# Patient Record
Sex: FEMALE | Race: WHITE | NOT HISPANIC OR LATINO | Employment: OTHER | URBAN - METROPOLITAN AREA
[De-identification: names, ages, dates, MRNs, and addresses within clinical notes are randomized per-mention and may not be internally consistent; named-entity substitution may affect disease eponyms.]

---

## 2023-03-31 ENCOUNTER — OFFICE VISIT (OUTPATIENT)
Dept: PRIMARY CARE | Facility: CLINIC | Age: 43
End: 2023-03-31
Payer: COMMERCIAL

## 2023-03-31 VITALS
WEIGHT: 233 LBS | OXYGEN SATURATION: 99 % | TEMPERATURE: 96.8 F | DIASTOLIC BLOOD PRESSURE: 70 MMHG | HEART RATE: 67 BPM | SYSTOLIC BLOOD PRESSURE: 118 MMHG | BODY MASS INDEX: 37.61 KG/M2

## 2023-03-31 DIAGNOSIS — Z12.31 ENCOUNTER FOR SCREENING MAMMOGRAM FOR MALIGNANT NEOPLASM OF BREAST: ICD-10-CM

## 2023-03-31 DIAGNOSIS — T75.3XXA MOTION SICKNESS, INITIAL ENCOUNTER: ICD-10-CM

## 2023-03-31 DIAGNOSIS — E66.09 CLASS 2 OBESITY DUE TO EXCESS CALORIES WITHOUT SERIOUS COMORBIDITY WITH BODY MASS INDEX (BMI) OF 37.0 TO 37.9 IN ADULT: Primary | ICD-10-CM

## 2023-03-31 DIAGNOSIS — E61.1 IRON DEFICIENCY: ICD-10-CM

## 2023-03-31 PROBLEM — E66.812 CLASS 2 OBESITY DUE TO EXCESS CALORIES WITHOUT SERIOUS COMORBIDITY WITH BODY MASS INDEX (BMI) OF 37.0 TO 37.9 IN ADULT: Status: ACTIVE | Noted: 2023-03-31

## 2023-03-31 PROCEDURE — 99214 OFFICE O/P EST MOD 30 MIN: CPT | Performed by: FAMILY MEDICINE

## 2023-03-31 PROCEDURE — 1036F TOBACCO NON-USER: CPT | Performed by: FAMILY MEDICINE

## 2023-03-31 RX ORDER — MECLIZINE HCL 12.5 MG 12.5 MG/1
12.5 TABLET ORAL 3 TIMES DAILY PRN
Qty: 60 TABLET | Refills: 0 | Status: SHIPPED | OUTPATIENT
Start: 2023-03-31 | End: 2023-04-20

## 2023-03-31 NOTE — PROGRESS NOTES
Assessment     ASSESSMENT/PLAN:      Problem List Items Addressed This Visit          Endocrine/Metabolic    Iron deficiency    Relevant Orders    CBC and Auto Differential    Iron and TIBC    Class 2 obesity due to excess calories without serious comorbidity with body mass index (BMI) of 37.0 to 37.9 in adult - Primary    Relevant Orders    Tsh With Reflex To Free T4 If Abnormal    Hemoglobin A1c    Lipid panel    Follow Up In [facility] - PCP     Other Visit Diagnoses       Encounter for screening mammogram for malignant neoplasm of breast        Relevant Orders    BI mammo bilateral screening tomosynthesis    Motion sickness, initial encounter        Relevant Medications    meclizine (Antivert) 12.5 mg tablet            Patient Instructions:  Patient Instructions   Class 2 Obesity: increase cardio to 45 min 5 days/week, continue calorie deficit , get labs   Preventative: mammogram ordered      Signed by: Chelsie Cooney DO       FUTURE DIRECTION:   Recheck weight, review weight loss options and labs   Needs pap this year     Subjective   SUBJECTIVE:     HPI : Patient is a 42 y.o. female who presents today for the following:     LBP: improved with exercise     GERD  Controlled with diet     Obesity   States that she has been working on calorie deficit for past month, doing a weight lost program through an [facility]   Has been weightlifting and increased cardio 30 min daily     CAMDEN:   Doing better with plant based iron supplements, does not feel fatigue     Motion sickness:  States that she will be traveling to [facility] and would like to try meds to help with motion sickness         Review of Systems    History reviewed. No pertinent past medical history.     History reviewed. No pertinent surgical history.     Current Outpatient Medications   Medication Instructions    meclizine (ANTIVERT) 12.5 mg, oral, 3 times daily PRN        No Known Allergies     Social History     Socioeconomic History    Marital  status:      Spouse name: Not on file    Number of children: Not on file    Years of education: Not on file    Highest education level: Not on file   Occupational History    Not on file   Tobacco Use    Smoking status: Never    Smokeless tobacco: Never   Vaping Use    Vaping status: Not on file   Substance and Sexual Activity    Alcohol use: Not on file    Drug use: Not on file    Sexual activity: Not on file   Other Topics Concern    Not on file   Social History Narrative    Not on file     Social Determinants of Health     Financial Resource Strain: Not on file   Food Insecurity: Not on file   Transportation Needs: Not on file   Physical Activity: Not on file   Stress: Not on file   Social Connections: Not on file   Intimate Partner Violence: Not on file   Housing Stability: Not on file        Family History   Problem Relation Name Age of Onset    Colon cancer Mother      Hyperlipidemia Father      Glaucoma Brother      Breast cancer Father's Sister          Objective     OBJECTIVE:     Vitals:    03/31/23 0855   BP: 118/70   Pulse: 67   Temp: 36 °C (96.8 °F)   SpO2: 99%   Weight: 106 kg (233 lb)   Body mass index is 37.61 kg/m².    Physical Exam  HENT:      Head: Normocephalic and atraumatic.      Nose: Nose normal.      Mouth/Throat:      Mouth: Mucous membranes are moist.   Eyes:      Pupils: Pupils are equal, round, and reactive to light.   Cardiovascular:      Rate and Rhythm: Normal rate and regular rhythm.      Pulses: Normal pulses.      Heart sounds: No murmur heard.  Pulmonary:      Effort: Pulmonary effort is normal.      Breath sounds: Normal breath sounds.   Abdominal:      Tenderness: There is no abdominal tenderness.   Musculoskeletal:         General: Normal range of motion.      Cervical back: Normal range of motion.   Skin:     General: Skin is warm and dry.   Neurological:      Mental Status: She is alert.   Psychiatric:         Mood and Affect: Mood normal.

## 2023-03-31 NOTE — PATIENT INSTRUCTIONS
Class 2 Obesity: increase cardio to 45 min 5 days/week, continue calorie deficit , get labs   Preventative: mammogram ordered

## 2023-04-04 ENCOUNTER — LAB (OUTPATIENT)
Dept: LAB | Facility: LAB | Age: 43
End: 2023-04-04
Payer: COMMERCIAL

## 2023-04-04 ENCOUNTER — TELEPHONE (OUTPATIENT)
Dept: PRIMARY CARE | Facility: CLINIC | Age: 43
End: 2023-04-04

## 2023-04-04 DIAGNOSIS — E61.1 IRON DEFICIENCY: ICD-10-CM

## 2023-04-04 DIAGNOSIS — E66.09 CLASS 2 OBESITY DUE TO EXCESS CALORIES WITHOUT SERIOUS COMORBIDITY WITH BODY MASS INDEX (BMI) OF 37.0 TO 37.9 IN ADULT: ICD-10-CM

## 2023-04-04 LAB
BASOPHILS (10*3/UL) IN BLOOD BY AUTOMATED COUNT: 0.06 X10E9/L (ref 0–0.1)
BASOPHILS/100 LEUKOCYTES IN BLOOD BY AUTOMATED COUNT: 0.8 % (ref 0–2)
CHOLESTEROL (MG/DL) IN SER/PLAS: 176 MG/DL (ref 0–199)
CHOLESTEROL IN HDL (MG/DL) IN SER/PLAS: 30.1 MG/DL
CHOLESTEROL/HDL RATIO: 5.8
EOSINOPHILS (10*3/UL) IN BLOOD BY AUTOMATED COUNT: 0.32 X10E9/L (ref 0–0.7)
EOSINOPHILS/100 LEUKOCYTES IN BLOOD BY AUTOMATED COUNT: 4.4 % (ref 0–6)
ERYTHROCYTE DISTRIBUTION WIDTH (RATIO) BY AUTOMATED COUNT: 17.6 % (ref 11.5–14.5)
ERYTHROCYTE MEAN CORPUSCULAR HEMOGLOBIN CONCENTRATION (G/DL) BY AUTOMATED: 30 G/DL (ref 32–36)
ERYTHROCYTE MEAN CORPUSCULAR VOLUME (FL) BY AUTOMATED COUNT: 75 FL (ref 80–100)
ERYTHROCYTES (10*6/UL) IN BLOOD BY AUTOMATED COUNT: 4.51 X10E12/L (ref 4–5.2)
ESTIMATED AVERAGE GLUCOSE FOR HBA1C: 126 MG/DL
HEMATOCRIT (%) IN BLOOD BY AUTOMATED COUNT: 33.7 % (ref 36–46)
HEMOGLOBIN (G/DL) IN BLOOD: 10.1 G/DL (ref 12–16)
HEMOGLOBIN A1C/HEMOGLOBIN TOTAL IN BLOOD: 6 %
IMMATURE GRANULOCYTES/100 LEUKOCYTES IN BLOOD BY AUTOMATED COUNT: 0.7 % (ref 0–0.9)
IRON (UG/DL) IN SER/PLAS: 18 UG/DL (ref 35–150)
IRON BINDING CAPACITY (UG/DL) IN SER/PLAS: 384 UG/DL (ref 240–445)
IRON SATURATION (%) IN SER/PLAS: 5 % (ref 25–45)
LDL: 126 MG/DL (ref 0–99)
LEUKOCYTES (10*3/UL) IN BLOOD BY AUTOMATED COUNT: 7.4 X10E9/L (ref 4.4–11.3)
LYMPHOCYTES (10*3/UL) IN BLOOD BY AUTOMATED COUNT: 1.27 X10E9/L (ref 1.2–4.8)
LYMPHOCYTES/100 LEUKOCYTES IN BLOOD BY AUTOMATED COUNT: 17.3 % (ref 13–44)
MONOCYTES (10*3/UL) IN BLOOD BY AUTOMATED COUNT: 0.7 X10E9/L (ref 0.1–1)
MONOCYTES/100 LEUKOCYTES IN BLOOD BY AUTOMATED COUNT: 9.5 % (ref 2–10)
NEUTROPHILS (10*3/UL) IN BLOOD BY AUTOMATED COUNT: 4.95 X10E9/L (ref 1.2–7.7)
NEUTROPHILS/100 LEUKOCYTES IN BLOOD BY AUTOMATED COUNT: 67.3 % (ref 40–80)
OVALOCYTES PRESENCE IN BLOOD BY LIGHT MICROSCOPY: NORMAL
PLATELETS (10*3/UL) IN BLOOD AUTOMATED COUNT: 87 X10E9/L (ref 150–450)
POLYCHROMASIA IN BLOOD BY LIGHT MICROSCOPY: NORMAL
RBC MORPHOLOGY IN BLOOD: NORMAL
THYROTROPIN (MIU/L) IN SER/PLAS BY DETECTION LIMIT <= 0.05 MIU/L: 5.05 MIU/L (ref 0.44–3.98)
THYROXINE (T4) FREE (NG/DL) IN SER/PLAS: 0.66 NG/DL (ref 0.61–1.12)
TRIGLYCERIDE (MG/DL) IN SER/PLAS: 98 MG/DL (ref 0–149)
VLDL: 20 MG/DL (ref 0–40)

## 2023-04-04 PROCEDURE — 83550 IRON BINDING TEST: CPT

## 2023-04-04 PROCEDURE — 83540 ASSAY OF IRON: CPT

## 2023-04-04 PROCEDURE — 36415 COLL VENOUS BLD VENIPUNCTURE: CPT

## 2023-04-04 PROCEDURE — 84443 ASSAY THYROID STIM HORMONE: CPT

## 2023-04-04 PROCEDURE — 80061 LIPID PANEL: CPT

## 2023-04-04 PROCEDURE — 83036 HEMOGLOBIN GLYCOSYLATED A1C: CPT

## 2023-04-04 PROCEDURE — 85025 COMPLETE CBC W/AUTO DIFF WBC: CPT

## 2023-04-04 PROCEDURE — 84439 ASSAY OF FREE THYROXINE: CPT

## 2023-04-04 NOTE — TELEPHONE ENCOUNTER
----- Message from Felecia Yancey MA sent at 4/4/2023  1:59 PM EDT -----    ----- Message -----  From: Chelsie Cooney DO  Sent: 4/4/2023   1:57 PM EDT  To: #    Pt can schedule video or in office visit to discuss labs

## 2023-04-04 NOTE — TELEPHONE ENCOUNTER
----- Message from Chelsie Cooney DO sent at 4/4/2023 11:11 AM EDT -----  Can't find thyroxine(T4) level. Can you help call lab to see what the level was. This will help me with management

## 2023-04-04 NOTE — TELEPHONE ENCOUNTER
Called and spoke with Varghese at  Lab- he stated that the free t4 is resulted and it is 0.66. He stated that he will also fax over a copy of this result. Thanks, CG

## 2023-04-07 ENCOUNTER — TELEMEDICINE (OUTPATIENT)
Dept: PRIMARY CARE | Facility: CLINIC | Age: 43
End: 2023-04-07
Payer: COMMERCIAL

## 2023-04-07 DIAGNOSIS — E78.2 MIXED HYPERLIPIDEMIA: ICD-10-CM

## 2023-04-07 DIAGNOSIS — E03.9 HYPOTHYROIDISM, UNSPECIFIED TYPE: Primary | ICD-10-CM

## 2023-04-07 DIAGNOSIS — R73.01 IFG (IMPAIRED FASTING GLUCOSE): ICD-10-CM

## 2023-04-07 DIAGNOSIS — D50.9 IRON DEFICIENCY ANEMIA, UNSPECIFIED IRON DEFICIENCY ANEMIA TYPE: ICD-10-CM

## 2023-04-07 PROCEDURE — 99214 OFFICE O/P EST MOD 30 MIN: CPT | Performed by: FAMILY MEDICINE

## 2023-04-07 RX ORDER — LEVOTHYROXINE SODIUM 25 UG/1
12.5 TABLET ORAL DAILY
Qty: 90 TABLET | Refills: 0 | Status: SHIPPED | OUTPATIENT
Start: 2023-04-07 | End: 2023-07-18 | Stop reason: SDUPTHER

## 2023-04-07 NOTE — PATIENT INSTRUCTIONS
Hypothyroid: would like to try 1/2 tablet of levothyroxine, recheck thyroid in 6 weeks   IFG: work on diet, will continue to monitor   CAMDEN: will refer to hematology, iron low despite oral replacement

## 2023-04-07 NOTE — PROGRESS NOTES
Assessment     ASSESSMENT/PLAN:      Problem List Items Addressed This Visit    None  Visit Diagnoses       Hypothyroidism, unspecified type    -  Primary    Relevant Medications    levothyroxine (Synthroid, Levoxyl) 25 mcg tablet    Other Relevant Orders    Tsh With Reflex To Free T4 If Abnormal    Iron deficiency anemia, unspecified iron deficiency anemia type        Relevant Orders    Referral to Hematology    IFG (impaired fasting glucose)        Mixed hyperlipidemia                  Patient Instructions:  Patient Instructions   Hypothyroid: would like to try 1/2 tablet of levothyroxine, recheck thyroid in 6 weeks   IFG: work on diet, will continue to monitor   CAMDEN: will refer to hematology, iron low despite oral replacement       Patient consented to virtual encounter. If patient was felt to need in-person evaluation they were directed to the office or ED as appropriate. Total time spent interacting with patient was 10 minutes.     Patient presents today via video telemedicine encounter. This encounter is not taking place in person due to COVID.    Signed by: Chelsie Cooney DO       FUTURE DIRECTION:   Review TSH, may need to increase to 25mcg     Subjective     SUBJECTIVE:     HPI : Patient is a 42 y.o. female who presents today via video telemedicine encounter to discuss the following:      Review labs   Patient mentions concern of low thyroid, since she has a strong family history of low thyroid     LBP: improved with exercise      GERD  Controlled with diet      Obesity   States that she has been working on calorie deficit for past month, doing a weight lost program through an [facility]   Has been weightlifting and increased cardio 30 min daily      CAMDEN:   -plant based iron supplements, does not feel fatigue      Review of Systems  Negative unless stated above     Objective   OBJECTIVE:     There were no vitals filed for this visit.    Physical Exam  Constitutional:       Appearance: Normal appearance.    HENT:      Head: Normocephalic.   Pulmonary:      Effort: Pulmonary effort is normal.   Musculoskeletal:      Cervical back: Normal range of motion.   Neurological:      Mental Status: She is alert.   Psychiatric:         Mood and Affect: Mood normal.         The 10-year ASCVD risk score (Marcus ABBASI, et al., 2019) is: 1.2%    Values used to calculate the score:      Age: 42 years      Sex: Female      Is Non- : No      Diabetic: No      Tobacco smoker: No      Systolic Blood Pressure: 118 mmHg      Is BP treated: No      HDL Cholesterol: 30.1 mg/dL      Total Cholesterol: 176 mg/dL

## 2023-04-12 DIAGNOSIS — R92.8 ABNORMAL MAMMOGRAM: Primary | ICD-10-CM

## 2023-04-25 ENCOUNTER — TELEPHONE (OUTPATIENT)
Dept: PRIMARY CARE | Facility: CLINIC | Age: 43
End: 2023-04-25
Payer: COMMERCIAL

## 2023-04-25 NOTE — TELEPHONE ENCOUNTER
----- Message from Kourtney Chung MA sent at 4/25/2023  3:50 PM EDT -----    ----- Message -----  From: Chelsie Cooney DO  Sent: 4/25/2023   1:37 PM EDT  To: #    Please let pt know that mammogram is normal. Continue yearly screening.

## 2023-05-12 ENCOUNTER — TELEPHONE (OUTPATIENT)
Dept: PRIMARY CARE | Facility: CLINIC | Age: 43
End: 2023-05-12
Payer: COMMERCIAL

## 2023-05-12 NOTE — TELEPHONE ENCOUNTER
----- Message from Chelsie Cooney DO sent at 5/12/2023  4:19 PM EDT -----  Please let pt know that mammogram is normal. Continue yearly screening.

## 2023-05-30 ENCOUNTER — HOSPITAL ENCOUNTER (OUTPATIENT)
Dept: DATA CONVERSION | Facility: HOSPITAL | Age: 43
End: 2023-05-30
Attending: SURGERY | Admitting: SURGERY
Payer: COMMERCIAL

## 2023-05-30 DIAGNOSIS — K44.9 DIAPHRAGMATIC HERNIA WITHOUT OBSTRUCTION OR GANGRENE: ICD-10-CM

## 2023-05-30 DIAGNOSIS — D50.9 IRON DEFICIENCY ANEMIA, UNSPECIFIED: ICD-10-CM

## 2023-05-30 DIAGNOSIS — Z87.11 PERSONAL HISTORY OF PEPTIC ULCER DISEASE: ICD-10-CM

## 2023-05-30 DIAGNOSIS — K31.89 OTHER DISEASES OF STOMACH AND DUODENUM: ICD-10-CM

## 2023-05-30 DIAGNOSIS — R51.9 HEADACHE, UNSPECIFIED: ICD-10-CM

## 2023-05-30 DIAGNOSIS — K21.9 GASTRO-ESOPHAGEAL REFLUX DISEASE WITHOUT ESOPHAGITIS: ICD-10-CM

## 2023-05-30 DIAGNOSIS — E03.9 HYPOTHYROIDISM, UNSPECIFIED: ICD-10-CM

## 2023-05-30 DIAGNOSIS — R12 HEARTBURN: ICD-10-CM

## 2023-05-30 LAB — HCG, URINE: NEGATIVE

## 2023-06-02 LAB
COMPLETE PATHOLOGY REPORT: NORMAL
CONVERTED CLINICAL DIAGNOSIS-HISTORY: NORMAL
CONVERTED FINAL DIAGNOSIS: NORMAL
CONVERTED FINAL REPORT PDF LINK TO COPY AND PASTE: NORMAL
CONVERTED GROSS DESCRIPTION: NORMAL

## 2023-06-30 ENCOUNTER — LAB (OUTPATIENT)
Dept: LAB | Facility: LAB | Age: 43
End: 2023-06-30
Payer: COMMERCIAL

## 2023-06-30 DIAGNOSIS — E03.9 HYPOTHYROIDISM, UNSPECIFIED TYPE: ICD-10-CM

## 2023-06-30 LAB
ERYTHROCYTE DISTRIBUTION WIDTH (RATIO) BY AUTOMATED COUNT: 21.4 % (ref 11.5–14.5)
ERYTHROCYTE MEAN CORPUSCULAR HEMOGLOBIN CONCENTRATION (G/DL) BY AUTOMATED: 30.3 G/DL (ref 32–36)
ERYTHROCYTE MEAN CORPUSCULAR VOLUME (FL) BY AUTOMATED COUNT: 80 FL (ref 80–100)
ERYTHROCYTES (10*6/UL) IN BLOOD BY AUTOMATED COUNT: 4.41 X10E12/L (ref 4–5.2)
FERRITIN (UG/LL) IN SER/PLAS: 104 UG/L (ref 8–150)
HEMATOCRIT (%) IN BLOOD BY AUTOMATED COUNT: 35.3 % (ref 36–46)
HEMOGLOBIN (G/DL) IN BLOOD: 10.7 G/DL (ref 12–16)
IRON (UG/DL) IN SER/PLAS: 53 UG/DL (ref 35–150)
IRON BINDING CAPACITY (UG/DL) IN SER/PLAS: 318 UG/DL (ref 240–445)
IRON SATURATION (%) IN SER/PLAS: 17 % (ref 25–45)
LEUKOCYTES (10*3/UL) IN BLOOD BY AUTOMATED COUNT: 8.9 X10E9/L (ref 4.4–11.3)
OVALOCYTES PRESENCE IN BLOOD BY LIGHT MICROSCOPY: NORMAL
PLATELETS (10*3/UL) IN BLOOD AUTOMATED COUNT: 65 X10E9/L (ref 150–450)
PLATELETS GIANT PRESENCE IN BLOOD BY LIGHT MICROSCOPY: NORMAL
RBC MORPHOLOGY IN BLOOD: NORMAL
THYROTROPIN (MIU/L) IN SER/PLAS BY DETECTION LIMIT <= 0.05 MIU/L: 2.93 MIU/L (ref 0.44–3.98)

## 2023-06-30 PROCEDURE — 84443 ASSAY THYROID STIM HORMONE: CPT

## 2023-06-30 PROCEDURE — 36415 COLL VENOUS BLD VENIPUNCTURE: CPT

## 2023-07-14 ENCOUNTER — APPOINTMENT (OUTPATIENT)
Dept: PRIMARY CARE | Facility: CLINIC | Age: 43
End: 2023-07-14
Payer: COMMERCIAL

## 2023-07-18 ENCOUNTER — OFFICE VISIT (OUTPATIENT)
Dept: PRIMARY CARE | Facility: CLINIC | Age: 43
End: 2023-07-18
Payer: COMMERCIAL

## 2023-07-18 VITALS
BODY MASS INDEX: 38.41 KG/M2 | WEIGHT: 238 LBS | DIASTOLIC BLOOD PRESSURE: 70 MMHG | OXYGEN SATURATION: 98 % | TEMPERATURE: 97.1 F | SYSTOLIC BLOOD PRESSURE: 122 MMHG | HEART RATE: 68 BPM

## 2023-07-18 DIAGNOSIS — E66.09 CLASS 2 OBESITY DUE TO EXCESS CALORIES WITHOUT SERIOUS COMORBIDITY WITH BODY MASS INDEX (BMI) OF 37.0 TO 37.9 IN ADULT: ICD-10-CM

## 2023-07-18 DIAGNOSIS — E78.2 MIXED HYPERLIPIDEMIA: ICD-10-CM

## 2023-07-18 DIAGNOSIS — E61.1 IRON DEFICIENCY: ICD-10-CM

## 2023-07-18 DIAGNOSIS — L40.9 PSORIASIS: Primary | ICD-10-CM

## 2023-07-18 DIAGNOSIS — E03.9 HYPOTHYROIDISM, UNSPECIFIED TYPE: ICD-10-CM

## 2023-07-18 PROCEDURE — 3008F BODY MASS INDEX DOCD: CPT | Performed by: FAMILY MEDICINE

## 2023-07-18 PROCEDURE — 1036F TOBACCO NON-USER: CPT | Performed by: FAMILY MEDICINE

## 2023-07-18 PROCEDURE — 99214 OFFICE O/P EST MOD 30 MIN: CPT | Performed by: FAMILY MEDICINE

## 2023-07-18 RX ORDER — LEVOTHYROXINE SODIUM 25 UG/1
12.5 TABLET ORAL DAILY
Qty: 45 TABLET | Refills: 1 | Status: SHIPPED | OUTPATIENT
Start: 2023-07-18 | End: 2024-03-15 | Stop reason: SDUPTHER

## 2023-07-18 ASSESSMENT — ENCOUNTER SYMPTOMS: ARTHRALGIAS: 1

## 2023-07-18 NOTE — PROGRESS NOTES
Assessment     ASSESSMENT/PLAN:      Problem List Items Addressed This Visit          Cardiac and Vasculature    Mixed hyperlipidemia       Endocrine/Metabolic    Class 2 obesity due to excess calories without serious comorbidity with body mass index (BMI) of 37.0 to 37.9 in adult    Hypothyroidism    Relevant Medications    levothyroxine (Synthroid, Levoxyl) 25 mcg tablet       Hematology and Neoplasia    Iron deficiency     Other Visit Diagnoses       Psoriasis    -  Primary            Patient Instructions:  Patient Instructions   Obesity: Continue follow up with bariatric surgery.   Hypothyroid: reviewed recent labs and refilled meds   CAMDEN: continue follow up with hematology       Signed by: Chelsie Cooney DO       FUTURE DIRECTION:   None     Subjective   SUBJECTIVE:     HPI : Patient is a 42 y.o. female who presents today for the following:     LBP: improved with exercise      GERD  Controlled with diet, not interested in taking medication     Obesity   No improvement with home exercise regimen   Was seen by bariatric surgery, plans to move forward with surgery     Dermatology   - diagnosed with psoriasis, states that she has been using steroid cream      CAMDEN:   - follows hematology   - has been getting iron infusion, last one in June       Review of Systems   Musculoskeletal:  Positive for arthralgias.       History reviewed. No pertinent past medical history.     History reviewed. No pertinent surgical history.     Current Outpatient Medications   Medication Instructions    levothyroxine (SYNTHROID, LEVOXYL) 12.5 mcg, oral, Daily        No Known Allergies     Social History     Socioeconomic History    Marital status:      Spouse name: Not on file    Number of children: Not on file    Years of education: Not on file    Highest education level: Not on file   Occupational History    Not on file   Tobacco Use    Smoking status: Never    Smokeless tobacco: Never   Substance and Sexual Activity     Alcohol use: Not on file    Drug use: Not on file    Sexual activity: Not on file   Other Topics Concern    Not on file   Social History Narrative    Not on file     Social Determinants of Health     Financial Resource Strain: Not on file   Food Insecurity: Not on file   Transportation Needs: Not on file   Physical Activity: Not on file   Stress: Not on file   Social Connections: Not on file   Intimate Partner Violence: Not on file   Housing Stability: Not on file        Family History   Problem Relation Name Age of Onset    Colon cancer Mother      Hyperlipidemia Father      Glaucoma Brother      Breast cancer Father's Sister          Objective     OBJECTIVE:     Vitals:    07/18/23 0759   BP: 122/70   Pulse: 68   Temp: 36.2 °C (97.1 °F)   SpO2: 98%   Weight: 108 kg (238 lb)        Physical Exam  HENT:      Head: Normocephalic and atraumatic.      Nose: Nose normal.      Mouth/Throat:      Mouth: Mucous membranes are moist.   Eyes:      Pupils: Pupils are equal, round, and reactive to light.   Cardiovascular:      Rate and Rhythm: Normal rate and regular rhythm.      Pulses: Normal pulses.      Heart sounds: No murmur heard.  Pulmonary:      Effort: Pulmonary effort is normal.      Breath sounds: Normal breath sounds.   Abdominal:      Tenderness: There is no abdominal tenderness.   Musculoskeletal:         General: Normal range of motion.      Cervical back: Normal range of motion.      Right knee: Crepitus present. No swelling or effusion. Normal range of motion. No tenderness. No LCL laxity, MCL laxity or ACL laxity.      Instability Tests: Anterior drawer test negative. Posterior drawer test negative. Anterior Lachman test negative.      Left knee: Crepitus present. No swelling or effusion. Normal range of motion. No tenderness. No LCL laxity, MCL laxity, ACL laxity or PCL laxity.     Instability Tests: Anterior drawer test negative. Posterior drawer test negative. Anterior Lachman test negative.   Skin:      General: Skin is warm and dry.   Neurological:      Mental Status: She is alert.   Psychiatric:         Mood and Affect: Mood normal.

## 2023-07-18 NOTE — PATIENT INSTRUCTIONS
Obesity: Continue follow up with bariatric surgery.   Hypothyroid: reviewed recent labs and refilled meds   CAMDEN: continue follow up with hematology

## 2023-09-29 NOTE — PROGRESS NOTES
"Counseling:  The patient was counseled regarding diagnostic results, instructions for management, risk factor reductions, prognosis, patient and family education, impressions, risks and benefits of treatment options and importance of compliance with treatment.      Chief Complaint:   The patient presents today for 1-month followup of echocardiogram performed as part of preop clearance.      History Of Present Illness:    ANT MESA is a 42 year old female patient who presents today for 1-month followup of echocardiogram performed as part of preop clearance. Her PMH is significant for GERD and PHAN. Echocardiogram performed 09/26/2023 demonstrated an EF of 65%. Today, the patient states that she is feeling well with no new cardiac complaints.       Last Recorded Vitals:  Vitals:    10/02/23 1521   BP: 120/78   BP Location: Left arm   Pulse: 74   Weight: 105 kg (232 lb)   Height: 1.676 m (5' 6\")     Past Surgical History:  She has no past surgical history on file.      Social History:  She reports that she has quit smoking. Her smoking use included cigarettes. She has never used smokeless tobacco. She reports current alcohol use of about 1.0 standard drink of alcohol per week. She reports that she does not use drugs.    Family History:  Family History   Problem Relation Name Age of Onset    Colon cancer Mother      Hyperlipidemia Father      Glaucoma Brother      Breast cancer Father's Sister        Allergies:  Patient has no known allergies.    Outpatient Medications:  Current Outpatient Medications   Medication Instructions    Bryhali 0.01 % lotion     levothyroxine (SYNTHROID, LEVOXYL) 12.5 mcg, oral, Daily     Review of Systems   All other systems reviewed and are negative.     Physical Exam:  Constitutional:       Appearance: Healthy appearance. Not in distress.   Neck:      Vascular: No JVR. JVD normal.   Pulmonary:      Effort: Pulmonary effort is normal.      Breath sounds: Normal breath sounds. No " wheezing. No rhonchi. No rales.   Chest:      Chest wall: Not tender to palpatation.   Cardiovascular:      PMI at left midclavicular line. Normal rate. Regular rhythm. Normal S1. Normal S2.       Murmurs: There is no murmur.      No gallop.  No click. No rub.   Pulses:     Intact distal pulses.   Edema:     Peripheral edema absent.   Abdominal:      General: Bowel sounds are normal.      Palpations: Abdomen is soft.      Tenderness: There is no abdominal tenderness.   Musculoskeletal: Normal range of motion.         General: No tenderness. Skin:     General: Skin is warm and dry.   Neurological:      General: No focal deficit present.      Mental Status: Alert and oriented to person, place and time.     Last Labs:  CBC -  Lab Results   Component Value Date    WBC 8.0 09/30/2023    HGB 13.1 09/30/2023    HCT 40.3 09/30/2023    MCV 89 09/30/2023    PLT 55 (L) 09/30/2023       CMP -  Lab Results   Component Value Date    CALCIUM 8.4 (L) 09/30/2023    PROT 6.3 (L) 09/30/2023    ALBUMIN 4.0 09/30/2023    AST 11 09/30/2023    ALT 16 09/30/2023    ALKPHOS 56 09/30/2023    BILITOT 0.2 09/30/2023       LIPID PANEL -   Lab Results   Component Value Date    CHOL 183 09/30/2023    HDL 26.6 09/30/2023    CHHDL 6.9 09/30/2023    VLDL 15 09/30/2023    TRIG 74 09/30/2023    NHDL 156 (H) 09/30/2023       RENAL FUNCTION PANEL -   Lab Results   Component Value Date    K 4.2 09/30/2023       Lab Results   Component Value Date    HGBA1C 5.8 (H) 09/30/2023       Last Cardiology Tests:  09/26/2023 - TTE  Left ventricular systolic function is normal with a 65% estimated ejection fraction.     Diagnostic review: I have personally reviewed the result(s) of the Echocardiogram.    Assessment/Plan   1) Cardiovascular Risk Stratification Prior to Bariatric Surgery  H/O palpitations s/t anemia requiring 6 iron infusions  Denies CP, chest discomfort or SOB  No past h/o h/o HTN or diabetes  Sleep study 08/01/2023 with mild sleep apnea with a SpO2  karson of 86.0% - will be starting CPAP therapy   ECG with ? old anteroseptal infarct  TTE 09/26/2023 with LVEF 65%  Low risk for planned surgery  Followup as needed      Scribe Attestation  By signing my name below, I, Rene Rose   attest that this documentation has been prepared under the direction and in the presence of Gerson Barksdale MD.

## 2023-09-30 ENCOUNTER — LAB (OUTPATIENT)
Dept: LAB | Facility: LAB | Age: 43
End: 2023-09-30
Payer: COMMERCIAL

## 2023-09-30 DIAGNOSIS — Z98.84 BARIATRIC SURGERY STATUS: Primary | ICD-10-CM

## 2023-09-30 LAB
25(OH)D3 SERPL-MCNC: 26 NG/ML (ref 30–100)
ALBUMIN SERPL BCP-MCNC: 4 G/DL (ref 3.4–5)
ALP SERPL-CCNC: 56 U/L (ref 33–110)
ALT SERPL W P-5'-P-CCNC: 16 U/L (ref 7–45)
ANION GAP SERPL CALC-SCNC: 10 MMOL/L (ref 10–20)
APTT PPP: 35 SECONDS (ref 27–38)
AST SERPL W P-5'-P-CCNC: 11 U/L (ref 9–39)
BASOPHILS # BLD AUTO: 0.05 X10*3/UL (ref 0–0.1)
BASOPHILS NFR BLD AUTO: 0.6 %
BILIRUB SERPL-MCNC: 0.2 MG/DL (ref 0–1.2)
BUN SERPL-MCNC: 15 MG/DL (ref 6–23)
CALCIUM SERPL-MCNC: 8.4 MG/DL (ref 8.6–10.3)
CHLORIDE SERPL-SCNC: 107 MMOL/L (ref 98–107)
CHOLEST SERPL-MCNC: 183 MG/DL (ref 0–199)
CHOLESTEROL/HDL RATIO: 6.9
CO2 SERPL-SCNC: 25 MMOL/L (ref 21–32)
CREAT SERPL-MCNC: 0.74 MG/DL (ref 0.5–1.05)
EOSINOPHIL # BLD AUTO: 0.35 X10*3/UL (ref 0–0.7)
EOSINOPHIL NFR BLD AUTO: 4.4 %
ERYTHROCYTE [DISTWIDTH] IN BLOOD BY AUTOMATED COUNT: 15.9 % (ref 11.5–14.5)
FERRITIN SERPL-MCNC: 185 NG/ML (ref 8–150)
FOLATE SERPL-MCNC: 11.6 NG/ML
GFR SERPL CREATININE-BSD FRML MDRD: >90 ML/MIN/1.73M*2
GLUCOSE SERPL-MCNC: 102 MG/DL (ref 74–99)
HCT VFR BLD AUTO: 40.3 % (ref 36–46)
HDLC SERPL-MCNC: 26.6 MG/DL
HGB BLD-MCNC: 13.1 G/DL (ref 12–16)
IMM GRANULOCYTES # BLD AUTO: 0.1 X10*3/UL (ref 0–0.7)
IMM GRANULOCYTES NFR BLD AUTO: 1.3 % (ref 0–0.9)
INR PPP: 1 (ref 0.9–1.1)
IRON SATN MFR SERPL: 13 % (ref 25–45)
IRON SERPL-MCNC: 38 UG/DL (ref 35–150)
LDLC SERPL CALC-MCNC: 142 MG/DL (ref 140–190)
LYMPHOCYTES # BLD AUTO: 0.96 X10*3/UL (ref 1.2–4.8)
LYMPHOCYTES NFR BLD AUTO: 12 %
MCH RBC QN AUTO: 28.8 PG (ref 26–34)
MCHC RBC AUTO-ENTMCNC: 32.5 G/DL (ref 32–36)
MCV RBC AUTO: 89 FL (ref 80–100)
MONOCYTES # BLD AUTO: 0.57 X10*3/UL (ref 0.1–1)
MONOCYTES NFR BLD AUTO: 7.1 %
NEUTROPHILS # BLD AUTO: 5.97 X10*3/UL (ref 1.2–7.7)
NEUTROPHILS NFR BLD AUTO: 74.6 %
NON HDL CHOLESTEROL: 156 MG/DL (ref 0–149)
NRBC BLD-RTO: 0 /100 WBCS (ref 0–0)
PLATELET # BLD AUTO: 55 X10*3/UL (ref 150–450)
PMV BLD AUTO: ABNORMAL FL
POTASSIUM SERPL-SCNC: 4.2 MMOL/L (ref 3.5–5.3)
PROT SERPL-MCNC: 6.3 G/DL (ref 6.4–8.2)
PROTHROMBIN TIME: 11.7 SECONDS (ref 9.8–12.8)
RBC # BLD AUTO: 4.55 X10*6/UL (ref 4–5.2)
SODIUM SERPL-SCNC: 138 MMOL/L (ref 136–145)
TIBC SERPL-MCNC: 291 UG/DL (ref 240–445)
TRIGL SERPL-MCNC: 74 MG/DL (ref 0–149)
TSH SERPL-ACNC: 3.85 MIU/L (ref 0.44–3.98)
UIBC SERPL-MCNC: 253 UG/DL (ref 110–370)
VIT B12 SERPL-MCNC: 346 PG/ML (ref 211–911)
VLDL: 15 MG/DL (ref 0–40)
WBC # BLD AUTO: 8 X10*3/UL (ref 4.4–11.3)

## 2023-09-30 PROCEDURE — 36415 COLL VENOUS BLD VENIPUNCTURE: CPT

## 2023-09-30 PROCEDURE — 85610 PROTHROMBIN TIME: CPT

## 2023-09-30 PROCEDURE — 82607 VITAMIN B-12: CPT

## 2023-09-30 PROCEDURE — 80061 LIPID PANEL: CPT

## 2023-09-30 PROCEDURE — 82306 VITAMIN D 25 HYDROXY: CPT

## 2023-09-30 PROCEDURE — 83036 HEMOGLOBIN GLYCOSYLATED A1C: CPT

## 2023-09-30 PROCEDURE — 82746 ASSAY OF FOLIC ACID SERUM: CPT

## 2023-09-30 PROCEDURE — 80323 ALKALOIDS NOS: CPT

## 2023-09-30 PROCEDURE — 83550 IRON BINDING TEST: CPT

## 2023-09-30 PROCEDURE — 84425 ASSAY OF VITAMIN B-1: CPT

## 2023-09-30 PROCEDURE — 80050 GENERAL HEALTH PANEL: CPT

## 2023-09-30 PROCEDURE — 85730 THROMBOPLASTIN TIME PARTIAL: CPT

## 2023-09-30 PROCEDURE — 83970 ASSAY OF PARATHORMONE: CPT

## 2023-09-30 PROCEDURE — 84681 ASSAY OF C-PEPTIDE: CPT

## 2023-09-30 PROCEDURE — 82728 ASSAY OF FERRITIN: CPT

## 2023-09-30 PROCEDURE — 82525 ASSAY OF COPPER: CPT

## 2023-09-30 PROCEDURE — 84630 ASSAY OF ZINC: CPT

## 2023-09-30 PROCEDURE — 83540 ASSAY OF IRON: CPT

## 2023-09-30 NOTE — H&P
"    History & Physical Reviewed:   Pregnant/Lactating:  ·  Are You Pregnant no (1)   ·  Are You Currently Breastfeeding no (1)     I have reviewed the History and Physical dated:  23-May-2023   History and Physical reviewed and relevant findings noted. Patient examined to review pertinent physical  findings.: No significant changes   Home Medications Reviewed: no changes noted   Allergies Reviewed: no changes noted       ERAS (Enhanced Recovery After Surgery):  ·  ERAS Patient: no     Consent:   COVID-19 Consent:  ·  COVID-19 Risk Consent Surgeon has reviewed key risks related to the risk of homa COVID-19 and if they contract COVID-19 what the risks are.       Electronic Signatures:  Clement Evans)  (Signed 30-May-2023 10:28)   Authored: History & Physical Reviewed, ERAS, Consent,  Note Completion      Last Updated: 30-May-2023 10:28 by Clement Evans (MD)    References:  1.  Data Referenced From \"History and Physical - Surgical Update < 30 days\" 30-May-2023 09:34   "

## 2023-09-30 NOTE — H&P
History & Physical Reviewed:   Pregnant/Lactating:  ·  Are You Pregnant no   ·  Are You Currently Breastfeeding no     I have reviewed the History and Physical dated:  23-May-2023   History and Physical reviewed and relevant findings noted. Patient examined to review pertinent physical  findings.: No significant changes   Home Medications Reviewed: no changes noted   Allergies Reviewed: no changes noted       ERAS (Enhanced Recovery After Surgery):  ·  ERAS Patient: no     Consent:   COVID-19 Consent:  ·  COVID-19 Risk Consent Surgeon has reviewed key risks related to the risk of homa COVID-19 and if they contract COVID-19 what the risks are.       Electronic Signatures:  Clement Evans)  (Signed 30-May-2023 09:34)   Authored: History & Physical Reviewed, ERAS, Consent,  Note Completion      Last Updated: 30-May-2023 09:34 by Clement Evans)

## 2023-10-01 LAB
EST. AVERAGE GLUCOSE BLD GHB EST-MCNC: 120 MG/DL
HBA1C MFR BLD: 5.8 %

## 2023-10-02 ENCOUNTER — OFFICE VISIT (OUTPATIENT)
Dept: CARDIOLOGY | Facility: CLINIC | Age: 43
End: 2023-10-02
Payer: COMMERCIAL

## 2023-10-02 VITALS
DIASTOLIC BLOOD PRESSURE: 78 MMHG | WEIGHT: 232 LBS | HEIGHT: 66 IN | SYSTOLIC BLOOD PRESSURE: 120 MMHG | HEART RATE: 74 BPM | BODY MASS INDEX: 37.28 KG/M2

## 2023-10-02 DIAGNOSIS — E78.2 MIXED HYPERLIPIDEMIA: Primary | ICD-10-CM

## 2023-10-02 LAB
Lab: 37.9 PG/ML
Lab: 4.5 NG/ML

## 2023-10-02 PROCEDURE — 93000 ELECTROCARDIOGRAM COMPLETE: CPT | Performed by: INTERNAL MEDICINE

## 2023-10-02 PROCEDURE — 99212 OFFICE O/P EST SF 10 MIN: CPT | Performed by: INTERNAL MEDICINE

## 2023-10-02 PROCEDURE — 3008F BODY MASS INDEX DOCD: CPT | Performed by: INTERNAL MEDICINE

## 2023-10-02 PROCEDURE — 1036F TOBACCO NON-USER: CPT | Performed by: INTERNAL MEDICINE

## 2023-10-02 RX ORDER — HALOBETASOL PROPIONATE 0.1 MG/G
LOTION TOPICAL
COMMUNITY
Start: 2023-05-09 | End: 2024-03-11 | Stop reason: WASHOUT

## 2023-10-02 ASSESSMENT — ENCOUNTER SYMPTOMS
DEPRESSION: 0
LOSS OF SENSATION IN FEET: 0
OCCASIONAL FEELINGS OF UNSTEADINESS: 0

## 2023-10-02 NOTE — PATIENT INSTRUCTIONS
From a heart standpoint, you are cleared for surgery.  Followup with Dr. Barksdale on an as needed basis.     If you have any questions or cardiac concerns, please call our office at [phone].

## 2023-10-06 LAB — COPPER SERPL-MCNC: 121 UG/DL (ref 80–155)

## 2023-10-07 LAB — VIT B1 PYROPHOSHATE BLD-SCNC: 109 NMOL/L (ref 70–180)

## 2023-10-08 LAB
COTININE SERPL-MCNC: <5 NG/ML
NICOTINE SERPL-MCNC: <5 NG/ML
ZINC SERPL-MCNC: 92.5 UG/DL (ref 60–120)

## 2023-10-13 ENCOUNTER — NUTRITION (OUTPATIENT)
Dept: SURGERY | Facility: CLINIC | Age: 43
End: 2023-10-13
Payer: COMMERCIAL

## 2023-10-13 VITALS — HEIGHT: 67 IN | BODY MASS INDEX: 36.1 KG/M2 | WEIGHT: 230 LBS

## 2023-10-13 NOTE — PROGRESS NOTES
"PREOPERATIVE, MULTIDISCIPLINARY, MEDICALLY SUPERVISED, REDUCED CALORIE DIET, BEHAVIOR MODIFICATION AND EXERCISE PROGRAM    S:  Continues to practice post op behaviors. Has been tracking her intake and has been getting 2000kcals and 60g+ of protein. Has been drinking water and decaf coffee and has been meet her goals. Continues to take a MVI.     O:    Wt: 230kcal     Ht:    1.699 m (5' 6.9\")          BMI: 36.1    Goal: 5% body weight loss over the course of program    Dietary recommendation:   1. Continue to get 64oz of beverages.   2. Continue post op behaviors.   3. Continue to track your intake. Aim to get 1800kcals.   4. Have balanced meals that always contain a good source of protein.  5. Increase intake of non-starchy vegetables.  Have 5 servings fruits and vegetables daily.   6. Take a multivitamin daily.  7. Continue walking. Increase physical activity by 10-15 minutes to an end goal of 60 minutes 5 x per week.    Group Topic: Eating Triggers and Controlling Environment    Behavioral recommendation: Pt will be able to identify eating triggers and how to avoid them.    A/P: Pt appears to be able to recognize eating triggers and how to avoid eating when not hungry and/or not eating when it is not time for a meal or snack. Pt will continue to practice being mindful of healthy eating habits. The pt is meeting all of her goals. Is cleared from a nutrition standpoint. Will call pt to review 2 week pre op diet.     Exercise: walking 5-6x per week for 30-45 minutes     Ines Licea RDN, [facility]  "

## 2023-10-17 ENCOUNTER — APPOINTMENT (OUTPATIENT)
Dept: SURGERY | Facility: CLINIC | Age: 43
End: 2023-10-17
Payer: COMMERCIAL

## 2023-10-18 ENCOUNTER — OFFICE VISIT (OUTPATIENT)
Dept: SLEEP MEDICINE | Facility: CLINIC | Age: 43
End: 2023-10-18
Payer: COMMERCIAL

## 2023-10-18 VITALS
SYSTOLIC BLOOD PRESSURE: 114 MMHG | RESPIRATION RATE: 16 BRPM | TEMPERATURE: 97.9 F | DIASTOLIC BLOOD PRESSURE: 76 MMHG | WEIGHT: 230.2 LBS | HEART RATE: 73 BPM | BODY MASS INDEX: 37 KG/M2 | HEIGHT: 66 IN

## 2023-10-18 DIAGNOSIS — E66.09 CLASS 2 OBESITY DUE TO EXCESS CALORIES WITHOUT SERIOUS COMORBIDITY WITH BODY MASS INDEX (BMI) OF 37.0 TO 37.9 IN ADULT: ICD-10-CM

## 2023-10-18 DIAGNOSIS — G47.33 OBSTRUCTIVE SLEEP APNEA: Primary | ICD-10-CM

## 2023-10-18 PROCEDURE — 99214 OFFICE O/P EST MOD 30 MIN: CPT | Performed by: NURSE PRACTITIONER

## 2023-10-18 PROCEDURE — 3008F BODY MASS INDEX DOCD: CPT | Performed by: NURSE PRACTITIONER

## 2023-10-18 PROCEDURE — 1036F TOBACCO NON-USER: CPT | Performed by: NURSE PRACTITIONER

## 2023-10-18 NOTE — ASSESSMENT & PLAN NOTE
-currently on auto PAP 5-15 cm H2O  -very compliant, 100% usage >4 hours for past 30 days  -PHAN is well controlled; residual AHI is 0.4  -advised that pt be closely monitored post-operatively due to increased risk of complications related to sleep apnea.   -pt at increased but not prohibitive risk of postoperative respiratory complications due to PHAN.   -pt is optimized on PAP therapy for sleep apnea as long as pt is compliant with PAP use per most recent download.  -pt has expressed understanding of the above instructions and risks.

## 2023-10-18 NOTE — ASSESSMENT & PLAN NOTE
BMI Readings from Last 1 Encounters:   10/18/23 37.16 kg/m²     - encouraged healthy weight loss via diet and exercise  - patient is in the process of undergoing bariatric surgery

## 2023-10-18 NOTE — PATIENT INSTRUCTIONS
Instructions in Preparing for Surgery When You Have Sleep Apnea    Bring your PAP and all equipment with you to surgery.  Use your PAP daily before surgery and after surgery as soon as able.   Keep your head of the bed at 30 degrees or higher whichever is comfortable for patient.  We advise judicious use of pain medications and sedatives post-operatively as these medications can worsen sleep apnea.  We advise that you be closely monitored post-operatively due to increased risk of complications related to sleep apnea.   You are at increased but not prohibitive risk of postoperative respiratory complications due to PHAN.   You are optimized on PAP therapy for sleep apnea as long as you are compliant with PAP use per most recent download.  You have expressed your understanding of the above instructions and risks.           [facility]  DO [address]  [address] [facility] [address]       NAME: Ally Carpio   DATE: [unfilled]    DIAGNOSIS:   No diagnosis found.    Thank you for coming to the Sleep Medicine Clinic today! Your sleep medicine provider today was: Kaylee Bradley, JORDAN-CNP Below is a summary of your treatment plan, other important information, and our contact numbers:    TREATMENT PLAN:   - Follow-up in 6 months.  - If not already done, sign up for 'My Chart' and send prescription requests or messages through this      Obstructive sleep apnea (PHAN): PHAN is a sleep disorder where your upper airway muscles relax during sleep and the airway intermittently and repetitively narrows and collapses leading to blocked airway (apnea) which, in turn, can disrupt breathing in sleep, lower oxygen levels while you sleep and cause night time wakings. Because apnea may cause higher carbon dioxide or low oxygen levels, untreated PHAN can lead to heart arrhythmia, elevation of blood pressure, and make it harder for the body to consolidate memory and  metabolize (leading to higher blood sugars at night).   Frequent partial arousals occur during sleep resulting in sleep deprivation and daytime sleepiness. PHAN is associated with an increased risk of cardiovascular disease, stroke, hypertension, and insulin resistance. Moreover, untreated PHAN with excessive daytime sleepiness can increase the risk of motor vehicular accidents.    Some conservative strategies for PHAN are:   Positional therapy - Avoid sleeping on your back.   Healthy diet, exercise, and optimizing weight encouraged.   Avoid alcohol late in the evening as it can make sleep apnea worse.     Safety: Avoid driving and operating heavy equipment while sleepy. Drowsy driving may lead to life-threatening motor vehicle accidents.     Common treatment options for sleep apnea include weight management, positional therapy, Positive Airway Therapy (PAP) therapy, oral appliance therapy, hypoglossal nerve stimulation, and select airway surgeries.     Instructions - Common PHAN Recs: - For your sleep apnea, continue to use your PAP every night and use it whenever you are sleeping.   - Avoid alcohol or sedatives several hours prior to sleeping.   - Get additional supplies for your PAP (e.g., mask, hose, filters) every 3 months or as your insurance allows from your [employer] company. Replacement cushions for your PAP mask can be requested monthly if airseals are an issue.  - Remember to clean your mask, tubings, and water chamber regularly as instructed.  - Avoid driving or operating heavy machinery when drowsy. A person driving while sleepy is five (5) times more likely to have an accident. If you feel sleepy, pull over and take a short power nap (sleep for less than 30 minutes). Otherwise, ask somebody to drive you.    EASY WAYS TO IMPROVE YOUR SLEEP:  1. Go to bed and wake up at the same time every day.   Aim for 8 hours but some people need less, some need more.   Get out of bed if you are not sleeping.   Limit naps to 20  min or less.   2. Expose yourself to daylight and/ or bright light in the morning.   Go outside or spend time near a window each morning.   You can use a light box (found on Amazon) if you wake before the sunrise.   Limit light exposure in the evenings (including electronic usage).   Try meditation, reading, stretching, deep breathing, warm shower or bath, or yoga nidra as part of your bedtime routine. There are many great FREE, videos or audio tracks on [employer]/ [employer], etc for guidance.  3. Exercise, in some form, EVERY day, but not too close to bedtime. Consider making this part of your routine at the start of your day, followed by a cool shower.  4. Eat meals at roughly the same time every day. Make sure you are prioritizing fruits, vegetables, whole grains, lean proteins.  5. Time your caffeine intake. Make sure you are not drinking caffeine within 8 to 12 hours prior to your bedtime.   6. Avoid marijuana, alcohol, and nicotine. They will reduce sleep quality in any quantity.  7. Learn to manage anxiety. Psychology services at  can be reached at [phone] to schedule an appointment.     Referrals:      Follow-up Appointment:   No follow-ups on file.    IMPORTANT INFORMATION:     Call [phone] for medical emergencies.  Our offices are generally open from Monday-Friday, 9 am - 5 pm.  If you need to get in touch with me, you may either call me and my team(number is below) or you can use RV ID.  If a referral for a test, for CPAP, or for another specialist was made, and you have not heard about scheduling this within a week, please call scheduling at [phone]).  If you are unable to make your appointment for clinic or an overnight study, kindly call the office at least 48 hours in advance to cancel and reschedule.  If you are on CPAP, please bring your device's card to each clinic appointment unless told otherwise by your provider.  There are no supporting services by either the sleep doctors or their  staff on weekends and Holidays, or after 5 PM on weekdays.   If you have been asked to come to a sleep study, make sure you bring toiletries, a comfy pillow, and any nighttime medications that you may regularly take. Also be sure to eat dinner before you arrive. We generally do not provide meals.      PRESCRIPTIONS:  We require 7 days advanced notice for prescription refills. If we do not receive the request in this time, we cannot guarantee that your medication will be refilled in time. Please contact the sleep nurses listed below for refills or request via Youx[employer].     IMPORTANT PHONE NUMBERS:   Sleep Medicine Clinic Fax: [phone]  Appointments (for Pediatric Sleep Clinic): [phone] - option 1  Appointments (for Adult Sleep Clinic): [phone] - option 2  Appointments (For Sleep Studies): [phone] - option 3  Behavioral Sleep Medicine: [phone]  Sleep Surgery: [phone]  ENT (Otolaryngology): [phone]  Headache Clinic (Neurology): [phone]  Neurology: [phone]  Psychiatry: [phone]  Pulmonary Function Testing (PFT) Center: [phone]  Pulmonary Medicine: [phone]  [employer] (DME): [phone]  [employer] (DME): [phone]  [facility] ([facility]): [phone]    Our Adult Sleep Medicine Team (Please do not hesitate to call the office or sleep nurse with any questions between appointments):    Adult Sleep Nurses (Rashmi Gamble, RN and Laura Hartman RN):  For clinical questions and refilling prescriptions: [phone]  Email sleep diaries and other documents at: [email or web address]    Adult Sleep Medicine Secretaries:  Margoth Diaz (For Sergio/Calix/Kralex/Jace/Mica/Иван):   P: [phone]  F: [phone]  Catherine Gabriel (For [place]/Kirk): P: [phone]  Fax: [phone]  Nano Weir (For Jurcevic/Blank): P: [phone]  F: [phone]  Ysabel Echavarria (For [place]): P: [phone]   F: [phone]  Demetria Shalom (For Betzaida/Miriam/Zakhary): P: [phone]  F: [phone]  Miya Falcon (For Shields/Cruz): P: [phone]  F: [phone]     Adult Sleep Medicine Advanced Practice Providers:  Mathieu Sauceda ([place], [place])  Sumi Pineda ([facility])  Kaylee Bradley CNP ([place], [place], [place])  Vicky Beaver CNP ([place], [place], [place])  Shweta Ogden ([place], [place], [place])  Lg Cruz CNP ([place], [place])      Our Sleep Testing Center (STC) Locations:  Our team will contact you to schedule your sleep study, however, you can contact us as follow:  Main Phone Line (scheduling only): [phone]), option 3  Adult and Pediatric Locations  [facility] (6 years and older): [facility] - 4th floor ([address]) After hours line: [phone]  [facility] (Main campus: All ages): [facility], 6th floor. After hours line: [phone]   [place] (5 years and older; younger considered on case-by-case basis): [address]; [address], [address]. Scheduling  After hours line: [phone]   [place] (6 years and older): [address]; [address]; [address]   [place] (6 years and older): [address] [facility], [address]  After hours line: [phone]   [place] (13 years and older) in [place]: [address], 2nd floor  After hours line: [phone]   [place] (13 year and older): [address]  After hours line: [phone] (Home studies out of [facility])    Adult Only Locations:   [place] (18 years and older): [address] [facility], 2nd floor   [place] (18 years and older): [address]; 4th floor  After hours line: [phone]  [facility] (18 years and older) at [place]: [address] [place] [address]  After hours line: [phone]        CONTACTING YOUR SLEEP MEDICINE PROVIDER:  Send a message  "directly to your provider through \"My Chart\", which is the email service through your  Records Account: [email or web address]   Call [phone] and leave a message. One of the administrative assistants will forward the message to your sleep medicine provider through \"My Chart\" and/or email.     Your sleep medicine provider for this visit was: Kaylee Bradley, APRN-CNP    In the event that you are running more than 15 minutes late to your appointment, I will kindly ask you to reschedule.       "

## 2023-10-18 NOTE — PROGRESS NOTES
Patient: Ally Carpio    [id number]  : 1980 -- AGE 42 y.o.    Provider: Kaylee Bradley     Location [facility]   Service Date: 10/18/2023              [facility]  Followup Visit Note    HISTORY OF PRESENT ILLNESS     HISTORY OF PRESENT ILLNESS   Ally Carpio is a 42 y.o. female with h/o PHAN and Obesity who presents to a [facility] Sleep Medicine Clinic for followup.     Assessment and plan from last visit:   Ms. ALLY SIERRA is a 42 year female with the following problems:    SLEEP DISORDERED BREATHING:   This is likely sleep apnea based on the the history and physical examination. She has not yet had a sleep study.  -HSAT is reasonable as patient likely has PHAN based on history and exam and does not have any of the following comorbidities: CHF, neuromuscular weakness, hypoventilation, or significant COPD.  -We consider treatment as indicated when testing is complete.     OBESITY with a BMI of 38. Her most recent Bicarb on BMP was 26 in 23    PROBABLE RESTLESS LEG SYNDROME: This occurs frequently.  Last ferritin 104 after IV iron x3. She has follow up pending with heme.  Discussed warm bath, massage, Tacos's Restless leg OTC PRN. She is agreeable    Current History    On today's visit, the patient reports doing well on the CPAP. She has used it for over a month. She says it was easy to get used to and doesn't leak (she uses the nasal pillows). She feels better when using the CPAP - less headache and feels less tired. She has some issues with drooling and lines from the mask on her face in the morning. She also notes that she has had iron infusions to help her anemia, which has also helped her fatigue and restless leg. She denies any current restless leg symptoms.    PAP Info  DURABLE MEDICAL EQUIPMENT COMPANY: [employer]  Machine: THERAPY: RESMED AIRSENSE 10 5 - 15 cm H2O Mask: MASK TYPE: NASAL PILLOWS  "MASK  Issues with therapy: ISSUES WITH THERAPY: None  Benefits with PAP: PERCEIVED BENEFITS OF PAP: refreshing sleep reduced daytime sleepiness better sleep quality decreased morning headaches    RLS Followup:   Symptoms improved from last visit: feels much better on CPAP and with iron infusions    Daytime Symptoms    Patient reports DAYTIME SYMPTOMS: no daytime symptoms  Patient denies daytime symptoms including: Denies: excessive daytime sleepiness late to work/school due to sleepiness irritability during the day feeling sleepy when driving    Naps: Yes, for 2times per week for 45min. Naps are refreshing  Fatigue: denies feeling fatigue    ESS: 5   PARISH:  7  FOSQ: 36    REVIEW OF SYSTEMS     REVIEW OF SYSTEMS  Review of Systems  All systems negative unless otherwise stated in HPI.      ALLERGIES AND MEDICATIONS     ALLERGIES  No Known Allergies    MEDICATIONS: She has a current medication list which includes the following prescription(s): bryhali and levothyroxine - Take 0.5 tablets (12.5 mcg) by mouth once daily.    PAST MEDICAL HISTORY : She  has no past medical history on file.    PAST SURGICAL HISTORY: She  has no past surgical history on file.     FAMILY HISTORY: No changes since previous visit. Otherwise non-contributory as charted.     SOCIAL HISTORY  She  reports that she has quit smoking. Her smoking use included cigarettes. She has never used smokeless tobacco. She reports current alcohol use of about 1.0 standard drink of alcohol per week. She reports that she does not use drugs.       PHYSICAL EXAM     VITAL SIGNS: /76 (BP Location: Right arm, Patient Position: Sitting, BP Cuff Size: Large adult)   Pulse 73   Temp 36.6 °C (97.9 °F) (Temporal)   Resp 16   Ht 1.676 m (5' 6\")   Wt 104 kg (230 lb 3.2 oz)   BMI 37.16 kg/m²      PREVIOUS WEIGHTS:  Wt Readings from Last 3 Encounters:   10/18/23 104 kg (230 lb 3.2 oz)   10/13/23 104 kg (230 lb)   10/02/23 105 kg (232 lb)         RESULTS/DATA "     Bicarbonate (mmol/L)   Date Value   09/30/2023 25   08/04/2023 23   05/06/2022 26     Iron (ug/dL)   Date Value   09/30/2023 38   09/21/2023 55   08/01/2023 68   06/30/2023 53     % Saturation (%)   Date Value   09/30/2023 13 (L)     Iron Saturation (%)   Date Value   09/21/2023 18 (L)   08/01/2023 19 (L)   06/30/2023 17 (L)     TIBC (ug/dL)   Date Value   09/30/2023 291   09/21/2023 308   08/01/2023 358   06/30/2023 318     Ferritin   Date Value   09/30/2023 185 ng/mL (H)   09/21/2023 125 ug/L   08/01/2023 64 ug/L   06/30/2023 104 ug/L       PAP Adherence  A PAP adherence download was obtained and data was reviewed personally today in clinic.      CPAP Adherence at 5-15 cm H2O: Between dates 9/17/23  and 10/16/23 . Patient usage: 30  of nights, for an average of 7 hours  and  14 mins per night, with >= 4 hours usage on  100%  of nights. On nights used, she uses PAP for approximately 7 hours  and 14 minutes  per night. Her residual estimated apnea/hypopnea index is 0.4 .  The average large leak is  median 0.1, 95th 3.8.      ASSESSMENT/PLAN     Ms. Carpio is a 42 y.o. female and she returns in followup to the [facility] for PHAN.    Problem List, Orders, Assessment, Recommendations:  Problem List Items Addressed This Visit             ICD-10-CM    Class 2 obesity due to excess calories without serious comorbidity with body mass index (BMI) of 37.0 to 37.9 in adult E66.09, Z68.37     BMI Readings from Last 1 Encounters:   10/18/23 37.16 kg/m²   - encouraged healthy weight loss via diet and exercise  - patient is in the process of undergoing bariatric surgery         Obstructive sleep apnea - Primary G47.33     -currently on auto PAP 5-15 cm H2O  -very compliant, 100% usage >4 hours for past 30 days  -PHAN is well controlled; residual AHI is 0.4  -advised that pt be closely monitored post-operatively due to increased risk of complications related to sleep apnea.   -pt at increased but  not prohibitive risk of postoperative respiratory complications due to PHAN.   -pt is optimized on PAP therapy for sleep apnea as long as pt is compliant with PAP use per most recent download.  -pt has expressed understanding of the above instructions and risks.              Disposition    Return to clinic in 6 months    Seen and staffed with Kaylee Bradley.   Debbie Baker, MS4

## 2023-10-18 NOTE — LETTER
October 18, 2023     Clement BRADY MD  [address]  [address]  [facility] [address]    Patient: Ally Carpio   YOB: 1980   Date of Visit: 10/18/2023       Dear Dr. Clement BRADY MD:    Thank you for referring Ally Carpio to me for evaluation. Below are my notes for this consultation.  If you have questions, please do not hesitate to call me. I look forward to following your patient along with you.       Sincerely,     Kaylee Bradley, APRN-CNP      CC: No Recipients  ______________________________________________________________________________________

## 2023-10-19 PROBLEM — K21.9 GASTROESOPHAGEAL REFLUX DISEASE WITHOUT ESOPHAGITIS: Status: ACTIVE | Noted: 2023-10-19

## 2023-10-19 NOTE — PROGRESS NOTES
Saw patient and agree with A&P. Discussed karan-op recommendations as listed in AVS. Reviewed CPAP compliance and she plans to send compliance report from her Zova radha to mary coordinator. Recommended follow up in 6 mo to review progress and discuss retesting for PHAN. She verbalized understanding.

## 2023-10-25 ENCOUNTER — LAB (OUTPATIENT)
Dept: LAB | Facility: LAB | Age: 43
End: 2023-10-25
Payer: COMMERCIAL

## 2023-10-25 ENCOUNTER — ANCILLARY PROCEDURE (OUTPATIENT)
Dept: RADIOLOGY | Facility: CLINIC | Age: 43
End: 2023-10-25
Payer: COMMERCIAL

## 2023-10-25 ENCOUNTER — OFFICE VISIT (OUTPATIENT)
Dept: RHEUMATOLOGY | Facility: CLINIC | Age: 43
End: 2023-10-25
Payer: COMMERCIAL

## 2023-10-25 VITALS
SYSTOLIC BLOOD PRESSURE: 104 MMHG | HEIGHT: 66 IN | HEART RATE: 57 BPM | BODY MASS INDEX: 36.96 KG/M2 | DIASTOLIC BLOOD PRESSURE: 66 MMHG | WEIGHT: 230 LBS

## 2023-10-25 DIAGNOSIS — G89.29 CHRONIC LOW BACK PAIN WITHOUT SCIATICA, UNSPECIFIED BACK PAIN LATERALITY: ICD-10-CM

## 2023-10-25 DIAGNOSIS — R76.8 POSITIVE ANA (ANTINUCLEAR ANTIBODY): Primary | ICD-10-CM

## 2023-10-25 DIAGNOSIS — M54.50 CHRONIC LOW BACK PAIN WITHOUT SCIATICA, UNSPECIFIED BACK PAIN LATERALITY: ICD-10-CM

## 2023-10-25 DIAGNOSIS — R76.8 POSITIVE ANA (ANTINUCLEAR ANTIBODY): ICD-10-CM

## 2023-10-25 LAB — ERYTHROCYTE [SEDIMENTATION RATE] IN BLOOD BY WESTERGREN METHOD: 37 MM/H (ref 0–20)

## 2023-10-25 PROCEDURE — 1036F TOBACCO NON-USER: CPT | Performed by: INTERNAL MEDICINE

## 2023-10-25 PROCEDURE — 86146 BETA-2 GLYCOPROTEIN ANTIBODY: CPT

## 2023-10-25 PROCEDURE — 72202 X-RAY EXAM SI JOINTS 3/> VWS: CPT | Mod: FY

## 2023-10-25 PROCEDURE — 72100 X-RAY EXAM L-S SPINE 2/3 VWS: CPT

## 2023-10-25 PROCEDURE — 85613 RUSSELL VIPER VENOM DILUTED: CPT

## 2023-10-25 PROCEDURE — 86160 COMPLEMENT ANTIGEN: CPT

## 2023-10-25 PROCEDURE — 86147 CARDIOLIPIN ANTIBODY EA IG: CPT

## 2023-10-25 PROCEDURE — 72100 X-RAY EXAM L-S SPINE 2/3 VWS: CPT | Performed by: RADIOLOGY

## 2023-10-25 PROCEDURE — 85730 THROMBOPLASTIN TIME PARTIAL: CPT

## 2023-10-25 PROCEDURE — 86431 RHEUMATOID FACTOR QUANT: CPT

## 2023-10-25 PROCEDURE — 85652 RBC SED RATE AUTOMATED: CPT

## 2023-10-25 PROCEDURE — 81381 HLA I TYPING 1 ALLELE HR: CPT

## 2023-10-25 PROCEDURE — 3008F BODY MASS INDEX DOCD: CPT | Performed by: INTERNAL MEDICINE

## 2023-10-25 PROCEDURE — 72202 X-RAY EXAM SI JOINTS 3/> VWS: CPT | Performed by: RADIOLOGY

## 2023-10-25 PROCEDURE — 99204 OFFICE O/P NEW MOD 45 MIN: CPT | Performed by: INTERNAL MEDICINE

## 2023-10-25 PROCEDURE — 36415 COLL VENOUS BLD VENIPUNCTURE: CPT

## 2023-10-25 ASSESSMENT — COLUMBIA-SUICIDE SEVERITY RATING SCALE - C-SSRS
1. IN THE PAST MONTH, HAVE YOU WISHED YOU WERE DEAD OR WISHED YOU COULD GO TO SLEEP AND NOT WAKE UP?: NO
2. HAVE YOU ACTUALLY HAD ANY THOUGHTS OF KILLING YOURSELF?: NO

## 2023-10-25 ASSESSMENT — PATIENT HEALTH QUESTIONNAIRE - PHQ9
1. LITTLE INTEREST OR PLEASURE IN DOING THINGS: NOT AT ALL
2. FEELING DOWN, DEPRESSED OR HOPELESS: NOT AT ALL
SUM OF ALL RESPONSES TO PHQ9 QUESTIONS 1 AND 2: 0

## 2023-10-25 NOTE — PROGRESS NOTES
"Subjective   Patient ID: Ally Carpio is a 42 y.o. female who presents for New Patient Visit (New pt is here for low platelets ).    HPI  42-year-old female with history of iron deficiency anemia, thrombocytopenia, hypothyroidism, GERD, hiatal hernia, PHAN on CPAP and obesity referred through hematology for positive ALEXEY.    He reports low back pain for the past few years.  She states right SI joint hurts more than left.  Pain gets worse after prolonged sitting or standing.  She has not noticed any difference in her pain while walking.  She has not noticed any numbness, tingling or weakness in lower extremities.    She feels better after chiropractor manipulation, stretching exercises, heating pad and using TENS unit.  She stated that she has had psoriasis involving the knees and elbows that has cleared after using topical steroid cream.  She reports chronic fatigue and tiredness.  She states that she cannot sleep 8 hours.  She uses CPAP regularly.  She stated back pain and fatigue has not improved after getting down working hours.  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   She reports heavy menstrual cycle 7 days in a month.      Recent labs showed thrombocytopenia and normocytic anemia.  EBV serology positive for previous exposure.  ALEXEY positive at 1: 160 with negative KG.  Inflammatory markers within normal limits.  She gets IV iron.  Last infusion was given in August.    Ultrasound of the abdomen showed mild hepatic steatosis and mild splenomegaly.    Review of Systems.  She has no history of fever, chills, unintentional weight loss, sicca symptoms, mucocutaneous ulceration, Raynaud's phenomena, iritis, uveitis, peripheral joint pain and swelling, pleuritic chest pain, alopecia, inflammatory bowel disease and recurrent infections.    Objective .  /66   Pulse 57   Ht 1.676 m (5' 6\")   Wt 104 kg (230 lb)   BMI 37.12 kg/m²     Physical Exam.  Gen. AAO x3, NAD.  HEENT: No " pallor or icterus, PERRLA, EOMI. Oropharynx is clear. MM moist,Parotid glands  not enlarged. No cervical lymphadenopathy .  Skin: No rashes.  Heart: S1, S2/ RRR. No murmurs or gallops.  Lungs: CTA B.  Abdomen: Soft, NT/ND, BS regular.  MSK: No.swelling or tenderness of the  upper or lower extremity joints with full ROM, Neck,spine and Chuck SI with out tenderness.finger to floor distance 14 cm.  Schober's 7 cm.    Neuro: CN II-XII intact. Sensation to touch intact.Strength 5/5 throughout. DTR 2+ and symmetrical.  Psych:Appropriate mood and behavior  EXT: No edema    Assessment/Plan .  42-year-old female with history of iron deficiency anemia, thrombocytopenia, hypothyroidism, GERD, hiatal hernia, PHAN on CPAP and obesity referred through hematology for positive ALEXEY.    #1: Positive ALEXEY.  No evidence of active autoimmune connective tissue disease by history and physical examination.  Patient was reassured.  #2: Chronic low back pain.  Appears mechanical however given the history of psoriasis, we will obtain labs and x-rays for further evaluation.  -She is to continue back stretching exercises.    We will communicate with the patient.     This note was partially generated using the Dragon Voice recognition system. There may be some incorrect wording, spelling and/or spelling errors or punctuation errors that were not corrected prior to committing the note to the medical record.    Patient Active Problem List   Diagnosis    Iron deficiency    Class 2 obesity due to excess calories without serious comorbidity with body mass index (BMI) of 37.0 to 37.9 in adult    Mixed hyperlipidemia    IFG (impaired fasting glucose)    Iron deficiency anemia    Hypothyroidism    Obstructive sleep apnea    Gastroesophageal reflux disease without esophagitis      No past surgical history on file.   Social History     Tobacco Use    Smoking status: Former     Types: Cigarettes    Smokeless tobacco: Never   Substance Use Topics    Alcohol  use: Yes     Alcohol/week: 1.0 standard drink of alcohol     Types: 1 Glasses of wine per week      Family History   Problem Relation Name Age of Onset    Colon cancer Mother      Hyperlipidemia Father      Glaucoma Brother      Breast cancer Father's Sister        No Known Allergies   Current Outpatient Medications   Medication Instructions    Bryhali 0.01 % lotion     levothyroxine (SYNTHROID, LEVOXYL) 12.5 mcg, oral, Daily

## 2023-10-26 LAB
B2 GLYCOPROT1 IGA SER-ACNC: <0.6 U/ML
B2 GLYCOPROT1 IGG SER-ACNC: <1.4 U/ML
B2 GLYCOPROT1 IGM SER-ACNC: 0.4 U/ML
C3 SERPL-MCNC: 175 MG/DL (ref 87–200)
C4 SERPL-MCNC: 34 MG/DL (ref 10–50)
CARDIOLIPIN IGA SERPL-ACNC: <0.5 APL U/ML
CARDIOLIPIN IGG SER IA-ACNC: <1.6 GPL U/ML
CARDIOLIPIN IGM SER IA-ACNC: 0.2 MPL U/ML
DRVVT SCREEN TO CONFIRM RATIO: 1.29 RATIO
DRVVT/DRVVT CFM NRMLZD PPP-RTO: 1.19 RATIO
DRVVT/DRVVT CFM P DOAC NEUT NORM PPP-RTO: 1.09 RATIO
LA 2 SCREEN W REFLEX-IMP: NORMAL
NORMALIZED SCT PPP-RTO: 0.95 RATIO
RHEUMATOID FACT SER NEPH-ACNC: <10 IU/ML (ref 0–15)
SILICA CLOTTING TIME CONFIRMATION: 1.39 RATIO
SILICA CLOTTING TIME SCREEN: 1.32 RATIO

## 2023-10-28 LAB — HLAB27 TYPING: NEGATIVE

## 2023-10-31 ENCOUNTER — APPOINTMENT (OUTPATIENT)
Dept: CARDIOLOGY | Facility: CLINIC | Age: 43
End: 2023-10-31
Payer: COMMERCIAL

## 2023-11-01 ENCOUNTER — DOCUMENTATION (OUTPATIENT)
Dept: SURGERY | Facility: CLINIC | Age: 43
End: 2023-11-01
Payer: COMMERCIAL

## 2023-11-01 DIAGNOSIS — Z98.84 BARIATRIC SURGERY STATUS: ICD-10-CM

## 2023-11-01 NOTE — PROGRESS NOTES
Case submitted    Drug result will need to be sent as soon as it becomes available.    Pt contacted.

## 2023-11-07 ENCOUNTER — DOCUMENTATION (OUTPATIENT)
Dept: SURGERY | Facility: HOSPITAL | Age: 43
End: 2023-11-07

## 2023-11-07 ENCOUNTER — LAB (OUTPATIENT)
Dept: LAB | Facility: LAB | Age: 43
End: 2023-11-07
Payer: COMMERCIAL

## 2023-11-07 DIAGNOSIS — Z98.84 BARIATRIC SURGERY STATUS: ICD-10-CM

## 2023-11-07 LAB
AMPHETAMINES UR QL SCN: NORMAL
BARBITURATES UR QL SCN: NORMAL
BENZODIAZ UR QL SCN: NORMAL
BZE UR QL SCN: NORMAL
CANNABINOIDS UR QL SCN: NORMAL
FENTANYL+NORFENTANYL UR QL SCN: NORMAL
OPIATES UR QL SCN: NORMAL
OXYCODONE+OXYMORPHONE UR QL SCN: NORMAL
PCP UR QL SCN: NORMAL

## 2023-11-07 PROCEDURE — 80307 DRUG TEST PRSMV CHEM ANLYZR: CPT

## 2023-11-09 NOTE — PROGRESS NOTES
BARIATRIC SURGERY CLINIC  FOLLOW UP NOTE      Name: Ally Carpio  MRN: [id number]      Index Surgery  Date of Surgery:  Upcoming   Surgeon: Clement Evans   Surgical Procedure: Laparoscopic anette en y gastric bypass 10351  Initial weight:  235 lbs  Current weight: 229 lbs      Other Bariatric Surgeries      Today's Visit:   Wt Readings from Last 1 Encounters:   10/25/23 104 kg (230 lb)    There is no height or weight on file to calculate BMI.   Last Visit:    Wt Readings from Last 3 Encounters:   10/25/23 104 kg (230 lb)   10/18/23 104 kg (230 lb 3.2 oz)   10/13/23 104 kg (230 lb)         HPI: 42 year old female here for a follow up visit after having completed all medical clearances for weight loss surgery requirements.  Her weight is down to 229 pounds from a starting weight of 235 pounds.  Her comorbidities include prediabetes, hypercholesterolemia, GERD, iron deficiency anemia, psoriasis, hypothyroidism, hiatal hernia, recurrent stomach ulcers, and thrombocytopenia.  The thrombocytopenia has come to light since we first seen her back in May.  She was seen by hematology, and they have completed some work-up of this, but thus far no clear diagnosis, and not much improvement in her platelet count.  Most recent platelet count is 55.  She has been as low as 26, but never above 75.      DAILY SUPPLEMENTS:  Calcium: Calcium Citrate w/ vitamin D (1200 - 1500mg)  Multivitamin & Minerals: 2 per day  Iron Supplement: included in multi-vitamin  Vitamin B12: 1000 mcg  Vitamin D3: 3000 units            Current Outpatient Medications   Medication Sig Dispense Refill    Bryhali 0.01 % lotion       levothyroxine (Synthroid, Levoxyl) 25 mcg tablet Take 0.5 tablets (12.5 mcg) by mouth once daily. 45 tablet 1     No current facility-administered medications for this visit.       Comorbidities:  No problem-specific Assessment & Plan notes found for this encounter.        REVIEW OF SYSTEMS:  CONSTITUTIONAL: Patient denies fevers,  chills, sweats and weight changes.  EYES: Patient denies any visual symptoms.  EARS, NOSE, AND THROAT: No difficulties with hearing. No symptoms of rhinitis or sore throat.  CARDIOVASCULAR: Patient denies chest pains, palpitations, orthopnea and paroxysmal nocturnal dyspnea.  RESPIRATORY: No dyspnea on exertion, no wheezing or cough.  GI: No nausea, vomiting, diarrhea, constipation, abdominal pain, hematochezia or melena.  : No urinary hesitancy or dribbling. No nocturia or urinary frequency. No abnormal urethral discharge.  MUSCULOSKELETAL: No myalgias or arthralgias.  NEUROLOGIC: No chronic headaches, no seizures. Patient denies numbness, tingling or weakness.  PSYCHIATRIC: Patient denies problems with mood disturbance. No problems with anxiety.  ENDOCRINE: No excessive urination or excessive thirst.  DERMATOLOGIC: Patient denies any rashes or skin changes.    PHYSICAL EXAM:  There were no vitals taken for this visit.  GENERAL: Obese. No apparent distress. Pt is alert and oriented x3.  HEENT: Head is normocephalic and atraumatic. Extraocular muscles are intact. Pupils are equal, round, and reactive to light and accommodation. Nares appeared normal. Mouth is well hydrated and without lesions. Mucous membranes are moist. Posterior pharynx clear of any exudate or lesions.  NECK: Supple. No carotid bruits. No lymphadenopathy or thyromegaly.  LUNGS: Clear to auscultation.  HEART: Regular rate and rhythm without murmur.  ABDOMEN: Soft, nontender, and nondistended. Positive bowel sounds. No hepatosplenomegaly was noted.  EXTREMITIES: Without any cyanosis, clubbing, rash, lesions or edema.  NEUROLOGIC: Cranial nerves II through XII are grossly intact.  PSYCHIATRIC: Flat affect, but denies suicidal or homicidal ideations.  SKIN: No ulceration or induration present.      A/P: The patient is a 42-year-old woman with a history of morbid obesity with a current weight of 229 pounds and a BMI of 37.  She has completed her  medically supervised weight loss..  Her comorbidities include prediabetes, hypercholesterolemia, GERD, iron deficiency anemia, psoriasis, hypothyroidism, hiatal hernia, stomach ulcers, and thrombocytopenia.  I am concerned about her thrombocytopenia, as her platelet counts have never been above 75.  Her most recent platelet count was 55.  I explained to the patient that at this level we cannot operate on her safely without her being at high risk for bleeding postoperatively.  I advised her to follow-up with the hematologist to further evaluate and see if there is any treatment available for this.  If this is ITP, she may require steroid treatments, which would also complicate any bariatric surgery.  For now we will have to hold off on any surgical intervention until we can figure out and treat her thrombocytopenia.

## 2023-11-10 ENCOUNTER — TELEMEDICINE (OUTPATIENT)
Dept: SURGERY | Facility: CLINIC | Age: 43
End: 2023-11-10
Payer: COMMERCIAL

## 2023-11-10 DIAGNOSIS — E66.09 CLASS 2 OBESITY DUE TO EXCESS CALORIES WITHOUT SERIOUS COMORBIDITY WITH BODY MASS INDEX (BMI) OF 37.0 TO 37.9 IN ADULT: ICD-10-CM

## 2023-11-10 DIAGNOSIS — D50.9 IRON DEFICIENCY ANEMIA, UNSPECIFIED IRON DEFICIENCY ANEMIA TYPE: Primary | ICD-10-CM

## 2023-11-10 PROCEDURE — 99213 OFFICE O/P EST LOW 20 MIN: CPT | Performed by: SURGERY

## 2023-11-11 ENCOUNTER — LAB (OUTPATIENT)
Dept: LAB | Facility: LAB | Age: 43
End: 2023-11-11
Payer: COMMERCIAL

## 2023-11-11 DIAGNOSIS — D50.9 IRON DEFICIENCY ANEMIA, UNSPECIFIED IRON DEFICIENCY ANEMIA TYPE: ICD-10-CM

## 2023-11-11 LAB
BASOPHILS # BLD AUTO: 0.05 X10*3/UL (ref 0–0.1)
BASOPHILS NFR BLD AUTO: 0.7 %
EOSINOPHIL # BLD AUTO: 0.33 X10*3/UL (ref 0–0.7)
EOSINOPHIL NFR BLD AUTO: 4.6 %
ERYTHROCYTE [DISTWIDTH] IN BLOOD BY AUTOMATED COUNT: 14.1 % (ref 11.5–14.5)
FERRITIN SERPL-MCNC: 97 NG/ML (ref 8–150)
GIANT PLATELETS BLD QL SMEAR: NORMAL
HCT VFR BLD AUTO: 41.8 % (ref 36–46)
HGB BLD-MCNC: 13.3 G/DL (ref 12–16)
IMM GRANULOCYTES # BLD AUTO: 0.06 X10*3/UL (ref 0–0.7)
IMM GRANULOCYTES NFR BLD AUTO: 0.8 % (ref 0–0.9)
IRON SATN MFR SERPL: 11 % (ref 25–45)
IRON SERPL-MCNC: 36 UG/DL (ref 35–150)
LYMPHOCYTES # BLD AUTO: 1.13 X10*3/UL (ref 1.2–4.8)
LYMPHOCYTES NFR BLD AUTO: 15.9 %
MCH RBC QN AUTO: 28.8 PG (ref 26–34)
MCHC RBC AUTO-ENTMCNC: 31.8 G/DL (ref 32–36)
MCV RBC AUTO: 91 FL (ref 80–100)
MONOCYTES # BLD AUTO: 0.52 X10*3/UL (ref 0.1–1)
MONOCYTES NFR BLD AUTO: 7.3 %
NEUTROPHILS # BLD AUTO: 5.01 X10*3/UL (ref 1.2–7.7)
NEUTROPHILS NFR BLD AUTO: 70.7 %
NRBC BLD-RTO: 0 /100 WBCS (ref 0–0)
PLATELET # BLD AUTO: 70 X10*3/UL (ref 150–450)
RBC # BLD AUTO: 4.62 X10*6/UL (ref 4–5.2)
RBC MORPH BLD: NORMAL
TIBC SERPL-MCNC: 317 UG/DL (ref 240–445)
UIBC SERPL-MCNC: 281 UG/DL (ref 110–370)
WBC # BLD AUTO: 7.1 X10*3/UL (ref 4.4–11.3)

## 2023-11-11 PROCEDURE — 36415 COLL VENOUS BLD VENIPUNCTURE: CPT

## 2023-11-11 PROCEDURE — 85025 COMPLETE CBC W/AUTO DIFF WBC: CPT

## 2023-11-11 PROCEDURE — 82728 ASSAY OF FERRITIN: CPT

## 2023-11-11 PROCEDURE — 83550 IRON BINDING TEST: CPT

## 2023-11-11 PROCEDURE — 83540 ASSAY OF IRON: CPT

## 2023-11-13 ENCOUNTER — TRANSCRIBE ORDERS (OUTPATIENT)
Dept: SURGERY | Facility: CLINIC | Age: 43
End: 2023-11-13
Payer: COMMERCIAL

## 2023-11-13 ENCOUNTER — HOSPITAL ENCOUNTER (OUTPATIENT)
Facility: HOSPITAL | Age: 43
Setting detail: OUTPATIENT SURGERY
End: 2023-11-13
Attending: SURGERY | Admitting: SURGERY
Payer: COMMERCIAL

## 2023-11-13 ENCOUNTER — OFFICE VISIT (OUTPATIENT)
Dept: HEMATOLOGY/ONCOLOGY | Facility: CLINIC | Age: 43
End: 2023-11-13
Payer: COMMERCIAL

## 2023-11-13 ENCOUNTER — DOCUMENTATION (OUTPATIENT)
Dept: SURGERY | Facility: CLINIC | Age: 43
End: 2023-11-13
Payer: COMMERCIAL

## 2023-11-13 VITALS
RESPIRATION RATE: 18 BRPM | HEART RATE: 65 BPM | OXYGEN SATURATION: 96 % | DIASTOLIC BLOOD PRESSURE: 87 MMHG | SYSTOLIC BLOOD PRESSURE: 146 MMHG | BODY MASS INDEX: 37.43 KG/M2 | TEMPERATURE: 98.4 F | WEIGHT: 231.92 LBS

## 2023-11-13 DIAGNOSIS — E66.01 MORBID OBESITY (MULTI): ICD-10-CM

## 2023-11-13 DIAGNOSIS — D69.6 THROMBOCYTOPENIA (CMS-HCC): Primary | ICD-10-CM

## 2023-11-13 DIAGNOSIS — Z98.890: ICD-10-CM

## 2023-11-13 PROCEDURE — 99214 OFFICE O/P EST MOD 30 MIN: CPT | Performed by: PHYSICIAN ASSISTANT

## 2023-11-13 PROCEDURE — 1036F TOBACCO NON-USER: CPT | Performed by: PHYSICIAN ASSISTANT

## 2023-11-13 PROCEDURE — 3008F BODY MASS INDEX DOCD: CPT | Performed by: PHYSICIAN ASSISTANT

## 2023-11-13 ASSESSMENT — PATIENT HEALTH QUESTIONNAIRE - PHQ9
SUM OF ALL RESPONSES TO PHQ9 QUESTIONS 1 AND 2: 0
1. LITTLE INTEREST OR PLEASURE IN DOING THINGS: NOT AT ALL
2. FEELING DOWN, DEPRESSED OR HOPELESS: NOT AT ALL

## 2023-11-13 ASSESSMENT — COLUMBIA-SUICIDE SEVERITY RATING SCALE - C-SSRS
6. HAVE YOU EVER DONE ANYTHING, STARTED TO DO ANYTHING, OR PREPARED TO DO ANYTHING TO END YOUR LIFE?: NO
2. HAVE YOU ACTUALLY HAD ANY THOUGHTS OF KILLING YOURSELF?: NO
1. IN THE PAST MONTH, HAVE YOU WISHED YOU WERE DEAD OR WISHED YOU COULD GO TO SLEEP AND NOT WAKE UP?: NO

## 2023-11-13 ASSESSMENT — ENCOUNTER SYMPTOMS
OCCASIONAL FEELINGS OF UNSTEADINESS: 0
LOSS OF SENSATION IN FEET: 0
DEPRESSION: 0

## 2023-11-13 ASSESSMENT — PAIN SCALES - GENERAL: PAINLEVEL: 0-NO PAIN

## 2023-11-13 NOTE — PROGRESS NOTES
Received a call from Fadia at the [employer] inquiring on clinical information sent on Friday morning, I advised yes, it was sent to their email as advised at 9:30 am. She was able to recall it, but then asked for EGD results due to hernia repair request. I forwarded this result to her as well. She advised she will review and call me back.

## 2023-11-13 NOTE — PROGRESS NOTES
Patient Visit Information:   Visit Type: Benign Heme Follow-up     Cancer History:   Treatment Synopsis:    42 female referred by Dr. Cooney for CAMDEN and IV iron.    Upon review of labs noted to be anemic with iron deficiency ~1 year ago. Started on oral iron but can't tolerate due to GI upset so stopped taking it. Also of note is thrombocytopenia, also new, last cbc with normal plt count was in  and no labs done between  till now.    On assessment, reports heavy menses and has been experiencing PIERRE and heart palpitations. Notes that she was recently diagnosed with psoriasis but might also have psoriatic arthritis, work up being done now. Otherwise doing well. Denies F/C/night sweats, unintentional weight loss, anorexia, fatigue, HA, change in vision/hearing, palpitations, CP, SOB, n/v/d/c/abd pain, bleeding, melena, LAD, peripheral neuropathy, rash.     S/P 3 doses of Venofer, last given 2023. Had EGD and VCE on 2023 c/w gastritis. No c-scope performed.    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        History of Present Illness:   On assessment, went to see surgeon regarding weight loss surgery and was told she needs bone marrow.  denies any bleeding, bruising. Notes worsening of menses seeing GYN soon. Otherwise doing well.     Review of Systems:    12 pt ROS was performed, pertinent positive and negative findings as per HPI above, remainder of systems reviewed and are negative.    Allergies and Intolerances:        Allergies:   No Known Allergies: Active    Outpatient Medication Profile:   * Patient Currently Takes Medications as of 21-Sep-2023 11:31 documented in Structured Notes   dexAMETHasone 20 mg oral tablet: 2 tab(s) orally once a day , Start Date: 21-Sep-2023   levothyroxine 25 mcg (0.025 mg) oral tablet:  1/2 tab(s) orally once a day   Multiple Vitamins oral tablet: 1 tab(s) orally once a day      Family History: No Family History items are recorded in the problem list.     Social History:   Social Substance History   · Smoking Status former smoker (1)     Visit Vitals  /87 (BP Location: Left arm, Patient Position: Sitting, BP Cuff Size: Adult)   Pulse 65   Temp 36.9 °C (98.4 °F) (Temporal)   Resp 18   Wt 105 kg (231 lb 14.8 oz)   SpO2 96%   BMI 37.43 kg/m²   Smoking Status Former   BSA 2.21 m²       Physical Exam:     Constitutional: Well developed, awake/alert/oriented x3, no distress, alert and cooperative   Eyes: clear sclera   Extremities: normal extremities, no cyanosis edema, contusions or wounds, no clubbing   Neurological: alert and oriented x3, intact senses, motor, response and reflexes, normal strength   Psychological: Appropriate mood and behavior   Skin: Warm and dry, no lesions, no rashes     Lab on 11/11/2023   Component Date Value Ref Range Status    WBC 11/11/2023 7.1  4.4 - 11.3 x10*3/uL Final    nRBC 11/11/2023 0.0  0.0 - 0.0 /100 WBCs Final    RBC 11/11/2023 4.62  4.00 - 5.20 x10*6/uL Final    Hemoglobin 11/11/2023 13.3  12.0 - 16.0 g/dL Final    Hematocrit 11/11/2023 41.8  36.0 - 46.0 % Final    MCV 11/11/2023 91  80 - 100 fL Final    MCH 11/11/2023 28.8  26.0 - 34.0 pg Final    MCHC 11/11/2023 31.8 (L)  32.0 - 36.0 g/dL Final    RDW 11/11/2023 14.1  11.5 - 14.5 % Final    Platelets 11/11/2023 70 (L)  150 - 450 x10*3/uL Final    Neutrophils % 11/11/2023 70.7  40.0 - 80.0 % Final    Immature Granulocytes %, Automated 11/11/2023 0.8  0.0 - 0.9 % Final    Immature Granulocyte Count (IG) includes promyelocytes, myelocytes and metamyelocytes but does not include bands. Percent differential counts (%) should be interpreted in the context of the absolute cell counts (cells/UL).    Lymphocytes % 11/11/2023 15.9  13.0 - 44.0 % Final    Monocytes % 11/11/2023 7.3  2.0 - 10.0 % Final    Eosinophils %  11/11/2023 4.6  0.0 - 6.0 % Final    Basophils % 11/11/2023 0.7  0.0 - 2.0 % Final    Neutrophils Absolute 11/11/2023 5.01  1.20 - 7.70 x10*3/uL Final    Percent differential counts (%) should be interpreted in the context of the absolute cell counts (cells/uL).    Immature Granulocytes Absolute, Au* 11/11/2023 0.06  0.00 - 0.70 x10*3/uL Final    Lymphocytes Absolute 11/11/2023 1.13 (L)  1.20 - 4.80 x10*3/uL Final    Monocytes Absolute 11/11/2023 0.52  0.10 - 1.00 x10*3/uL Final    Eosinophils Absolute 11/11/2023 0.33  0.00 - 0.70 x10*3/uL Final    Basophils Absolute 11/11/2023 0.05  0.00 - 0.10 x10*3/uL Final    Automated WBC differential has been confirmed by manual smear.    Iron 11/11/2023 36  35 - 150 ug/dL Final    UIBC 11/11/2023 281  110 - 370 ug/dL Final    TIBC 11/11/2023 317  240 - 445 ug/dL Final    % Saturation 11/11/2023 11 (L)  25 - 45 % Final    Ferritin 11/11/2023 97  8 - 150 ng/mL Final    RBC Morphology 11/11/2023 See Below   Final    Giant Platelets 11/11/2023 Few   Final   Lab on 11/07/2023   Component Date Value Ref Range Status    Amphetamine Screen, Urine 11/07/2023 Presumptive Negative  Presumptive Negative Final    CUTOFF LEVEL: 500 NG/ML   Cross-reactivity has been reported with high concentrations   of the following drugs: buproprion, chloroquine, chlorpromazine,   ephedrine, mephentermine, fenfluramine, phentermine,   phenylpropanolamine, pseudoephedrine, and propranolol.    Barbiturate Screen, Urine 11/07/2023 Presumptive Negative  Presumptive Negative Final    CUTOFF LEVEL: 200 NG/ML    Benzodiazepines Screen, Urine 11/07/2023 Presumptive Negative  Presumptive Negative Final    CUTOFF LEVEL: 200 NG/ML    Cannabinoid Screen, Urine 11/07/2023 Presumptive Negative  Presumptive Negative Final    CUTOFF LEVEL: 50 NG/ML    Cocaine Metabolite Screen, Urine 11/07/2023 Presumptive Negative  Presumptive Negative Final    CUTOFF LEVEL: 150 NG/ML    Fentanyl Screen, Urine 11/07/2023 Presumptive  Negative  Presumptive Negative Final    CUTOFF LEVEL: 5 NG/ML    Opiate Screen, Urine 11/07/2023 Presumptive Negative  Presumptive Negative Final    CUTOFF LEVEL: 300 NG/ML  The opiate screen does not detect fentanyl, meperidine, or   tramadol. Oxycodone is not consistently detected (refer to  Oxycodone Screen, Urine result).    Oxycodone Screen, Urine 11/07/2023 Presumptive Negative  Presumptive Negative Final    CUTOFF LEVEL: 100 NG/ML  This test will accurately detect both oxycodone and oxymorphone.    PCP Screen, Urine 11/07/2023 Presumptive Negative  Presumptive Negative Final    CUTOFF LEVEL:  25 NG/ML  Cross-reactivity has been reported with dextromethorphan.   Lab on 10/25/2023   Component Date Value Ref Range Status    C3 Complement 10/25/2023 175  87 - 200 mg/dL Final    C4 Complement 10/25/2023 34  10 - 50 mg/dL Final    Beta-2 Glyco 1 IgG 10/25/2023 <1.4  <20.0 U/mL Final    Elevated levels of IgG anti-Beta 2 Glycoprotein-I on 2 occasions  at least 12 weeks apart are laboratory criteria for anti-phospholipid  syndrome according to an international consensus (J Thromb Haemost  2006 4:295).    Beta-2 Glyco 1 IgA 10/25/2023 <0.6  <20.0 U/mL Final    Elevated levels of IgA anti-Beta 2 Glycoprotein-I have not been  included in the laboratory criteria for anti-phospholipid syndrome  according to an international consensus (J Thromb Haemost 2006 4:295).  It may be helpful in identifying subgroups of patients at risk for  specific clinical manifestations of anti-phospholipid syndrome. A  significant proportion of IgA anti-Beta 2 Glycoprotein- positive  tests has no apparent association with any clinical manifestation  of anti-phospholipid syndrome.    Beta 2 Glyco 1 IgM 10/25/2023 0.4  <20.0 U/mL Final    Elevated levels of IgM anti-Beta 2 Glycoprotein-I on 2 occasions  at least 12 weeks apart are laboratory criteria for anti-phospholipid  syndrome according to an international consensus (J Thromb Haemost  2006  4:295). IgM anti-Beta 2 Glycoprotein-I tends to give false  positive results in the low positive range, especially in the  presence of rheumatoid factor or cryoglobulins.    Anticardiolipin IgA 10/25/2023 <0.5  <20.0 APL U/mL Final    Elevated levels of IgA anti-cardiolipin have not been included  in the laboratory criteria for anti-phospholipid syndrome  according to an international consensus (J Thromb Haemost 2006 4:295).  It may be helpful in identifying subgroups of patients at risk for  specific clinical manifestations of anti-phospholipid syndrome.    Anticardiolipin IgG 10/25/2023 <1.6  <20.0 GPL U/mL Final    Elevated levels of IgG anti-cardiolipin on 2 occasions at least  12 weeks apart are laboratory criteria for anti-phospholipid  syndrome according to an international consensus (J Thromb Haemost  2006 4:295).    Anticardiolipin IgM 10/25/2023 0.2  <20.0 MPL U/mL Final    Elevated levels of IgM anti-cardiolipin on 2 occasions at least  12 weeks apart are laboratory criteria for anti-phospholipid  syndrome according to an international consensus (J Thromb Haemost  2006 4:295). IgM anti-cardiolipin tends to give false positive  results in the low positive range, especially in the  presence of rheumatoid factor or cryoglobulins.    Rheumatoid Factor 10/25/2023 <10  0 - 15 IU/mL Final    Sedimentation Rate 10/25/2023 37 (H)  0 - 20 mm/h Final    DRVVT Screen 10/25/2023 1.29  RATIO Final    DRVVT Confirmation 10/25/2023 1.19  RATIO Final    DRVVT Test Ratio 10/25/2023 1.09  <=1.20 RATIO Final    SCT Screen 10/25/2023 1.32  RATIO Final    SCT Confirmation 10/25/2023 1.39  RATIO Final    SCT Test Ratio 10/25/2023 0.95  <=1.16 RATIO Final    Lupus Anticoagulant Interpretation 10/25/2023    Final                    Value:No evidence of lupus anticoagulant in these assays (DRVVT and Silica Clotting Time (SCT)). Assay interferences may occur in the presence of factor deficiency/inhibitor and/or anticoagulants. For  patients on anti-Vitamin K therapy, repeating DRVVT testing might be indicated when the patient is off anti-vitamin K therapy. The DRVVT assay contains a heparin neutralizer up to 1.0 U/mL. Higher concentrations of heparin may cause interferences. SCT results are not affected by UF heparin up to 0.5 U/mL and LMW Heparin up to 1.0 U/mL. Higher concentrations of heparin may cause interferences. Correlation with clinical findings and clinical history is necessary to assess significance of results in an individual patient.    HLAB27 Typing 10/25/2023 Negative   Final   Lab on 09/30/2023   Component Date Value Ref Range Status    C-Peptide 09/30/2023 4.5  ng/mL Final    Protime 09/30/2023 11.7  9.8 - 12.8 seconds Final    INR 09/30/2023 1.0  0.9 - 1.1 Final    aPTT 09/30/2023 35  27 - 38 seconds Final    WBC 09/30/2023 8.0  4.4 - 11.3 x10*3/uL Final    nRBC 09/30/2023 0.0  0.0 - 0.0 /100 WBCs Final    RBC 09/30/2023 4.55  4.00 - 5.20 x10*6/uL Final    Hemoglobin 09/30/2023 13.1  12.0 - 16.0 g/dL Final    Hematocrit 09/30/2023 40.3  36.0 - 46.0 % Final    MCV 09/30/2023 89  80 - 100 fL Final    MCH 09/30/2023 28.8  26.0 - 34.0 pg Final    MCHC 09/30/2023 32.5  32.0 - 36.0 g/dL Final    RDW 09/30/2023 15.9 (H)  11.5 - 14.5 % Final    Platelets 09/30/2023 55 (L)  150 - 450 x10*3/uL Final    MPV 09/30/2023    Final    Not Measured    Neutrophils % 09/30/2023 74.6  40.0 - 80.0 % Final    Immature Granulocytes %, Automated 09/30/2023 1.3 (H)  0.0 - 0.9 % Final    Immature Granulocyte Count (IG) includes promyelocytes, myelocytes and metamyelocytes but does not include bands. Percent differential counts (%) should be interpreted in the context of the absolute cell counts (cells/UL).    Lymphocytes % 09/30/2023 12.0  13.0 - 44.0 % Final    Monocytes % 09/30/2023 7.1  2.0 - 10.0 % Final    Eosinophils % 09/30/2023 4.4  0.0 - 6.0 % Final    Basophils % 09/30/2023 0.6  0.0 - 2.0 % Final    Neutrophils Absolute 09/30/2023 5.97  1.20  - 7.70 x10*3/uL Final    Percent differential counts (%) should be interpreted in the context of the absolute cell counts (cells/uL).    Immature Granulocytes Absolute, Au* 09/30/2023 0.10  0.00 - 0.70 x10*3/uL Final    Lymphocytes Absolute 09/30/2023 0.96 (L)  1.20 - 4.80 x10*3/uL Final    Monocytes Absolute 09/30/2023 0.57  0.10 - 1.00 x10*3/uL Final    Eosinophils Absolute 09/30/2023 0.35  0.00 - 0.70 x10*3/uL Final    Basophils Absolute 09/30/2023 0.05  0.00 - 0.10 x10*3/uL Final    Glucose 09/30/2023 102 (H)  74 - 99 mg/dL Final    Sodium 09/30/2023 138  136 - 145 mmol/L Final    Potassium 09/30/2023 4.2  3.5 - 5.3 mmol/L Final    Chloride 09/30/2023 107  98 - 107 mmol/L Final    Bicarbonate 09/30/2023 25  21 - 32 mmol/L Final    Anion Gap 09/30/2023 10  10 - 20 mmol/L Final    Urea Nitrogen 09/30/2023 15  6 - 23 mg/dL Final    Creatinine 09/30/2023 0.74  0.50 - 1.05 mg/dL Final    eGFR 09/30/2023 >90  >60 mL/min/1.73m*2 Final    Calculations of estimated GFR are performed using the 2021 CKD-EPI Study Refit  equation without the race variable for the IDMS-Traceable Creatinine Methods.    https://jasn.asnjournals.org/content/early/2021/09/22/ASN.1946456834    Calcium 09/30/2023 8.4 (L)  8.6 - 10.3 mg/dL Final    Albumin 09/30/2023 4.0  3.4 - 5.0 g/dL Final    Alkaline Phosphatase 09/30/2023 56  33 - 110 U/L Final    Total Protein 09/30/2023 6.3 (L)  6.4 - 8.2 g/dL Final    AST 09/30/2023 11  9 - 39 U/L Final    Bilirubin, Total 09/30/2023 0.2  0.0 - 1.2 mg/dL Final    ALT 09/30/2023 16  7 - 45 U/L Final    Patients treated with Sulfasalazine may generate falsely decreased results for ALT.    Ferritin 09/30/2023 185 (H)  8 - 150 ng/mL Final    Folate, Serum 09/30/2023 11.6  >5.0 ng/mL Final    Hemoglobin A1C 09/30/2023 5.8 (H)  see below % Final    Estimated Average Glucose 09/30/2023 120  Not Established mg/dL Final    Iron 09/30/2023 38  35 - 150 ug/dL Final    UIBC 09/30/2023 253  110 - 370 ug/dL Final    TIBC  09/30/2023 291  240 - 445 ug/dL Final    % Saturation 09/30/2023 13 (L)  25 - 45 % Final    Cholesterol 09/30/2023 183  0 - 199 mg/dL Final          Age      Desirable   Borderline High   High     0-19 Y     0 - 169       170 - 199     >/= 200    20-24 Y     0 - 189       190 - 224     >/= 225         >24 Y     0 - 199       200 - 239     >/= 240   **All ranges are based on fasting samples. Specific   therapeutic targets will vary based on patient-specific   cardiac risk.    Pediatric guidelines reference:Pediatrics 2011, 128(S5).Adult guidelines reference: NCEP ATPIII Guidelines,LATIA 2001, 258:2486-97    Venipuncture immediately after or during the administration of Metamizole may lead to falsely low results. Testing should be performed immediately prior to Metamizole dosing.    HDL-Cholesterol 09/30/2023 26.6  mg/dL Final      Age       Very Low   Low     Normal    High    0-19 Y    < 35      < 40     40-45     ----  20-24 Y    ----     < 40      >45      ----        >24 Y      ----     < 40     40-60      >60      Cholesterol/HDL Ratio 09/30/2023 6.9   Final      Ref Values  Desirable  < 3.4  High Risk  > 5.0    LDL Calculated 09/30/2023 142  140 - 190 mg/dL Final                                Near   Borderline      AGE      Desirable  Optimal    High     High     Very High     0-19 Y     0 - 109     ---    110-129   >/= 130     ----    20-24 Y     0 - 119     ---    120-159   >/= 160     ----      >24 Y     0 -  99   100-129  130-159   160-189     >/=190      VLDL 09/30/2023 15  0 - 40 mg/dL Final    Triglycerides 09/30/2023 74  0 - 149 mg/dL Final       Age         Desirable   Borderline High   High     Very High   0 D-90 D    19 - 174         ----         ----        ----  91 D- 9 Y     0 -  74        75 -  99     >/= 100      ----    10-19 Y     0 -  89        90 - 129     >/= 130      ----    20-24 Y     0 - 114       115 - 149     >/= 150      ----         >24 Y     0 - 149       150 - 199    200- 499     >/= 500    Venipuncture immediately after or during the administration of Metamizole may lead to falsely low results. Testing should be performed immediately prior to Metamizole dosing.    Non HDL Cholesterol 09/30/2023 156 (H)  0 - 149 mg/dL Final          Age       Desirable   Borderline High   High     Very High     0-19 Y     0 - 119       120 - 144     >/= 145    >/= 160    20-24 Y     0 - 149       150 - 189     >/= 190      ----         >24 Y    30 mg/dL above LDL Cholesterol goal      Parathyroid Hormone, Intact 09/30/2023 37.9  pg/mL Final    Thyroid Stimulating Hormone 09/30/2023 3.85  0.44 - 3.98 mIU/L Final    Vitamin B12 09/30/2023 346  211 - 911 pg/mL Final    Vitamin D, 25-Hydroxy, Total 09/30/2023 26 (L)  30 - 100 ng/mL Final    Nicotine Blood Quantitative 09/30/2023 <5  ng/mL Final    Comment: Consistent with abstinence from nicotine-containing  products for at least 1 week.  INTERPRETIVE INFORMATION: Nicotine and Metabolites,                             Serum or Plasma,                             Quantitative      Methodology: Quantitative Liquid Chromatography-Tandem Mass   Spectrometry    Positive cutoff: 5 ng/mL    For medical purposes only; not valid for forensic use.     This test is designed to evaluate recent use of   nicotine-containing products.  Passive and active exposure cannot   be discriminated definitively, although a cutoff of 10 ng/mL   cotinine is frequently used for surgery qualification purposes.    For smoking cessation programs or compliance testing, the absence   of expected drug(s) and/or drug metabolite(s) may indicate   non-compliance, inappropriate timing of specimen collection   relative to drug administration, poor drug absorption, or   limitations of testing. This test cannot distinguish between use   of tobacco and purified nicotine products.                            The concentration value   must be greater than or equal to the cutoff to be reported as   positive.       This test was developed and its performance characteristics   determined by [facility]. It has not been cleared or   approved by the [place] Food and Drug Administration. This test was   performed in a CLIA certified laboratory and is intended for   clinical purposes.  Performed By: [facility]  [address] [place] [id number]  : Byron Sanchez MD, PhD  CLIA Number: [id number]    Cotinine Blood Quantitative 09/30/2023 <5  ng/mL Final    Copper 09/30/2023 121.0  80.0 - 155.0 ug/dL Final    Comment: INTERPRETIVE INFORMATION: Copper, Serum or Plasma    Elevated results may be due to skin or collection-related   contamination, including the use of a noncertified metal-free   collection/transport tube. If contamination concerns exist due to   elevated levels of serum/plasma copper, confirmation with a second   specimen collected in a certified metal-free tube is recommended.    Serum copper may be elevated with infection, inflammation, stress,   and copper supplementation. In females, elevated copper may also   be caused by oral contraceptives and pregnancy (concentrations may   be elevated up to 3 times normal during the third trimester).    This test was developed and its performance characteristics   determined by [facility]. It has not been cleared or   approved by the [place] Food and Drug Administration. This test was   performed in a CLIA certified laboratory and is intended for   clinical purposes.  Performed By: [facility]  [address] [place] [address]                            [id number]  : Byron Sanchez MD, PhD  CLIA Number: [id number]    Vitamin B1, Whole Blood 09/30/2023 109  70 - 180 nmol/L Final    INTERPRETIVE INFORMATION: Vitamin B1, Whole Blood    This assay measures the concentration of thiamine diphosphate   (TDP), the primary active form of vitamin B1. Approximately 90   percent of vitamin B1 present in whole blood is  TDP. Thiamine and   thiamine monophosphate, which comprise the remaining 10 percent,   are not measured.    This test was developed and its performance characteristics   determined by [facility]. It has not been cleared or   approved by the US Food and Drug Administration. This test was   performed in a CLIA certified laboratory and is intended for   clinical purposes.  Performed By: [facility]  [address] [place], [place] [id number]  : Byron Sanchez MD, PhD  CLIA Number: [id number]    Zinc, Serum or Plasma 09/30/2023 92.5  60.0 - 120.0 ug/dL Final    Comment: INTERPRETIVE INFORMATION: Zinc, Serum or Plasma    Elevated results may be due to skin or collection-related   contamination, including the use of a noncertified metal-free   collection/transport tube. If contamination concerns exist due to   elevated levels of serum/plasma zinc, confirmation with a second   specimen collected in a certified metal-free tube is recommended.    Circulating zinc concentrations are dependent on albumin status   and are depressed with malnutrition.  Zinc may also be lowered   with infection, inflammation, stress, oral contraceptives, and   pregnancy.  Zinc may be elevated with zinc supplementation or   fasting.  Elevated zinc concentrations may interfere with copper   absorption.     This test was developed and its performance characteristics   determined by [facility]. It has not been cleared or   approved by the US Food and Drug Administration. This test was   performed in a CLIA certified laboratory and is intended for   clinical                            purposes.  Performed By: [facility]  [address] [place] [id number]  : Byron Sanchez MD, PhD  CLIA Number: [id number]   [facility] Encounter on 09/21/2023   Component Date Value Ref Range Status    Vitamin B-12 09/21/2023 451  211 - 911 pg/mL Final    Ferritin 09/21/2023 125  8 - 150 ug/L  Final    WBC 09/21/2023 8.3  4.4 - 11.3 x10E9/L Final    RBC 09/21/2023 4.35  4.00 - 5.20 x10E12/L Final    Hemoglobin 09/21/2023 12.2  12.0 - 16.0 g/dL Final    Hematocrit 09/21/2023 39.8  36.0 - 46.0 % Final    MCV 09/21/2023 91  80 - 100 fL Final    MCHC 09/21/2023 30.7 (L)  32.0 - 36.0 g/dL Final    Platelets 09/21/2023 55 (L)  150 - 450 x10E9/L Corrected    Comment: aniket on icc notified  This is a corrected result. Previous value was 26, verified at 09/21/2023   10:55  PLT CALLED RB TO SHALOM MORGANSERGE, 09/21/2023 11:13      RDW 09/21/2023 16.1 (H)  11.5 - 14.5 % Final    Neutrophils % 09/21/2023 72.1  40.0 - 80.0 % Final    Immature Granulocytes %, Automated 09/21/2023 0.4  0.0 - 0.9 % Final    Comment:  Immature Granulocyte Count (IG) includes promyelocytes,    myelocytes and metamyelocytes but does not include bands.   Percent differential counts (%) should be interpreted in the   context of the absolute cell counts (cells/L).      Lymphocytes % 09/21/2023 16.4  13.0 - 44.0 % Final    Comment:  Percent differential counts (%) should be interpreted in the   context of the absolute cell counts (cells/L).      Monocytes % 09/21/2023 7.2  2.0 - 10.0 % Final    Eosinophils % 09/21/2023 3.1  0.0 - 6.0 % Final    Basophils % 09/21/2023 0.8  0.0 - 2.0 % Final    Neutrophils Absolute 09/21/2023 5.98  1.20 - 7.70 x10E9/L Final    Lymphocytes Absolute 09/21/2023 1.36  1.20 - 4.80 x10E9/L Final    Monocytes Absolute 09/21/2023 0.60  0.10 - 1.00 x10E9/L Final    Eosinophils Absolute 09/21/2023 0.26  0.00 - 0.70 x10E9/L Final    Basophils Absolute 09/21/2023 0.07  0.00 - 0.10 x10E9/L Final    Iron 09/21/2023 55  35 - 150 ug/dL Final    TIBC 09/21/2023 308  240 - 445 ug/dL Final    Iron Saturation 09/21/2023 18 (L)  25 - 45 % Final    RBC Morphology 09/21/2023 SEE COMMENT   Final    No significant RBC morphology present    WBC 09/21/2023 8.8  4.4 - 11.3 x10E9/L Final    RBC 09/21/2023 4.60  4.00 - 5.20 x10E12/L Final     Hemoglobin 09/21/2023 12.7  12.0 - 16.0 g/dL Final    Hematocrit 09/21/2023 40.5  36.0 - 46.0 % Final    MCV 09/21/2023 88  80 - 100 fL Final    MCHC 09/21/2023 31.4 (L)  32.0 - 36.0 g/dL Final    Platelets 09/21/2023 58 (L)  150 - 450 x10E9/L Final    RDW 09/21/2023 16.1 (H)  11.5 - 14.5 % Final   [facility] Encounter on 08/22/2023   Component Date Value Ref Range Status    WBC 08/22/2023 8.8  4.4 - 11.3 x10E9/L Final    RBC 08/22/2023 4.69  4.00 - 5.20 x10E12/L Final    Hemoglobin 08/22/2023 12.5  12.0 - 16.0 g/dL Final    Hematocrit 08/22/2023 39.8  36.0 - 46.0 % Final    MCV 08/22/2023 85  80 - 100 fL Final    MCHC 08/22/2023 31.4 (L)  32.0 - 36.0 g/dL Final    Platelets 08/22/2023 55 (L)  150 - 450 x10E9/L Final    RDW 08/22/2023 20.3 (H)  11.5 - 14.5 % Final    Neutrophils % 08/22/2023 74.0  40.0 - 80.0 % Final    Immature Granulocytes %, Automated 08/22/2023 0.9  0.0 - 0.9 % Final    Comment:  Immature Granulocyte Count (IG) includes promyelocytes,    myelocytes and metamyelocytes but does not include bands.   Percent differential counts (%) should be interpreted in the   context of the absolute cell counts (cells/L).      Lymphocytes % 08/22/2023 15.3  13.0 - 44.0 % Final    Comment:  Percent differential counts (%) should be interpreted in the   context of the absolute cell counts (cells/L).      Monocytes % 08/22/2023 6.5  2.0 - 10.0 % Final    Eosinophils % 08/22/2023 2.6  0.0 - 6.0 % Final    Basophils % 08/22/2023 0.7  0.0 - 2.0 % Final    Neutrophils Absolute 08/22/2023 6.51  1.20 - 7.70 x10E9/L Final    Lymphocytes Absolute 08/22/2023 1.35  1.20 - 4.80 x10E9/L Final    Monocytes Absolute 08/22/2023 0.57  0.10 - 1.00 x10E9/L Final    Eosinophils Absolute 08/22/2023 0.23  0.00 - 0.70 x10E9/L Final    Basophils Absolute 08/22/2023 0.06  0.00 - 0.10 x10E9/L Final   Legacy Encounter on 08/04/2023   Component Date Value Ref Range Status    WBC 08/04/2023 8.6  4.4 - 11.3 x10E9/L Final    RBC 08/04/2023 4.85   4.00 - 5.20 x10E12/L Final    Hemoglobin 08/04/2023 12.3  12.0 - 16.0 g/dL Final    Hematocrit 08/04/2023 39.6  36.0 - 46.0 % Final    MCV 08/04/2023 82  80 - 100 fL Final    MCHC 08/04/2023 31.1 (L)  32.0 - 36.0 g/dL Final    Platelets 08/04/2023 67 (L)  150 - 450 x10E9/L Final    RDW 08/04/2023 21.2 (H)  11.5 - 14.5 % Final    Neutrophils % 08/04/2023 71.7  40.0 - 80.0 % Final    Immature Granulocytes %, Automated 08/04/2023 0.6  0.0 - 0.9 % Final    Comment:  Immature Granulocyte Count (IG) includes promyelocytes,    myelocytes and metamyelocytes but does not include bands.   Percent differential counts (%) should be interpreted in the   context of the absolute cell counts (cells/L).      Lymphocytes % 08/04/2023 16.0  13.0 - 44.0 % Final    Comment:  Percent differential counts (%) should be interpreted in the   context of the absolute cell counts (cells/L).      Monocytes % 08/04/2023 7.7  2.0 - 10.0 % Final    Eosinophils % 08/04/2023 3.2  0.0 - 6.0 % Final    Basophils % 08/04/2023 0.8  0.0 - 2.0 % Final    Neutrophils Absolute 08/04/2023 6.17  1.20 - 7.70 x10E9/L Final    Lymphocytes Absolute 08/04/2023 1.38  1.20 - 4.80 x10E9/L Final    Monocytes Absolute 08/04/2023 0.66  0.10 - 1.00 x10E9/L Final    Eosinophils Absolute 08/04/2023 0.28  0.00 - 0.70 x10E9/L Final    Basophils Absolute 08/04/2023 0.07  0.00 - 0.10 x10E9/L Final    aPTT 08/04/2023 36  27 - 38 sec Final    Note new reference range as of 6/20/2023 at 10:00am.    Total IgG 08/04/2023 878  700 - 1,600 mg/dL Final    Comment: MONOCLONAL PROTEINS MAY CAUSE FALSELY LOW  RESULTS IN THIS ASSAY. SERUM PROTEIN  ELECTROPHORESIS SHOULD BE DONE AS THE  FIRST TEST TO EVALUATE MONOCLONAL GAMMOPATHY.      IgA 08/04/2023 192  70 - 400 mg/dL Final    Comment: MONOCLONAL PROTEINS MAY CAUSE FALSELY LOW  RESULTS IN THIS ASSAY. SERUM PROTEIN  ELECTROPHORESIS SHOULD BE DONE AS THE  FIRST TEST TO EVALUATE MONOCLONAL GAMMOPATHY.      IgM 08/04/2023 199  40 - 230 mg/dL  Final    Comment: MONOCLONAL PROTEINS MAY CAUSE FALSELY LOW  RESULTS IN THIS ASSAY. SERUM PROTEIN  ELECTROPHORESIS SHOULD BE DONE AS THE  FIRST TEST TO EVALUATE MONOCLONAL GAMMOPATHY.      Haptoglobin 08/04/2023 190  30 - 200 mg/dL Final    Retic % 08/04/2023 2.0  0.5 - 2.0 % Final    Retic Absolute 08/04/2023 0.092 (H)  0.018 - 0.083 x10E12/L Final    Immature Retic fraction 08/04/2023 17.7 (H)  0.0 - 16.0 % Final    Reticulocyte Hemoglobin 08/04/2023 31  28 - 38 pg Final    Sedimentation Rate 08/04/2023 35 (H)  0 - 20 mm/h Final    Folate 08/04/2023 >24.0  >5.0 ng/mL Final    Comment: Low           <3.4  Borderline 3.4-5.0  Normal        >5.0  .   Biotin interference may cause falsely elevated results.    Patients taking a Biotin dose of up to 5 mg/day should    refrain from taking Biotin for 24 hours before sample    collection. Providers may contact their local laboratory   for further information.      Protime 08/04/2023 11.3  9.8 - 12.8 sec Final    Note new reference range as of 6/20/2023 at 10:00am.    INR 08/04/2023 1.0  0.9 - 1.1 Final    EBV VCA IgG Antibody 08/04/2023 POSITIVE (A)  NEGATIVE Final    FROILAN-MOORE VIRUS EARLY ANTIGEN A* 08/04/2023 NEGATIVE  NEGATIVE Final    EBV Nuclear Ag Ab 08/04/2023 POSITIVE (A)  NEGATIVE Final    EBV VCA IgM Antibody 08/04/2023 NEGATIVE  NEGATIVE Final    EBV Interpretation 08/04/2023 SEE BELOW   Final    Comment: .                       EBV INTERPRETATION CHART  .                 VCA-IGG  VCA-IGM  NA-IGG  EA-IGG  .                 --------------------------------  PRIMARY ACUTE       +/-      +/-      -      +/-  LATE ACUTE           +       +/-     +/-     +/-  RECOVERING           +        -       -       +  PREVIOUS INFECTION   +        -      +/-      -      RBC Morphology 08/04/2023 See Below   Final    Spherocytes 08/04/2023 FEW   Final    Ovalocytes 08/04/2023 MANY   Final    Reactive Lymph 08/04/2023 FEW   Final    Giant PLTs 08/04/2023 FEW   Final    Glucose  08/04/2023 127 (H)  74 - 99 mg/dL Final    Sodium 08/04/2023 135 (L)  136 - 145 mmol/L Final    Potassium 08/04/2023 3.9  3.5 - 5.3 mmol/L Final    Chloride 08/04/2023 104  98 - 107 mmol/L Final    Bicarbonate 08/04/2023 23  21 - 32 mmol/L Final    Anion Gap 08/04/2023 12  10 - 20 mmol/L Final    Urea Nitrogen 08/04/2023 18  6 - 23 mg/dL Final    Creatinine 08/04/2023 0.82  0.50 - 1.05 mg/dL Final    GFR Female 08/04/2023 >90  >90 mL/min/1.73m2 Final    Comment:  CALCULATIONS OF ESTIMATED GFR ARE PERFORMED   USING THE 2021 CKD-EPI STUDY REFIT EQUATION   WITHOUT THE RACE VARIABLE FOR THE IDMS-TRACEABLE   CREATININE METHODS.    https://jasn.asnjournals.org/content/early/2021/09/22/ASN.2238346705      Calcium 08/04/2023 9.1  8.6 - 10.3 mg/dL Final    Albumin 08/04/2023 3.9  3.4 - 5.0 g/dL Final    Alkaline Phosphatase 08/04/2023 49  33 - 110 U/L Final    Total Protein 08/04/2023 6.5  6.4 - 8.2 g/dL Final    AST 08/04/2023 13  9 - 39 U/L Final    Total Bilirubin 08/04/2023 0.2  0.0 - 1.2 mg/dL Final    ALT (SGPT) 08/04/2023 17  7 - 45 U/L Final    Comment:  Patients treated with Sulfasalazine may generate    falsely decreased results for ALT.     Legacy Encounter on 08/01/2023   Component Date Value Ref Range Status    WBC 08/01/2023 9.3  4.4 - 11.3 x10E9/L Final    RBC 08/01/2023 4.80  4.00 - 5.20 x10E12/L Final    Hemoglobin 08/01/2023 12.2  12.0 - 16.0 g/dL Final    Hematocrit 08/01/2023 39.3  36.0 - 46.0 % Final    MCV 08/01/2023 82  80 - 100 fL Final    MCHC 08/01/2023 31.0 (L)  32.0 - 36.0 g/dL Final    Platelets 08/01/2023 32 (LL)  150 - 450 x10E9/L Final    Comment: no clot or clumps seen  PLT CALLED TO RADHA TOVAR RB, 08/01/2023 12:03      RDW 08/01/2023 21.5 (H)  11.5 - 14.5 % Final    Ig Kappa Free Light Chain 08/01/2023 2.13 (H)  0.33 - 1.94 mg/dL Final    Ig Lambda Free Light Chain 08/01/2023 1.50  0.57 - 2.63 mg/dL Final    Kappa/Lambda LC Ratio 08/01/2023 1.42  0.26 - 1.65 Final    Comment:   Undetected antigen excess is a rare event but cannot be   excluded. If these free light chain results do not agree   with other clinical or laboratory findings, or if the   sample is from a patient that has previously demonstrated   antigen excess, the result must be checked by retesting   at a higher sample dilution. Results should always be   interpreted in conjunction with other laboratory tests   and clinical evidence; any anomalies should be discussed   with the testing laboratory.      Iron 08/01/2023 68  35 - 150 ug/dL Final    TIBC 08/01/2023 358  240 - 445 ug/dL Final    Iron Saturation 08/01/2023 19 (L)  25 - 45 % Final    Total Protein 08/01/2023 7.3  6.4 - 8.2 g/dL Final    Ferritin 08/01/2023 64  8 - 150 ug/L Final    ANTI-NUCLEAR ANTIBODY (ALEXEY) 08/01/2023 POSITIVE (A)  NEGATIVE Final    Comment:   The Antinuclear Antibody (ALEXEY) test was performed using    indirect immunofluorescence assay with HEp-2 cells slide.      ALEXEY Titer 08/01/2023 1:160  <1:80 Final    ALEXEY Pattern 08/01/2023 HOMOGENEOUS   Final    Anti-SM 08/01/2023 <0.2  AI Final    Comment: REF VALUES   < 1.0 = NEGATIVE   >=1.0 = POSITIVE      Anti-RNP 08/01/2023 <0.2  AI Final    Comment: REF VALUES   < 1.0 = NEGATIVE   >=1.0 = POSITIVE      Anti-SM/RNP 08/01/2023 <0.2  AI Final    Comment: REF VALUES   < 1.0 = NEGATIVE   >=1.0 = POSITIVE      Anti-SSA 08/01/2023 <0.2  AI Final    Comment: REF VALUES   < 1.0 = NEGATIVE   >=1.0 = POSITIVE      Anti-SSB 08/01/2023 <0.2  AI Final    Comment: REF VALUES   < 1.0 = NEGATIVE   >=1.0 = POSITIVE      Anti-SCL-70 08/01/2023 <0.2  AI Final    Comment: REF VALUES   < 1.0 = NEGATIVE   >=1.0 = POSITIVE      Anti-STEFANO-1 IgG 08/01/2023 <0.2  AI Final    Comment: REF VALUES   < 1.0 = NEGATIVE   >=1.0 = POSITIVE      Anti-Chromatin 08/01/2023 <0.2  AI Final    Comment: REF VALUES   < 1.0 = NEGATIVE   >=1.0 = POSITIVE      Anti-Centromere 08/01/2023 <0.2  AI Final    Comment: REF VALUES   < 1.0 = NEGATIVE   >=1.0  = POSITIVE      Anti-Ribosomal P 08/01/2023 <0.2  AI Final    Comment: REF VALUES   < 1.0 = NEGATIVE   >=1.0 = POSITIVE      Anti-DNA (DS) 08/01/2023 <1.0  IU/mL Final    Comment: REF VALUES  NEGATIVE:    <= 4 IU/ML  EQUIVOCAL:   5- 9 IU/ML  POSITIVE:    >=10 IU/ML      Hep A IgM 08/01/2023 NONREACTIVE  NONREACTIVE Final    Comment:  Biotin interference may cause falsely decreased results.   Patients taking a Biotin dose of up to 5 mg/day should   refrain from taking Biotin for 24 hours before sample   collection. Providers may contact their local laboratory   for further information.      Hep B Core IgM 08/01/2023 NONREACTIVE  NONREACTIVE Final    Comment:  Results from patients taking biotin supplements or receiving   high-dose biotin therapy should be interpreted with caution   due to possible interference with this test. Providers may   contact their local laboratory for further information.      Hepatitis B Surface Ag 08/01/2023 NONREACTIVE  NONREACTIVE Final    Comment:  Biotin interference may cause falsely decreased results.   Patients taking a Biotin dose of up to 5 mg/day should   refrain from taking Biotin for 24 hours before sample   collection. Providers may contact their local laboratory   for further information.      Hepatitis C Ab 08/01/2023 NONREACTIVE  NONREACTIVE Final    Comment:  Results from patients taking biotin supplements or receiving   high-dose biotin therapy should be interpreted with caution   due to possible interference with this test. Providers may    contact their local laboratory for further information.      HIV 1 and 2 Screen 08/01/2023 NONREACTIVE  NONREACTIVE Final    Comment:  HIV Ag/Ab screen is performed using the Siemens QUIQ   HIV Ag/Ab Combo assay which detects the presence of HIV    p24 antigen as well as antibodies to HIV-1   (Group M and O) and HIV-2.  .  No laboratory evidence of HIV infection. If acute HIV infection is   suspected, consider testing for HIV RNA  by PCR (viral load).      CRP 08/01/2023 0.83  mg/dL Final    Comment: REF VALUE  < 1.00      Sedimentation Rate 08/01/2023 37 (H)  0 - 20 mm/h Final    Folate 08/01/2023 CANCELED   Final-Edited    Comment: Low           <3.4  Borderline 3.4-5.0  Normal        >5.0  .   Biotin interference may cause falsely elevated results.    Patients taking a Biotin dose of up to 5 mg/day should    refrain from taking Biotin for 24 hours before sample    collection. Providers may contact their local laboratory   for further information.    Result canceled by the ancillary.      RBC Morphology 08/01/2023 See Below   Final    Ovalocytes 08/01/2023 Few   Final    Teardrop Cells 08/01/2023 Few   Final    Giant PLTs 08/01/2023 Few   Final    Total Protein 08/01/2023 7.3  6.4 - 8.2 g/dL Final    Albumin 08/01/2023 4.2  3.4 - 5.0 g/dL Final    Alpha 1 08/01/2023 0.4  0.2 - 0.6 g/dL Final    Alpha 2 08/01/2023 0.8  0.4 - 1.1 g/dL Final    Beta 08/01/2023 0.9  0.5 - 1.2 g/dL Final    Gamma 08/01/2023 1.0  0.5 - 1.4 g/dL Final    SPE Interpretation 08/01/2023 NORMAL   Final    Serum Immunofixation Interpretation 08/01/2023 NORMAL   Final    No monoclonal proteins detected by immunofixation.    Path Review - Serum Protein Electr* 08/01/2023 LEXUS   Final    Comment:  By her/his signature above, the Pathologist   listed as making the final interpretation   certifies that she/he has personally reviewed    this case.  ----------------------------------------------       Path Review - Serum Immunofixation 08/01/2023 LEXUS   Final    Comment:  By her/his signature above, the Pathologist   listed as making the final interpretation   certifies that she/he has personally reviewed    this case.  ----------------------------------------------      Orders Only on 06/30/2023   Component Date Value Ref Range Status    WBC 06/30/2023 8.9  4.4 - 11.3 x10E9/L Final    RBC 06/30/2023 4.41  4.00 - 5.20 x10E12/L Final    Hemoglobin 06/30/2023 10.7 (L)   12.0 - 16.0 g/dL Final    Hematocrit 06/30/2023 35.3 (L)  36.0 - 46.0 % Final    MCV 06/30/2023 80  80 - 100 fL Final    MCHC 06/30/2023 30.3 (L)  32.0 - 36.0 g/dL Final    Platelets 06/30/2023 65 (L)  150 - 450 x10E9/L Final    RDW 06/30/2023 21.4 (H)  11.5 - 14.5 % Final    Ferritin 06/30/2023 104  8 - 150 ug/L Final    Iron 06/30/2023 53  35 - 150 ug/dL Final    TIBC 06/30/2023 318  240 - 445 ug/dL Final    Iron Saturation 06/30/2023 17 (L)  25 - 45 % Final    RBC Morphology 06/30/2023 See Below   Final    Ovalocytes 06/30/2023 Few   Final    Giant PLTs 06/30/2023 Few   Final   Lab on 06/30/2023   Component Date Value Ref Range Status    TSH 06/30/2023 2.93  0.44 - 3.98 mIU/L Final     TSH testing is performed using different testing    methodology at [facility] than at other    [facility]. Direct result comparisons should    only be made within the same method.   There may be more visits with results that are not included.        Assessment:    42 female referred for CAM[place] and IV iron due to heavy menses. Can't tolerate oral iron.     S/P 3 doses of Venofer, last given 6/19/2023. Had EGD and VCE on 6/30/2023 c/w gastritis. No c-scope performed.    Thrombocytopenia     Plan:    Reviewed and discussed lab, imaging, and pathology results with patient in detail as well as diagnosis, prognosis, and treatment options.    Continue Venofer booster PRN    Thrombocytopenia w/u including abd US (r/o liver etiologies) unremarkble.     Discussed likely diagnosis of thrombocytopenia is ITP. Discussed surgery can happen with either platelet transfusion during surgery or N-plate if given enough time to set up. As per patient, surgeon wont do surgery w/o BMBx. Explained will plan for BMBx but recommendations wont change.      F/U w/PCP    RTC in 1 month    Patient verbalized understanding, and all her questions were answered to her satisfaction.     30 min spent with patient greater than 50 % of which was spent  in consultation, counselling and coordination of care.

## 2023-11-13 NOTE — PROGRESS NOTES
Received surgery approval. Auth # [id number]. Surgical date is 12/21/2023 on auth and must be updated once date has been selected.  Auth is good for up to 5 days. Nurse and patient notified.

## 2023-11-14 ENCOUNTER — OFFICE VISIT (OUTPATIENT)
Dept: OBSTETRICS AND GYNECOLOGY | Facility: CLINIC | Age: 43
End: 2023-11-14
Payer: COMMERCIAL

## 2023-11-14 VITALS
BODY MASS INDEX: 36.64 KG/M2 | SYSTOLIC BLOOD PRESSURE: 119 MMHG | WEIGHT: 228 LBS | HEIGHT: 66 IN | DIASTOLIC BLOOD PRESSURE: 78 MMHG

## 2023-11-14 DIAGNOSIS — D69.6 THROMBOCYTOPENIA (CMS-HCC): Primary | ICD-10-CM

## 2023-11-14 DIAGNOSIS — N92.0 MENORRHAGIA WITH REGULAR CYCLE: Primary | ICD-10-CM

## 2023-11-14 PROCEDURE — 99213 OFFICE O/P EST LOW 20 MIN: CPT | Performed by: NURSE PRACTITIONER

## 2023-11-14 PROCEDURE — 1036F TOBACCO NON-USER: CPT | Performed by: NURSE PRACTITIONER

## 2023-11-14 PROCEDURE — 99203 OFFICE O/P NEW LOW 30 MIN: CPT | Performed by: NURSE PRACTITIONER

## 2023-11-14 PROCEDURE — 3008F BODY MASS INDEX DOCD: CPT | Performed by: NURSE PRACTITIONER

## 2023-11-14 RX ORDER — CHLORHEXIDINE GLUCONATE ORAL RINSE 1.2 MG/ML
15 SOLUTION DENTAL AS NEEDED
COMMUNITY
Start: 2023-11-10

## 2023-11-14 ASSESSMENT — ENCOUNTER SYMPTOMS
PSYCHIATRIC NEGATIVE: 1
BACK PAIN: 1
RESPIRATORY NEGATIVE: 1
HEMATOLOGIC/LYMPHATIC NEGATIVE: 1
CARDIOVASCULAR NEGATIVE: 1
ALLERGIC/IMMUNOLOGIC NEGATIVE: 1
ENDOCRINE NEGATIVE: 1
NEUROLOGICAL NEGATIVE: 1
GASTROINTESTINAL NEGATIVE: 1
EYES NEGATIVE: 1
CONSTITUTIONAL NEGATIVE: 1

## 2023-11-14 NOTE — PROGRESS NOTES
Subjective   Patient ID: Ally Carpio is a 42 y.o. female who presents for Menstrual Problem (Patient here for heavy, painful menstruals/Patient states that she has to get iron infusions because of bleeding/Last pap 06/29/2017 WNL).  HPI  42-year-old G2 2 new to the practice here today to discuss her heavy menstrual cycles.    Patient is overdue for a Pap smear but reports that she is currently bleeding.    Patient is under the care of hematology due to low iron counts and the need for iron infusions.  She will be having a bone marrow biopsy in the near future.  There is a possibility that she has ITP due to also having low platelets.    She reports that in the last year or 2 her menstrual cycles have become very heavy.  She is not on any method of birth control as her  has a vasectomy.  In the past she has used oral contraceptives but does not like the way they made her feel.    She was referred to discuss help with the heavy bleeding.    She has a family history in mother and sister of uterine ablations due to heavy bleeding.  Review of Systems   Constitutional: Negative.    HENT: Negative.          Dry eyes   Eyes: Negative.    Respiratory: Negative.     Cardiovascular: Negative.    Gastrointestinal: Negative.    Endocrine: Negative.    Genitourinary:  Positive for menstrual problem.   Musculoskeletal:  Positive for back pain.   Skin: Negative.    Allergic/Immunologic: Negative.    Neurological: Negative.    Hematological: Negative.    Psychiatric/Behavioral: Negative.     All other systems reviewed and are negative.      Objective   Physical Exam deferred    Assessment/Plan   Problem List Items Addressed This Visit    None  Visit Diagnoses         Codes    Menorrhagia with regular cycle    -  Primary N92.0    Relevant Orders    US pelvis    Follow Up In Gynecology        Ultrasound ordered to assess for uterine fibroids or abnormalities to the uterus.  Patient will return with MD partner for annual  exam and to discuss options for treatment of the heavy uterine bleeding.  Patient was given brochure on Mirena IUD as a medical option for control of menorrhagia.

## 2023-11-18 ENCOUNTER — HOSPITAL ENCOUNTER (OUTPATIENT)
Dept: RADIOLOGY | Facility: HOSPITAL | Age: 43
Discharge: HOME | End: 2023-11-18
Payer: COMMERCIAL

## 2023-11-18 DIAGNOSIS — N92.0 MENORRHAGIA WITH REGULAR CYCLE: ICD-10-CM

## 2023-11-18 PROCEDURE — 76856 US EXAM PELVIC COMPLETE: CPT | Performed by: RADIOLOGY

## 2023-11-18 PROCEDURE — 76856 US EXAM PELVIC COMPLETE: CPT

## 2023-11-18 PROCEDURE — 76830 TRANSVAGINAL US NON-OB: CPT | Performed by: RADIOLOGY

## 2023-11-21 ENCOUNTER — TELEPHONE (OUTPATIENT)
Dept: OBSTETRICS AND GYNECOLOGY | Facility: HOSPITAL | Age: 43
End: 2023-11-21
Payer: COMMERCIAL

## 2023-11-21 NOTE — TELEPHONE ENCOUNTER
----- Message from Valencia Black MD sent at 2023  5:11 PM EST -----  Ultrasound normal. Follow-up with MD to discuss options (has appointment)    Called patient to discuss US results  Patient identified by name and   Informed patient that her US results were normal and the recommendation is to follow up with an MD to discuss options  Patient has appointment scheduled  with Dr. Schafer  Patient verbalized understanding  No further questions or concerns at this time  Maxine Quintanilla RN

## 2023-11-22 DIAGNOSIS — D69.6 THROMBOCYTOPENIA, UNSPECIFIED (CMS-HCC): ICD-10-CM

## 2023-11-22 DIAGNOSIS — Z98.890 OTHER SPECIFIED POSTPROCEDURAL STATES: ICD-10-CM

## 2023-11-22 DIAGNOSIS — E66.01 MORBID (SEVERE) OBESITY DUE TO EXCESS CALORIES (MULTI): ICD-10-CM

## 2023-11-29 ENCOUNTER — HOSPITAL ENCOUNTER (OUTPATIENT)
Dept: RADIOLOGY | Facility: HOSPITAL | Age: 43
Discharge: HOME | End: 2023-11-29
Payer: COMMERCIAL

## 2023-11-29 VITALS
RESPIRATION RATE: 14 BRPM | HEART RATE: 69 BPM | BODY MASS INDEX: 36.96 KG/M2 | OXYGEN SATURATION: 95 % | SYSTOLIC BLOOD PRESSURE: 115 MMHG | HEIGHT: 66 IN | DIASTOLIC BLOOD PRESSURE: 74 MMHG | WEIGHT: 230 LBS | TEMPERATURE: 97.7 F

## 2023-11-29 DIAGNOSIS — D69.6 THROMBOCYTOPENIA (CMS-HCC): ICD-10-CM

## 2023-11-29 LAB
BASOPHILS # BLD AUTO: 0.06 X10*3/UL (ref 0–0.1)
BASOPHILS NFR BLD AUTO: 0.8 %
EOSINOPHIL # BLD AUTO: 0.28 X10*3/UL (ref 0–0.7)
EOSINOPHIL NFR BLD AUTO: 3.7 %
ERYTHROCYTE [DISTWIDTH] IN BLOOD BY AUTOMATED COUNT: 13.5 % (ref 11.5–14.5)
GIANT PLATELETS BLD QL SMEAR: NORMAL
HCT VFR BLD AUTO: 38.5 % (ref 36–46)
HGB BLD-MCNC: 12.8 G/DL (ref 12–16)
IMM GRANULOCYTES # BLD AUTO: 0.06 X10*3/UL (ref 0–0.7)
IMM GRANULOCYTES NFR BLD AUTO: 0.8 % (ref 0–0.9)
LYMPHOCYTES # BLD AUTO: 1.09 X10*3/UL (ref 1.2–4.8)
LYMPHOCYTES NFR BLD AUTO: 14.5 %
MCH RBC QN AUTO: 29.8 PG (ref 26–34)
MCHC RBC AUTO-ENTMCNC: 33.2 G/DL (ref 32–36)
MCV RBC AUTO: 90 FL (ref 80–100)
MONOCYTES # BLD AUTO: 0.5 X10*3/UL (ref 0.1–1)
MONOCYTES NFR BLD AUTO: 6.7 %
NEUTROPHILS # BLD AUTO: 5.52 X10*3/UL (ref 1.2–7.7)
NEUTROPHILS NFR BLD AUTO: 73.5 %
NRBC BLD-RTO: 0 /100 WBCS (ref 0–0)
PLATELET # BLD AUTO: 64 X10*3/UL (ref 150–450)
POCT INTERNATIONAL NORMALIZATION RATIO: 1
POCT PROTHROMBIN TIME: NORMAL
RBC # BLD AUTO: 4.29 X10*6/UL (ref 4–5.2)
RBC MORPH BLD: NORMAL
WBC # BLD AUTO: 7.5 X10*3/UL (ref 4.4–11.3)

## 2023-11-29 PROCEDURE — 36415 COLL VENOUS BLD VENIPUNCTURE: CPT | Performed by: PHYSICIAN ASSISTANT

## 2023-11-29 PROCEDURE — 88305 TISSUE EXAM BY PATHOLOGIST: CPT | Performed by: PATHOLOGY

## 2023-11-29 PROCEDURE — 88185 FLOWCYTOMETRY/TC ADD-ON: CPT | Mod: TC,PORLAB | Performed by: PHYSICIAN ASSISTANT

## 2023-11-29 PROCEDURE — 77012 CT SCAN FOR NEEDLE BIOPSY: CPT

## 2023-11-29 PROCEDURE — 2720000007 HC OR 272 NO HCPCS

## 2023-11-29 PROCEDURE — 99221 1ST HOSP IP/OBS SF/LOW 40: CPT | Performed by: NURSE PRACTITIONER

## 2023-11-29 PROCEDURE — 88188 FLOWCYTOMETRY/READ 9-15: CPT | Performed by: PATHOLOGY

## 2023-11-29 PROCEDURE — 81450 HL NEO GSAP 5-50DNA/DNA&RNA: CPT | Mod: 59 | Performed by: PHYSICIAN ASSISTANT

## 2023-11-29 PROCEDURE — G0452 MOLECULAR PATHOLOGY INTERPR: HCPCS | Performed by: PHYSICIAN ASSISTANT

## 2023-11-29 PROCEDURE — 2500000004 HC RX 250 GENERAL PHARMACY W/ HCPCS (ALT 636 FOR OP/ED): Performed by: RADIOLOGY

## 2023-11-29 PROCEDURE — 77012 CT SCAN FOR NEEDLE BIOPSY: CPT | Performed by: RADIOLOGY

## 2023-11-29 PROCEDURE — 85097 BONE MARROW INTERPRETATION: CPT | Mod: TC,PORLAB | Performed by: PHYSICIAN ASSISTANT

## 2023-11-29 PROCEDURE — 99152 MOD SED SAME PHYS/QHP 5/>YRS: CPT | Performed by: RADIOLOGY

## 2023-11-29 PROCEDURE — 88275 CYTOGENETICS 100-300: CPT | Mod: 59 | Performed by: PATHOLOGY

## 2023-11-29 PROCEDURE — 99152 MOD SED SAME PHYS/QHP 5/>YRS: CPT

## 2023-11-29 PROCEDURE — 88313 SPECIAL STAINS GROUP 2: CPT | Performed by: PATHOLOGY

## 2023-11-29 PROCEDURE — 88342 IMHCHEM/IMCYTCHM 1ST ANTB: CPT | Performed by: PATHOLOGY

## 2023-11-29 PROCEDURE — 88311 DECALCIFY TISSUE: CPT | Performed by: PATHOLOGY

## 2023-11-29 PROCEDURE — 88291 CYTO/MOLECULAR REPORT: CPT | Performed by: PATHOLOGY

## 2023-11-29 PROCEDURE — 88237 TISSUE CULTURE BONE MARROW: CPT | Performed by: PATHOLOGY

## 2023-11-29 PROCEDURE — 88280 CHROMOSOME KARYOTYPE STUDY: CPT | Performed by: PHYSICIAN ASSISTANT

## 2023-11-29 PROCEDURE — 38222 DX BONE MARROW BX & ASPIR: CPT | Performed by: RADIOLOGY

## 2023-11-29 PROCEDURE — 88189 FLOWCYTOMETRY/READ 16 & >: CPT | Performed by: PATHOLOGY

## 2023-11-29 PROCEDURE — 88305 TISSUE EXAM BY PATHOLOGIST: CPT | Mod: TC,SUR,PORLAB | Performed by: PHYSICIAN ASSISTANT

## 2023-11-29 PROCEDURE — 85025 COMPLETE CBC W/AUTO DIFF WBC: CPT | Performed by: PHYSICIAN ASSISTANT

## 2023-11-29 PROCEDURE — 81229 CYTOG ALYS CHRML ABNR SNPCGH: CPT | Performed by: PATHOLOGY

## 2023-11-29 RX ORDER — FENTANYL CITRATE 50 UG/ML
100 INJECTION, SOLUTION INTRAMUSCULAR; INTRAVENOUS ONCE
Status: COMPLETED | OUTPATIENT
Start: 2023-11-29 | End: 2023-11-29

## 2023-11-29 RX ORDER — MIDAZOLAM HYDROCHLORIDE 1 MG/ML
1 INJECTION, SOLUTION INTRAMUSCULAR; INTRAVENOUS ONCE
Status: COMPLETED | OUTPATIENT
Start: 2023-11-29 | End: 2023-11-29

## 2023-11-29 RX ADMIN — MIDAZOLAM 1 MG: 1 INJECTION INTRAMUSCULAR; INTRAVENOUS at 09:14

## 2023-11-29 RX ADMIN — FENTANYL CITRATE 100 MCG: 50 INJECTION, SOLUTION INTRAMUSCULAR; INTRAVENOUS at 09:14

## 2023-11-29 ASSESSMENT — PAIN - FUNCTIONAL ASSESSMENT
PAIN_FUNCTIONAL_ASSESSMENT: 0-10

## 2023-11-29 ASSESSMENT — PAIN SCALES - GENERAL
PAINLEVEL_OUTOF10: 0 - NO PAIN

## 2023-11-29 NOTE — H&P
"History Of Present Illness  H&P reviewed from Hem-Onc note on 23. Information is current. No changes.      Past Medical History  Past Medical History:   Diagnosis Date    Anemia     Hypothyroidism        Surgical History  Past Surgical History:   Procedure Laterality Date     SECTION, LOW TRANSVERSE  ,        Social History  She reports that she has quit smoking. Her smoking use included cigarettes. She has never used smokeless tobacco. She reports that she does not currently use alcohol. She reports that she does not use drugs.    Family History  Family History   Problem Relation Name Age of Onset    Colon cancer Mother Shari     COPD Mother Shari     Hyperlipidemia Father Archuleta     Glaucoma Brother      Breast cancer Father's Sister Na         Allergies  Patient has no known allergies.    Review of Systems  GEN: no fever, chills  CV: no chest pain, syncope  RESP: no wheezing or rhonchi   PV: no edema  HEM: + bruising easily and heave menses, - epistaxis       Physical Exam  GEN: NAD  ENT: tonsil grade 2  CV: RRR  RESP: CTAB  PV: no BLE edema     Last Recorded Vitals  Blood pressure 118/68, pulse 83, temperature 36.5 °C (97.7 °F), temperature source Temporal, resp. rate 14, height 1.676 m (5' 6\"), weight 104 kg (230 lb), last menstrual period 2023, SpO2 97 %.    Relevant Results  Current Outpatient Medications   Medication Instructions    Bryhali 0.01 % lotion     chlorhexidine (Peridex) 0.12 % solution 15 mL, Mouth/Throat, As needed    levothyroxine (SYNTHROID, LEVOXYL) 12.5 mcg, oral, Daily        Assessment/Plan   Pt. Appropriate for BMBX today.    Alvina Berrios NP Student     I was present with the APRN student who participated in the documentation of this note. I have personally seen and re-examined the patient and performed the medical decision-making components (assessment and plan of care). I have reviewed the APRN student documentation and verified the findings in the note as " written with additions or exceptions as stated in the body of this note.    Pauline Rowley, APRN-CNP

## 2023-11-29 NOTE — NURSING NOTE
Intraprocedure navigator not available for this patient's chart---documentation below for procedure details    Staff: Paloma Martin RN, Reed Paez RN, Pato Franks MD, Dulce Maria [facility] CT tech    0900-O2 @ 2L/min via NC per protocol for prophylaxis by Yoshi TORRE  0908-Huddle performed with all staff as listed per protocol  0910-Preprocedure time out with all staff as listed per protocol  0912- LPIC site prepped with chloraprep and draped per policy  0914-sedation meds given as charted in MAR  0916-Local anesthetic Lidocaine 2% injected at site by Mehnaz KWONG  0918-Biopsy obtained, lab staff present  0923-Debrief performed per protocol with all staff present  0924-Procedure end, physician left the room  0928-Patient out of room

## 2023-11-29 NOTE — DISCHARGE INSTRUCTIONS
If you have any questions please call the NP Esperanza Amrik at [phone]     What happens after a bone marrow biopsy?  Once you are home, it is important to keep the biopsy area clean and dry. Okay to remove bandage on 11/30/23 and wash with soap and water, rinse and dry and then leave uncovered. No tub baths for 7 days.     Take a pain reliever as if needed. Typically recommend over the counter medicine such as tylenol or ibuprofen. Ice will also help as needed     Call your provider if you have any of the following:    Fever    Redness, swelling, bleeding, or other drainage from the biopsy site    More pain around the biopsy site    You may go back to your usual diet and activities unless your healthcare provider advises you differently.

## 2023-11-29 NOTE — POST-PROCEDURE NOTE
Interventional Radiology Brief Postprocedure Note    Attending: Pato Franks MD      Assistant: none    Diagnosis: thrombocytopenia    Description of procedure: Bone marrow biopsy     Anesthesia:  Local, moderate    Complications: None    Estimated Blood Loss: none    Medications (Filter: Administrations occurring from 0926 to 0926 on 11/29/23) As of 11/29/23 0926      None          No specimens collected      See detailed result report with images in PACS.    The patient tolerated the procedure well without incident or complication and is in stable condition.

## 2023-11-30 LAB
CELL COUNT (BLOOD): 5.91 X10*3/UL
CELL POPULATIONS: NORMAL
DIAGNOSIS: NORMAL
FLOW DIFFERENTIAL: NORMAL
FLOW TEST ORDERED: NORMAL
LAB TEST METHOD: NORMAL
NUMBER OF CELLS COLLECTED: NORMAL
PATH REPORT.TOTAL CANCER: NORMAL
SIGNATURE COMMENT: NORMAL
SPECIMEN VIABILITY: NORMAL

## 2023-12-05 ENCOUNTER — APPOINTMENT (OUTPATIENT)
Dept: HEMATOLOGY/ONCOLOGY | Facility: CLINIC | Age: 43
End: 2023-12-05
Payer: COMMERCIAL

## 2023-12-06 LAB
ELECTRONICALLY SIGNED BY: NORMAL
MYELOID NGS RESULTS: NORMAL

## 2023-12-08 LAB
PATH REPORT.COMMENTS IMP SPEC: NORMAL
PATH REPORT.FINAL DX SPEC: NORMAL
PATH REPORT.GROSS SPEC: NORMAL
PATH REPORT.MICROSCOPIC SPEC OTHER STN: NORMAL
PATH REPORT.RELEVANT HX SPEC: NORMAL
PATH REPORT.TOTAL CANCER: NORMAL

## 2023-12-12 ENCOUNTER — APPOINTMENT (OUTPATIENT)
Dept: PRIMARY CARE | Facility: CLINIC | Age: 43
End: 2023-12-12
Payer: COMMERCIAL

## 2023-12-12 ENCOUNTER — APPOINTMENT (OUTPATIENT)
Dept: RADIOLOGY | Facility: HOSPITAL | Age: 43
End: 2023-12-12
Payer: COMMERCIAL

## 2023-12-12 ENCOUNTER — OFFICE VISIT (OUTPATIENT)
Dept: PRIMARY CARE | Facility: CLINIC | Age: 43
End: 2023-12-12
Payer: COMMERCIAL

## 2023-12-12 ENCOUNTER — HOSPITAL ENCOUNTER (OUTPATIENT)
Dept: RADIOLOGY | Facility: HOSPITAL | Age: 43
Discharge: HOME | End: 2023-12-12
Payer: COMMERCIAL

## 2023-12-12 VITALS
TEMPERATURE: 97.8 F | SYSTOLIC BLOOD PRESSURE: 122 MMHG | HEART RATE: 62 BPM | OXYGEN SATURATION: 98 % | DIASTOLIC BLOOD PRESSURE: 80 MMHG

## 2023-12-12 DIAGNOSIS — R10.32 LEFT LOWER QUADRANT ABDOMINAL PAIN: Primary | ICD-10-CM

## 2023-12-12 DIAGNOSIS — K21.9 GASTROESOPHAGEAL REFLUX DISEASE, UNSPECIFIED WHETHER ESOPHAGITIS PRESENT: ICD-10-CM

## 2023-12-12 DIAGNOSIS — R10.32 LEFT LOWER QUADRANT ABDOMINAL PAIN: ICD-10-CM

## 2023-12-12 DIAGNOSIS — K62.5 BRIGHT RED RECTAL BLEEDING: ICD-10-CM

## 2023-12-12 PROBLEM — L40.9 PSORIASIS (A TYPE OF SKIN INFLAMMATION): Status: ACTIVE | Noted: 2023-12-12

## 2023-12-12 PROCEDURE — 99213 OFFICE O/P EST LOW 20 MIN: CPT | Performed by: FAMILY MEDICINE

## 2023-12-12 PROCEDURE — 74177 CT ABD & PELVIS W/CONTRAST: CPT

## 2023-12-12 PROCEDURE — 2550000001 HC RX 255 CONTRASTS: Performed by: FAMILY MEDICINE

## 2023-12-12 PROCEDURE — 1036F TOBACCO NON-USER: CPT | Performed by: FAMILY MEDICINE

## 2023-12-12 PROCEDURE — 3008F BODY MASS INDEX DOCD: CPT | Performed by: FAMILY MEDICINE

## 2023-12-12 PROCEDURE — 74177 CT ABD & PELVIS W/CONTRAST: CPT | Performed by: RADIOLOGY

## 2023-12-12 RX ORDER — FAMOTIDINE 20 MG/1
20 TABLET, FILM COATED ORAL 2 TIMES DAILY
Qty: 60 TABLET | Refills: 0 | Status: SHIPPED | OUTPATIENT
Start: 2023-12-12 | End: 2024-03-11 | Stop reason: WASHOUT

## 2023-12-12 RX ADMIN — IOHEXOL 75 ML: 350 INJECTION, SOLUTION INTRAVENOUS at 14:12

## 2023-12-12 ASSESSMENT — ENCOUNTER SYMPTOMS
ANAL BLEEDING: 1
DIARRHEA: 1
BLOOD IN STOOL: 1

## 2023-12-12 NOTE — PROGRESS NOTES
Assessment/Plan   ASSESSMENT/PLAN:      Ally was seen today for diarrhea, rectal bleeding and poor appetite.  Diagnoses and all orders for this visit:  Left lower quadrant abdominal pain (Primary)  -     CT abdomen pelvis wo IV contrast; Future  Gastroesophageal reflux disease, unspecified whether esophagitis present  -     famotidine (Pepcid) 20 mg tablet; Take 1 tablet (20 mg) by mouth 2 times a day.  Bright red rectal bleeding       Patient Instructions   Rectal bleeding and Abd pain: Plan to r/o diverticulitis with CTAP, advised pt to schedule appointment with GI for colonoscopy   GERD: + gastritis on EGD done on 5/2023, try pepcid     Chelsie Cooney, DO     FUTURE DIRECTION:     Subjective   SUBJECTIVE:     Patient ID: Ally Carpio is a 42 y.o. femalefor the following:    Rectal Bleeding  Ongoing for the past 1.5 weeks   Bloody stool described as bright red, first noticed when wiping but now present inside toilet   Associate lost of appetite, bloating when she eats, and nausea  Has been taking tums without improvement   Mentions that she was having diarrhea previously for 3 weeks   Denies abd pain, fever or chills     Review of Systems   Gastrointestinal:  Positive for anal bleeding, blood in stool and diarrhea.       No Known Allergies      Current Outpatient Medications:     Bryhali 0.01 % lotion, , Disp: , Rfl:     chlorhexidine (Peridex) 0.12 % solution, Use 15 mL in the mouth or throat if needed for wound care., Disp: , Rfl:     levothyroxine (Synthroid, Levoxyl) 25 mcg tablet, Take 0.5 tablets (12.5 mcg) by mouth once daily., Disp: 45 tablet, Rfl: 1    famotidine (Pepcid) 20 mg tablet, Take 1 tablet (20 mg) by mouth 2 times a day., Disp: 60 tablet, Rfl: 0     Patient Active Problem List   Diagnosis    Iron deficiency    Class 2 obesity due to excess calories without serious comorbidity with body mass index (BMI) of 37.0 to 37.9 in adult    Mixed hyperlipidemia    IFG (impaired fasting  glucose)    Iron deficiency anemia    Hypothyroidism    Obstructive sleep apnea    Gastroesophageal reflux disease without esophagitis    Morbid obesity (CMS/HCC)    Psoriasis (a type of skin inflammation)       Social History     Socioeconomic History    Marital status:      Spouse name: Not on file    Number of children: Not on file    Years of education: Not on file    Highest education level: Not on file   Occupational History    Not on file   Tobacco Use    Smoking status: Former     Types: Cigarettes    Smokeless tobacco: Never   Vaping Use    Vaping Use: Never used   Substance and Sexual Activity    Alcohol use: Not Currently    Drug use: Never    Sexual activity: Yes     Partners: Male     Birth control/protection: Male Sterilization   Other Topics Concern    Not on file   Social History Narrative    Not on file     Social Determinants of Health     Financial Resource Strain: Not on file   Food Insecurity: Not on file   Transportation Needs: Not on file   Physical Activity: Not on file   Stress: Not on file   Social Connections: Not on file   Intimate Partner Violence: Not on file   Housing Stability: Not on file       Objective   OBJECTIVE:     Vitals:    12/12/23 0724   BP: 122/80   Pulse: 62   Temp: 36.6 °C (97.8 °F)   SpO2: 98%       Exam      Physical Exam  Constitutional:       Appearance: Normal appearance.   HENT:      Head: Normocephalic.   Pulmonary:      Effort: Pulmonary effort is normal.   Abdominal:      Tenderness: There is abdominal tenderness.       Musculoskeletal:      Cervical back: Normal range of motion.   Neurological:      Mental Status: She is alert.   Psychiatric:         Mood and Affect: Mood normal.

## 2023-12-12 NOTE — PATIENT INSTRUCTIONS
Rectal bleeding and Abd pain: Plan to r/o diverticulitis with CTAP, advised pt to schedule appointment with GI for colonoscopy   GERD: + gastritis on EGD done on 5/2023, try pepcid

## 2023-12-13 ENCOUNTER — TELEPHONE (OUTPATIENT)
Dept: HEMATOLOGY/ONCOLOGY | Facility: CLINIC | Age: 43
End: 2023-12-13
Payer: COMMERCIAL

## 2023-12-13 ENCOUNTER — HOSPITAL ENCOUNTER (EMERGENCY)
Facility: HOSPITAL | Age: 43
Discharge: HOME | End: 2023-12-13
Attending: EMERGENCY MEDICINE
Payer: COMMERCIAL

## 2023-12-13 ENCOUNTER — TELEPHONE (OUTPATIENT)
Dept: PRIMARY CARE | Facility: CLINIC | Age: 43
End: 2023-12-13
Payer: COMMERCIAL

## 2023-12-13 VITALS
WEIGHT: 220 LBS | SYSTOLIC BLOOD PRESSURE: 134 MMHG | TEMPERATURE: 97.2 F | HEART RATE: 89 BPM | HEIGHT: 66 IN | BODY MASS INDEX: 35.36 KG/M2 | RESPIRATION RATE: 18 BRPM | OXYGEN SATURATION: 97 % | DIASTOLIC BLOOD PRESSURE: 87 MMHG

## 2023-12-13 DIAGNOSIS — K52.9 COLITIS: Primary | ICD-10-CM

## 2023-12-13 LAB
ALBUMIN SERPL BCP-MCNC: 4.3 G/DL (ref 3.4–5)
ALP SERPL-CCNC: 49 U/L (ref 33–110)
ALT SERPL W P-5'-P-CCNC: 11 U/L (ref 7–45)
ANION GAP SERPL CALC-SCNC: 12 MMOL/L (ref 10–20)
APPEARANCE UR: ABNORMAL
AST SERPL W P-5'-P-CCNC: 10 U/L (ref 9–39)
B-HCG SERPL-ACNC: <2 MIU/ML
BASOPHILS # BLD MANUAL: 0 X10*3/UL (ref 0–0.1)
BASOPHILS NFR BLD MANUAL: 0 %
BILIRUB SERPL-MCNC: 0.3 MG/DL (ref 0–1.2)
BILIRUB UR STRIP.AUTO-MCNC: NEGATIVE MG/DL
BUN SERPL-MCNC: 8 MG/DL (ref 6–23)
CALCIUM SERPL-MCNC: 9.2 MG/DL (ref 8.6–10.3)
CHLORIDE SERPL-SCNC: 104 MMOL/L (ref 98–107)
CO2 SERPL-SCNC: 25 MMOL/L (ref 21–32)
COLOR UR: YELLOW
CREAT SERPL-MCNC: 0.79 MG/DL (ref 0.5–1.05)
EOSINOPHIL # BLD MANUAL: 0 X10*3/UL (ref 0–0.7)
EOSINOPHIL NFR BLD MANUAL: 0 %
ERYTHROCYTE [DISTWIDTH] IN BLOOD BY AUTOMATED COUNT: 12.9 % (ref 11.5–14.5)
GFR SERPL CREATININE-BSD FRML MDRD: >90 ML/MIN/1.73M*2
GIANT PLATELETS BLD QL SMEAR: ABNORMAL
GLUCOSE SERPL-MCNC: 132 MG/DL (ref 74–99)
GLUCOSE UR STRIP.AUTO-MCNC: NEGATIVE MG/DL
HCT VFR BLD AUTO: 40.5 % (ref 36–46)
HGB BLD-MCNC: 13.2 G/DL (ref 12–16)
HOLD SPECIMEN: NORMAL
IMM GRANULOCYTES # BLD AUTO: 0.04 X10*3/UL (ref 0–0.7)
IMM GRANULOCYTES NFR BLD AUTO: 0.4 % (ref 0–0.9)
KETONES UR STRIP.AUTO-MCNC: ABNORMAL MG/DL
LEUKOCYTE ESTERASE UR QL STRIP.AUTO: NEGATIVE
LYMPHOCYTES # BLD MANUAL: 0.61 X10*3/UL (ref 1.2–4.8)
LYMPHOCYTES NFR BLD MANUAL: 6 %
MCH RBC QN AUTO: 29.4 PG (ref 26–34)
MCHC RBC AUTO-ENTMCNC: 32.6 G/DL (ref 32–36)
MCV RBC AUTO: 90 FL (ref 80–100)
MONOCYTES # BLD MANUAL: 0.2 X10*3/UL (ref 0.1–1)
MONOCYTES NFR BLD MANUAL: 2 %
NEUTS SEG # BLD MANUAL: 9.19 X10*3/UL (ref 1.2–7)
NEUTS SEG NFR BLD MANUAL: 91 %
NITRITE UR QL STRIP.AUTO: NEGATIVE
NRBC BLD-RTO: 0 /100 WBCS (ref 0–0)
PH UR STRIP.AUTO: 7 [PH]
PLATELET # BLD AUTO: 81 X10*3/UL (ref 150–450)
POTASSIUM SERPL-SCNC: 3.7 MMOL/L (ref 3.5–5.3)
PROT SERPL-MCNC: 7.1 G/DL (ref 6.4–8.2)
PROT UR STRIP.AUTO-MCNC: NEGATIVE MG/DL
RBC # BLD AUTO: 4.49 X10*6/UL (ref 4–5.2)
RBC # UR STRIP.AUTO: NEGATIVE /UL
RBC MORPH BLD: ABNORMAL
SODIUM SERPL-SCNC: 137 MMOL/L (ref 136–145)
SP GR UR STRIP.AUTO: 1.01
TOTAL CELLS COUNTED BLD: 100
UROBILINOGEN UR STRIP.AUTO-MCNC: <2 MG/DL
VARIANT LYMPHS # BLD MANUAL: 0.1 X10*3/UL (ref 0–0.5)
VARIANT LYMPHS NFR BLD: 1 %
WBC # BLD AUTO: 10.1 X10*3/UL (ref 4.4–11.3)

## 2023-12-13 PROCEDURE — 99283 EMERGENCY DEPT VISIT LOW MDM: CPT

## 2023-12-13 PROCEDURE — 99284 EMERGENCY DEPT VISIT MOD MDM: CPT | Performed by: EMERGENCY MEDICINE

## 2023-12-13 PROCEDURE — 85007 BL SMEAR W/DIFF WBC COUNT: CPT | Performed by: EMERGENCY MEDICINE

## 2023-12-13 PROCEDURE — 84702 CHORIONIC GONADOTROPIN TEST: CPT | Performed by: EMERGENCY MEDICINE

## 2023-12-13 PROCEDURE — 80053 COMPREHEN METABOLIC PANEL: CPT | Performed by: EMERGENCY MEDICINE

## 2023-12-13 PROCEDURE — 36415 COLL VENOUS BLD VENIPUNCTURE: CPT | Performed by: EMERGENCY MEDICINE

## 2023-12-13 PROCEDURE — 85027 COMPLETE CBC AUTOMATED: CPT | Performed by: EMERGENCY MEDICINE

## 2023-12-13 PROCEDURE — 81003 URINALYSIS AUTO W/O SCOPE: CPT | Performed by: EMERGENCY MEDICINE

## 2023-12-13 PROCEDURE — 2500000001 HC RX 250 WO HCPCS SELF ADMINISTERED DRUGS (ALT 637 FOR MEDICARE OP): Performed by: EMERGENCY MEDICINE

## 2023-12-13 RX ORDER — METRONIDAZOLE 500 MG/1
500 TABLET ORAL ONCE
Status: COMPLETED | OUTPATIENT
Start: 2023-12-13 | End: 2023-12-13

## 2023-12-13 RX ORDER — TRAMADOL HYDROCHLORIDE 50 MG/1
50 TABLET ORAL EVERY 6 HOURS PRN
Qty: 12 TABLET | Refills: 0 | Status: SHIPPED | OUTPATIENT
Start: 2023-12-13 | End: 2023-12-16

## 2023-12-13 RX ORDER — METRONIDAZOLE 500 MG/1
500 TABLET ORAL 3 TIMES DAILY
Qty: 21 TABLET | Refills: 0 | Status: SHIPPED | OUTPATIENT
Start: 2023-12-13 | End: 2023-12-14 | Stop reason: ALTCHOICE

## 2023-12-13 RX ORDER — PREDNISONE 10 MG/1
TABLET ORAL
Qty: 11 TABLET | Refills: 0 | Status: SHIPPED | OUTPATIENT
Start: 2023-12-13 | End: 2023-12-18

## 2023-12-13 RX ORDER — TRAMADOL HYDROCHLORIDE 50 MG/1
50 TABLET ORAL ONCE
Status: COMPLETED | OUTPATIENT
Start: 2023-12-13 | End: 2023-12-13

## 2023-12-13 RX ORDER — ALUMINUM HYDROXIDE, MAGNESIUM HYDROXIDE, AND SIMETHICONE 1200; 120; 1200 MG/30ML; MG/30ML; MG/30ML
20 SUSPENSION ORAL ONCE
Status: COMPLETED | OUTPATIENT
Start: 2023-12-13 | End: 2023-12-13

## 2023-12-13 RX ORDER — CIPROFLOXACIN 500 MG/1
500 TABLET ORAL ONCE
Status: COMPLETED | OUTPATIENT
Start: 2023-12-13 | End: 2023-12-13

## 2023-12-13 RX ORDER — CIPROFLOXACIN 500 MG/1
500 TABLET ORAL EVERY 12 HOURS
Qty: 14 TABLET | Refills: 0 | Status: SHIPPED | OUTPATIENT
Start: 2023-12-13 | End: 2023-12-14 | Stop reason: ALTCHOICE

## 2023-12-13 RX ADMIN — TRAMADOL HYDROCHLORIDE 50 MG: 50 TABLET, COATED ORAL at 16:01

## 2023-12-13 RX ADMIN — METRONIDAZOLE 500 MG: 500 TABLET ORAL at 16:01

## 2023-12-13 RX ADMIN — ALUMINUM HYDROXIDE, MAGNESIUM HYDROXIDE, AND DIMETHICONE 20 ML: 200; 20; 200 SUSPENSION ORAL at 17:38

## 2023-12-13 RX ADMIN — CIPROFLOXACIN HYDROCHLORIDE 500 MG: 500 TABLET, FILM COATED ORAL at 16:01

## 2023-12-13 ASSESSMENT — COLUMBIA-SUICIDE SEVERITY RATING SCALE - C-SSRS
1. IN THE PAST MONTH, HAVE YOU WISHED YOU WERE DEAD OR WISHED YOU COULD GO TO SLEEP AND NOT WAKE UP?: NO
6. HAVE YOU EVER DONE ANYTHING, STARTED TO DO ANYTHING, OR PREPARED TO DO ANYTHING TO END YOUR LIFE?: NO
2. HAVE YOU ACTUALLY HAD ANY THOUGHTS OF KILLING YOURSELF?: NO

## 2023-12-13 NOTE — TELEPHONE ENCOUNTER
Called and lM on VM that her platelets were better per ARNULFO Salinas PAC and she hopes she feels better and will see her next week.

## 2023-12-13 NOTE — TELEPHONE ENCOUNTER
States that pain has slightly improved since yesterday and is now complaining of bloating. Writer reviewed CT results and advised patient to schedule with GI for colonoscopy to rule out neoplasm especially since pt does have a family history of colon cancer. Pt states that she needs a referral. Referral placed.   Will try prednisone taper to help with pain. Pt advised to call the office if no improvement.

## 2023-12-13 NOTE — ED PROVIDER NOTES
HPI   Chief Complaint   Patient presents with    Abdominal Pain     Pt to ED for severe lower abdominal pain. Pt had CT done yesterday that showed colitis & ovarian cyst rupture. Pt was started on steroids by PCP today. C/o N/V/D. C/o pain with urination.        This is a 42-year-old female who presents to the emergency department complaining of colitis.  The patient reports that for the past week she has had no appetite.  She has had diarrhea with blood in the stool.  She denies fevers and chills.  She reports that she started with abdominal pain yesterday.  The pain is crampy.  It is in the lower abdomen.  She reports some nausea when she tries to eat.  She saw her primary physician yesterday and had a CT scan.  The CT scan showed colitis.  She started on steroids today.  Her symptoms, however, have persisted.  She came to the hospital for further evaluation.    Past medical history: Low platelet count, , hypothyroid                          No data recorded                Patient History   Past Medical History:   Diagnosis Date    Anemia     Hypothyroidism      Past Surgical History:   Procedure Laterality Date     SECTION, LOW TRANSVERSE  ,     Family History   Problem Relation Name Age of Onset    Colon cancer Mother Shari     COPD Mother Shari     Hyperlipidemia Father Archuleta     Glaucoma Brother      Breast cancer Father's Sister Na      Social History     Tobacco Use    Smoking status: Former     Types: Cigarettes    Smokeless tobacco: Never   Vaping Use    Vaping Use: Never used   Substance Use Topics    Alcohol use: Not Currently    Drug use: Never       Physical Exam   ED Triage Vitals   Temp Heart Rate Resp BP   23 1146 23 1148 23 1148 23 1148   36.2 °C (97.2 °F) 72 18 177/88      SpO2 Temp src Heart Rate Source Patient Position   23 1148 -- -- --   98 %         BP Location FiO2 (%)     -- --             Physical Exam  Vitals and nursing note reviewed.    HENT:      Head: Normocephalic and atraumatic.      Nose: Nose normal.   Eyes:      Conjunctiva/sclera: Conjunctivae normal.   Cardiovascular:      Rate and Rhythm: Normal rate and regular rhythm.      Pulses: Normal pulses.      Heart sounds: Normal heart sounds.   Pulmonary:      Effort: Pulmonary effort is normal.      Breath sounds: Normal breath sounds.   Abdominal:      General: Bowel sounds are normal.      Palpations: Abdomen is soft.      Tenderness: There is abdominal tenderness in the right lower quadrant, periumbilical area, suprapubic area and left lower quadrant.   Musculoskeletal:         General: Normal range of motion.      Cervical back: Normal range of motion and neck supple.   Skin:     Findings: No rash.   Neurological:      General: No focal deficit present.      Mental Status: She is alert and oriented to person, place, and time.   Psychiatric:         Mood and Affect: Mood normal.         ED Course & MDM   Diagnoses as of 12/14/23 0103   Colitis       Medical Decision Making  Differential diagnosis: I have considered the following conditions during my assessment of this patient's condition: Gastritis, gastroenteritis, GERD, food poisoning, ileus, bowel obstruction, hernia, acute appendicitis, cholecystitis, diverticulitis, colitis.    This is a 42-year-old female who presents to the emergency department complaining of abdominal pain in the setting of a CT scan that showed colitis.  CT scan result was reviewed by myself.  It showed colitis mainly around the rectum with lymphadenopathy, an endometrial polyp and small amount of free fluid in the pelvis potentially from a ruptured ovarian cyst.  The patient's labs from today showed a normal white blood count.  Her platelet count was 81 which is actually improved from previous.  Blood sugar was mildly elevated at 132.  The patient did take steroids today.  The patient is clinically well-appearing.  She will be treated with Cipro and Flagyl for  possible infectious colitis and was given tramadol for pain.  She was already referred to GI by her primary physician for follow-up.        Procedure  Procedures     Thom Montelongo MD  12/14/23 0107

## 2023-12-13 NOTE — TELEPHONE ENCOUNTER
Pt states she did not need ARNULFO [employer] PAC to call her, but wanted he rto know she had a suspicious CT scan yesterday.  She has a follow up appointment tomorrow with GI.  She believes they will be doing a scope.  Yesterday, she had a ovarian cyst rupture.  She sees ARNULFO Salinas PAC on 12/20.  She is a little concerned wit hher Platelet level.  She has not had one drawn recently.

## 2023-12-14 ENCOUNTER — OFFICE VISIT (OUTPATIENT)
Dept: GASTROENTEROLOGY | Facility: CLINIC | Age: 43
End: 2023-12-14
Payer: COMMERCIAL

## 2023-12-14 VITALS
HEART RATE: 94 BPM | HEIGHT: 66 IN | WEIGHT: 212 LBS | TEMPERATURE: 97.5 F | DIASTOLIC BLOOD PRESSURE: 83 MMHG | SYSTOLIC BLOOD PRESSURE: 128 MMHG | BODY MASS INDEX: 34.07 KG/M2

## 2023-12-14 DIAGNOSIS — R10.30 LOWER ABDOMINAL PAIN: ICD-10-CM

## 2023-12-14 DIAGNOSIS — R19.7 DIARRHEA, UNSPECIFIED TYPE: ICD-10-CM

## 2023-12-14 DIAGNOSIS — K52.9 COLITIS: Primary | ICD-10-CM

## 2023-12-14 DIAGNOSIS — K92.1 HEMATOCHEZIA: ICD-10-CM

## 2023-12-14 PROCEDURE — 3008F BODY MASS INDEX DOCD: CPT | Performed by: NURSE PRACTITIONER

## 2023-12-14 PROCEDURE — 99204 OFFICE O/P NEW MOD 45 MIN: CPT | Performed by: NURSE PRACTITIONER

## 2023-12-14 PROCEDURE — 99214 OFFICE O/P EST MOD 30 MIN: CPT | Performed by: NURSE PRACTITIONER

## 2023-12-14 PROCEDURE — 1036F TOBACCO NON-USER: CPT | Performed by: NURSE PRACTITIONER

## 2023-12-14 NOTE — PATIENT INSTRUCTIONS
Recommendations  Can continue prednisone taper.  Stop antibiotics.  Obtain stool tests.  Schedule colonoscopy. You can call [phone] to schedule.   Follow up 3 weeks after procedure. Please call the office at [phone] with any questions or concerns.

## 2023-12-14 NOTE — PROGRESS NOTES
"Ally Carpio is a 42 y.o. female with past medical history of hypothyroidism, psoriasis, remote history of peptic ulcer disease and anemia who is referred by PCP Dr. Chelsie Cooney for colitis. She had CT scan 12/12/2023 for diarrhea, rectal bleeding, and poor appetite. There was rectal wall thickening with numerous perirectal lymph nodes in the pelvis, suspicious for inflammatory bowel disease.    She previously had \"normal\" bowel movements. She now reports an 8 month history of diarrhea since traveling to [facility] 4/2023. She did a course of antibiotics and things improved. A month ago, non-bloody diarrhea returned about 3-5 times per day. Two weeks ago developed bright red and maroon colored hematochezia with her bowel movements. She lost appetite and has lost 10-15 pounds in the last 1-2 months. Occasional bloating, but no pain at that time. Now having sharp lower abdominal pains that come and go. Worse after eating or drinking anything. No fever, but has had nausea. She has history of GERD with uncontrolled heartburn lately. Taking pepcid. Denies oral aphthous ulcerations, eye inflammations, skin lesions, and joint pains, but endorses chronic low back pain. She recently was given short prednisone taper by her PCP. She is on day 2 but did not notice much difference so went to ER yesterday and given Cipro/Flagyl.    ALEXEY positive and saw Rheumatology in the past. No lupus or RA. She has no iron deficiency anemia. Platelets previously low around 50. Most recent 80. She sees a Hematologist. Had bone marrow biopsy and is waiting for results. She has had gastric ulcers in the past when taking NSAIDS. She now avoids these. She had an EGD 5/2023 with gastritis. No H. Pylori. No prior colonoscopy.     Social history: Works as a massage therapist. No tobacco. Rare alcohol. Denies illicit drugs.    Family history: Mother with colon cancer, diagnosed around age 70. Paternal uncle also with colon cancer. Denies " "family history of IBD.     Past Medical History:   Diagnosis Date    Anemia     Hypothyroidism      Past Surgical History:   Procedure Laterality Date     SECTION, LOW TRANSVERSE  ,     Current Outpatient Medications   Medication Sig Dispense Refill    Bryhali 0.01 % lotion       chlorhexidine (Peridex) 0.12 % solution Use 15 mL in the mouth or throat if needed for wound care.      famotidine (Pepcid) 20 mg tablet Take 1 tablet (20 mg) by mouth 2 times a day. 60 tablet 0    levothyroxine (Synthroid, Levoxyl) 25 mcg tablet Take 0.5 tablets (12.5 mcg) by mouth once daily. 45 tablet 1    predniSONE (Deltasone) 10 mg tablet Take 4 tablets (40 mg) by mouth once daily for 1 day, THEN 3 tablets (30 mg) once daily for 1 day, THEN 2 tablets (20 mg) once daily for 1 day, THEN 1 tablet (10 mg) once daily for 1 day, THEN 0.5 tablets (5 mg) once daily for 2 days. 11 tablet 0    traMADol (Ultram) 50 mg tablet Take 1 tablet (50 mg) by mouth every 6 hours if needed for severe pain (7 - 10) for up to 3 days. 12 tablet 0     No current facility-administered medications for this visit.     No Known Allergies  Family History   Problem Relation Name Age of Onset    Colon cancer Mother Shari     COPD Mother Shari     Hyperlipidemia Father Archuleta     Glaucoma Brother      Breast cancer Father's Sister Na        Review of Systems  Review of Systems negative except as noted in HPI.    Objective     /83   Pulse 94   Temp 36.4 °C (97.5 °F)   Ht 1.676 m (5' 6\")   Wt 96.2 kg (212 lb)   LMP 2023   BMI 34.22 kg/m²      Physical Exam  Constitutional:  No acute distress. Normal appearance. Not ill-appearing.  HENT:  Head normocephalic and atraumatic. Conjunctivae normal.  Cardiovascular:  Normal rate. Regular rhythm.  Pulmonary:  Pulmonary effort normal. No respiratory distress. Breath sounds clear.  Abdominal:  Abdomen is soft. There is no distension. Lower abdominal tenderness with palpation, worse in LLQ. No rebound " or guarding.  Skin: Dry.  Neurological:  Alert and oriented.  Psychiatric:  Mood and affect normal.    Assessment/Plan     42 y.o. female with family history of colon cancer in mother who presents today for clinic visit for several month history of bloody diarrhea and lower abdominal pain in the setting of positive ALEXEY, elevated sed rate, and abnormal CT showing possible colitis. Colonoscopy is indicated to rule out IBD.    Recommendations  Can continue prednisone taper.  Continue pepcid.  Stop antibiotics.  Obtain stool tests.  Schedule colonoscopy with biopsies.   Follow up after procedure, or sooner if needed.    Electronically signed by: Rashmi Vieira CNP on 12/14/2023 at 3:34 PM

## 2023-12-15 ENCOUNTER — LAB (OUTPATIENT)
Dept: LAB | Facility: LAB | Age: 43
End: 2023-12-15
Payer: COMMERCIAL

## 2023-12-15 DIAGNOSIS — R10.30 LOWER ABDOMINAL PAIN: ICD-10-CM

## 2023-12-15 DIAGNOSIS — K92.1 HEMATOCHEZIA: ICD-10-CM

## 2023-12-15 DIAGNOSIS — R19.7 DIARRHEA, UNSPECIFIED TYPE: ICD-10-CM

## 2023-12-15 LAB — C DIF TOX TCDA+TCDB STL QL NAA+PROBE: NOT DETECTED

## 2023-12-15 PROCEDURE — 87493 C DIFF AMPLIFIED PROBE: CPT

## 2023-12-15 PROCEDURE — 83993 ASSAY FOR CALPROTECTIN FECAL: CPT

## 2023-12-15 PROCEDURE — 87506 IADNA-DNA/RNA PROBE TQ 6-11: CPT

## 2023-12-16 LAB

## 2023-12-17 ENCOUNTER — PATIENT MESSAGE (OUTPATIENT)
Dept: GASTROENTEROLOGY | Facility: CLINIC | Age: 43
End: 2023-12-17
Payer: COMMERCIAL

## 2023-12-18 ENCOUNTER — LAB (OUTPATIENT)
Dept: LAB | Facility: LAB | Age: 43
End: 2023-12-18
Payer: COMMERCIAL

## 2023-12-18 DIAGNOSIS — E61.1 IRON DEFICIENCY: ICD-10-CM

## 2023-12-18 DIAGNOSIS — D69.6 THROMBOCYTOPENIA, UNSPECIFIED (CMS-HCC): ICD-10-CM

## 2023-12-18 DIAGNOSIS — E66.01 MORBID (SEVERE) OBESITY DUE TO EXCESS CALORIES (MULTI): ICD-10-CM

## 2023-12-18 DIAGNOSIS — Z98.890 OTHER SPECIFIED POSTPROCEDURAL STATES: ICD-10-CM

## 2023-12-18 LAB
ALBUMIN SERPL BCP-MCNC: 4.2 G/DL (ref 3.4–5)
ALP SERPL-CCNC: 43 U/L (ref 33–110)
ALT SERPL W P-5'-P-CCNC: 10 U/L (ref 7–45)
ANION GAP SERPL CALC-SCNC: 12 MMOL/L (ref 10–20)
AST SERPL W P-5'-P-CCNC: 9 U/L (ref 9–39)
BASOPHILS # BLD AUTO: 0.06 X10*3/UL (ref 0–0.1)
BASOPHILS NFR BLD AUTO: 0.7 %
BILIRUB SERPL-MCNC: 0.5 MG/DL (ref 0–1.2)
BUN SERPL-MCNC: 16 MG/DL (ref 6–23)
CALCIUM SERPL-MCNC: 9.1 MG/DL (ref 8.6–10.3)
CHLORIDE SERPL-SCNC: 104 MMOL/L (ref 98–107)
CO2 SERPL-SCNC: 26 MMOL/L (ref 21–32)
CREAT SERPL-MCNC: 0.81 MG/DL (ref 0.5–1.05)
EOSINOPHIL # BLD AUTO: 0.25 X10*3/UL (ref 0–0.7)
EOSINOPHIL NFR BLD AUTO: 2.9 %
ERYTHROCYTE [DISTWIDTH] IN BLOOD BY AUTOMATED COUNT: 13.2 % (ref 11.5–14.5)
GFR SERPL CREATININE-BSD FRML MDRD: >90 ML/MIN/1.73M*2
GIANT PLATELETS BLD QL SMEAR: NORMAL
GLUCOSE SERPL-MCNC: 82 MG/DL (ref 74–99)
HCT VFR BLD AUTO: 40.7 % (ref 36–46)
HGB BLD-MCNC: 13.4 G/DL (ref 12–16)
IMM GRANULOCYTES # BLD AUTO: 0.02 X10*3/UL (ref 0–0.7)
IMM GRANULOCYTES NFR BLD AUTO: 0.2 % (ref 0–0.9)
LYMPHOCYTES # BLD AUTO: 1.29 X10*3/UL (ref 1.2–4.8)
LYMPHOCYTES NFR BLD AUTO: 14.9 %
MCH RBC QN AUTO: 29.5 PG (ref 26–34)
MCHC RBC AUTO-ENTMCNC: 32.9 G/DL (ref 32–36)
MCV RBC AUTO: 90 FL (ref 80–100)
MONOCYTES # BLD AUTO: 0.69 X10*3/UL (ref 0.1–1)
MONOCYTES NFR BLD AUTO: 7.9 %
NEUTROPHILS # BLD AUTO: 6.37 X10*3/UL (ref 1.2–7.7)
NEUTROPHILS NFR BLD AUTO: 73.4 %
NRBC BLD-RTO: 0 /100 WBCS (ref 0–0)
PLATELET # BLD AUTO: 87 X10*3/UL (ref 150–450)
POTASSIUM SERPL-SCNC: 3.7 MMOL/L (ref 3.5–5.3)
PROT SERPL-MCNC: 6.5 G/DL (ref 6.4–8.2)
RBC # BLD AUTO: 4.54 X10*6/UL (ref 4–5.2)
RBC MORPH BLD: NORMAL
SODIUM SERPL-SCNC: 138 MMOL/L (ref 136–145)
WBC # BLD AUTO: 8.7 X10*3/UL (ref 4.4–11.3)

## 2023-12-18 PROCEDURE — 80053 COMPREHEN METABOLIC PANEL: CPT

## 2023-12-18 PROCEDURE — 82728 ASSAY OF FERRITIN: CPT

## 2023-12-18 PROCEDURE — 85025 COMPLETE CBC W/AUTO DIFF WBC: CPT

## 2023-12-18 PROCEDURE — 36415 COLL VENOUS BLD VENIPUNCTURE: CPT

## 2023-12-18 PROCEDURE — 83540 ASSAY OF IRON: CPT

## 2023-12-18 PROCEDURE — 83550 IRON BINDING TEST: CPT

## 2023-12-19 ENCOUNTER — APPOINTMENT (OUTPATIENT)
Dept: PRIMARY CARE | Facility: CLINIC | Age: 43
End: 2023-12-19
Payer: COMMERCIAL

## 2023-12-19 DIAGNOSIS — K52.9 COLITIS: Primary | ICD-10-CM

## 2023-12-19 LAB
CALPROTECTIN STL-MCNT: >3000 UG/G
CELLS ANALYZED: 13 CELLS
CHROM ANALY OVERALL INTERP-IMP: NORMAL
CHROMOS CYTO BASIC ASSOC OBS PNL BLD/T: 4 CELLS
CHROMOSOME ANALYSIS MASTER PANEL: 0 CELLS
CHROMOSOME ANALYSIS MASTER PANEL: 0 CELLS
CHROMOSOME ANALYSIS MASTER PANEL: 46 CHROMOSOMES
CHROMOSOME ANALYSIS MASTER PANEL: NORMAL
ELECTRONICALLY SIGNED BY CYTOGENETICS: NORMAL
FERRITIN SERPL-MCNC: 81 NG/ML (ref 8–150)
IRON SATN MFR SERPL: 16 % (ref 25–45)
IRON SERPL-MCNC: 47 UG/DL (ref 35–150)
ISCN BAND LEVEL QL: 400 BANDS
KARYOTYP MAR: 2 CELLS
SPECIMEN CONDITION: NORMAL
TIBC SERPL-MCNC: 297 UG/DL (ref 240–445)
TOTAL CELLS COUNTED MAR: 13 CELLS
UIBC SERPL-MCNC: 250 UG/DL (ref 110–370)

## 2023-12-19 RX ORDER — DIPHENHYDRAMINE HYDROCHLORIDE 50 MG/ML
50 INJECTION INTRAMUSCULAR; INTRAVENOUS AS NEEDED
OUTPATIENT
Start: 2023-12-20

## 2023-12-19 RX ORDER — BUDESONIDE 3 MG/1
CAPSULE, COATED PELLETS ORAL
Qty: 180 CAPSULE | Refills: 0 | Status: SHIPPED | OUTPATIENT
Start: 2023-12-19 | End: 2024-01-02 | Stop reason: ALTCHOICE

## 2023-12-19 RX ORDER — HEPARIN 100 UNIT/ML
500 SYRINGE INTRAVENOUS AS NEEDED
OUTPATIENT
Start: 2023-12-20

## 2023-12-19 RX ORDER — ALBUTEROL SULFATE 0.83 MG/ML
3 SOLUTION RESPIRATORY (INHALATION) AS NEEDED
OUTPATIENT
Start: 2023-12-20

## 2023-12-19 RX ORDER — HEPARIN SODIUM,PORCINE/PF 10 UNIT/ML
50 SYRINGE (ML) INTRAVENOUS AS NEEDED
OUTPATIENT
Start: 2023-12-20

## 2023-12-19 RX ORDER — EPINEPHRINE 0.3 MG/.3ML
0.3 INJECTION SUBCUTANEOUS EVERY 5 MIN PRN
OUTPATIENT
Start: 2023-12-20

## 2023-12-19 RX ORDER — FAMOTIDINE 10 MG/ML
20 INJECTION INTRAVENOUS ONCE AS NEEDED
OUTPATIENT
Start: 2023-12-20

## 2023-12-20 ENCOUNTER — OFFICE VISIT (OUTPATIENT)
Dept: HEMATOLOGY/ONCOLOGY | Facility: CLINIC | Age: 43
End: 2023-12-20
Payer: COMMERCIAL

## 2023-12-20 ENCOUNTER — INFUSION (OUTPATIENT)
Dept: HEMATOLOGY/ONCOLOGY | Facility: CLINIC | Age: 43
End: 2023-12-20
Payer: COMMERCIAL

## 2023-12-20 VITALS
SYSTOLIC BLOOD PRESSURE: 155 MMHG | BODY MASS INDEX: 33.23 KG/M2 | RESPIRATION RATE: 16 BRPM | OXYGEN SATURATION: 99 % | WEIGHT: 206.79 LBS | TEMPERATURE: 97.3 F | HEART RATE: 54 BPM | DIASTOLIC BLOOD PRESSURE: 84 MMHG | HEIGHT: 66 IN

## 2023-12-20 VITALS — SYSTOLIC BLOOD PRESSURE: 128 MMHG | HEART RATE: 62 BPM | TEMPERATURE: 97.7 F | DIASTOLIC BLOOD PRESSURE: 80 MMHG

## 2023-12-20 DIAGNOSIS — E61.1 IRON DEFICIENCY: Primary | ICD-10-CM

## 2023-12-20 DIAGNOSIS — E61.1 IRON DEFICIENCY: ICD-10-CM

## 2023-12-20 PROCEDURE — 2500000004 HC RX 250 GENERAL PHARMACY W/ HCPCS (ALT 636 FOR OP/ED): Performed by: PHYSICIAN ASSISTANT

## 2023-12-20 PROCEDURE — 99214 OFFICE O/P EST MOD 30 MIN: CPT | Performed by: PHYSICIAN ASSISTANT

## 2023-12-20 PROCEDURE — 96365 THER/PROPH/DIAG IV INF INIT: CPT | Mod: INF

## 2023-12-20 PROCEDURE — 3008F BODY MASS INDEX DOCD: CPT | Performed by: PHYSICIAN ASSISTANT

## 2023-12-20 PROCEDURE — 1036F TOBACCO NON-USER: CPT | Performed by: PHYSICIAN ASSISTANT

## 2023-12-20 PROCEDURE — 99214 OFFICE O/P EST MOD 30 MIN: CPT | Mod: 25 | Performed by: PHYSICIAN ASSISTANT

## 2023-12-20 RX ORDER — EPINEPHRINE 0.3 MG/.3ML
0.3 INJECTION SUBCUTANEOUS EVERY 5 MIN PRN
OUTPATIENT
Start: 2023-12-24

## 2023-12-20 RX ORDER — DIPHENHYDRAMINE HYDROCHLORIDE 50 MG/ML
50 INJECTION INTRAMUSCULAR; INTRAVENOUS AS NEEDED
OUTPATIENT
Start: 2023-12-24

## 2023-12-20 RX ORDER — ALBUTEROL SULFATE 0.83 MG/ML
3 SOLUTION RESPIRATORY (INHALATION) AS NEEDED
OUTPATIENT
Start: 2023-12-24

## 2023-12-20 RX ORDER — HEPARIN 100 UNIT/ML
500 SYRINGE INTRAVENOUS AS NEEDED
OUTPATIENT
Start: 2023-12-20

## 2023-12-20 RX ORDER — HEPARIN SODIUM,PORCINE/PF 10 UNIT/ML
50 SYRINGE (ML) INTRAVENOUS AS NEEDED
OUTPATIENT
Start: 2023-12-20

## 2023-12-20 RX ORDER — FAMOTIDINE 10 MG/ML
20 INJECTION INTRAVENOUS ONCE AS NEEDED
OUTPATIENT
Start: 2023-12-24

## 2023-12-20 RX ADMIN — IRON SUCROSE 300 MG: 20 INJECTION, SOLUTION INTRAVENOUS at 10:32

## 2023-12-20 ASSESSMENT — ENCOUNTER SYMPTOMS
OCCASIONAL FEELINGS OF UNSTEADINESS: 0
LOSS OF SENSATION IN FEET: 0
DEPRESSION: 0

## 2023-12-20 ASSESSMENT — PAIN SCALES - GENERAL: PAINLEVEL: 2

## 2023-12-21 NOTE — PROGRESS NOTES
Patient Visit Information:   Visit Type: Benign Heme Follow-up     Cancer History:   Treatment Synopsis:    42 female referred by Dr. Cooney for CAMDEN and IV iron.    Upon review of labs noted to be anemic with iron deficiency ~1 year ago. Started on oral iron but can't tolerate due to GI upset so stopped taking it. Also of note is thrombocytopenia, also new, last cbc with normal plt count was in  and no labs done between  till now.    On assessment, reports heavy menses and has been experiencing PIERRE and heart palpitations. Notes that she was recently diagnosed with psoriasis but might also have psoriatic arthritis, work up being done now. Otherwise doing well. Denies F/C/night sweats, unintentional weight loss, anorexia, fatigue, HA, change in vision/hearing, palpitations, CP, SOB, n/v/d/c/abd pain, bleeding, melena, LAD, peripheral neuropathy, rash.     S/P 3 doses of Venofer, last given 2023. Had EGD and VCE on 2023 c/w gastritis. No c-scope performed.    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        History of Present Illness:   Patient presents for follow up. Beginning of the month, patient developed bloody diarrhea and abd pain. Started Medrol dose pack by PCP. Seen by GI placed on Budesonide. On assessment, c/o of ongoing diarrhea and n/v and weight loss. denies any bleeding, bruising. Notes worsening of menses seeing GYN soon. Otherwise doing well.     Review of Systems:    12 pt ROS was performed, pertinent positive and negative findings as per HPI above, remainder of systems reviewed and are negative.    Allergies and Intolerances:        Allergies:   No Known Allergies: Active    Outpatient Medication Profile:   * Patient Currently Takes Medications as of 21-Sep-2023 11:31 documented in Structured  "Notes   dexAMETHasone 20 mg oral tablet: 2 tab(s) orally once a day , Start Date: 21-Sep-2023   levothyroxine 25 mcg (0.025 mg) oral tablet: 1/2 tab(s) orally once a day   Multiple Vitamins oral tablet: 1 tab(s) orally once a day      Family History: No Family History items are recorded in the problem list.     Social History:   Social Substance History   · Smoking Status former smoker (1)     Visit Vitals  /84 (BP Location: Right arm, Patient Position: Sitting, BP Cuff Size: Adult long)   Pulse 54   Temp 36.3 °C (97.3 °F) (Temporal)   Resp 16   Ht 1.683 m (5' 6.26\")   Wt 93.8 kg (206 lb 12.7 oz)   SpO2 99%   BMI 33.12 kg/m²   OB Status Having periods   Smoking Status Former   BSA 2.09 m²       Physical Exam:     Constitutional: Well developed, awake/alert/oriented x3, no distress, alert and cooperative   Eyes: clear sclera   Extremities: normal extremities, no cyanosis edema, contusions or wounds, no clubbing   Neurological: alert and oriented x3, intact senses, motor, response and reflexes, normal strength   Psychological: Appropriate mood and behavior   Skin: Warm and dry, no lesions, no rashes     Lab on 12/18/2023   Component Date Value Ref Range Status    WBC 12/18/2023 8.7  4.4 - 11.3 x10*3/uL Final    nRBC 12/18/2023 0.0  0.0 - 0.0 /100 WBCs Final    RBC 12/18/2023 4.54  4.00 - 5.20 x10*6/uL Final    Hemoglobin 12/18/2023 13.4  12.0 - 16.0 g/dL Final    Hematocrit 12/18/2023 40.7  36.0 - 46.0 % Final    MCV 12/18/2023 90  80 - 100 fL Final    MCH 12/18/2023 29.5  26.0 - 34.0 pg Final    MCHC 12/18/2023 32.9  32.0 - 36.0 g/dL Final    RDW 12/18/2023 13.2  11.5 - 14.5 % Final    Platelets 12/18/2023 87 (L)  150 - 450 x10*3/uL Final    Neutrophils % 12/18/2023 73.4  40.0 - 80.0 % Final    Immature Granulocytes %, Automated 12/18/2023 0.2  0.0 - 0.9 % Final    Immature Granulocyte Count (IG) includes promyelocytes, myelocytes and metamyelocytes but does not include bands. Percent differential counts (%) " should be interpreted in the context of the absolute cell counts (cells/UL).    Lymphocytes % 12/18/2023 14.9  13.0 - 44.0 % Final    Monocytes % 12/18/2023 7.9  2.0 - 10.0 % Final    Eosinophils % 12/18/2023 2.9  0.0 - 6.0 % Final    Basophils % 12/18/2023 0.7  0.0 - 2.0 % Final    Neutrophils Absolute 12/18/2023 6.37  1.20 - 7.70 x10*3/uL Final    Percent differential counts (%) should be interpreted in the context of the absolute cell counts (cells/uL).    Immature Granulocytes Absolute, Au* 12/18/2023 0.02  0.00 - 0.70 x10*3/uL Final    Lymphocytes Absolute 12/18/2023 1.29  1.20 - 4.80 x10*3/uL Final    Monocytes Absolute 12/18/2023 0.69  0.10 - 1.00 x10*3/uL Final    Eosinophils Absolute 12/18/2023 0.25  0.00 - 0.70 x10*3/uL Final    Basophils Absolute 12/18/2023 0.06  0.00 - 0.10 x10*3/uL Final    Glucose 12/18/2023 82  74 - 99 mg/dL Final    Sodium 12/18/2023 138  136 - 145 mmol/L Final    Potassium 12/18/2023 3.7  3.5 - 5.3 mmol/L Final    Chloride 12/18/2023 104  98 - 107 mmol/L Final    Bicarbonate 12/18/2023 26  21 - 32 mmol/L Final    Anion Gap 12/18/2023 12  10 - 20 mmol/L Final    Urea Nitrogen 12/18/2023 16  6 - 23 mg/dL Final    Creatinine 12/18/2023 0.81  0.50 - 1.05 mg/dL Final    eGFR 12/18/2023 >90  >60 mL/min/1.73m*2 Final    Calculations of estimated GFR are performed using the 2021 CKD-EPI Study Refit equation without the race variable for the IDMS-Traceable creatinine methods.  https://jasn.asnjournals.org/content/early/2021/09/22/ASN.8530301624    Calcium 12/18/2023 9.1  8.6 - 10.3 mg/dL Final    Albumin 12/18/2023 4.2  3.4 - 5.0 g/dL Final    Alkaline Phosphatase 12/18/2023 43  33 - 110 U/L Final    Total Protein 12/18/2023 6.5  6.4 - 8.2 g/dL Final    AST 12/18/2023 9  9 - 39 U/L Final    Bilirubin, Total 12/18/2023 0.5  0.0 - 1.2 mg/dL Final    ALT 12/18/2023 10  7 - 45 U/L Final    Patients treated with Sulfasalazine may generate falsely decreased results for ALT.    RBC Morphology  12/18/2023 See Below   Final    Giant Platelets 12/18/2023 Few   Final    Ferritin 12/18/2023 81  8 - 150 ng/mL Final    Iron 12/18/2023 47  35 - 150 ug/dL Final    UIBC 12/18/2023 250  110 - 370 ug/dL Final    TIBC 12/18/2023 297  240 - 445 ug/dL Final    % Saturation 12/18/2023 16 (L)  25 - 45 % Final   Lab on 12/15/2023   Component Date Value Ref Range Status    Calprotectin, Stool 12/15/2023 >3000 (H)  <=49 ug/g Final    REFERENCE INTERVAL: Calprotectin, Fecal by Immunoassay      Less than 50 ug/g.........Normal     ug/g...............Borderline elevated, test should be                              re-evaluated in 4-6 weeks.    121 ug/g or greater.......Elevated  Performed By: [employer]  [address] [place] [id number]  : Byron Sanchez MD, PhD  CLIA Number: [id number]    Campylobacter Group 12/15/2023 Not Detected  Not Detected Final    Salmonella species 12/15/2023 Not Detected  Not Detected Final    Shigella species 12/15/2023 Not Detected  Not Detected Final    Vibrio Group 12/15/2023 Not Detected  Not Detected Final    Yersinia Enterocolitica 12/15/2023 Not Detected  Not Detected Final    Shiga Toxin 1 12/15/2023 Not Detected  Not Detected Final    Shiga Toxin 2 12/15/2023 Not Detected  Not Detected Final    Norovirus GI/GII 12/15/2023 Not Detected  Not Detected Final    Rotavirus A 12/15/2023 Not Detected  Not Detected Final   Office Visit on 12/14/2023   Component Date Value Ref Range Status    C. difficile, PCR 12/15/2023 Not Detected  Not Detected Final   Admission on 12/13/2023, Discharged on 12/13/2023   Component Date Value Ref Range Status    WBC 12/13/2023 10.1  4.4 - 11.3 x10*3/uL Final    nRBC 12/13/2023 0.0  0.0 - 0.0 /100 WBCs Final    RBC 12/13/2023 4.49  4.00 - 5.20 x10*6/uL Final    Hemoglobin 12/13/2023 13.2  12.0 - 16.0 g/dL Final    Hematocrit 12/13/2023 40.5  36.0 - 46.0 % Final    MCV 12/13/2023 90  80 - 100 fL Final    MCH 12/13/2023 29.4   26.0 - 34.0 pg Final    MCHC 12/13/2023 32.6  32.0 - 36.0 g/dL Final    RDW 12/13/2023 12.9  11.5 - 14.5 % Final    Platelets 12/13/2023 81 (L)  150 - 450 x10*3/uL Final    Platelet count verified by smear review.    Immature Granulocytes %, Automated 12/13/2023 0.4  0.0 - 0.9 % Final    Immature Granulocyte Count (IG) includes promyelocytes, myelocytes and metamyelocytes but does not include bands. Percent differential counts (%) should be interpreted in the context of the absolute cell counts (cells/UL).    Immature Granulocytes Absolute, Au* 12/13/2023 0.04  0.00 - 0.70 x10*3/uL Final    Glucose 12/13/2023 132 (H)  74 - 99 mg/dL Final    Sodium 12/13/2023 137  136 - 145 mmol/L Final    Potassium 12/13/2023 3.7  3.5 - 5.3 mmol/L Final    Chloride 12/13/2023 104  98 - 107 mmol/L Final    Bicarbonate 12/13/2023 25  21 - 32 mmol/L Final    Anion Gap 12/13/2023 12  10 - 20 mmol/L Final    Urea Nitrogen 12/13/2023 8  6 - 23 mg/dL Final    Creatinine 12/13/2023 0.79  0.50 - 1.05 mg/dL Final    eGFR 12/13/2023 >90  >60 mL/min/1.73m*2 Final    Calculations of estimated GFR are performed using the 2021 CKD-EPI Study Refit equation without the race variable for the IDMS-Traceable creatinine methods.  https://jasn.asnjournals.org/content/early/2021/09/22/ASN.7335482192    Calcium 12/13/2023 9.2  8.6 - 10.3 mg/dL Final    Albumin 12/13/2023 4.3  3.4 - 5.0 g/dL Final    Alkaline Phosphatase 12/13/2023 49  33 - 110 U/L Final    Total Protein 12/13/2023 7.1  6.4 - 8.2 g/dL Final    AST 12/13/2023 10  9 - 39 U/L Final    Bilirubin, Total 12/13/2023 0.3  0.0 - 1.2 mg/dL Final    ALT 12/13/2023 11  7 - 45 U/L Final    Patients treated with Sulfasalazine may generate falsely decreased results for ALT.    HCG, Beta-Quantitative 12/13/2023 <2  <5 mIU/mL Final    Neutrophils %, Manual 12/13/2023 91.0  40.0 - 80.0 % Final    Percent differential counts (%) should be interpreted in the context of the absolute cell counts (cells/uL).     Lymphocytes %, Manual 12/13/2023 6.0  13.0 - 44.0 % Final    Monocytes %, Manual 12/13/2023 2.0  2.0 - 10.0 % Final    Eosinophils %, Manual 12/13/2023 0.0  0.0 - 6.0 % Final    Basophils %, Manual 12/13/2023 0.0  0.0 - 2.0 % Final    Atypical Lymphocytes %, Manual 12/13/2023 1.0  0.0 - 2.0 % Final    Seg Neutrophils Absolute, Manual 12/13/2023 9.19 (H)  1.20 - 7.00 x10*3/uL Final    Lymphocytes Absolute, Manual 12/13/2023 0.61 (L)  1.20 - 4.80 x10*3/uL Final    Monocytes Absolute, Manual 12/13/2023 0.20  0.10 - 1.00 x10*3/uL Final    Eosinophils Absolute, Manual 12/13/2023 0.00  0.00 - 0.70 x10*3/uL Final    Basophils Absolute, Manual 12/13/2023 0.00  0.00 - 0.10 x10*3/uL Final    Atypical Lymphs Absolute, Manual 12/13/2023 0.10  0.00 - 0.50 x10*3/uL Final    Total Cells Counted 12/13/2023 100   Final    RBC Morphology 12/13/2023 See Below   Final    Giant Platelets 12/13/2023 Few   Final    Color, Urine 12/13/2023 Yellow  Straw, Yellow Final    Appearance, Urine 12/13/2023 Hazy (N)  Clear Final    Specific Gravity, Urine 12/13/2023 1.008  1.005 - 1.035 Final    pH, Urine 12/13/2023 7.0  5.0, 5.5, 6.0, 6.5, 7.0, 7.5, 8.0 Final    Protein, Urine 12/13/2023 NEGATIVE  NEGATIVE mg/dL Final    Glucose, Urine 12/13/2023 NEGATIVE  NEGATIVE mg/dL Final    Blood, Urine 12/13/2023 NEGATIVE  NEGATIVE Final    Ketones, Urine 12/13/2023 20 (1+) (A)  NEGATIVE mg/dL Final    Bilirubin, Urine 12/13/2023 NEGATIVE  NEGATIVE Final    Urobilinogen, Urine 12/13/2023 <2.0  <2.0 mg/dL Final    Nitrite, Urine 12/13/2023 NEGATIVE  NEGATIVE Final    Leukocyte Esterase, Urine 12/13/2023 NEGATIVE  NEGATIVE Final    Extra Tube 12/13/2023 Hold for add-ons.   Final    Auto resulted.   Hospital Outpatient Visit on 11/29/2023   Component Date Value Ref Range Status    POCT INR 11/29/2023 1.0   In process    Case Report 11/29/2023    Final                    Value:Surgical Pathology                                Case: [id number]                                   Authorizing Provider:  Ursula Salinas PA-C          Collected:           11/29/2023 0940              Ordering Location:     [facility]  Received:            11/29/2023 0940                                     Center                                                                       Pathologist:           Radha Alanis MD                                                              Specimens:   A) - BONE MARROW CLOT, LEFT ILIAC CREST                                                             B) - BONE MARROW CORE BIOPSY, LEFT ILIAC CREST                                                      C) - BONE MARROW ASPIRATE, LEFT ILIAC CREST                                                FINAL DIAGNOSIS 11/29/2023    Final                    Value:This result contains rich text formatting which cannot be displayed here.    Comment 11/29/2023    Final                    Value:This result contains rich text formatting which cannot be displayed here.    Bone Marrow Differential 11/29/2023    Final                    Value:This result contains rich text formatting which cannot be displayed here.    Microscopic Description 11/29/2023    Final                    Value:This result contains rich text formatting which cannot be displayed here.    Gross Description 11/29/2023    Final                    Value:This result contains rich text formatting which cannot be displayed here.    Case Report 11/29/2023    Final                    Value:Flow Cytometry                                    Case: [id number]                                   Authorizing Provider:  Ursula Salinas PA-C          Collected:           11/29/2023 0940              Ordering Location:     [facility]  Received:            11/29/2023 0940                                     Center                                                                       Pathologist:           Radha Alanis MD                                                               Specimen:                                                                                               Diagnosis 11/29/2023    Final                    Value:This result contains rich text formatting which cannot be displayed here.    Flow Differential 11/29/2023    Final                    Value:This result contains rich text formatting which cannot be displayed here.    Flow Test Ordered 11/29/2023 Acute Panel  not established Final    Specimen Viability 11/29/2023 Acceptable  not established Final    Cell Count 11/29/2023 5.91  not established x10*3/uL Final    Number of Cells Collected 11/29/2023    Final    This result contains rich text formatting which cannot be displayed here.    Methodology 11/29/2023    Final                    Value:This result contains rich text formatting which cannot be displayed here.    WBC 11/29/2023 7.5  4.4 - 11.3 x10*3/uL Final    nRBC 11/29/2023 0.0  0.0 - 0.0 /100 WBCs Final    RBC 11/29/2023 4.29  4.00 - 5.20 x10*6/uL Final    Hemoglobin 11/29/2023 12.8  12.0 - 16.0 g/dL Final    Hematocrit 11/29/2023 38.5  36.0 - 46.0 % Final    MCV 11/29/2023 90  80 - 100 fL Final    MCH 11/29/2023 29.8  26.0 - 34.0 pg Final    MCHC 11/29/2023 33.2  32.0 - 36.0 g/dL Final    RDW 11/29/2023 13.5  11.5 - 14.5 % Final    Platelets 11/29/2023 64 (L)  150 - 450 x10*3/uL Final    Neutrophils % 11/29/2023 73.5  40.0 - 80.0 % Final    Immature Granulocytes %, Automated 11/29/2023 0.8  0.0 - 0.9 % Final    Immature Granulocyte Count (IG) includes promyelocytes, myelocytes and metamyelocytes but does not include bands. Percent differential counts (%) should be interpreted in the context of the absolute cell counts (cells/UL).    Lymphocytes % 11/29/2023 14.5  13.0 - 44.0 % Final    Monocytes % 11/29/2023 6.7  2.0 - 10.0 % Final    Eosinophils % 11/29/2023 3.7  0.0 - 6.0 % Final    Basophils % 11/29/2023 0.8  0.0 - 2.0 % Final    Neutrophils Absolute 11/29/2023 5.52  1.20 -  7.70 x10*3/uL Final    Percent differential counts (%) should be interpreted in the context of the absolute cell counts (cells/uL).    Immature Granulocytes Absolute, Au* 11/29/2023 0.06  0.00 - 0.70 x10*3/uL Final    Lymphocytes Absolute 11/29/2023 1.09 (L)  1.20 - 4.80 x10*3/uL Final    Monocytes Absolute 11/29/2023 0.50  0.10 - 1.00 x10*3/uL Final    Eosinophils Absolute 11/29/2023 0.28  0.00 - 0.70 x10*3/uL Final    Basophils Absolute 11/29/2023 0.06  0.00 - 0.10 x10*3/uL Final    Automated WBC differential has been confirmed by manual smear.    RBC Morphology 11/29/2023 See Below   Final    Giant Platelets 11/29/2023 Few   Final    Cytogenetics Interpretation 11/29/2023    Final                    Value:This result contains rich text formatting which cannot be displayed here.    Cells Counted 11/29/2023 13  cells Final    Cells Analyzed 11/29/2023 13  cells Final    Cells Imaged 11/29/2023 4  cells Final    Cells Karyotyped 11/29/2023 2  cells Final    Modal Chromosome # 11/29/2023 46  chromosomes Final    Hypomodal Chromosome # 11/29/2023 0  cells Final    Hypermodal Chromosome # 11/29/2023 0  cells Final    Staining Method 11/29/2023 GTG   Final    Band Resolution 11/29/2023 400  bands Final    Karyotype Specimen Adequacy 11/29/2023 Hypocellular   Final    Electronically signed and reported* 11/29/2023    Final                    Value:This result contains rich text formatting which cannot be displayed here.    Myeloid Malignancies Results 11/29/2023    Final                    Value:This result contains rich text formatting which cannot be displayed here.    Electronically signed and reported* 11/29/2023    Final                    Value:Kourtney Bach MD   Lab on 11/11/2023   Component Date Value Ref Range Status    WBC 11/11/2023 7.1  4.4 - 11.3 x10*3/uL Final    nRBC 11/11/2023 0.0  0.0 - 0.0 /100 WBCs Final    RBC 11/11/2023 4.62  4.00 - 5.20 x10*6/uL Final    Hemoglobin 11/11/2023 13.3  12.0 - 16.0  g/dL Final    Hematocrit 11/11/2023 41.8  36.0 - 46.0 % Final    MCV 11/11/2023 91  80 - 100 fL Final    MCH 11/11/2023 28.8  26.0 - 34.0 pg Final    MCHC 11/11/2023 31.8 (L)  32.0 - 36.0 g/dL Final    RDW 11/11/2023 14.1  11.5 - 14.5 % Final    Platelets 11/11/2023 70 (L)  150 - 450 x10*3/uL Final    Neutrophils % 11/11/2023 70.7  40.0 - 80.0 % Final    Immature Granulocytes %, Automated 11/11/2023 0.8  0.0 - 0.9 % Final    Immature Granulocyte Count (IG) includes promyelocytes, myelocytes and metamyelocytes but does not include bands. Percent differential counts (%) should be interpreted in the context of the absolute cell counts (cells/UL).    Lymphocytes % 11/11/2023 15.9  13.0 - 44.0 % Final    Monocytes % 11/11/2023 7.3  2.0 - 10.0 % Final    Eosinophils % 11/11/2023 4.6  0.0 - 6.0 % Final    Basophils % 11/11/2023 0.7  0.0 - 2.0 % Final    Neutrophils Absolute 11/11/2023 5.01  1.20 - 7.70 x10*3/uL Final    Percent differential counts (%) should be interpreted in the context of the absolute cell counts (cells/uL).    Immature Granulocytes Absolute, Au* 11/11/2023 0.06  0.00 - 0.70 x10*3/uL Final    Lymphocytes Absolute 11/11/2023 1.13 (L)  1.20 - 4.80 x10*3/uL Final    Monocytes Absolute 11/11/2023 0.52  0.10 - 1.00 x10*3/uL Final    Eosinophils Absolute 11/11/2023 0.33  0.00 - 0.70 x10*3/uL Final    Basophils Absolute 11/11/2023 0.05  0.00 - 0.10 x10*3/uL Final    Automated WBC differential has been confirmed by manual smear.    Iron 11/11/2023 36  35 - 150 ug/dL Final    UIBC 11/11/2023 281  110 - 370 ug/dL Final    TIBC 11/11/2023 317  240 - 445 ug/dL Final    % Saturation 11/11/2023 11 (L)  25 - 45 % Final    Ferritin 11/11/2023 97  8 - 150 ng/mL Final    RBC Morphology 11/11/2023 See Below   Final    Giant Platelets 11/11/2023 Few   Final   Lab on 11/07/2023   Component Date Value Ref Range Status    Amphetamine Screen, Urine 11/07/2023 Presumptive Negative  Presumptive Negative Final    CUTOFF LEVEL: 500  NG/ML   Cross-reactivity has been reported with high concentrations   of the following drugs: buproprion, chloroquine, chlorpromazine,   ephedrine, mephentermine, fenfluramine, phentermine,   phenylpropanolamine, pseudoephedrine, and propranolol.    Barbiturate Screen, Urine 11/07/2023 Presumptive Negative  Presumptive Negative Final    CUTOFF LEVEL: 200 NG/ML    Benzodiazepines Screen, Urine 11/07/2023 Presumptive Negative  Presumptive Negative Final    CUTOFF LEVEL: 200 NG/ML    Cannabinoid Screen, Urine 11/07/2023 Presumptive Negative  Presumptive Negative Final    CUTOFF LEVEL: 50 NG/ML    Cocaine Metabolite Screen, Urine 11/07/2023 Presumptive Negative  Presumptive Negative Final    CUTOFF LEVEL: 150 NG/ML    Fentanyl Screen, Urine 11/07/2023 Presumptive Negative  Presumptive Negative Final    CUTOFF LEVEL: 5 NG/ML    Opiate Screen, Urine 11/07/2023 Presumptive Negative  Presumptive Negative Final    CUTOFF LEVEL: 300 NG/ML  The opiate screen does not detect fentanyl, meperidine, or   tramadol. Oxycodone is not consistently detected (refer to  Oxycodone Screen, Urine result).    Oxycodone Screen, Urine 11/07/2023 Presumptive Negative  Presumptive Negative Final    CUTOFF LEVEL: 100 NG/ML  This test will accurately detect both oxycodone and oxymorphone.    PCP Screen, Urine 11/07/2023 Presumptive Negative  Presumptive Negative Final    CUTOFF LEVEL:  25 NG/ML  Cross-reactivity has been reported with dextromethorphan.   Lab on 10/25/2023   Component Date Value Ref Range Status    C3 Complement 10/25/2023 175  87 - 200 mg/dL Final    C4 Complement 10/25/2023 34  10 - 50 mg/dL Final    Beta-2 Glyco 1 IgG 10/25/2023 <1.4  <20.0 U/mL Final    Elevated levels of IgG anti-Beta 2 Glycoprotein-I on 2 occasions  at least 12 weeks apart are laboratory criteria for anti-phospholipid  syndrome according to an international consensus (J Thromb Haemost  2006 4:295).    Beta-2 Glyco 1 IgA 10/25/2023 <0.6  <20.0 U/mL Final     Elevated levels of IgA anti-Beta 2 Glycoprotein-I have not been  included in the laboratory criteria for anti-phospholipid syndrome  according to an international consensus (J Thromb Haemost 2006 4:295).  It may be helpful in identifying subgroups of patients at risk for  specific clinical manifestations of anti-phospholipid syndrome. A  significant proportion of IgA anti-Beta 2 Glycoprotein- positive  tests has no apparent association with any clinical manifestation  of anti-phospholipid syndrome.    Beta 2 Glyco 1 IgM 10/25/2023 0.4  <20.0 U/mL Final    Elevated levels of IgM anti-Beta 2 Glycoprotein-I on 2 occasions  at least 12 weeks apart are laboratory criteria for anti-phospholipid  syndrome according to an international consensus (J Thromb Haemost  2006 4:295). IgM anti-Beta 2 Glycoprotein-I tends to give false  positive results in the low positive range, especially in the  presence of rheumatoid factor or cryoglobulins.    Anticardiolipin IgA 10/25/2023 <0.5  <20.0 APL U/mL Final    Elevated levels of IgA anti-cardiolipin have not been included  in the laboratory criteria for anti-phospholipid syndrome  according to an international consensus (J Thromb Haemost 2006 4:295).  It may be helpful in identifying subgroups of patients at risk for  specific clinical manifestations of anti-phospholipid syndrome.    Anticardiolipin IgG 10/25/2023 <1.6  <20.0 GPL U/mL Final    Elevated levels of IgG anti-cardiolipin on 2 occasions at least  12 weeks apart are laboratory criteria for anti-phospholipid  syndrome according to an international consensus (J Thromb Haemost  2006 4:295).    Anticardiolipin IgM 10/25/2023 0.2  <20.0 MPL U/mL Final    Elevated levels of IgM anti-cardiolipin on 2 occasions at least  12 weeks apart are laboratory criteria for anti-phospholipid  syndrome according to an international consensus (J Thromb Haemost  2006 4:295). IgM anti-cardiolipin tends to give false positive  results in the low  positive range, especially in the  presence of rheumatoid factor or cryoglobulins.    Rheumatoid Factor 10/25/2023 <10  0 - 15 IU/mL Final    Sedimentation Rate 10/25/2023 37 (H)  0 - 20 mm/h Final    DRVVT Screen 10/25/2023 1.29  RATIO Final    DRVVT Confirmation 10/25/2023 1.19  RATIO Final    DRVVT Test Ratio 10/25/2023 1.09  <=1.20 RATIO Final    SCT Screen 10/25/2023 1.32  RATIO Final    SCT Confirmation 10/25/2023 1.39  RATIO Final    SCT Test Ratio 10/25/2023 0.95  <=1.16 RATIO Final    Lupus Anticoagulant Interpretation 10/25/2023    Final                    Value:No evidence of lupus anticoagulant in these assays (DRVVT and Silica Clotting Time (SCT)). Assay interferences may occur in the presence of factor deficiency/inhibitor and/or anticoagulants. For patients on anti-Vitamin K therapy, repeating DRVVT testing might be indicated when the patient is off anti-vitamin K therapy. The DRVVT assay contains a heparin neutralizer up to 1.0 U/mL. Higher concentrations of heparin may cause interferences. SCT results are not affected by UF heparin up to 0.5 U/mL and LMW Heparin up to 1.0 U/mL. Higher concentrations of heparin may cause interferences. Correlation with clinical findings and clinical history is necessary to assess significance of results in an individual patient.    HLAB27 Typing 10/25/2023 Negative   Final   Lab on 09/30/2023   Component Date Value Ref Range Status    C-Peptide 09/30/2023 4.5  ng/mL Final    Protime 09/30/2023 11.7  9.8 - 12.8 seconds Final    INR 09/30/2023 1.0  0.9 - 1.1 Final    aPTT 09/30/2023 35  27 - 38 seconds Final    WBC 09/30/2023 8.0  4.4 - 11.3 x10*3/uL Final    nRBC 09/30/2023 0.0  0.0 - 0.0 /100 WBCs Final    RBC 09/30/2023 4.55  4.00 - 5.20 x10*6/uL Final    Hemoglobin 09/30/2023 13.1  12.0 - 16.0 g/dL Final    Hematocrit 09/30/2023 40.3  36.0 - 46.0 % Final    MCV 09/30/2023 89  80 - 100 fL Final    MCH 09/30/2023 28.8  26.0 - 34.0 pg Final    MCHC 09/30/2023 32.5   32.0 - 36.0 g/dL Final    RDW 09/30/2023 15.9 (H)  11.5 - 14.5 % Final    Platelets 09/30/2023 55 (L)  150 - 450 x10*3/uL Final    MPV 09/30/2023    Final    Not Measured    Neutrophils % 09/30/2023 74.6  40.0 - 80.0 % Final    Immature Granulocytes %, Automated 09/30/2023 1.3 (H)  0.0 - 0.9 % Final    Immature Granulocyte Count (IG) includes promyelocytes, myelocytes and metamyelocytes but does not include bands. Percent differential counts (%) should be interpreted in the context of the absolute cell counts (cells/UL).    Lymphocytes % 09/30/2023 12.0  13.0 - 44.0 % Final    Monocytes % 09/30/2023 7.1  2.0 - 10.0 % Final    Eosinophils % 09/30/2023 4.4  0.0 - 6.0 % Final    Basophils % 09/30/2023 0.6  0.0 - 2.0 % Final    Neutrophils Absolute 09/30/2023 5.97  1.20 - 7.70 x10*3/uL Final    Percent differential counts (%) should be interpreted in the context of the absolute cell counts (cells/uL).    Immature Granulocytes Absolute, Au* 09/30/2023 0.10  0.00 - 0.70 x10*3/uL Final    Lymphocytes Absolute 09/30/2023 0.96 (L)  1.20 - 4.80 x10*3/uL Final    Monocytes Absolute 09/30/2023 0.57  0.10 - 1.00 x10*3/uL Final    Eosinophils Absolute 09/30/2023 0.35  0.00 - 0.70 x10*3/uL Final    Basophils Absolute 09/30/2023 0.05  0.00 - 0.10 x10*3/uL Final    Glucose 09/30/2023 102 (H)  74 - 99 mg/dL Final    Sodium 09/30/2023 138  136 - 145 mmol/L Final    Potassium 09/30/2023 4.2  3.5 - 5.3 mmol/L Final    Chloride 09/30/2023 107  98 - 107 mmol/L Final    Bicarbonate 09/30/2023 25  21 - 32 mmol/L Final    Anion Gap 09/30/2023 10  10 - 20 mmol/L Final    Urea Nitrogen 09/30/2023 15  6 - 23 mg/dL Final    Creatinine 09/30/2023 0.74  0.50 - 1.05 mg/dL Final    eGFR 09/30/2023 >90  >60 mL/min/1.73m*2 Final    Calculations of estimated GFR are performed using the 2021 CKD-EPI Study Refit  equation without the race variable for the IDMS-Traceable Creatinine  Methods.    https://jasn.asnjournals.org/content/early/2021/09/22/ASN.6384583971    Calcium 09/30/2023 8.4 (L)  8.6 - 10.3 mg/dL Final    Albumin 09/30/2023 4.0  3.4 - 5.0 g/dL Final    Alkaline Phosphatase 09/30/2023 56  33 - 110 U/L Final    Total Protein 09/30/2023 6.3 (L)  6.4 - 8.2 g/dL Final    AST 09/30/2023 11  9 - 39 U/L Final    Bilirubin, Total 09/30/2023 0.2  0.0 - 1.2 mg/dL Final    ALT 09/30/2023 16  7 - 45 U/L Final    Patients treated with Sulfasalazine may generate falsely decreased results for ALT.    Ferritin 09/30/2023 185 (H)  8 - 150 ng/mL Final    Folate, Serum 09/30/2023 11.6  >5.0 ng/mL Final    Hemoglobin A1C 09/30/2023 5.8 (H)  see below % Final    Estimated Average Glucose 09/30/2023 120  Not Established mg/dL Final    Iron 09/30/2023 38  35 - 150 ug/dL Final    UIBC 09/30/2023 253  110 - 370 ug/dL Final    TIBC 09/30/2023 291  240 - 445 ug/dL Final    % Saturation 09/30/2023 13 (L)  25 - 45 % Final    Cholesterol 09/30/2023 183  0 - 199 mg/dL Final          Age      Desirable   Borderline High   High     0-19 Y     0 - 169       170 - 199     >/= 200    20-24 Y     0 - 189       190 - 224     >/= 225         >24 Y     0 - 199       200 - 239     >/= 240   **All ranges are based on fasting samples. Specific   therapeutic targets will vary based on patient-specific   cardiac risk.    Pediatric guidelines reference:Pediatrics 2011, 128(S5).Adult guidelines reference: NCEP ATPIII Guidelines,LATIA 2001, 258:2486-97    Venipuncture immediately after or during the administration of Metamizole may lead to falsely low results. Testing should be performed immediately prior to Metamizole dosing.    HDL-Cholesterol 09/30/2023 26.6  mg/dL Final      Age       Very Low   Low     Normal    High    0-19 Y    < 35      < 40     40-45     ----  20-24 Y    ----     < 40      >45      ----        >24 Y      ----     < 40     40-60      >60      Cholesterol/HDL Ratio 09/30/2023 6.9   Final      Ref  Values  Desirable  < 3.4  High Risk  > 5.0    LDL Calculated 09/30/2023 142  140 - 190 mg/dL Final                                Near   Borderline      AGE      Desirable  Optimal    High     High     Very High     0-19 Y     0 - 109     ---    110-129   >/= 130     ----    20-24 Y     0 - 119     ---    120-159   >/= 160     ----      >24 Y     0 -  99   100-129  130-159   160-189     >/=190      VLDL 09/30/2023 15  0 - 40 mg/dL Final    Triglycerides 09/30/2023 74  0 - 149 mg/dL Final       Age         Desirable   Borderline High   High     Very High   0 D-90 D    19 - 174         ----         ----        ----  91 D- 9 Y     0 -  74        75 -  99     >/= 100      ----    10-19 Y     0 -  89        90 - 129     >/= 130      ----    20-24 Y     0 - 114       115 - 149     >/= 150      ----         >24 Y     0 - 149       150 - 199    200- 499    >/= 500    Venipuncture immediately after or during the administration of Metamizole may lead to falsely low results. Testing should be performed immediately prior to Metamizole dosing.    Non HDL Cholesterol 09/30/2023 156 (H)  0 - 149 mg/dL Final          Age       Desirable   Borderline High   High     Very High     0-19 Y     0 - 119       120 - 144     >/= 145    >/= 160    20-24 Y     0 - 149       150 - 189     >/= 190      ----         >24 Y    30 mg/dL above LDL Cholesterol goal      Parathyroid Hormone, Intact 09/30/2023 37.9  pg/mL Final    Thyroid Stimulating Hormone 09/30/2023 3.85  0.44 - 3.98 mIU/L Final    Vitamin B12 09/30/2023 346  211 - 911 pg/mL Final    Vitamin D, 25-Hydroxy, Total 09/30/2023 26 (L)  30 - 100 ng/mL Final    Nicotine Blood Quantitative 09/30/2023 <5  ng/mL Final    Comment: Consistent with abstinence from nicotine-containing  products for at least 1 week.  INTERPRETIVE INFORMATION: Nicotine and Metabolites,                             Serum or Plasma,                             Quantitative      Methodology: Quantitative Liquid  Chromatography-Tandem Mass   Spectrometry    Positive cutoff: 5 ng/mL    For medical purposes only; not valid for forensic use.     This test is designed to evaluate recent use of   nicotine-containing products.  Passive and active exposure cannot   be discriminated definitively, although a cutoff of 10 ng/mL   cotinine is frequently used for surgery qualification purposes.    For smoking cessation programs or compliance testing, the absence   of expected drug(s) and/or drug metabolite(s) may indicate   non-compliance, inappropriate timing of specimen collection   relative to drug administration, poor drug absorption, or   limitations of testing. This test cannot distinguish between use   of tobacco and purified nicotine products.                            The concentration value   must be greater than or equal to the cutoff to be reported as   positive.      This test was developed and its performance characteristics   determined by [facility]. It has not been cleared or   approved by the US Food and Drug Administration. This test was   performed in a CLIA certified laboratory and is intended for   clinical purposes.  Performed By: [facility]  [address] [place] [id number]  : Byron Sanchez MD, PhD  CLIA Number: [id number]    Cotinine Blood Quantitative 09/30/2023 <5  ng/mL Final    Copper 09/30/2023 121.0  80.0 - 155.0 ug/dL Final    Comment: INTERPRETIVE INFORMATION: Copper, Serum or Plasma    Elevated results may be due to skin or collection-related   contamination, including the use of a noncertified metal-free   collection/transport tube. If contamination concerns exist due to   elevated levels of serum/plasma copper, confirmation with a second   specimen collected in a certified metal-free tube is recommended.    Serum copper may be elevated with infection, inflammation, stress,   and copper supplementation. In females, elevated copper may also   be caused by  oral contraceptives and pregnancy (concentrations may   be elevated up to 3 times normal during the third trimester).    This test was developed and its performance characteristics   determined by [facility]. It has not been cleared or   approved by the US Food and Drug Administration. This test was   performed in a CLIA certified laboratory and is intended for   clinical purposes.  Performed By: [facility]  [address] [place], [place] [id number]  : Byron Sanhcez MD, PhD  CLIA Number: [id number]    Vitamin B1, Whole Blood 09/30/2023 109  70 - 180 nmol/L Final    INTERPRETIVE INFORMATION: Vitamin B1, Whole Blood    This assay measures the concentration of thiamine diphosphate   (TDP), the primary active form of vitamin B1. Approximately 90   percent of vitamin B1 present in whole blood is TDP. Thiamine and   thiamine monophosphate, which comprise the remaining 10 percent,   are not measured.    This test was developed and its performance characteristics   determined by [facility]. It has not been cleared or   approved by the US Food and Drug Administration. This test was   performed in a CLIA certified laboratory and is intended for   clinical purposes.  Performed By: [facility]  [address] [place] [id number]  : Byron Sanchez MD, PhD  CLIA Number: [id number]    Zinc, Serum or Plasma 09/30/2023 92.5  60.0 - 120.0 ug/dL Final    Comment: INTERPRETIVE INFORMATION: Zinc, Serum or Plasma    Elevated results may be due to skin or collection-related   contamination, including the use of a noncertified metal-free   collection/transport tube. If contamination concerns exist due to   elevated levels of serum/plasma zinc, confirmation with a second   specimen collected in a certified metal-free tube is recommended.    Circulating zinc concentrations are dependent on albumin status   and are depressed with  malnutrition.  Zinc may also be lowered   with infection, inflammation, stress, oral contraceptives, and   pregnancy.  Zinc may be elevated with zinc supplementation or   fasting.  Elevated zinc concentrations may interfere with copper   absorption.     This test was developed and its performance characteristics   determined by [facility]. It has not been cleared or   approved by the US Food and Drug Administration. This test was   performed in a CLIA certified laboratory and is intended for   clinical                            purposes.  Performed By: [facility]  [address] [place] [id number]  : Byron Sanchez MD, PhD  CLIA Number: [id number]   Legacy Encounter on 09/21/2023   Component Date Value Ref Range Status    Vitamin B-12 09/21/2023 451  211 - 911 pg/mL Final    Ferritin 09/21/2023 125  8 - 150 ug/L Final    WBC 09/21/2023 8.3  4.4 - 11.3 x10E9/L Final    RBC 09/21/2023 4.35  4.00 - 5.20 x10E12/L Final    Hemoglobin 09/21/2023 12.2  12.0 - 16.0 g/dL Final    Hematocrit 09/21/2023 39.8  36.0 - 46.0 % Final    MCV 09/21/2023 91  80 - 100 fL Final    MCHC 09/21/2023 30.7 (L)  32.0 - 36.0 g/dL Final    Platelets 09/21/2023 55 (L)  150 - 450 x10E9/L Corrected    Comment: aniket on icc notified  This is a corrected result. Previous value was 26, verified at 09/21/2023   10:55  PLT CALLED RB TO SHALOM LUNSFORD, 09/21/2023 11:13      RDW 09/21/2023 16.1 (H)  11.5 - 14.5 % Final    Neutrophils % 09/21/2023 72.1  40.0 - 80.0 % Final    Immature Granulocytes %, Automated 09/21/2023 0.4  0.0 - 0.9 % Final    Comment:  Immature Granulocyte Count (IG) includes promyelocytes,    myelocytes and metamyelocytes but does not include bands.   Percent differential counts (%) should be interpreted in the   context of the absolute cell counts (cells/L).      Lymphocytes % 09/21/2023 16.4  13.0 - 44.0 % Final    Comment:  Percent differential counts (%) should be interpreted in the    context of the absolute cell counts (cells/L).      Monocytes % 09/21/2023 7.2  2.0 - 10.0 % Final    Eosinophils % 09/21/2023 3.1  0.0 - 6.0 % Final    Basophils % 09/21/2023 0.8  0.0 - 2.0 % Final    Neutrophils Absolute 09/21/2023 5.98  1.20 - 7.70 x10E9/L Final    Lymphocytes Absolute 09/21/2023 1.36  1.20 - 4.80 x10E9/L Final    Monocytes Absolute 09/21/2023 0.60  0.10 - 1.00 x10E9/L Final    Eosinophils Absolute 09/21/2023 0.26  0.00 - 0.70 x10E9/L Final    Basophils Absolute 09/21/2023 0.07  0.00 - 0.10 x10E9/L Final    Iron 09/21/2023 55  35 - 150 ug/dL Final    TIBC 09/21/2023 308  240 - 445 ug/dL Final    Iron Saturation 09/21/2023 18 (L)  25 - 45 % Final    RBC Morphology 09/21/2023 SEE COMMENT   Final    No significant RBC morphology present    WBC 09/21/2023 8.8  4.4 - 11.3 x10E9/L Final    RBC 09/21/2023 4.60  4.00 - 5.20 x10E12/L Final    Hemoglobin 09/21/2023 12.7  12.0 - 16.0 g/dL Final    Hematocrit 09/21/2023 40.5  36.0 - 46.0 % Final    MCV 09/21/2023 88  80 - 100 fL Final    MCHC 09/21/2023 31.4 (L)  32.0 - 36.0 g/dL Final    Platelets 09/21/2023 58 (L)  150 - 450 x10E9/L Final    RDW 09/21/2023 16.1 (H)  11.5 - 14.5 % Final   There may be more visits with results that are not included.        Assessment:    42 female referred for [place] and IV iron due to heavy menses. Can't tolerate oral iron.     S/P 3 doses of Venofer, last given 6/19/2023. Had EGD and VCE on 6/30/2023 c/w gastritis. No c-scope performed.    Thrombocytopenia w/u including abd US (r/o liver etiologies) unremarkble. Likely ITP    Plan:    Reviewed and discussed lab, imaging, and pathology results with patient in detail as well as diagnosis, prognosis, and treatment options.    Continue Venofer booster today and continue to monitor every 6 months.    Platelet improve while on steroids.     BMBx w low level of  but otherwise no evidence of malignancy.    Continue Budesonide and f/U with GI and perform c-scope  F/U w/PCP    RTC  in 6 month    Patient verbalized understanding, and all her questions were answered to her satisfaction.     30 min spent with patient greater than 50 % of which was spent in consultation, counselling and coordination of care.

## 2023-12-22 ENCOUNTER — OFFICE VISIT (OUTPATIENT)
Dept: OBSTETRICS AND GYNECOLOGY | Facility: CLINIC | Age: 43
End: 2023-12-22
Payer: COMMERCIAL

## 2023-12-22 VITALS
WEIGHT: 204 LBS | HEIGHT: 66 IN | BODY MASS INDEX: 32.78 KG/M2 | DIASTOLIC BLOOD PRESSURE: 82 MMHG | SYSTOLIC BLOOD PRESSURE: 122 MMHG

## 2023-12-22 DIAGNOSIS — D69.3 CHRONIC ITP (IDIOPATHIC THROMBOCYTOPENIC PURPURA) (MULTI): ICD-10-CM

## 2023-12-22 DIAGNOSIS — Z01.419 PAP TEST, AS PART OF ROUTINE GYNECOLOGICAL EXAMINATION: Primary | ICD-10-CM

## 2023-12-22 DIAGNOSIS — N92.0 MENORRHAGIA WITH REGULAR CYCLE: ICD-10-CM

## 2023-12-22 DIAGNOSIS — Z01.419 ENCOUNTER FOR WELL WOMAN EXAM WITH ROUTINE GYNECOLOGICAL EXAM: ICD-10-CM

## 2023-12-22 PROCEDURE — 88175 CYTOPATH C/V AUTO FLUID REDO: CPT | Mod: TC | Performed by: OBSTETRICS & GYNECOLOGY

## 2023-12-22 PROCEDURE — 99396 PREV VISIT EST AGE 40-64: CPT | Performed by: OBSTETRICS & GYNECOLOGY

## 2023-12-22 PROCEDURE — 3008F BODY MASS INDEX DOCD: CPT | Performed by: OBSTETRICS & GYNECOLOGY

## 2023-12-22 PROCEDURE — 87624 HPV HI-RISK TYP POOLED RSLT: CPT | Performed by: OBSTETRICS & GYNECOLOGY

## 2023-12-22 PROCEDURE — 99214 OFFICE O/P EST MOD 30 MIN: CPT | Performed by: OBSTETRICS & GYNECOLOGY

## 2023-12-22 PROCEDURE — 1036F TOBACCO NON-USER: CPT | Performed by: OBSTETRICS & GYNECOLOGY

## 2023-12-22 RX ORDER — NORETHINDRONE 0.35 MG/1
1 TABLET ORAL DAILY
Qty: 28 TABLET | Refills: 11 | Status: SHIPPED | OUTPATIENT
Start: 2023-12-22 | End: 2024-03-11 | Stop reason: WASHOUT

## 2023-12-22 ASSESSMENT — ENCOUNTER SYMPTOMS
NEUROLOGICAL NEGATIVE: 0
RESPIRATORY NEGATIVE: 0
CONSTITUTIONAL NEGATIVE: 0
ALLERGIC/IMMUNOLOGIC NEGATIVE: 0
GASTROINTESTINAL NEGATIVE: 0
CARDIOVASCULAR NEGATIVE: 0
MUSCULOSKELETAL NEGATIVE: 0
PSYCHIATRIC NEGATIVE: 0
EYES NEGATIVE: 0
HEMATOLOGIC/LYMPHATIC NEGATIVE: 0
ENDOCRINE NEGATIVE: 0

## 2023-12-22 ASSESSMENT — PATIENT HEALTH QUESTIONNAIRE - PHQ9
2. FEELING DOWN, DEPRESSED OR HOPELESS: NOT AT ALL
SUM OF ALL RESPONSES TO PHQ9 QUESTIONS 1 AND 2: 0
1. LITTLE INTEREST OR PLEASURE IN DOING THINGS: NOT AT ALL

## 2023-12-22 ASSESSMENT — PAIN SCALES - GENERAL: PAINLEVEL: 0-NO PAIN

## 2023-12-22 NOTE — PROGRESS NOTES
"Ally Carpio is a 42 y.o. female who is here for a routine exam. PCP = Chelsie Cooney DO  Patient here for annual exam.  Sexually active with steady partner.  Partner has had a vasectomy    Patient complaining of 1 to 2 years of increasingly heavy menses.  Cycles are very regular 20 days apart lasting 5 to 8 days.  First 3 days patient also bleeding is so heavy she has to wear diapers also complains severe dysmenorrhea.Patient has been treated with hormonal therapy in the past and notes she has not done well.  Uncertain as to which when she was on.  Patient also noted to have ITP and is currently under care of hematology.  Platelets generally run around 80,000 PE.  Patient has had multiple iron transfusions    Review of Systems   Genitourinary:  Positive for vaginal bleeding.   All other systems reviewed and are negative.      OBGyn Exam    Objective   /82   Ht 1.676 m (5' 6\")   Wt 92.5 kg (204 lb)   LMP 2023   BMI 32.93 kg/m²   OB History          2    Para   2    Term                AB        Living   2         SAB        IAB        Ectopic        Multiple        Live Births   2                  GynHx:  Menarche/Menopause: 14  Periods are regular every 28-30 days, lasting 5 days.  Dysmenorrhea: severe, occurring first 1-2 days of flow.   Cyclic symptoms include none.    Social History     Substance and Sexual Activity   Sexual Activity Yes    Partners: Male    Birth control/protection: Male Sterilization     Current contraception: Vasectomy  STIs: none    Substance:   Tobacco Use: Medium Risk (2023)    Patient History     Smoking Tobacco Use: Former     Smokeless Tobacco Use: Never     Passive Exposure: Not on file      Social History     Substance and Sexual Activity   Drug Use Never      Social History     Substance and Sexual Activity   Alcohol Use Not Currently     Abuse: No  Depression Screen:   Denies any symptoms    Past med hx and past surg hx reviewed and " notable for: ITP    Assessment/Plan    Clinically unremarkable GYN exam.  Patient is sexually active steady partner declining STD testing.  Patient has had a recent mammogram.  Discussed diet and exercise.  Urinalysis was negative today.  Pap smear obtained.  Should return to office for annual exam in 1 year or as needed    Patient with 1 to 2-year history of increasing menorrhagia with regular cycles.  Patient notes her cycles every 28 days lasting anywhere from 5 to 8 days.  First 3 days are extremely heavy with large clots.  Patient has to wear diaper.  Chart reviewed.  Ultrasound and CT scans reviewed.  Essentially normal pelvic organs.  Uterus negative.  No polyps seen on ultrasound or thickened lining.  Remainder of chart reviewed as well.  Patient has ITP is currently under treatment with steroids.  Running approximately 80,000.  Reviewed options with patient and .  Currently could continue observation although this is resulted in multiple iron transfusions and is probably not the best course of action.  Discussed hormonal therapy would probably be her best option at this point.  Discussed various methods both hormonal and nonhormonal.  Patient notes she has had some issues with some emotional mood swings on oral contraceptives but does not remember which when she was on.  Is willing to try again.  Will start norethindrone and reassess in 4 weeks.  Patient will keep menstrual calendar.  Also discussed possible surgery.  With history of 2 prior  sections ablation would be a higher risk but still possible.  Also discussed hysterectomy.  Risks and benefits of these procedures reviewed.  Patient will consider her options and will see how the oral contraceptives work.  Will check back in 4 to 6 weeks

## 2023-12-26 ENCOUNTER — TELEPHONE (OUTPATIENT)
Dept: SURGERY | Facility: CLINIC | Age: 43
End: 2023-12-26
Payer: COMMERCIAL

## 2023-12-26 LAB
CHROM ANALY OVERALL INTERP-IMP: NORMAL
ELECTRONICALLY COSIGNED BY CYTOGENETICS: NORMAL
ELECTRONICALLY SIGNED BY CYTOGENETICS: NORMAL
STRUCT VAR ISCN NAME: NORMAL

## 2023-12-26 NOTE — TELEPHONE ENCOUNTER
Called this pt to review the 2 week pre op diet starting on 1/1. LVM and encouraged the pt to call this RD back if she had any questions.    Ines Licea MS, RD, LD  Phone: [phone]    Pt called back noting that she will not be undergoing BS at this time.

## 2023-12-27 ENCOUNTER — APPOINTMENT (OUTPATIENT)
Dept: SLEEP MEDICINE | Facility: CLINIC | Age: 43
End: 2023-12-27
Payer: COMMERCIAL

## 2023-12-29 ENCOUNTER — OFFICE VISIT (OUTPATIENT)
Dept: PRIMARY CARE | Facility: CLINIC | Age: 43
End: 2023-12-29
Payer: COMMERCIAL

## 2023-12-29 VITALS
SYSTOLIC BLOOD PRESSURE: 122 MMHG | WEIGHT: 202 LBS | DIASTOLIC BLOOD PRESSURE: 74 MMHG | HEART RATE: 78 BPM | BODY MASS INDEX: 32.6 KG/M2 | OXYGEN SATURATION: 98 % | TEMPERATURE: 97.3 F

## 2023-12-29 DIAGNOSIS — F41.9 ANXIETY: Primary | ICD-10-CM

## 2023-12-29 DIAGNOSIS — K21.9 GASTROESOPHAGEAL REFLUX DISEASE, UNSPECIFIED WHETHER ESOPHAGITIS PRESENT: ICD-10-CM

## 2023-12-29 DIAGNOSIS — K52.9 COLITIS: ICD-10-CM

## 2023-12-29 LAB
ELECTRONICALLY SIGNED BY CYTOGENETICS: NORMAL
MICROARRAY PLATFORM: NORMAL

## 2023-12-29 PROCEDURE — 3008F BODY MASS INDEX DOCD: CPT | Performed by: FAMILY MEDICINE

## 2023-12-29 PROCEDURE — 1036F TOBACCO NON-USER: CPT | Performed by: FAMILY MEDICINE

## 2023-12-29 PROCEDURE — 99214 OFFICE O/P EST MOD 30 MIN: CPT | Performed by: FAMILY MEDICINE

## 2023-12-29 RX ORDER — ONDANSETRON 4 MG/1
4 TABLET, ORALLY DISINTEGRATING ORAL EVERY 8 HOURS PRN
Qty: 20 TABLET | Refills: 0 | Status: SHIPPED | OUTPATIENT
Start: 2023-12-29 | End: 2024-01-05

## 2023-12-29 RX ORDER — PANTOPRAZOLE SODIUM 40 MG/1
40 TABLET, DELAYED RELEASE ORAL DAILY
Qty: 30 TABLET | Refills: 1 | Status: SHIPPED | OUTPATIENT
Start: 2023-12-29 | End: 2024-02-16 | Stop reason: SDUPTHER

## 2023-12-29 RX ORDER — ALPRAZOLAM 0.25 MG/1
0.25 TABLET ORAL 3 TIMES DAILY PRN
Qty: 21 TABLET | Refills: 0 | Status: SHIPPED | OUTPATIENT
Start: 2023-12-29 | End: 2024-03-11 | Stop reason: WASHOUT

## 2023-12-29 NOTE — PROGRESS NOTES
Assessment/Plan   ASSESSMENT/PLAN:      Patient Instructions   1. Anxiety  Agreed to short term use of xanax due to increased anxiety likely 2/2 to prednisone side effect   If you are experiencing thoughts of hurting yourself or hurting someone else please use the following resources:   Crisis  Hotline [phone]  [facility] - ADM Board [facility] Crisis [facility] Recovery [facility] [phone]  Nationwide Suicide & Crisis Lifeline - 9-8-8 or 1-390.175.5223 (TALK)  [facility] First Call for Help -   2-1-1, National [place]  on Mental Illness - (939) 159-7497 info@rober.org  24/7 Peer Support Warmline: 940.697.7468  Crisis Text Line: Text keyword 4hope to 740981       - ALPRAZolam (Xanax) 0.25 mg tablet; Take 1 tablet (0.25 mg) by mouth 3 times a day as needed for anxiety for up to 7 days.  Dispense: 21 tablet; Refill: 0  - ondansetron ODT (Zofran-ODT) 4 mg disintegrating tablet; Take 1 tablet (4 mg) by mouth every 8 hours if needed for nausea or vomiting for up to 7 days.  Dispense: 20 tablet; Refill: 0    2. Gastroesophageal reflux disease, unspecified whether esophagitis present  Start pantoprazole to help with reflux symptoms   - pantoprazole (ProtoNix) 40 mg EC tablet; Take 1 tablet (40 mg) by mouth once daily. Do not crush, chew, or split.  Dispense: 30 tablet; Refill: 1    3. Colitis  Provided information for General Surgery to get colonoscopy sooner          Chelsie Cooney DO     FUTURE DIRECTION:     Subjective   SUBJECTIVE:     Patient ID: Ally Carpio is a 43 y.o. femalefor the following:    Increased stress   Feeling a bit oerwhelmed   Has been having difficulty sleeping   Has increased stress with worrying about  recent cancer diagnosis     Nausea and Abd pain   Following GI, fecal calprotectin is elevated   Needs a colonoscopy   Has poor appetite and reflux symptoms     Review of Systems    No Known Allergies      Current Outpatient Medications:     Bryhali 0.01  % lotion, , Disp: , Rfl:     budesonide EC (Entocort EC) 3 mg 24 hr capsule, Take 3 capsules (9 mg) by mouth once daily in the morning for 30 days, THEN 2 capsules (6 mg) once daily in the morning for 30 days, THEN 1 capsule (3 mg) once daily in the morning., Disp: 180 capsule, Rfl: 0    chlorhexidine (Peridex) 0.12 % solution, Use 15 mL in the mouth or throat if needed for wound care., Disp: , Rfl:     famotidine (Pepcid) 20 mg tablet, Take 1 tablet (20 mg) by mouth 2 times a day., Disp: 60 tablet, Rfl: 0    levothyroxine (Synthroid, Levoxyl) 25 mcg tablet, Take 0.5 tablets (12.5 mcg) by mouth once daily., Disp: 45 tablet, Rfl: 1    norethindrone (Micronor) 0.35 mg tablet, Take 1 tablet (0.35 mg) over 28 days by mouth once daily., Disp: 28 tablet, Rfl: 11    ALPRAZolam (Xanax) 0.25 mg tablet, Take 1 tablet (0.25 mg) by mouth 3 times a day as needed for anxiety for up to 7 days., Disp: 21 tablet, Rfl: 0    ondansetron ODT (Zofran-ODT) 4 mg disintegrating tablet, Take 1 tablet (4 mg) by mouth every 8 hours if needed for nausea or vomiting for up to 7 days., Disp: 20 tablet, Rfl: 0    pantoprazole (ProtoNix) 40 mg EC tablet, Take 1 tablet (40 mg) by mouth once daily. Do not crush, chew, or split., Disp: 30 tablet, Rfl: 1     Patient Active Problem List   Diagnosis    Iron deficiency    Class 2 obesity due to excess calories without serious comorbidity with body mass index (BMI) of 37.0 to 37.9 in adult    Mixed hyperlipidemia    IFG (impaired fasting glucose)    Iron deficiency anemia    Hypothyroidism    Obstructive sleep apnea    Gastroesophageal reflux disease without esophagitis    Morbid obesity (CMS/HCC)    Psoriasis (a type of skin inflammation)       Social History     Socioeconomic History    Marital status:      Spouse name: Not on file    Number of children: Not on file    Years of education: Not on file    Highest education level: Not on file   Occupational History    Not on file   Tobacco Use     Smoking status: Former     Types: Cigarettes    Smokeless tobacco: Never   Vaping Use    Vaping Use: Never used   Substance and Sexual Activity    Alcohol use: Not Currently    Drug use: Never    Sexual activity: Yes     Partners: Male     Birth control/protection: Male Sterilization   Other Topics Concern    Not on file   Social History Narrative    Not on file     Social Determinants of Health     Financial Resource Strain: Not on file   Food Insecurity: Not on file   Transportation Needs: Not on file   Physical Activity: Not on file   Stress: Not on file   Social Connections: Not on file   Intimate Partner Violence: Not on file   Housing Stability: Not on file       Objective   OBJECTIVE:     Vitals:    12/29/23 1700   BP: 122/74   Pulse: 78   Temp: 36.3 °C (97.3 °F)   SpO2: 98%       Exam      Physical Exam  Constitutional:       Appearance: Normal appearance.   HENT:      Head: Normocephalic.   Pulmonary:      Effort: Pulmonary effort is normal.   Musculoskeletal:      Cervical back: Normal range of motion.   Neurological:      Mental Status: She is alert.   Psychiatric:         Mood and Affect: Mood is anxious.

## 2023-12-29 NOTE — PATIENT INSTRUCTIONS
1. Anxiety  Agreed to short term use of xanax due to increased anxiety likely 2/2 to prednisone side effect   If you are experiencing thoughts of hurting yourself or hurting someone else please use the following resources:   Crisis  Hotline [phone]  [facility] Board [facility] Crisis Intervention and Recovery Center ([phone]  Nationwide Suicide & Crisis Lifeline - 9-8-8 or 1-991.148.7722 (TALK)  [facility] First Call for Help -   2-1-1, National [place]  on Mental Illness - (323) 262-1491 info@rober.org  24/7 Peer Support Warmline: 488.749.6240  Crisis Text Line: Text keyword 4hope to 362054       - ALPRAZolam (Xanax) 0.25 mg tablet; Take 1 tablet (0.25 mg) by mouth 3 times a day as needed for anxiety for up to 7 days.  Dispense: 21 tablet; Refill: 0  - ondansetron ODT (Zofran-ODT) 4 mg disintegrating tablet; Take 1 tablet (4 mg) by mouth every 8 hours if needed for nausea or vomiting for up to 7 days.  Dispense: 20 tablet; Refill: 0    2. Gastroesophageal reflux disease, unspecified whether esophagitis present  Start pantoprazole to help with reflux symptoms. Continue pepcid   - pantoprazole (ProtoNix) 40 mg EC tablet; Take 1 tablet (40 mg) by mouth once daily. Do not crush, chew, or split.  Dispense: 30 tablet; Refill: 1    3. Colitis  Provided information for General Surgery to get colonoscopy sooner

## 2024-01-01 ENCOUNTER — PATIENT MESSAGE (OUTPATIENT)
Dept: GASTROENTEROLOGY | Facility: CLINIC | Age: 44
End: 2024-01-01
Payer: COMMERCIAL

## 2024-01-02 DIAGNOSIS — K52.9 COLITIS: Primary | ICD-10-CM

## 2024-01-02 RX ORDER — PREDNISONE 10 MG/1
TABLET ORAL
Qty: 123 TABLET | Refills: 0 | Status: SHIPPED | OUTPATIENT
Start: 2024-01-02 | End: 2024-01-29 | Stop reason: SDUPTHER

## 2024-01-03 ENCOUNTER — APPOINTMENT (OUTPATIENT)
Dept: SURGERY | Facility: CLINIC | Age: 44
End: 2024-01-03
Payer: COMMERCIAL

## 2024-01-04 ENCOUNTER — APPOINTMENT (OUTPATIENT)
Dept: PREADMISSION TESTING | Facility: HOSPITAL | Age: 44
End: 2024-01-04
Payer: COMMERCIAL

## 2024-01-04 DIAGNOSIS — K21.9 GASTROESOPHAGEAL REFLUX DISEASE, UNSPECIFIED WHETHER ESOPHAGITIS PRESENT: ICD-10-CM

## 2024-01-04 RX ORDER — FAMOTIDINE 20 MG/1
20 TABLET, FILM COATED ORAL 2 TIMES DAILY
Qty: 180 TABLET | Refills: 1 | OUTPATIENT
Start: 2024-01-04

## 2024-01-12 ENCOUNTER — PATIENT MESSAGE (OUTPATIENT)
Dept: GASTROENTEROLOGY | Facility: CLINIC | Age: 44
End: 2024-01-12
Payer: COMMERCIAL

## 2024-01-12 LAB
CYTOLOGY CMNT CVX/VAG CYTO-IMP: NORMAL
HPV HR 12 DNA GENITAL QL NAA+PROBE: NEGATIVE
HPV HR GENOTYPES PNL CVX NAA+PROBE: NEGATIVE
HPV16 DNA SPEC QL NAA+PROBE: NEGATIVE
HPV18 DNA SPEC QL NAA+PROBE: NEGATIVE
LAB AP HPV GENOTYPE QUESTION: YES
LAB AP HPV HR: NORMAL
LABORATORY COMMENT REPORT: NORMAL
LMP START DATE: NORMAL
PATH REPORT.TOTAL CANCER: NORMAL

## 2024-01-16 ENCOUNTER — HOSPITAL ENCOUNTER (OUTPATIENT)
Dept: GASTROENTEROLOGY | Facility: HOSPITAL | Age: 44
Discharge: HOME | End: 2024-01-16
Payer: COMMERCIAL

## 2024-01-16 ENCOUNTER — ANESTHESIA EVENT (OUTPATIENT)
Dept: GASTROENTEROLOGY | Facility: HOSPITAL | Age: 44
End: 2024-01-16
Payer: COMMERCIAL

## 2024-01-16 ENCOUNTER — ANESTHESIA (OUTPATIENT)
Dept: GASTROENTEROLOGY | Facility: HOSPITAL | Age: 44
End: 2024-01-16
Payer: COMMERCIAL

## 2024-01-16 VITALS
BODY MASS INDEX: 31.5 KG/M2 | OXYGEN SATURATION: 95 % | WEIGHT: 195.99 LBS | DIASTOLIC BLOOD PRESSURE: 74 MMHG | TEMPERATURE: 97.2 F | SYSTOLIC BLOOD PRESSURE: 133 MMHG | RESPIRATION RATE: 15 BRPM | HEIGHT: 66 IN | HEART RATE: 54 BPM

## 2024-01-16 DIAGNOSIS — R10.30 LOWER ABDOMINAL PAIN: ICD-10-CM

## 2024-01-16 DIAGNOSIS — K92.1 HEMATOCHEZIA: ICD-10-CM

## 2024-01-16 DIAGNOSIS — R19.7 DIARRHEA, UNSPECIFIED TYPE: ICD-10-CM

## 2024-01-16 DIAGNOSIS — K51.911 ULCERATIVE COLITIS WITH RECTAL BLEEDING, UNSPECIFIED LOCATION (MULTI): Primary | ICD-10-CM

## 2024-01-16 DIAGNOSIS — K62.89 PROCTITIS: Primary | ICD-10-CM

## 2024-01-16 PROCEDURE — 88305 TISSUE EXAM BY PATHOLOGIST: CPT | Performed by: PATHOLOGY

## 2024-01-16 PROCEDURE — 0753T DGTZ GLS MCRSCP SLD LEVEL IV: CPT | Mod: TC,SUR,AHULAB | Performed by: INTERNAL MEDICINE

## 2024-01-16 PROCEDURE — 2500000004 HC RX 250 GENERAL PHARMACY W/ HCPCS (ALT 636 FOR OP/ED): Performed by: INTERNAL MEDICINE

## 2024-01-16 PROCEDURE — 7100000009 HC PHASE TWO TIME - INITIAL BASE CHARGE

## 2024-01-16 PROCEDURE — 3700000002 HC GENERAL ANESTHESIA TIME - EACH INCREMENTAL 1 MINUTE

## 2024-01-16 PROCEDURE — A45385 PR COLONOSCOPY,REMV LESN,SNARE: Performed by: ANESTHESIOLOGY

## 2024-01-16 PROCEDURE — 7100000010 HC PHASE TWO TIME - EACH INCREMENTAL 1 MINUTE

## 2024-01-16 PROCEDURE — 3700000001 HC GENERAL ANESTHESIA TIME - INITIAL BASE CHARGE

## 2024-01-16 PROCEDURE — 94760 N-INVAS EAR/PLS OXIMETRY 1: CPT

## 2024-01-16 PROCEDURE — 2500000004 HC RX 250 GENERAL PHARMACY W/ HCPCS (ALT 636 FOR OP/ED): Performed by: NURSE ANESTHETIST, CERTIFIED REGISTERED

## 2024-01-16 PROCEDURE — 45380 COLONOSCOPY AND BIOPSY: CPT | Mod: XS | Performed by: INTERNAL MEDICINE

## 2024-01-16 PROCEDURE — A45385 PR COLONOSCOPY,REMV LESN,SNARE: Performed by: NURSE ANESTHETIST, CERTIFIED REGISTERED

## 2024-01-16 PROCEDURE — 2500000005 HC RX 250 GENERAL PHARMACY W/O HCPCS: Performed by: NURSE ANESTHETIST, CERTIFIED REGISTERED

## 2024-01-16 PROCEDURE — 45385 COLONOSCOPY W/LESION REMOVAL: CPT | Performed by: INTERNAL MEDICINE

## 2024-01-16 RX ORDER — LIDOCAINE HYDROCHLORIDE 20 MG/ML
INJECTION, SOLUTION EPIDURAL; INFILTRATION; INTRACAUDAL; PERINEURAL AS NEEDED
Status: DISCONTINUED | OUTPATIENT
Start: 2024-01-16 | End: 2024-01-16

## 2024-01-16 RX ORDER — MESALAMINE 1000 MG/1
1000 SUPPOSITORY RECTAL 2 TIMES DAILY
Qty: 60 SUPPOSITORY | Refills: 0 | Status: SHIPPED | OUTPATIENT
Start: 2024-01-16 | End: 2024-02-02 | Stop reason: SDUPTHER

## 2024-01-16 RX ORDER — PROPOFOL 10 MG/ML
INJECTION, EMULSION INTRAVENOUS CONTINUOUS PRN
Status: DISCONTINUED | OUTPATIENT
Start: 2024-01-16 | End: 2024-01-16

## 2024-01-16 RX ORDER — SODIUM CHLORIDE, SODIUM LACTATE, POTASSIUM CHLORIDE, CALCIUM CHLORIDE 600; 310; 30; 20 MG/100ML; MG/100ML; MG/100ML; MG/100ML
20 INJECTION, SOLUTION INTRAVENOUS CONTINUOUS
Status: DISCONTINUED | OUTPATIENT
Start: 2024-01-16 | End: 2024-01-17 | Stop reason: HOSPADM

## 2024-01-16 RX ORDER — PROPOFOL 10 MG/ML
INJECTION, EMULSION INTRAVENOUS AS NEEDED
Status: DISCONTINUED | OUTPATIENT
Start: 2024-01-16 | End: 2024-01-16

## 2024-01-16 RX ORDER — MESALAMINE 1.2 G/1
4.8 TABLET, DELAYED RELEASE ORAL DAILY
Qty: 120 TABLET | Refills: 11 | Status: SHIPPED | OUTPATIENT
Start: 2024-01-16 | End: 2024-03-11 | Stop reason: WASHOUT

## 2024-01-16 RX ADMIN — PROPOFOL 50 MG: 10 INJECTION, EMULSION INTRAVENOUS at 11:53

## 2024-01-16 RX ADMIN — PROPOFOL 50 MG: 10 INJECTION, EMULSION INTRAVENOUS at 11:38

## 2024-01-16 RX ADMIN — PROPOFOL 125 MCG/KG/MIN: 10 INJECTION, EMULSION INTRAVENOUS at 11:32

## 2024-01-16 RX ADMIN — LIDOCAINE HYDROCHLORIDE 50 MG: 20 INJECTION, SOLUTION EPIDURAL; INFILTRATION; INTRACAUDAL; PERINEURAL at 11:32

## 2024-01-16 RX ADMIN — PROPOFOL 150 MG: 10 INJECTION, EMULSION INTRAVENOUS at 11:32

## 2024-01-16 RX ADMIN — SODIUM CHLORIDE, POTASSIUM CHLORIDE, SODIUM LACTATE AND CALCIUM CHLORIDE: 600; 310; 30; 20 INJECTION, SOLUTION INTRAVENOUS at 11:24

## 2024-01-16 RX ADMIN — SODIUM CHLORIDE, POTASSIUM CHLORIDE, SODIUM LACTATE AND CALCIUM CHLORIDE 20 ML/HR: 600; 310; 30; 20 INJECTION, SOLUTION INTRAVENOUS at 10:15

## 2024-01-16 ASSESSMENT — PAIN - FUNCTIONAL ASSESSMENT
PAIN_FUNCTIONAL_ASSESSMENT: 0-10
PAIN_FUNCTIONAL_ASSESSMENT: 0-10
PAIN_FUNCTIONAL_ASSESSMENT: VAS (VISUAL ANALOG SCALE)
PAIN_FUNCTIONAL_ASSESSMENT: 0-10
PAIN_FUNCTIONAL_ASSESSMENT: VAS (VISUAL ANALOG SCALE)

## 2024-01-16 ASSESSMENT — PAIN SCALES - GENERAL
PAINLEVEL_OUTOF10: 0 - NO PAIN
PAINLEVEL_OUTOF10: 0 - NO PAIN
PAINLEVEL_OUTOF10: 2
PAINLEVEL_OUTOF10: 0 - NO PAIN

## 2024-01-16 ASSESSMENT — PAIN DESCRIPTION - DESCRIPTORS: DESCRIPTORS: CRAMPING

## 2024-01-16 ASSESSMENT — COLUMBIA-SUICIDE SEVERITY RATING SCALE - C-SSRS
1. IN THE PAST MONTH, HAVE YOU WISHED YOU WERE DEAD OR WISHED YOU COULD GO TO SLEEP AND NOT WAKE UP?: NO
6. HAVE YOU EVER DONE ANYTHING, STARTED TO DO ANYTHING, OR PREPARED TO DO ANYTHING TO END YOUR LIFE?: NO

## 2024-01-16 NOTE — DISCHARGE INSTRUCTIONS
Patient Instructions after a colonoscopy      The anesthetics, sedatives or narcotics which were given to you today will be acting in your body for the next 24 hours, so you might feel a little sleepy or groggy.  This feeling should slowly wear off. Carefully read and follow the instructions.     You received sedation today:  - Do not drive or operate any machinery or power tools of any kind.   - No alcoholic beverages today, not even beer or wine.  - Do not make any important decisions or sign any legal documents.  - No over the counter medications that contain alcohol or that may cause drowsiness.  - Do not make any important decisions or sign any legal documents.    While it is common to experience mild to moderate abdominal distention, gas, or belching after your procedure, if any of these symptoms occur following discharge from the GI Lab or within one week of having your procedure, call the [facility] to be advised whether a visit to your nearest Urgent Care or Emergency Department is indicated.  Take this paper with you if you go.     - If you develop an allergic reaction to the medications that were given during your procedure such as difficulty breathing, rash, hives, severe nausea, vomiting or lightheadedness.- If you experience chest pain, shortness of breath, severe abdominal pain, fevers and chills.    -If you develop signs and symptoms of bleeding such as blood in your spit, if your stools turn black, tarry, or bloody    - If you have not urinated within 8 hours following your procedure.- If your IV site becomes painful, red, inflamed, or looks infected.

## 2024-01-16 NOTE — ANESTHESIA PREPROCEDURE EVALUATION
Patient: Ally Carpio    Procedure Information       Date/Time: 24 1040    Scheduled providers: Leidy Ortiz MD; Edwar Goncalves MD; ANA PAULA Alicia DNP    Procedure: COLONOSCOPY    Location: [facility]            Relevant Problems   Cardiovascular   (+) Mixed hyperlipidemia      Endocrine   (+) Class 2 obesity due to excess calories without serious comorbidity with body mass index (BMI) of 37.0 to 37.9 in adult   (+) Hypothyroidism   (+) Morbid obesity (CMS/HCC)      GI   (+) Gastroesophageal reflux disease without esophagitis      Pulmonary   (+) Obstructive sleep apnea      Hematology   (+) Iron deficiency anemia       Clinical information reviewed:   Tobacco  Allergies  Meds   Med Hx  Surg Hx  OB Status  Fam Hx  Soc   Hx        NPO/Void Status  Carbohydrate Drink Given Prior to Surgery? : N  Date of Last Liquid: 24  Time of Last Liquid: 0400  Date of Last Solid: 24  Time of Last Solid: 1900  Last Intake Type: Clear fluids  Time of Last Void: 0950           Past Medical History:   Diagnosis Date    Anemia     Anxiety     GERD (gastroesophageal reflux disease)     Hx of idiopathic thrombocytopenic purpura     on steroids    Hypothyroidism     Sleep apnea       Past Surgical History:   Procedure Laterality Date     SECTION, LOW TRANSVERSE  ,    ESOPHAGOGASTRODUODENOSCOPY       Social History     Tobacco Use    Smoking status: Former     Packs/day: 1.00     Years: 12.50     Additional pack years: 0.00     Total pack years: 12.50     Types: Cigarettes     Quit date:      Years since quittin.0    Smokeless tobacco: Never   Vaping Use    Vaping Use: Never used   Substance Use Topics    Alcohol use: Not Currently     Comment: socially    Drug use: Never      Current Outpatient Medications   Medication Instructions    ALPRAZolam (XANAX) 0.25 mg, oral, 3 times daily PRN    Bryhali 0.01 % lotion     chlorhexidine (Peridex) 0.12 % solution 15 mL,  Mouth/Throat, As needed    famotidine (PEPCID) 20 mg, oral, 2 times daily    levothyroxine (SYNTHROID, LEVOXYL) 12.5 mcg, oral, Daily    norethindrone (MICRONOR) 0.35 mg, oral, Daily    pantoprazole (PROTONIX) 40 mg, oral, Daily, Do not crush, chew, or split.    predniSONE (Deltasone) 10 mg tablet Take 4 tablets (40 mg) by mouth once daily for 7 days, THEN 3.5 tablets (35 mg) once daily for 7 days, THEN 3 tablets (30 mg) once daily for 7 days, THEN 2.5 tablets (25 mg) once daily for 7 days, THEN 2 tablets (20 mg) once daily for 7 days, THEN 1.5 tablets (15 mg) once daily for 7 days, THEN 1 tablet (10 mg) once daily for 7 days. Take by mouth as directed.      No Known Allergies     Chemistry    Lab Results   Component Value Date/Time     12/18/2023 1216    K 3.7 12/18/2023 1216     12/18/2023 1216    CO2 26 12/18/2023 1216    BUN 16 12/18/2023 1216    CREATININE 0.81 12/18/2023 1216    Lab Results   Component Value Date/Time    CALCIUM 9.1 12/18/2023 1216    ALKPHOS 43 12/18/2023 1216    AST 9 12/18/2023 1216    ALT 10 12/18/2023 1216    BILITOT 0.5 12/18/2023 1216          Lab Results   Component Value Date/Time    WBC 8.7 12/18/2023 1216    HGB 13.4 12/18/2023 1216    HCT 40.7 12/18/2023 1216    PLT 87 (L) 12/18/2023 1216     Lab Results   Component Value Date/Time    PROTIME 11.7 09/30/2023 0938    INR 1.0 11/29/2023 0846    INR 1.0 09/30/2023 0938     Encounter Date: 10/02/23   ECG 12 Lead    Narrative    See ECG     No results found for this or any previous visit from the past 1095 days.   Echo 9/26/2023:  Left Ventricle: Left ventricular systolic function is normal, with an estimated ejection fraction of 65%. There are no regional wall motion abnormalities. The left ventricular cavity size is normal. Spectral Doppler shows a normal pattern of left ventricular diastolic filling.  Left Atrium: The left atrium is normal in size.  Right Ventricle: The right ventricle is upper limits of normal in size.  "There is normal right ventricular global systolic function.  Right Atrium: The right atrium is mildly dilated.  Aortic Valve: The aortic valve appears structurally normal. There is no evidence of aortic valve regurgitation. The peak instantaneous gradient of the aortic valve is 6.8 mmHg. The mean gradient of the aortic valve is 3.5 mmHg.  Mitral Valve: The mitral valve is normal in structure. There is trace mitral valve regurgitation.  Tricuspid Valve: The tricuspid valve is structurally normal. There is trace tricuspid regurgitation.  Pulmonic Valve: The pulmonic valve is not well visualized. There is no indication of pulmonic valve regurgitation.  Pericardium: There is no pericardial effusion noted.  Aorta: The aortic root is normal.        CONCLUSIONS:  1. Left ventricular systolic function is normal with a 65% estimated ejection fraction.    Visit Vitals  /67   Pulse 69   Temp 37 °C (98.6 °F) (Temporal)   Resp 16   Ht 1.676 m (5' 6\")   Wt 88.9 kg (195 lb 15.8 oz)   LMP 12/27/2023 (Exact Date)   SpO2 97%   BMI 31.63 kg/m²   OB Status Having periods   Smoking Status Former   BSA 2.03 m²        Physical Exam    Airway  Mallampati: III  TM distance: >3 FB  Neck ROM: full     Cardiovascular   Rhythm: regular  Rate: normal     Dental - normal exam     Pulmonary   Breath sounds clear to auscultation     Abdominal - normal exam              Anesthesia Plan    History of general anesthesia?: yes  History of complications of general anesthesia?: no    ASA 3     MAC     intravenous induction   Anesthetic plan and risks discussed with patient.    Plan discussed with CRNA and CAA.      "

## 2024-01-16 NOTE — ANESTHESIA POSTPROCEDURE EVALUATION
Patient: Ally Carpio    Procedure Summary       Date: 01/16/24 Room / Location: [facility]    Anesthesia Start: 1124 Anesthesia Stop: 1214    Procedure: COLONOSCOPY Diagnosis:       Hematochezia      Lower abdominal pain      Diarrhea, unspecified type    Scheduled Providers: Leidy Ortiz MD; Edwar Goncalves MD; JORDAN Alicia-OLVIN, DNP Responsible Provider: Edwar Goncalves MD    Anesthesia Type: MAC ASA Status: 3            Anesthesia Type: MAC    Vitals Value Taken Time   /74 01/16/24 1236   Temp 36.2 °C (97.2 °F) 01/16/24 1206   Pulse 54 01/16/24 1236   Resp 15 01/16/24 1236   SpO2 95 % 01/16/24 1236       Anesthesia Post Evaluation    Patient location during evaluation: PACU  Patient participation: complete - patient participated  Level of consciousness: awake and alert  Pain management: adequate  Airway patency: patent  Cardiovascular status: acceptable and hemodynamically stable  Respiratory status: acceptable, spontaneous ventilation and nonlabored ventilation  Hydration status: acceptable  Postoperative Nausea and Vomiting: none        There were no known notable events for this encounter.

## 2024-01-16 NOTE — H&P
"History Of Present Illness  Ally Carpio is a 43 y.o. female presenting with several month history of bloody diarrhea and lower abdominal pain.    Had abnormal CT showing possible colitis. Colonoscopy is indicated to rule out IBD.      Past Medical History  Past Medical History:   Diagnosis Date    Anemia     Anxiety     GERD (gastroesophageal reflux disease)     Hx of idiopathic thrombocytopenic purpura     on steroids    Hypothyroidism     Sleep apnea        Surgical History  Past Surgical History:   Procedure Laterality Date     SECTION, LOW TRANSVERSE  ,    ESOPHAGOGASTRODUODENOSCOPY          Social History  She reports that she quit smoking about 7 years ago. Her smoking use included cigarettes. She has a 12.50 pack-year smoking history. She has never used smokeless tobacco. She reports that she does not currently use alcohol. She reports that she does not use drugs.    Family History  Family History   Problem Relation Name Age of Onset    Colon cancer Mother Shari     COPD Mother Shari     Hyperlipidemia Father Archuleta     Glaucoma Brother      Colon cancer Mother's Brother      Breast cancer Father's Sister Na         Allergies  Patient has no known allergies.    Review of Systems     Physical Exam     Last Recorded Vitals  Blood pressure 119/67, pulse 69, temperature 37 °C (98.6 °F), temperature source Temporal, resp. rate 16, height 1.676 m (5' 6\"), weight 88.9 kg (195 lb 15.8 oz), last menstrual period 2023, SpO2 97 %.    Relevant Results       Assessment/Plan   Proceed with colonoscopy with biopsies.       I spent 5 minutes in the professional and overall care of this patient.      Leidy Ortiz MD    "

## 2024-01-19 ENCOUNTER — PATIENT MESSAGE (OUTPATIENT)
Dept: OBSTETRICS AND GYNECOLOGY | Facility: CLINIC | Age: 44
End: 2024-01-19
Payer: COMMERCIAL

## 2024-01-19 ENCOUNTER — TELEPHONE (OUTPATIENT)
Dept: OBSTETRICS AND GYNECOLOGY | Facility: CLINIC | Age: 44
End: 2024-01-19
Payer: COMMERCIAL

## 2024-01-19 DIAGNOSIS — N92.0 MENORRHAGIA WITH REGULAR CYCLE: Primary | ICD-10-CM

## 2024-01-22 RX ORDER — MEDROXYPROGESTERONE ACETATE 10 MG/1
10 TABLET ORAL DAILY
Qty: 14 TABLET | Refills: 3 | Status: SHIPPED | OUTPATIENT
Start: 2024-01-22 | End: 2024-03-11 | Stop reason: WASHOUT

## 2024-01-23 ENCOUNTER — APPOINTMENT (OUTPATIENT)
Dept: PRIMARY CARE | Facility: CLINIC | Age: 44
End: 2024-01-23
Payer: COMMERCIAL

## 2024-01-23 LAB
LABORATORY COMMENT REPORT: NORMAL
PATH REPORT.FINAL DX SPEC: NORMAL
PATH REPORT.GROSS SPEC: NORMAL
PATH REPORT.MICROSCOPIC SPEC OTHER STN: NORMAL
PATH REPORT.RELEVANT HX SPEC: NORMAL
PATH REPORT.TOTAL CANCER: NORMAL

## 2024-01-25 NOTE — PROGRESS NOTES
Ally Carpio is a 43 y.o. female with past medical history of hypothyroidism, psoriasis, remote history of peptic ulcer disease when on NSAIDS, and anemia who presents today for follow up of colitis. She had 8 month history of progressively worsening diarrhea with rectal bleeding since traveling to [facility] 4/2023. Symptoms worsened and she was having abdominal pain, nausea poor appetite, and weight loss. She had CT scan 12/2023. There was rectal wall thickening with numerous perirectal lymph nodes in the pelvis, suspicious for inflammatory bowel disease. Fecal calprotectin 12/2023 was elevated over 3,000. She had colonoscopy 1/16/2024. Terminal ileum normal. There was chronic active colitis in rectosigmoid colon with edema, erythema, and aphthous ulcerations consistent with ulcerative colitis.    She was given Prednisone taper which helped some, but she did not tolerate very well and had adverse effect of mood changes. Currently on 10 mg daily and goes down weekly. The last 2 weeks has been on oral Mesalamine and Canasa suppositories twice daily. Currently having 3 bowels movements per day. Stools are loose to semi-formed. No longer seeing blood. Continues to have lots of upper symptoms. Can really only eat very bland foods like white toast. Lots of RUQ pain, upper abdominal pain, and nausea. Taking Pantoprazole twice daily and Carafate as needed. which helps heartburn but not the pain/nausea. Has lost about 40-50 pounds since diagnosis.    ALEXEY positive and saw Rheumatology in the past. No lupus or RA. She has no iron deficiency anemia. Platelets previously low around 50. Most recent 80. She sees a Hematologist. Had bone marrow biopsy and was diagnosed with ITP.     Social history: Stressed lately as her  has been undergoing chemo. Works as a massage therapist. No tobacco. Rare alcohol. Denies illicit drugs.     Family history: Mother with colon cancer, diagnosed around age 70. Paternal uncle also with  colon cancer. Denies family history of IBD.     Past Medical History:   Diagnosis Date    Anemia     Anxiety     GERD (gastroesophageal reflux disease)     Hx of idiopathic thrombocytopenic purpura     on steroids    Hypothyroidism     Sleep apnea      Past Surgical History:   Procedure Laterality Date     SECTION, LOW TRANSVERSE  ,    ESOPHAGOGASTRODUODENOSCOPY       Current Outpatient Medications   Medication Sig Dispense Refill    Bryhali 0.01 % lotion       chlorhexidine (Peridex) 0.12 % solution Use 15 mL in the mouth or throat if needed for wound care.      medroxyPROGESTERone (Provera) 10 mg tablet Take 1 tablet (10 mg) by mouth once daily. Take 1 tablet by mouth daily for 7 days beginning on day 16 of the menstrual cycle. 14 tablet 3    mesalamine (Lialda) 1.2 gram EC tablet Take 4 tablets (4.8 g) by mouth once daily. Do not crush, chew, or split. 120 tablet 11    norethindrone (Micronor) 0.35 mg tablet Take 1 tablet (0.35 mg) over 28 days by mouth once daily. 28 tablet 11    pantoprazole (ProtoNix) 40 mg EC tablet Take 1 tablet (40 mg) by mouth once daily. Do not crush, chew, or split. 30 tablet 1    predniSONE (Deltasone) 10 mg tablet Take 4 tablets (40 mg) by mouth once daily for 7 days, THEN 3.5 tablets (35 mg) once daily for 7 days, THEN 3 tablets (30 mg) once daily for 7 days, THEN 2.5 tablets (25 mg) once daily for 7 days, THEN 2 tablets (20 mg) once daily for 7 days, THEN 1.5 tablets (15 mg) once daily for 7 days, THEN 1 tablet (10 mg) once daily for 7 days. Take by mouth as directed. 123 tablet 0    sucralfate (Carafate) 1 gram tablet Take 1 tablet (1 g) by mouth 4 times a day. Take 1 hour before meals and at bedtime 120 tablet 11    ALPRAZolam (Xanax) 0.25 mg tablet Take 1 tablet (0.25 mg) by mouth 3 times a day as needed for anxiety for up to 7 days. 21 tablet 0    famotidine (Pepcid) 20 mg tablet Take 1 tablet (20 mg) by mouth 2 times a day. 60 tablet 0    levothyroxine  "(Synthroid, Levoxyl) 25 mcg tablet Take 0.5 tablets (12.5 mcg) by mouth once daily. 45 tablet 1    mesalamine (Canasa) 1,000 mg suppository Insert 1 suppository (1,000 mg) into the rectum once daily at bedtime. Use as directed 30 suppository 0     No current facility-administered medications for this visit.     No Known Allergies    Family History   Problem Relation Name Age of Onset    Colon cancer Mother Shari     COPD Mother Shari     Hyperlipidemia Father Archuleta     Glaucoma Brother      Colon cancer Mother's Brother      Breast cancer Father's Sister Na        Review of Systems  Review of Systems negative except as noted in HPI.    Objective     /89 (BP Location: Right arm, Patient Position: Sitting, BP Cuff Size: Large adult)   Pulse 80   Temp 36.4 °C (97.5 °F)   Ht 1.676 m (5' 6\")   Wt 83 kg (183 lb)   LMP 12/27/2023 (Exact Date)   BMI 29.54 kg/m²      Physical Exam  Constitutional:  No acute distress. Normal appearance. Not ill-appearing.  HENT:  Head normocephalic and atraumatic. Conjunctivae normal.  Cardiovascular:  Normal rate. Regular rhythm.  Pulmonary:  Pulmonary effort normal. No respiratory distress. Breath sounds clear.  Abdominal:  Abdomen is soft. There is no distension. RUQ tenderness with palpation. No rebound or guarding.  Skin: Dry.  Neurological:  Alert and oriented.  Psychiatric:  Mood and affect normal.    Assessment/Plan     43 y.o. female with history of hypothyroidism, psoriasis, remote history of peptic ulcer disease when on NSAIDS, anemia, and recently diagnosed ITP followed by Hematology who presents today for clinic visit for recently diagnosed ulcerative colitis. Fecal calprotectin 12/2023 over 3,000 and colonoscopy 1/2024 showed left sided UC. She has had mild improvement in stools/bleeding with oral and topical mesalamine, however her upper abdominal pain, nausea, poor appetite, and weight loss have persisted despite high dose PPI therapy. Acute pancreatitis is a rare " side effect of mesalamine so will obtain labs and CT scan. Will also exclude gallbladder etiology of symptoms.    Recommendations  Hold oral Mesalamine for a few days and see if it affects abdominal pain.  Continue suppositories twice daily for another month. Then decrease to once daily.  Continue Pantoprazole twice daily on empty stomach with Carafate as needed (separate Pantoprazole from Synthroid).  Obtain repeat labs.  Obtain US and CT scan.   Follow up in 4 weeks, or sooner if needed.     Electronically signed by: Rashmi Vieira CNP on 2/2/2024 at 12:01 PM

## 2024-01-26 ENCOUNTER — TELEPHONE (OUTPATIENT)
Dept: GASTROENTEROLOGY | Facility: CLINIC | Age: 44
End: 2024-01-26
Payer: COMMERCIAL

## 2024-01-26 DIAGNOSIS — R12 BURNING REFLUX: Primary | ICD-10-CM

## 2024-01-26 DIAGNOSIS — R10.9 ABDOMINAL PAIN, UNSPECIFIED ABDOMINAL LOCATION: ICD-10-CM

## 2024-01-26 DIAGNOSIS — R11.0 NAUSEA WITHOUT VOMITING: ICD-10-CM

## 2024-01-26 RX ORDER — SUCRALFATE 1 G/1
1 TABLET ORAL 4 TIMES DAILY
Qty: 120 TABLET | Refills: 11 | Status: SHIPPED | OUTPATIENT
Start: 2024-01-26 | End: 2024-05-07 | Stop reason: ALTCHOICE

## 2024-01-29 DIAGNOSIS — K52.9 COLITIS: ICD-10-CM

## 2024-01-29 RX ORDER — PREDNISONE 10 MG/1
TABLET ORAL
Qty: 123 TABLET | Refills: 0 | Status: SHIPPED | OUTPATIENT
Start: 2024-01-29 | End: 2024-03-11 | Stop reason: WASHOUT

## 2024-01-31 ENCOUNTER — APPOINTMENT (OUTPATIENT)
Dept: SURGERY | Facility: CLINIC | Age: 44
End: 2024-01-31
Payer: COMMERCIAL

## 2024-02-02 ENCOUNTER — HOSPITAL ENCOUNTER (OUTPATIENT)
Dept: RADIOLOGY | Facility: HOSPITAL | Age: 44
Discharge: HOME | End: 2024-02-02
Payer: COMMERCIAL

## 2024-02-02 ENCOUNTER — OFFICE VISIT (OUTPATIENT)
Dept: GASTROENTEROLOGY | Facility: CLINIC | Age: 44
End: 2024-02-02
Payer: COMMERCIAL

## 2024-02-02 ENCOUNTER — LAB (OUTPATIENT)
Dept: LAB | Facility: LAB | Age: 44
End: 2024-02-02
Payer: COMMERCIAL

## 2024-02-02 VITALS
BODY MASS INDEX: 29.41 KG/M2 | SYSTOLIC BLOOD PRESSURE: 142 MMHG | TEMPERATURE: 97.5 F | DIASTOLIC BLOOD PRESSURE: 89 MMHG | HEART RATE: 80 BPM | WEIGHT: 183 LBS | HEIGHT: 66 IN

## 2024-02-02 DIAGNOSIS — R10.11 RUQ ABDOMINAL PAIN: ICD-10-CM

## 2024-02-02 DIAGNOSIS — K62.89 PROCTITIS: ICD-10-CM

## 2024-02-02 DIAGNOSIS — R11.0 NAUSEA WITHOUT VOMITING: ICD-10-CM

## 2024-02-02 DIAGNOSIS — K51.311 ULCERATIVE RECTOSIGMOIDITIS WITH RECTAL BLEEDING (MULTI): Primary | ICD-10-CM

## 2024-02-02 DIAGNOSIS — R10.10 UPPER ABDOMINAL PAIN: ICD-10-CM

## 2024-02-02 LAB
ALBUMIN SERPL BCP-MCNC: 3.8 G/DL (ref 3.4–5)
ALP SERPL-CCNC: 35 U/L (ref 33–110)
ALT SERPL W P-5'-P-CCNC: 6 U/L (ref 7–45)
ANION GAP SERPL CALC-SCNC: 11 MMOL/L (ref 10–20)
AST SERPL W P-5'-P-CCNC: 7 U/L (ref 9–39)
BILIRUB SERPL-MCNC: 0.4 MG/DL (ref 0–1.2)
BUN SERPL-MCNC: 15 MG/DL (ref 6–23)
CALCIUM SERPL-MCNC: 8.5 MG/DL (ref 8.6–10.3)
CHLORIDE SERPL-SCNC: 105 MMOL/L (ref 98–107)
CO2 SERPL-SCNC: 25 MMOL/L (ref 21–32)
CREAT SERPL-MCNC: 0.74 MG/DL (ref 0.5–1.05)
CRP SERPL-MCNC: 0.11 MG/DL
EGFRCR SERPLBLD CKD-EPI 2021: >90 ML/MIN/1.73M*2
ERYTHROCYTE [DISTWIDTH] IN BLOOD BY AUTOMATED COUNT: 14.7 % (ref 11.5–14.5)
GLUCOSE SERPL-MCNC: 103 MG/DL (ref 74–99)
HCT VFR BLD AUTO: 39.8 % (ref 36–46)
HGB BLD-MCNC: 12.8 G/DL (ref 12–16)
LIPASE SERPL-CCNC: 5 U/L (ref 9–82)
MCH RBC QN AUTO: 29.2 PG (ref 26–34)
MCHC RBC AUTO-ENTMCNC: 32.2 G/DL (ref 32–36)
MCV RBC AUTO: 91 FL (ref 80–100)
NRBC BLD-RTO: 0 /100 WBCS (ref 0–0)
PLATELET # BLD AUTO: 95 X10*3/UL (ref 150–450)
POTASSIUM SERPL-SCNC: 3.6 MMOL/L (ref 3.5–5.3)
PROT SERPL-MCNC: 6 G/DL (ref 6.4–8.2)
RBC # BLD AUTO: 4.39 X10*6/UL (ref 4–5.2)
SODIUM SERPL-SCNC: 137 MMOL/L (ref 136–145)
WBC # BLD AUTO: 6.9 X10*3/UL (ref 4.4–11.3)

## 2024-02-02 PROCEDURE — 99214 OFFICE O/P EST MOD 30 MIN: CPT | Performed by: NURSE PRACTITIONER

## 2024-02-02 PROCEDURE — 76705 ECHO EXAM OF ABDOMEN: CPT

## 2024-02-02 PROCEDURE — 80053 COMPREHEN METABOLIC PANEL: CPT

## 2024-02-02 PROCEDURE — 76705 ECHO EXAM OF ABDOMEN: CPT | Performed by: RADIOLOGY

## 2024-02-02 PROCEDURE — 86140 C-REACTIVE PROTEIN: CPT

## 2024-02-02 PROCEDURE — 74177 CT ABD & PELVIS W/CONTRAST: CPT

## 2024-02-02 PROCEDURE — 3008F BODY MASS INDEX DOCD: CPT | Performed by: NURSE PRACTITIONER

## 2024-02-02 PROCEDURE — 36415 COLL VENOUS BLD VENIPUNCTURE: CPT

## 2024-02-02 PROCEDURE — 85027 COMPLETE CBC AUTOMATED: CPT

## 2024-02-02 PROCEDURE — 83690 ASSAY OF LIPASE: CPT

## 2024-02-02 PROCEDURE — 1036F TOBACCO NON-USER: CPT | Performed by: NURSE PRACTITIONER

## 2024-02-02 PROCEDURE — 2550000001 HC RX 255 CONTRASTS: Performed by: FAMILY MEDICINE

## 2024-02-02 RX ORDER — MESALAMINE 1000 MG/1
1000 SUPPOSITORY RECTAL NIGHTLY
Qty: 30 SUPPOSITORY | Refills: 0 | Status: SHIPPED | OUTPATIENT
Start: 2024-02-02 | End: 2024-03-11 | Stop reason: WASHOUT

## 2024-02-02 RX ADMIN — IOHEXOL 75 ML: 350 INJECTION, SOLUTION INTRAVENOUS at 14:30

## 2024-02-02 ASSESSMENT — PAIN SCALES - GENERAL: PAINLEVEL: 8

## 2024-02-02 NOTE — PATIENT INSTRUCTIONS
Recommendations  Hold oral Mesalamine for a few days and see if it affects your abdominal pain.  Continue suppositories twice daily for another month. Then can decrease to once daily.  Continue Pantoprazole twice daily on empty stomach with Carafate as needed.  Obtain repeat labs.  Obtain US and CT scan. You can call Radiology/Imaging at [phone] to schedule.   Follow up in 4 weeks. You can call my nurse, Moriah at [phone] with any questions or concerns.

## 2024-02-07 ENCOUNTER — TELEPHONE (OUTPATIENT)
Dept: GASTROENTEROLOGY | Facility: HOSPITAL | Age: 44
End: 2024-02-07
Payer: COMMERCIAL

## 2024-02-07 NOTE — TELEPHONE ENCOUNTER
Called and spoke to patient. Has been holding oral mesalamine for about a week and pain is about the same. Not any better or any worse. Will restart mesalamine and she is going to try Nhi-Jennifer indigo supplement to see if this helps.

## 2024-02-09 ENCOUNTER — APPOINTMENT (OUTPATIENT)
Dept: OBSTETRICS AND GYNECOLOGY | Facility: CLINIC | Age: 44
End: 2024-02-09
Payer: COMMERCIAL

## 2024-02-16 DIAGNOSIS — K21.9 GASTROESOPHAGEAL REFLUX DISEASE, UNSPECIFIED WHETHER ESOPHAGITIS PRESENT: ICD-10-CM

## 2024-02-16 RX ORDER — PANTOPRAZOLE SODIUM 40 MG/1
40 TABLET, DELAYED RELEASE ORAL DAILY
Qty: 30 TABLET | Refills: 2 | Status: SHIPPED | OUTPATIENT
Start: 2024-02-16 | End: 2024-05-29

## 2024-02-20 ENCOUNTER — APPOINTMENT (OUTPATIENT)
Dept: OBSTETRICS AND GYNECOLOGY | Facility: CLINIC | Age: 44
End: 2024-02-20
Payer: COMMERCIAL

## 2024-02-21 ENCOUNTER — APPOINTMENT (OUTPATIENT)
Dept: SURGERY | Facility: CLINIC | Age: 44
End: 2024-02-21
Payer: COMMERCIAL

## 2024-03-06 ENCOUNTER — APPOINTMENT (OUTPATIENT)
Dept: PRIMARY CARE | Facility: CLINIC | Age: 44
End: 2024-03-06
Payer: COMMERCIAL

## 2024-03-08 ENCOUNTER — APPOINTMENT (OUTPATIENT)
Dept: GASTROENTEROLOGY | Facility: CLINIC | Age: 44
End: 2024-03-08
Payer: COMMERCIAL

## 2024-03-08 DIAGNOSIS — G47.33 OBSTRUCTIVE SLEEP APNEA: Primary | ICD-10-CM

## 2024-03-11 ENCOUNTER — OFFICE VISIT (OUTPATIENT)
Dept: PRIMARY CARE | Facility: CLINIC | Age: 44
End: 2024-03-11
Payer: COMMERCIAL

## 2024-03-11 VITALS
SYSTOLIC BLOOD PRESSURE: 122 MMHG | HEART RATE: 68 BPM | OXYGEN SATURATION: 98 % | WEIGHT: 169 LBS | DIASTOLIC BLOOD PRESSURE: 70 MMHG | BODY MASS INDEX: 27.28 KG/M2 | TEMPERATURE: 97.2 F

## 2024-03-11 DIAGNOSIS — R20.2 PARESTHESIA OF BILATERAL LEGS: Primary | ICD-10-CM

## 2024-03-11 DIAGNOSIS — R20.2 PARESTHESIA OF BOTH HANDS: ICD-10-CM

## 2024-03-11 DIAGNOSIS — E03.9 HYPOTHYROIDISM, UNSPECIFIED TYPE: ICD-10-CM

## 2024-03-11 PROCEDURE — 3008F BODY MASS INDEX DOCD: CPT | Performed by: PHYSICIAN ASSISTANT

## 2024-03-11 PROCEDURE — 99214 OFFICE O/P EST MOD 30 MIN: CPT | Performed by: PHYSICIAN ASSISTANT

## 2024-03-11 PROCEDURE — 1036F TOBACCO NON-USER: CPT | Performed by: PHYSICIAN ASSISTANT

## 2024-03-11 RX ORDER — BALSALAZIDE DISODIUM 750 MG/1
2250 CAPSULE ORAL 2 TIMES DAILY
COMMUNITY
Start: 2024-02-29 | End: 2024-03-11 | Stop reason: SINTOL

## 2024-03-11 ASSESSMENT — ENCOUNTER SYMPTOMS
SHORTNESS OF BREATH: 0
HEADACHES: 0
SPEECH DIFFICULTY: 0

## 2024-03-11 NOTE — PROGRESS NOTES
Subjective   Patient ID: Ally Carpio is a 43 y.o. female who presents for numbness in hands and feet (Radiating to top of head x 2 wks).    HPI   Patient complains of getting paresthesias in lower legs bilaterally the past month.  Was on prednisone for her ulcerative colitis and tapered off it about a month ago and that is when she started noticing the paresthesias. This started intermittently but becoming more constant lately.   Gets intermittent tingling in hands, that seems worse at night and worse after gripping objects.  States it slightly extends up and forearms when it happens.  Rarely feels like top of her head is tingling a little.     Rarely gets slight lower back ache but nothing worse past month.  No neck pain.  No headaches.     Diagnosed with ulcerative colitis in recent months.  Was placed on a few different ulcerative colitis medications but did not feel like she tolerated them well.  Initially thought that the paresthesias were related to that but she has been off of them the past week and symptoms have not improved.    Has follow up with MARCIE Hanley in about 2 weeks.     No significant added stress recently.  Feels like she is coping with day-to-day stresses.  No panicky symptoms.    Does have hypothyroidism and taking her levothyroxine 25 mcg daily.       reports that she quit smoking about 7 years ago. Her smoking use included cigarettes. She has a 12.50 pack-year smoking history. She has never used smokeless tobacco.     reports no history of drug use.    Review of Systems   Eyes:  Negative for visual disturbance.   Respiratory:  Negative for shortness of breath.    Cardiovascular:  Negative for chest pain.   Skin:  Negative for rash.   Neurological:  Negative for speech difficulty and headaches.       Objective   /70   Pulse 68   Temp 36.2 °C (97.2 °F)   Wt 76.7 kg (169 lb)   SpO2 98%   BMI 27.28 kg/m²     Physical Exam  Vitals and nursing note reviewed.   Constitutional:        General: She is not in acute distress.     Appearance: Normal appearance. She is well-developed. She is not toxic-appearing.   Eyes:      General: No scleral icterus.  Cardiovascular:      Rate and Rhythm: Normal rate and regular rhythm.      Heart sounds: No murmur heard.  Pulmonary:      Effort: Pulmonary effort is normal.      Breath sounds: Normal breath sounds.   Musculoskeletal:      Cervical back: Neck supple.      Right lower leg: No edema.      Left lower leg: No edema.      Comments:  strength intact in hands.  Negative Tinel's and Phalen sign over wrists.  Hands and wrist without tenderness.  Bilateral ankles without swelling.  Distal pulses intact.  No calf tenderness or swelling.  Sensation grossly intact in legs but feels slightly dulled to patient.  No cervical tenderness or tension.  No lumbar tenderness.   Skin:     General: Skin is warm and dry.   Neurological:      Mental Status: She is alert.         Assessment/Plan   Diagnoses and all orders for this visit:  Paresthesia of bilateral legs  -     TSH with reflex to Free T4 if abnormal; Future  -     Basic Metabolic Panel; Future  -     CBC and Auto Differential; Future  -     Magnesium; Future  -     EMG & nerve conduction; Future  -     Vitamin B12; Future  Paresthesia of both hands  -     TSH with reflex to Free T4 if abnormal; Future  -     Basic Metabolic Panel; Future  -     CBC and Auto Differential; Future  -     Magnesium; Future  -     EMG & nerve conduction; Future  -     Vitamin B12; Future  Hypothyroidism, unspecified type  -     TSH with reflex to Free T4 if abnormal; Future       Get labs to evaluate further.  Get EMG and NCV of upper and lower extremities.  Explained that possibly has carpal tunnel syndrome in her hands (especially since she does a lot of work with her hands) and different entity in her legs.  Follow-up with her gastroenterologist later this month as scheduled and discuss whether paresthesias could be  related to her ulcerative colitis since that is reported as a possibility.  Follow-up with PCP Dr. Cooney as scheduled later this week.

## 2024-03-12 ENCOUNTER — LAB (OUTPATIENT)
Dept: LAB | Facility: LAB | Age: 44
End: 2024-03-12
Payer: COMMERCIAL

## 2024-03-12 ENCOUNTER — APPOINTMENT (OUTPATIENT)
Dept: PRIMARY CARE | Facility: CLINIC | Age: 44
End: 2024-03-12
Payer: COMMERCIAL

## 2024-03-12 DIAGNOSIS — R20.2 PARESTHESIA OF BOTH HANDS: ICD-10-CM

## 2024-03-12 DIAGNOSIS — R20.2 PARESTHESIA OF BILATERAL LEGS: ICD-10-CM

## 2024-03-12 DIAGNOSIS — E61.1 IRON DEFICIENCY: ICD-10-CM

## 2024-03-12 DIAGNOSIS — E03.9 HYPOTHYROIDISM, UNSPECIFIED TYPE: ICD-10-CM

## 2024-03-12 LAB
ANION GAP SERPL CALC-SCNC: 11 MMOL/L (ref 10–20)
BASOPHILS # BLD AUTO: 0.05 X10*3/UL (ref 0–0.1)
BASOPHILS NFR BLD AUTO: 1 %
BUN SERPL-MCNC: 13 MG/DL (ref 6–23)
CALCIUM SERPL-MCNC: 8.9 MG/DL (ref 8.6–10.3)
CHLORIDE SERPL-SCNC: 106 MMOL/L (ref 98–107)
CO2 SERPL-SCNC: 25 MMOL/L (ref 21–32)
CREAT SERPL-MCNC: 0.71 MG/DL (ref 0.5–1.05)
EGFRCR SERPLBLD CKD-EPI 2021: >90 ML/MIN/1.73M*2
EOSINOPHIL # BLD AUTO: 0.18 X10*3/UL (ref 0–0.7)
EOSINOPHIL NFR BLD AUTO: 3.7 %
ERYTHROCYTE [DISTWIDTH] IN BLOOD BY AUTOMATED COUNT: 14.6 % (ref 11.5–14.5)
GLUCOSE SERPL-MCNC: 99 MG/DL (ref 74–99)
HCT VFR BLD AUTO: 40.7 % (ref 36–46)
HGB BLD-MCNC: 13.3 G/DL (ref 12–16)
IMM GRANULOCYTES # BLD AUTO: 0.01 X10*3/UL (ref 0–0.7)
IMM GRANULOCYTES NFR BLD AUTO: 0.2 % (ref 0–0.9)
LYMPHOCYTES # BLD AUTO: 0.99 X10*3/UL (ref 1.2–4.8)
LYMPHOCYTES NFR BLD AUTO: 20.3 %
MAGNESIUM SERPL-MCNC: 2.16 MG/DL (ref 1.6–2.4)
MCH RBC QN AUTO: 30.2 PG (ref 26–34)
MCHC RBC AUTO-ENTMCNC: 32.7 G/DL (ref 32–36)
MCV RBC AUTO: 93 FL (ref 80–100)
MONOCYTES # BLD AUTO: 0.4 X10*3/UL (ref 0.1–1)
MONOCYTES NFR BLD AUTO: 8.2 %
NEUTROPHILS # BLD AUTO: 3.24 X10*3/UL (ref 1.2–7.7)
NEUTROPHILS NFR BLD AUTO: 66.6 %
NRBC BLD-RTO: 0 /100 WBCS (ref 0–0)
PLATELET # BLD AUTO: 107 X10*3/UL (ref 150–450)
POTASSIUM SERPL-SCNC: 3.8 MMOL/L (ref 3.5–5.3)
RBC # BLD AUTO: 4.4 X10*6/UL (ref 4–5.2)
SODIUM SERPL-SCNC: 138 MMOL/L (ref 136–145)
TSH SERPL-ACNC: 2.78 MIU/L (ref 0.44–3.98)
WBC # BLD AUTO: 4.9 X10*3/UL (ref 4.4–11.3)

## 2024-03-12 PROCEDURE — 83540 ASSAY OF IRON: CPT

## 2024-03-12 PROCEDURE — 36415 COLL VENOUS BLD VENIPUNCTURE: CPT

## 2024-03-12 PROCEDURE — 84443 ASSAY THYROID STIM HORMONE: CPT

## 2024-03-12 PROCEDURE — 80048 BASIC METABOLIC PNL TOTAL CA: CPT

## 2024-03-12 PROCEDURE — 82607 VITAMIN B-12: CPT

## 2024-03-12 PROCEDURE — 83735 ASSAY OF MAGNESIUM: CPT

## 2024-03-12 PROCEDURE — 85025 COMPLETE CBC W/AUTO DIFF WBC: CPT

## 2024-03-12 PROCEDURE — 83550 IRON BINDING TEST: CPT

## 2024-03-13 DIAGNOSIS — E03.9 HYPOTHYROIDISM, UNSPECIFIED TYPE: ICD-10-CM

## 2024-03-14 RX ORDER — LEVOTHYROXINE SODIUM 25 UG/1
12.5 TABLET ORAL DAILY
Qty: 45 TABLET | Refills: 1 | OUTPATIENT
Start: 2024-03-14 | End: 2024-09-10

## 2024-03-15 ENCOUNTER — PATIENT MESSAGE (OUTPATIENT)
Dept: PRIMARY CARE | Facility: CLINIC | Age: 44
End: 2024-03-15

## 2024-03-15 ENCOUNTER — TELEPHONE (OUTPATIENT)
Dept: PRIMARY CARE | Facility: CLINIC | Age: 44
End: 2024-03-15

## 2024-03-15 ENCOUNTER — OFFICE VISIT (OUTPATIENT)
Dept: PRIMARY CARE | Facility: CLINIC | Age: 44
End: 2024-03-15
Payer: COMMERCIAL

## 2024-03-15 VITALS
OXYGEN SATURATION: 96 % | HEART RATE: 72 BPM | SYSTOLIC BLOOD PRESSURE: 126 MMHG | WEIGHT: 167 LBS | BODY MASS INDEX: 26.95 KG/M2 | TEMPERATURE: 97.2 F | DIASTOLIC BLOOD PRESSURE: 84 MMHG

## 2024-03-15 DIAGNOSIS — R20.2 PARESTHESIA OF BILATERAL LEGS: ICD-10-CM

## 2024-03-15 DIAGNOSIS — R20.2 PARESTHESIA OF BOTH HANDS: ICD-10-CM

## 2024-03-15 DIAGNOSIS — F41.9 ANXIETY: Primary | ICD-10-CM

## 2024-03-15 DIAGNOSIS — R53.1 LEFT-SIDED WEAKNESS: ICD-10-CM

## 2024-03-15 DIAGNOSIS — K51.318: Primary | ICD-10-CM

## 2024-03-15 DIAGNOSIS — E03.9 HYPOTHYROIDISM, UNSPECIFIED TYPE: ICD-10-CM

## 2024-03-15 DIAGNOSIS — R53.83 OTHER FATIGUE: Primary | ICD-10-CM

## 2024-03-15 DIAGNOSIS — E61.1 IRON DEFICIENCY: ICD-10-CM

## 2024-03-15 PROBLEM — D69.3 CHRONIC IDIOPATHIC THROMBOCYTOPENIC PURPURA (MULTI): Status: ACTIVE | Noted: 2024-03-15

## 2024-03-15 PROBLEM — K51.90 ULCERATIVE COLITIS (MULTI): Status: ACTIVE | Noted: 2024-03-15

## 2024-03-15 LAB
IRON SATN MFR SERPL: 19 % (ref 25–45)
IRON SERPL-MCNC: 47 UG/DL (ref 35–150)
TIBC SERPL-MCNC: 245 UG/DL (ref 240–445)
UIBC SERPL-MCNC: 198 UG/DL (ref 110–370)
VIT B12 SERPL-MCNC: 217 PG/ML (ref 211–911)

## 2024-03-15 PROCEDURE — 1036F TOBACCO NON-USER: CPT | Performed by: FAMILY MEDICINE

## 2024-03-15 PROCEDURE — 99214 OFFICE O/P EST MOD 30 MIN: CPT | Performed by: FAMILY MEDICINE

## 2024-03-15 PROCEDURE — 3008F BODY MASS INDEX DOCD: CPT | Performed by: FAMILY MEDICINE

## 2024-03-15 RX ORDER — UBIDECARENONE 75 MG
1000 CAPSULE ORAL DAILY
Qty: 180 TABLET | Refills: 0 | Status: SHIPPED | OUTPATIENT
Start: 2024-03-15 | End: 2024-06-13

## 2024-03-15 RX ORDER — LEVOTHYROXINE SODIUM 25 UG/1
12.5 TABLET ORAL DAILY
Qty: 45 TABLET | Refills: 1 | Status: SHIPPED | OUTPATIENT
Start: 2024-03-15 | End: 2024-09-11

## 2024-03-15 RX ORDER — TACROLIMUS 1 MG/G
OINTMENT TOPICAL
COMMUNITY
Start: 2024-03-04

## 2024-03-15 NOTE — TELEPHONE ENCOUNTER
----- Message from Chelsie Cooney DO sent at 3/15/2024  1:41 PM EDT -----  Please call patient and let then know the following:  Vit b12 is  low nml, will order supplement. continue with EMG and MRI

## 2024-03-15 NOTE — PATIENT INSTRUCTIONS
1. Ulcerative proctosigmoiditis, other complication (CMS/HCC)  Patient will continue follow-up with GI to find the right medication    2. Paresthesia of both hands  Will check vitamin B12 levels, attempted to added to recent lab test  - Vitamin B12; Future    3. Paresthesia of bilateral legs    - Vitamin B12; Future    4. Left-sided weakness  Mild weakness on exam today, unsure if this is strictly from neuropathy  - MR brain wo IV contrast; Future

## 2024-03-15 NOTE — PROGRESS NOTES
Assessment     ASSESSMENT/PLAN:      Patient Instructions   1. Ulcerative proctosigmoiditis, other complication (CMS/HCC)  Patient will continue follow-up with GI to find the right medication    2. Paresthesia of both hands  Will check vitamin B12 levels, attempted to added to recent lab test  - Vitamin B12; Future    3. Paresthesia of bilateral legs    - Vitamin B12; Future    4. Left-sided weakness  Mild weakness on exam today, unsure if this is strictly from neuropathy  - MR brain wo IV contrast; Future           Signed by: Chelsie Cooney DO       FUTURE DIRECTION:   []    Subjective   SUBJECTIVE:     HPI : Patient is a 43 y.o. female who presents today for the following:     Paresthesia  Patient states that he has been experiencing numbness and tingling in upper and lower extremities for the past month.  Recently noticing burning sensation at the top of her head and burning sensation on bilateral cheeks and ears  Symptoms occur intermittently and is associated with increased fatigue  She has an EMG scheduled for 3/22/2024 and had labs done that did not point to the cause  Patient states that she is concerned because symptoms seem to be progressively worsening      PHAN  Tried using cpap  Was experiencing nose sores, waiting for new mask     Hypothyroidism  Has been compliant with levothyroxine 12.5 mcg daily, recent thyroid levels were within the normal range    Ulcerative colitis  Following GI [facility]  Had tried mesalamine but experienced adverse reaction was then switched to balsalazide but noticed another adverse reaction.  Currently waiting to start a new medication    Psoriasis  Following dermatology  Has started tacrolimus ointment    CAMDEN/chronic idiopathic thrombocytopenia  Has been following hematology, she is status post IV Venofer  Anemia and platelets has improved        Review of Systems    Past Medical History:   Diagnosis Date    Anemia     Anxiety     GERD (gastroesophageal reflux  disease)     Hx of idiopathic thrombocytopenic purpura     on steroids    Hypothyroidism     Sleep apnea         Past Surgical History:   Procedure Laterality Date     SECTION, LOW TRANSVERSE  ,    ESOPHAGOGASTRODUODENOSCOPY          Current Outpatient Medications   Medication Instructions    chlorhexidine (Peridex) 0.12 % solution 15 mL, Mouth/Throat, As needed    levothyroxine (SYNTHROID, LEVOXYL) 12.5 mcg, oral, Daily    pantoprazole (PROTONIX) 40 mg, oral, Daily, Do not crush, chew, or split.    sucralfate (CARAFATE) 1 g, oral, 4 times daily, Take 1 hour before meals and at bedtime    tacrolimus (Protopic) 0.1 % ointment         No Known Allergies     Social History     Socioeconomic History    Marital status:      Spouse name: Not on file    Number of children: Not on file    Years of education: Not on file    Highest education level: Not on file   Occupational History    Not on file   Tobacco Use    Smoking status: Former     Packs/day: 1.00     Years: 12.50     Additional pack years: 0.00     Total pack years: 12.50     Types: Cigarettes     Quit date:      Years since quittin.2    Smokeless tobacco: Never   Vaping Use    Vaping Use: Never used   Substance and Sexual Activity    Alcohol use: Not Currently     Comment: socially    Drug use: Never    Sexual activity: Yes     Partners: Male     Birth control/protection: Male Sterilization     Comment: Pt stopped her Norethindrone about one week ago- does not desire to be on birth control pills any longer   Other Topics Concern    Not on file   Social History Narrative    Not on file     Social Determinants of Health     Financial Resource Strain: Not on file   Food Insecurity: Not on file   Transportation Needs: Not on file   Physical Activity: Not on file   Stress: Not on file   Social Connections: Not on file   Intimate Partner Violence: Not on file   Housing Stability: Not on file        Family History   Problem Relation Name  Age of Onset    Colon cancer Mother Shari     COPD Mother Shari     Hyperlipidemia Father Archuleta     Glaucoma Brother      Colon cancer Mother's Brother      Breast cancer Father's Sister Na         Objective     OBJECTIVE:     Vitals:    03/15/24 1147   BP: 126/84   Pulse: 72   Temp: 36.2 °C (97.2 °F)   SpO2: 96%   Weight: 75.8 kg (167 lb)        Physical Exam  Constitutional:       Appearance: She is ill-appearing.   HENT:      Head: Normocephalic and atraumatic.      Nose: Nose normal.      Mouth/Throat:      Mouth: Mucous membranes are moist.   Eyes:      General: No visual field deficit.     Pupils: Pupils are equal, round, and reactive to light.   Cardiovascular:      Rate and Rhythm: Normal rate and regular rhythm.      Pulses: Normal pulses.      Heart sounds: No murmur heard.  Pulmonary:      Effort: Pulmonary effort is normal.      Breath sounds: Normal breath sounds.   Abdominal:      Tenderness: There is no abdominal tenderness.   Musculoskeletal:         General: Normal range of motion.      Cervical back: Normal range of motion.   Skin:     General: Skin is warm and dry.   Neurological:      Mental Status: She is alert and oriented to person, place, and time.      Cranial Nerves: Cranial nerves 2-12 are intact.      Sensory: Sensation is intact.      Motor: Weakness (left lower extremity) present.      Coordination: Romberg sign negative. Coordination normal. Finger-Nose-Finger Test normal.   Psychiatric:         Mood and Affect: Mood normal.

## 2024-03-19 ENCOUNTER — APPOINTMENT (OUTPATIENT)
Dept: OBSTETRICS AND GYNECOLOGY | Facility: CLINIC | Age: 44
End: 2024-03-19
Payer: COMMERCIAL

## 2024-03-19 RX ORDER — ALPRAZOLAM 0.25 MG/1
0.25 TABLET ORAL ONCE
Qty: 1 TABLET | Refills: 0 | Status: SHIPPED | OUTPATIENT
Start: 2024-03-19 | End: 2024-05-07 | Stop reason: ALTCHOICE

## 2024-03-22 ENCOUNTER — HOSPITAL ENCOUNTER (OUTPATIENT)
Dept: NEUROLOGY | Facility: CLINIC | Age: 44
Discharge: HOME | End: 2024-03-22
Payer: COMMERCIAL

## 2024-03-22 DIAGNOSIS — R20.2 PARESTHESIA OF BOTH HANDS: ICD-10-CM

## 2024-03-22 DIAGNOSIS — R20.2 PARESTHESIA OF BILATERAL LEGS: ICD-10-CM

## 2024-03-22 PROCEDURE — 95886 MUSC TEST DONE W/N TEST COMP: CPT | Mod: GC | Performed by: PSYCHIATRY & NEUROLOGY

## 2024-03-22 PROCEDURE — 95886 MUSC TEST DONE W/N TEST COMP: CPT | Performed by: PSYCHIATRY & NEUROLOGY

## 2024-03-22 PROCEDURE — 95913 NRV CNDJ TEST 13/> STUDIES: CPT | Performed by: PSYCHIATRY & NEUROLOGY

## 2024-03-29 ENCOUNTER — TELEPHONE (OUTPATIENT)
Dept: PRIMARY CARE | Facility: CLINIC | Age: 44
End: 2024-03-29
Payer: COMMERCIAL

## 2024-03-29 ENCOUNTER — HOSPITAL ENCOUNTER (EMERGENCY)
Facility: HOSPITAL | Age: 44
Discharge: HOME | End: 2024-03-29
Attending: EMERGENCY MEDICINE
Payer: COMMERCIAL

## 2024-03-29 ENCOUNTER — HOSPITAL ENCOUNTER (OUTPATIENT)
Dept: RADIOLOGY | Facility: HOSPITAL | Age: 44
Discharge: HOME | End: 2024-03-29
Payer: COMMERCIAL

## 2024-03-29 VITALS
WEIGHT: 169 LBS | HEART RATE: 56 BPM | DIASTOLIC BLOOD PRESSURE: 84 MMHG | RESPIRATION RATE: 18 BRPM | TEMPERATURE: 98.4 F | OXYGEN SATURATION: 97 % | BODY MASS INDEX: 27.16 KG/M2 | HEIGHT: 66 IN | SYSTOLIC BLOOD PRESSURE: 124 MMHG

## 2024-03-29 DIAGNOSIS — R20.0 NUMBNESS IN FEET: Primary | ICD-10-CM

## 2024-03-29 DIAGNOSIS — R53.1 LEFT-SIDED WEAKNESS: ICD-10-CM

## 2024-03-29 PROCEDURE — 70551 MRI BRAIN STEM W/O DYE: CPT

## 2024-03-29 PROCEDURE — 70551 MRI BRAIN STEM W/O DYE: CPT | Performed by: RADIOLOGY

## 2024-03-29 PROCEDURE — 99281 EMR DPT VST MAYX REQ PHY/QHP: CPT

## 2024-03-29 ASSESSMENT — LIFESTYLE VARIABLES
HAVE PEOPLE ANNOYED YOU BY CRITICIZING YOUR DRINKING: NO
EVER FELT BAD OR GUILTY ABOUT YOUR DRINKING: NO
HAVE YOU EVER FELT YOU SHOULD CUT DOWN ON YOUR DRINKING: NO
TOTAL SCORE: 0
EVER HAD A DRINK FIRST THING IN THE MORNING TO STEADY YOUR NERVES TO GET RID OF A HANGOVER: NO

## 2024-03-29 ASSESSMENT — COLUMBIA-SUICIDE SEVERITY RATING SCALE - C-SSRS
2. HAVE YOU ACTUALLY HAD ANY THOUGHTS OF KILLING YOURSELF?: NO
1. IN THE PAST MONTH, HAVE YOU WISHED YOU WERE DEAD OR WISHED YOU COULD GO TO SLEEP AND NOT WAKE UP?: NO
6. HAVE YOU EVER DONE ANYTHING, STARTED TO DO ANYTHING, OR PREPARED TO DO ANYTHING TO END YOUR LIFE?: NO

## 2024-03-29 ASSESSMENT — PAIN SCALES - GENERAL
PAINLEVEL_OUTOF10: 9
PAINLEVEL_OUTOF10: 7

## 2024-03-29 ASSESSMENT — PAIN - FUNCTIONAL ASSESSMENT: PAIN_FUNCTIONAL_ASSESSMENT: 0-10

## 2024-03-29 ASSESSMENT — PAIN DESCRIPTION - LOCATION: LOCATION: LEG

## 2024-03-29 NOTE — TELEPHONE ENCOUNTER
Patient states her MRI results are in her chart and is asking for EOI to read them today. She is having trouble walking not as easy as it was 2 weeks ago. Still having numbness and weakness. Can she please let her know the results today.

## 2024-03-29 NOTE — TELEPHONE ENCOUNTER
There is no evidence of stroke in the MRI however there were some areas that they were concerned of demyelination or loss of protective sheath. I think with her worsening symptoms and these results she will need to be evaluated urgently so I recommend going to the emergency room.

## 2024-03-30 NOTE — ED PROVIDER NOTES
HPI   Chief Complaint   Patient presents with   • abnormal MRI today     abnormal MRI today. Had MRI on brain/spine due to numbness in legs for last 2 weeks and was told to come here for abnormal results.        Patient please of bilateral leg pain and numbness.  She is been dealing with this for a couple of weeks.  She has bilateral numbness of the feet.  She states that his travel to multiple sites of her legs visuals complains of pain in her legs.  To the point where she is having issues with walking.  She saw her PCP who had laboratory studies done a couple days ago which were unremarkable.  Show nerve conduction studies of the legs which were negative.  She had an MRI today which showed nonspecific volume loss.  The PCP sent her in for further evaluation.  She denies any other symptoms.  No slurred speech or facial droop.  No falls recently.                          [place] Coma Scale Score: 15                  Patient History   Past Medical History:   Diagnosis Date   • Anemia    • Anxiety    • GERD (gastroesophageal reflux disease)    • Hx of idiopathic thrombocytopenic purpura     on steroids   • Hypothyroidism    • Sleep apnea      Past Surgical History:   Procedure Laterality Date   •  SECTION, LOW TRANSVERSE  ,   • ESOPHAGOGASTRODUODENOSCOPY       Family History   Problem Relation Name Age of Onset   • Colon cancer Mother Shari    • COPD Mother Shari    • Hyperlipidemia Father Archuleta    • Glaucoma Brother     • Colon cancer Mother's Brother     • Breast cancer Father's Sister Na      Social History     Tobacco Use   • Smoking status: Former     Packs/day: 1.00     Years: 12.50     Additional pack years: 0.00     Total pack years: 12.50     Types: Cigarettes     Quit date:      Years since quittin.2   • Smokeless tobacco: Never   Vaping Use   • Vaping Use: Never used   Substance Use Topics   • Alcohol use: Not Currently     Comment: socially   • Drug use: Never       Physical Exam   ED  Triage Vitals [03/29/24 1819]   Temperature Heart Rate Respirations BP   36.9 °C (98.4 °F) 79 18 (!) 150/92      Pulse Ox Temp Source Heart Rate Source Patient Position   100 % Temporal Monitor Sitting      BP Location FiO2 (%)     Left arm --       Physical Exam  Vitals and nursing note reviewed.   Constitutional:       Appearance: Normal appearance.   HENT:      Head: Normocephalic and atraumatic.      Mouth/Throat:      Mouth: Mucous membranes are moist.   Eyes:      Extraocular Movements: Extraocular movements intact.      Pupils: Pupils are equal, round, and reactive to light.   Cardiovascular:      Rate and Rhythm: Normal rate and regular rhythm.      Heart sounds: No murmur heard.  Pulmonary:      Effort: Pulmonary effort is normal. No respiratory distress.      Breath sounds: Normal breath sounds.   Abdominal:      General: There is no distension.      Palpations: Abdomen is soft.      Tenderness: There is no abdominal tenderness.   Musculoskeletal:         General: No tenderness or deformity. Normal range of motion.      Right lower leg: No edema.      Left lower leg: No edema.   Skin:     General: Skin is warm and dry.      Findings: No lesion or rash.   Neurological:      General: No focal deficit present.      Mental Status: She is alert and oriented to person, place, and time.      Sensory: Sensory deficit (Bilateral feet) present.      Motor: No weakness.   Psychiatric:         Behavior: Behavior normal.       Labs Reviewed - No data to display  No orders to display     ED Course & MDM   Diagnoses as of 03/29/24 2127   Numbness in feet       Medical Decision Making  Differentials include neuropathy, demyelinating process.  Imaging studies, if performed, were independently reviewed and interpreted by myself and confirmed by radiologist. EKG(s), if performed, were interpreted by myself.I discussed the patient with Dr. Esposito with neurology.  I reviewed the MRI results with him.  He does not feel that  this represents multiple sclerosis.  Patient has equal strength so I do not feel this represents Guillain-Barré syndrome.  She is able to ambulate.  I feel that she can follow-up as an outpatient.  He agrees.  I will give her his phone number and she will call him on Monday.        Procedure  Procedures     Brendan Subramanian MD  03/29/24 2121

## 2024-04-01 ENCOUNTER — TELEPHONE (OUTPATIENT)
Dept: PRIMARY CARE | Facility: CLINIC | Age: 44
End: 2024-04-01
Payer: COMMERCIAL

## 2024-04-01 DIAGNOSIS — R93.89 ABNORMAL MRI: Primary | ICD-10-CM

## 2024-04-01 NOTE — TELEPHONE ENCOUNTER
Pt called, she was sent to ER Friday per EOI. Pt was referred to Neuro Dr. Gary Esposito. She cannot get an OV with him until August. He will be out for 1 month. Is there anything we can do to get her in sooner? Should she try to schedule with someone else? Who would you recommend? Please advise. Thanks. BELLA   SW note: Per psych MD (Tracy) pt T&R, to resume w/ community f/u orchestrated through [facility], as no psych concerns identified. Psych MD spoke w/ [facility] RN (Mami) who cleared pt for return. Worker to send over BH clearance to [facility] via [facility] for record keeping. Burt arranged via [facility] (alycia) for next available. NEAF created and uploaded to [facility]. Transport letter left w/ transfer packet due to dementia dx. No other SW needs at this time.

## 2024-04-16 ENCOUNTER — APPOINTMENT (OUTPATIENT)
Dept: OBSTETRICS AND GYNECOLOGY | Facility: CLINIC | Age: 44
End: 2024-04-16
Payer: COMMERCIAL

## 2024-04-17 ENCOUNTER — APPOINTMENT (OUTPATIENT)
Dept: SURGERY | Facility: CLINIC | Age: 44
End: 2024-04-17
Payer: COMMERCIAL

## 2024-04-24 ENCOUNTER — APPOINTMENT (OUTPATIENT)
Dept: SLEEP MEDICINE | Facility: CLINIC | Age: 44
End: 2024-04-24
Payer: COMMERCIAL

## 2024-04-26 ENCOUNTER — OFFICE VISIT (OUTPATIENT)
Dept: ORTHOPEDIC SURGERY | Facility: CLINIC | Age: 44
End: 2024-04-26
Payer: COMMERCIAL

## 2024-04-26 VITALS — WEIGHT: 169 LBS | BODY MASS INDEX: 27.16 KG/M2 | HEIGHT: 66 IN

## 2024-04-26 DIAGNOSIS — M54.12 CERVICAL RADICULOPATHY: ICD-10-CM

## 2024-04-26 DIAGNOSIS — G62.9 NEUROPATHY: ICD-10-CM

## 2024-04-26 DIAGNOSIS — M54.16 LUMBAR RADICULOPATHY: Primary | ICD-10-CM

## 2024-04-26 PROCEDURE — 99203 OFFICE O/P NEW LOW 30 MIN: CPT | Performed by: ORTHOPAEDIC SURGERY

## 2024-04-26 PROCEDURE — 3008F BODY MASS INDEX DOCD: CPT | Performed by: ORTHOPAEDIC SURGERY

## 2024-04-26 ASSESSMENT — PAIN - FUNCTIONAL ASSESSMENT: PAIN_FUNCTIONAL_ASSESSMENT: 0-10

## 2024-04-30 NOTE — PROGRESS NOTES
HPI:Ally Carpio is a 43-year-old woman who comes today with complaints of weakness in her arms and numbness and tingling.  She has seen a neurologist at the [facility], Dr. Roberto who suggest that her symptoms including her left leg weakness is related to her neck.  She has had multifocal numbness and tingling and even a bandlike sensation across her chest for months.  She gets some electric sensations into the extremities left greater than right.  She has seen a chiropractor in the community.  She has not had an MRI of her lumbar spine.  Her symptoms began with left leg pain and weakness.      ROS:  Reviewed on EMR and patient intake sheet.    PMH/SH:  Reviewed on EMR and patient intake sheet.    Exam:  Physical Exam    Constitutional: Well appearing; no acute distress  Eyes: pupils are equal and round  Psych: normal affect  Respiratory: non-labored breathing  Cardiovascular: regular rate and rhythm  GI: non-distended abdomen  Musculoskeletal: no pain with range of motion of the hips bilaterally  Neurologic: [4+]/5 strength in the lower extremities bilaterally]; [negative] straight leg raise    Radiology:     MRI cervical spine does demonstrate a left C6-C7 disc herniation and disc osteophyte complex producing some moderate canal narrowing at this level as well as at C5-C6.  No cord signal change.    Diagnosis:    Cervical radiculopathy; lumbar radiculopathy    Assessment and Plan:   43-year-old woman, with radicular-like symptoms in the upper and lower extremities.  At this time I recommended getting an MRI of the lumbar spine to rule out any focal root compression producing her persistent radicular symptoms.  She has failed nonoperative management over the course of several months with persistent chiropractic treatment with Dr. Torres.  We also discussed getting a second opinion from neurology.  I will see her back to go over the MRI of the lumbar spine discuss any further potential surgical  intervention.    The patient was in agreement with the plan. At the end of the visit today, the patient felt that all questions had been answered satisfactorily.  The patient was pleased with the visit and very appreciative for the care rendered.     Thank you very much for the kind referral.  It is a privilege, and a pleasure, to partner with you in the care of your patients.  I would be delighted to assist you with any further consultations as needed.          Danielito Cardona MD    Chief of Spine Surgery, [facility]  Director of Spine Service, [facility]  , Department of Orthopaedics  [employer]  [id number] [id number]  P: [phone]  [facility]    This note was dictated with voice recognition software.  It has not been proofread for grammatical errors, typographical mistakes or other semantic inconsistencies.

## 2024-05-01 ENCOUNTER — APPOINTMENT (OUTPATIENT)
Dept: SLEEP MEDICINE | Facility: CLINIC | Age: 44
End: 2024-05-01
Payer: COMMERCIAL

## 2024-05-02 ENCOUNTER — OFFICE VISIT (OUTPATIENT)
Dept: NEUROLOGY | Facility: CLINIC | Age: 44
End: 2024-05-02
Payer: COMMERCIAL

## 2024-05-02 VITALS
BODY MASS INDEX: 26.52 KG/M2 | WEIGHT: 165 LBS | SYSTOLIC BLOOD PRESSURE: 112 MMHG | HEIGHT: 66 IN | DIASTOLIC BLOOD PRESSURE: 64 MMHG | HEART RATE: 90 BPM

## 2024-05-02 DIAGNOSIS — G62.9 NEUROPATHY: ICD-10-CM

## 2024-05-02 PROCEDURE — 99203 OFFICE O/P NEW LOW 30 MIN: CPT | Performed by: PSYCHIATRY & NEUROLOGY

## 2024-05-02 PROCEDURE — 1036F TOBACCO NON-USER: CPT | Performed by: PSYCHIATRY & NEUROLOGY

## 2024-05-02 PROCEDURE — 3008F BODY MASS INDEX DOCD: CPT | Performed by: PSYCHIATRY & NEUROLOGY

## 2024-05-02 ASSESSMENT — ENCOUNTER SYMPTOMS
LIGHT-HEADEDNESS: 0
FREQUENCY: 0
SHORTNESS OF BREATH: 0
CONFUSION: 0
SORE THROAT: 0
FATIGUE: 0
STRIDOR: 0
EYE DISCHARGE: 0
WEAKNESS: 0
SPEECH DIFFICULTY: 0
DYSURIA: 0
SLEEP DISTURBANCE: 0
PHOTOPHOBIA: 0
FACIAL SWELLING: 0
TROUBLE SWALLOWING: 0
RHINORRHEA: 0
DIAPHORESIS: 0
DIFFICULTY URINATING: 0
BACK PAIN: 0
HEADACHES: 0
PALPITATIONS: 0
FEVER: 0
DIZZINESS: 0
UNEXPECTED WEIGHT CHANGE: 0

## 2024-05-02 ASSESSMENT — VISUAL ACUITY: VA_NORMAL: 1

## 2024-05-02 NOTE — PROGRESS NOTES
Neurology Outpatient-Office Note    Facial Pain and Extremity Weakness (LLE)     History Of Present Illness  Ally Carpio is a 43 y.o. female presenting with LLE weakness and numbness, said that she got an MRI from the pcp.     She reports the weakness is getting worse. She reports back of head and hands numbness indicative of migrating symptoms. She had an emg done of the LLE that did not show any pathologies. She endorses lower back pain, but reports that it has been getting worse. She added that muscle pain has become worse with both lower leg when she walks. Of note She also has pending mri of the lumbar area.   She was just diagnosed on IBD and is waiting to get started with humira. Patient reports that's she has some increased sensation in her neck.   Patient says that she saw a neurologist in the [facility] who said that she does not have MS and told her to follow up with orthopedics.   Patient denies any trauma, falls, recent travels. Changes in urinary habit or bowel habits.       Past Medical and Surgical History  Past Medical History:   Diagnosis Date    Anemia     Anxiety     GERD (gastroesophageal reflux disease)     Hx of idiopathic thrombocytopenic purpura     on steroids    Hypothyroidism     Sleep apnea       Past Surgical History:   Procedure Laterality Date     SECTION, LOW TRANSVERSE  ,    ESOPHAGOGASTRODUODENOSCOPY        Social History  Social History     Tobacco Use    Smoking status: Former     Current packs/day: 0.00     Average packs/day: 1 pack/day for 12.5 years (12.5 ttl pk-yrs)     Types: Cigarettes     Start date: 2004     Quit date: 2017     Years since quittin.3    Smokeless tobacco: Never   Vaping Use    Vaping status: Never Used   Substance Use Topics    Alcohol use: Not Currently     Comment: socially    Drug use: Never     Allergies  Balsalazide and Mesalamine  (Not in a hospital admission)      Review of Systems   Constitutional:  Negative for diaphoresis,  "fatigue, fever and unexpected weight change.   HENT:  Negative for facial swelling, hearing loss, mouth sores, rhinorrhea, sore throat, tinnitus and trouble swallowing.    Eyes:  Negative for photophobia, discharge and visual disturbance.   Respiratory:  Negative for shortness of breath and stridor.    Cardiovascular:  Negative for chest pain, palpitations and leg swelling.   Endocrine: Negative for cold intolerance and heat intolerance.   Genitourinary:  Negative for difficulty urinating, dysuria, frequency and urgency.   Musculoskeletal:  Negative for back pain.        Generalized body pain.    Skin:  Negative for rash.   Neurological:  Negative for dizziness, speech difficulty, weakness, light-headedness and headaches.   Psychiatric/Behavioral:  Negative for confusion, sleep disturbance and suicidal ideas.        PHYSICAL EXAMINATION:   Last Recorded Vitals  Blood pressure 112/64, pulse 90, height 1.676 m (5' 6\"), weight 74.8 kg (165 lb).  Cardiovascular system: S1-S2 intact  Respiratory clear to auscultation bilateral on deep breathing, no adventitious sounds heard.   Neurological Exam  Mental Status  Awake, alert and oriented to person, place and time. Oriented to person, place and time. Recent and remote memory are intact. Speech is normal. Language is fluent with no aphasia. Attention and concentration are normal.    Cranial Nerves  CN II: Visual acuity is normal. Visual fields full to confrontation.  CN III, IV, VI: Extraocular movements intact bilaterally. Normal lids and orbits bilaterally. Pupils equal round and reactive to light bilaterally.  CN V: Facial sensation is normal.  CN VII: Full and symmetric facial movement.  CN VIII: Hearing is normal.  CN IX, X: Palate elevates symmetrically. Normal gag reflex.  CN XI: Shoulder shrug strength is normal.  CN XII: Tongue midline without atrophy or fasciculations.    Motor  Normal muscle bulk throughout. No fasciculations present. Normal muscle tone. Strength " is 5/5 in all four extremities except as noted.  Left sided weakness in the ankle. Righr achilles appears bigger than the left indicating that she is compensating with the right lower extremities. .    Sensory  Light touch is normal in upper and lower extremities. Pinprick abnormality: From t6 to t12 on the left side there is a patch of numbness on her back. Vibration is normal in upper and lower extremities. Proprioception is normal in upper and lower extremities.   Left side of the face is numb, patient says that she doesn't not feel much on her face.    Reflexes  Deep tendon reflexes are 2+ and symmetric except as noted.  Deep tendon reflexes: Alfredo reflexia on the right Lower ext.    Gait  Casual gait: Normal stance.  Walks on the heel but finds it difficult to walk on tip toes   Patient has a flat feet b/l .      Relevant Results  CBC:   WBC   Date Value Ref Range Status   03/12/2024 4.9 4.4 - 11.3 x10*3/uL Final     Hemoglobin   Date Value Ref Range Status   03/12/2024 13.3 12.0 - 16.0 g/dL Final     MCV   Date Value Ref Range Status   03/12/2024 93 80 - 100 fL Final     Platelets   Date Value Ref Range Status   03/12/2024 107 (L) 150 - 450 x10*3/uL Final      CMP:   Sodium   Date Value Ref Range Status   03/12/2024 138 136 - 145 mmol/L Final     Potassium   Date Value Ref Range Status   03/12/2024 3.8 3.5 - 5.3 mmol/L Final   02/02/2024 3.6 3.5 - 5.3 mmol/L Final   12/18/2023 3.7 3.5 - 5.3 mmol/L Final     Chloride   Date Value Ref Range Status   03/12/2024 106 98 - 107 mmol/L Final     Urea Nitrogen   Date Value Ref Range Status   03/12/2024 13 6 - 23 mg/dL Final   02/02/2024 15 6 - 23 mg/dL Final   12/18/2023 16 6 - 23 mg/dL Final     Creatinine   Date Value Ref Range Status   03/12/2024 0.71 0.50 - 1.05 mg/dL Final   02/02/2024 0.74 0.50 - 1.05 mg/dL Final   12/18/2023 0.81 0.50 - 1.05 mg/dL Final     eGFR   Date Value Ref Range Status   03/12/2024 >90 >60 mL/min/1.73m*2 Final     Comment:     Calculations  of estimated GFR are performed using the 2021 CKD-EPI Study Refit equation without the race variable for the IDMS-Traceable creatinine methods.  https://jasn.asnjournals.org/content/early/2021/09/22/ASN.[email or web address]   02/02/2024 >90 >60 mL/min/1.73m*2 Final     Comment:     Calculations of estimated GFR are performed using the 2021 CKD-EPI Study Refit equation without the race variable for the IDMS-Traceable creatinine methods.  https://jasn.asnjournals.org/content/early/2021/09/22/ASN.4694854576   12/18/2023 >90 >60 mL/min/1.73m*2 Final     Comment:     Calculations of estimated GFR are performed using the 2021 CKD-EPI Study Refit equation without the race variable for the IDMS-Traceable creatinine methods.  https://jasn.asnjournals.org/content/early/2021/09/22/ASN.8607664118      A1c:   Hemoglobin A1C   Date Value Ref Range Status   09/30/2023 5.8 (H) see below % Final      Other labs;   Vit D:   Vitamin D, 25-Hydroxy, Total   Date Value Ref Range Status   09/30/2023 26 (L) 30 - 100 ng/mL Final      TSH:  Thyroid Stimulating Hormone   Date Value Ref Range Status   03/12/2024 2.78 0.44 - 3.98 mIU/L Final          Current Outpatient Medications:     chlorhexidine (Peridex) 0.12 % solution, Use 15 mL in the mouth or throat if needed for wound care., Disp: , Rfl:     cyanocobalamin (Vitamin B-12) 500 mcg tablet, Take 2 tablets (1,000 mcg) by mouth once daily., Disp: 180 tablet, Rfl: 0    levothyroxine (Synthroid, Levoxyl) 25 mcg tablet, Take 0.5 tablets (12.5 mcg) by mouth once daily., Disp: 45 tablet, Rfl: 1    tacrolimus (Protopic) 0.1 % ointment, , Disp: , Rfl:     ALPRAZolam (Xanax) 0.25 mg tablet, Take 1 tablet (0.25 mg) by mouth 1 time for 1 dose. Take 1 hour before procedure, Disp: 1 tablet, Rfl: 0    pantoprazole (ProtoNix) 40 mg EC tablet, Take 1 tablet (40 mg) by mouth once daily. Do not crush, chew, or split., Disp: 30 tablet, Rfl: 2    sucralfate (Carafate) 1 gram tablet, Take 1 tablet (1 g) by mouth 4 times  a day. Take 1 hour before meals and at bedtime, Disp: 120 tablet, Rfl: 11   Continuous medications  PRN medications, reviewed    I have personally reviewed the following imaging for brain (CT/ MR) and results and discussed in detail with the patient/care giver/family   MR thoracic spine w and wo IV contrast    Result Date: 4/20/2024  * * *Final Report* * * DATE OF EXAM: Apr 20 2024  1:45PM   [facility]   [id number]  -  MRI THORACIC SPINE WO/W IVCON  / ACCESSION #  [id number] PROCEDURE REASON: Demyelinating disease of central nervous system (HCC)      * * * * Physician Interpretation * * * *  EXAMINATION:  MRI CERVICAL SPINE WO/W IVCON, MRI THORACIC SPINE WO/W IVCON CLINICAL HISTORY:  Demyelinating disease of central nervous system (HCC) TECHNIQUE: Routine cervical and thoracic spine MR protocol without and with intravenous gadolinium.  MQ:  MRCTWO_3 Contrast:  IV administration of 15 ml of Dotarem COMPARISON: None. RESULT: CERVICAL: Counting reference:  Craniocervical junction.   Anatomic Variants:  None. Localizer images:  No additional findings. Alignment:   There is slight reversal normal cervical lordosis with minimal retrolisthesis of C6 on C7. Craniocervical junction:    Craniocervical junction is normal. Cord:  The cervical spinal cord is within normal limits of signal intensity.  No abnormal intramedullary enhancement of the cervical cord. Bone marrow signal/fracture:    No evidence of pathologic marrow infiltration.  No evidence of prior fracture.  Mild disc space height loss at C6-C7. Cervical soft tissues:    The paraspinal soft tissues are within normal limits. C2-C3: Canal and foramina are patent. C3-C4: Canal and foramina are patent.  Minor disc bulge. C4-C5: Central disc osteophyte complex impinges on the cervical cord with mild narrowing of the spinal canal.  The neural foramina are patent. C5-C6: Disc osteophyte complex asymmetric to the right impinges on the right ventral cord with mild stenosis of the spinal  canal.  There is mild left and moderate right neural foraminal narrowing due to uncovertebral arthropathy. C6-C7: Left central subarticular disc osteophyte complex impinges on the left ventral cord and causes moderate stenosis of the spinal canal.   There is mild narrowing of the neural foramina due to uncovertebral arthropathy. C7-T1: Canal and foramina are patent. THORACIC: Counting reference:  Craniocervical and lumbosacral junctions  For the purposes of this report,  L4-5 is considered the level of the iliac crest and assume there are 5 lumbar-type vertebrae.  Anatomic variant:  None. Localizer images:  No additional findings. Alignment:   Alignment is anatomic. Cord:  The thoracic spinal cord is within normal limits of signal intensity.  No abnormal intramedullary enhancement of the thoracic cord. Bone marrow signal/fracture:    No evidence of pathologic marrow infiltration.  No evidence of prior fracture. Thoracic soft tissues:    The paraspinal soft tissues are within normal limits. Canal and foramina: At T8-T9 there is a right central disc protrusion that impresses on the thecal sac patent spinal canal and patent neural foramina. At T10-T11 there is broad-based disc bulge that effaces the ventral CSF mildly impinging on the thoracic cord and causing mild stenosis of the spinal canal.  The neural foramina are patent.    IMPRESSION: 1.  No intrinsic signal abnormality within the cervical or thoracic spinal cord. 2.  At C6-C7 disc osteophyte complex impinges on the cervical cord causing moderate stenosis of the spinal canal. 3.  At T10-T11 there is mild stenosis of the spinal canal and minor cord impingement. Cervical Anatomic Variant:  None.  Assume 7 cervical vertebrae with counting from the craniocervical junction. Anatomic Thoracic/Lumbar Variant: None.  L4-5 is considered the level of the iliac crest and assume there are 5 lumbar-type vertebrae. : JESSA   Transcribe Date/Time: Apr 20 2024   4:21P Dictated by : JOSE ANDRE MD This examination was interpreted and the report reviewed and electronically signed by: JOSE ANDRE MD on Apr 20 2024  4:36PM  EST    MR cervical spine w and wo IV contrast    Result Date: 4/20/2024  * * *Final Report* * * DATE OF EXAM: Apr 20 2024  1:45PM   [facility]   0298  -  MRI CERVICAL SPINE WO/W IVCON  / ACCESSION #  [id number] PROCEDURE REASON: Demyelinating disease of central nervous system (HCC)      * * * * Physician Interpretation * * * *  EXAMINATION:  MRI CERVICAL SPINE WO/W IVCON, MRI THORACIC SPINE WO/W IVCON CLINICAL HISTORY:  Demyelinating disease of central nervous system (HCC) TECHNIQUE: Routine cervical and thoracic spine MR protocol without and with intravenous gadolinium.  MQ:  MRCTWO_3 Contrast:  IV administration of 15 ml of Dotarem COMPARISON: None. RESULT: CERVICAL: Counting reference:  Craniocervical junction.   Anatomic Variants:  None. Localizer images:  No additional findings. Alignment:   There is slight reversal normal cervical lordosis with minimal retrolisthesis of C6 on C7. Craniocervical junction:    Craniocervical junction is normal. Cord:  The cervical spinal cord is within normal limits of signal intensity.  No abnormal intramedullary enhancement of the cervical cord. Bone marrow signal/fracture:    No evidence of pathologic marrow infiltration.  No evidence of prior fracture.  Mild disc space height loss at C6-C7. Cervical soft tissues:    The paraspinal soft tissues are within normal limits. C2-C3: Canal and foramina are patent. C3-C4: Canal and foramina are patent.  Minor disc bulge. C4-C5: Central disc osteophyte complex impinges on the cervical cord with mild narrowing of the spinal canal.  The neural foramina are patent. C5-C6: Disc osteophyte complex asymmetric to the right impinges on the right ventral cord with mild stenosis of the spinal canal.  There is mild left and moderate right neural foraminal narrowing due to uncovertebral arthropathy.  C6-C7: Left central subarticular disc osteophyte complex impinges on the left ventral cord and causes moderate stenosis of the spinal canal.   There is mild narrowing of the neural foramina due to uncovertebral arthropathy. C7-T1: Canal and foramina are patent. THORACIC: Counting reference:  Craniocervical and lumbosacral junctions  For the purposes of this report,  L4-5 is considered the level of the iliac crest and assume there are 5 lumbar-type vertebrae.  Anatomic variant:  None. Localizer images:  No additional findings. Alignment:   Alignment is anatomic. Cord:  The thoracic spinal cord is within normal limits of signal intensity.  No abnormal intramedullary enhancement of the thoracic cord. Bone marrow signal/fracture:    No evidence of pathologic marrow infiltration.  No evidence of prior fracture. Thoracic soft tissues:    The paraspinal soft tissues are within normal limits. Canal and foramina: At T8-T9 there is a right central disc protrusion that impresses on the thecal sac patent spinal canal and patent neural foramina. At T10-T11 there is broad-based disc bulge that effaces the ventral CSF mildly impinging on the thoracic cord and causing mild stenosis of the spinal canal.  The neural foramina are patent.    IMPRESSION: 1.  No intrinsic signal abnormality within the cervical or thoracic spinal cord. 2.  At C6-C7 disc osteophyte complex impinges on the cervical cord causing moderate stenosis of the spinal canal. 3.  At T10-T11 there is mild stenosis of the spinal canal and minor cord impingement. Cervical Anatomic Variant:  None.  Assume 7 cervical vertebrae with counting from the craniocervical junction. Anatomic Thoracic/Lumbar Variant: None.  L4-5 is considered the level of the iliac crest and assume there are 5 lumbar-type vertebrae. : JESSA   Transcribe Date/Time: Apr 20 2024  4:21P Dictated by : JOSE ANDRE MD This examination was interpreted and the report reviewed and  electronically signed by: JOSE ANDRE MD on Apr 20 2024  4:36PM  EST        Assessment/Plan   Problem List Items Addressed This Visit    None  Visit Diagnoses       Neuropathy        Relevant Orders    Referral to Neurosurgery           Ally Carpio is a 43 y.o. female presenting with LLE weakness and numbness,was recently diagnosed with UC,  said that she got an MRI from the pcp. Reports the weakness is getting worse.     Thoracis vs lumbar myelopathy, ?S1 involvement. MRI with  and without contrast of the Lumbar spine pending.-- will need to review this images. Patient had an emg done, that was unremarkable   Patient has atrophy of the left leg with reliance of the right ext when she walks. We recommend neurosurgery appointment. Giving the level of cord compression there is guarded prognosis for complete resolution.     Patient/Family Education: Extensive time was spent educating the patient on relevant anatomy, clinical findings and imaging, as well as discussing the potential diagnoses as discussed above.  Pharmacology: as above. Exercise: I discussed the importance of maintaining a daily exercise program, including stretching and strengthening. Preventative strategies were reviewed, specifically avoidance of any exercises that exacerbate pain. Return to online virtual visit/ clinic visit for follow-up with a neurologist in the  system in the next 1 months.   The patient expressed understanding and agreement with the assessment and plan.  Patient encouraged to contact us should they have any questions, concerns, or any changes in symptoms. Thank you for allowing me to participate in the care of your patient. The case was discussed with Attending neurologist Dr Can Sarmiento, who is in agreement of the plan detailed above.     This note is created using speech recognition transcription software. Despite proofreading, several typographical errors might be present that might affect the meaning of the  content. Please call with any questions or clarifications.    Caden Russell MD  Internal Medicine Resident.      Can Sarmiento MD  Neurolgoy

## 2024-05-03 NOTE — PROGRESS NOTES
Addendum by Can Sarmiento MD  Neurology    I saw and took history, examined, reviewed labs, and reviewed images- as above. I evaluated the patient. I obtained the key and critical portions of the history and physical exam. I reviewed the resident's documentation and discussed the patient with the resident. I agree with the resident's medical decision-making as documented in the resident's note.

## 2024-05-07 ENCOUNTER — PREP FOR PROCEDURE (OUTPATIENT)
Dept: OBSTETRICS AND GYNECOLOGY | Facility: CLINIC | Age: 44
End: 2024-05-07

## 2024-05-07 ENCOUNTER — OFFICE VISIT (OUTPATIENT)
Dept: OBSTETRICS AND GYNECOLOGY | Facility: CLINIC | Age: 44
End: 2024-05-07
Payer: COMMERCIAL

## 2024-05-07 VITALS — WEIGHT: 161.4 LBS | BODY MASS INDEX: 25.94 KG/M2 | HEIGHT: 66 IN

## 2024-05-07 DIAGNOSIS — Z86.2 HISTORY OF ITP: ICD-10-CM

## 2024-05-07 DIAGNOSIS — D69.3 CHRONIC ITP (IDIOPATHIC THROMBOCYTOPENIC PURPURA) (MULTI): ICD-10-CM

## 2024-05-07 DIAGNOSIS — K51.819 OTHER ULCERATIVE COLITIS WITH COMPLICATION (MULTI): ICD-10-CM

## 2024-05-07 DIAGNOSIS — N92.0 MENORRHAGIA WITH REGULAR CYCLE: Primary | ICD-10-CM

## 2024-05-07 DIAGNOSIS — Z01.818 PREOPERATIVE EXAM FOR GYNECOLOGIC SURGERY: ICD-10-CM

## 2024-05-07 PROCEDURE — 99214 OFFICE O/P EST MOD 30 MIN: CPT | Performed by: OBSTETRICS & GYNECOLOGY

## 2024-05-07 PROCEDURE — 1036F TOBACCO NON-USER: CPT | Performed by: OBSTETRICS & GYNECOLOGY

## 2024-05-07 PROCEDURE — 3008F BODY MASS INDEX DOCD: CPT | Performed by: OBSTETRICS & GYNECOLOGY

## 2024-05-07 ASSESSMENT — ENCOUNTER SYMPTOMS
CARDIOVASCULAR NEGATIVE: 0
RESPIRATORY NEGATIVE: 0
ALLERGIC/IMMUNOLOGIC NEGATIVE: 0
NEUROLOGICAL NEGATIVE: 0
EYES NEGATIVE: 0
PSYCHIATRIC NEGATIVE: 0
CONSTITUTIONAL NEGATIVE: 0
HEMATOLOGIC/LYMPHATIC NEGATIVE: 0
MUSCULOSKELETAL NEGATIVE: 0
GASTROINTESTINAL NEGATIVE: 0
ENDOCRINE NEGATIVE: 0

## 2024-05-07 NOTE — PROGRESS NOTES
Subjective   Patient ID: Ally Carpio is a 43 y.o. female who presents for Contraception (Last Pap 12/22/23 wnl. Mammogram 4/25/23 -.).  HPI  Patient here for follow-up for extremely heavy menses.  Patient notes that she is essentially homebound for 2 days out of the month.  Large clots and severe dysmenorrhea 8-10 out of 10 sharp stabbing suprapubic without radiation no other inciting factors.  Patient has a history of ulcerative colitis ITP with low platelets, possible MS, possible neurologic disease.  Currently is under workup and management    Review of Systems  Menorrhagia with dysmenorrhea some left-sided weakness all other systems negative    Objective   Physical Exam  Vitals reviewed.   Constitutional:       Appearance: Normal appearance. She is normal weight.   Cardiovascular:      Rate and Rhythm: Normal rate and regular rhythm.   Pulmonary:      Effort: Pulmonary effort is normal.      Breath sounds: Normal breath sounds.   Abdominal:      General: Abdomen is flat. Bowel sounds are normal.      Palpations: Abdomen is soft.   Musculoskeletal:      Cervical back: Normal range of motion and neck supple.   Neurological:      General: No focal deficit present.      Mental Status: She is alert.   Psychiatric:         Mood and Affect: Mood normal.         Behavior: Behavior normal.         Thought Content: Thought content normal.         Judgment: Judgment normal.       Assessment/Plan   Diagnoses and all orders for this visit:  Menorrhagia with regular cycle  Other ulcerative colitis with complication (Multi)  Chronic ITP (idiopathic thrombocytopenic purpura) (Multi)  Preoperative exam for gynecologic surgery    Reviewed along with previous films labs and notes     Reviewed options again with patient.  Patient did take the norethindrone but was unsure how it made her feel.  Did not like taking it.  Did note that her cycles improve markedly since her last visit but now started up having heavy menses again  for the last month some spotting between noted.  At this point discussed switching to Mirena IUD or subdermal implant if more convenient.  Also discussed doing endometrial sampling.  At this point patient had 2 prior  sections with stenotic cervix.  We would be better for patient to do this in hospital under anesthesia.  Will schedule for diagnostic hysteroscopy D&C placement of Mirena IUD risk benefits reviewed with patient.  Will need to check with hematology and platelet level prior to surgery.    Kwaku Schafer MD 24 12:00 PM

## 2024-05-08 PROBLEM — N92.0 MENORRHAGIA WITH REGULAR CYCLE: Status: ACTIVE | Noted: 2024-05-07

## 2024-05-08 PROBLEM — Z86.2 HISTORY OF ITP: Status: ACTIVE | Noted: 2024-05-07

## 2024-05-09 ENCOUNTER — PATIENT MESSAGE (OUTPATIENT)
Dept: PRIMARY CARE | Facility: CLINIC | Age: 44
End: 2024-05-09
Payer: COMMERCIAL

## 2024-05-09 DIAGNOSIS — G89.4 CHRONIC PAIN SYNDROME: Primary | ICD-10-CM

## 2024-05-09 RX ORDER — TIZANIDINE 2 MG/1
2 TABLET ORAL EVERY 6 HOURS PRN
Qty: 120 TABLET | Refills: 0 | Status: SHIPPED | OUTPATIENT
Start: 2024-05-09

## 2024-05-10 ENCOUNTER — HOSPITAL ENCOUNTER (OUTPATIENT)
Dept: RADIOLOGY | Facility: HOSPITAL | Age: 44
Discharge: HOME | End: 2024-05-10
Payer: COMMERCIAL

## 2024-05-10 DIAGNOSIS — M54.16 LUMBAR RADICULOPATHY: ICD-10-CM

## 2024-05-10 PROCEDURE — 72148 MRI LUMBAR SPINE W/O DYE: CPT

## 2024-05-10 PROCEDURE — 72148 MRI LUMBAR SPINE W/O DYE: CPT | Performed by: RADIOLOGY

## 2024-05-16 ENCOUNTER — LAB REQUISITION (OUTPATIENT)
Dept: LAB | Facility: HOSPITAL | Age: 44
End: 2024-05-16
Payer: COMMERCIAL

## 2024-05-16 ENCOUNTER — LAB (OUTPATIENT)
Dept: LAB | Facility: LAB | Age: 44
End: 2024-05-16
Payer: COMMERCIAL

## 2024-05-16 DIAGNOSIS — Z86.2 PERSONAL HISTORY OF DISEASES OF THE BLOOD AND BLOOD-FORMING ORGANS AND CERTAIN DISORDERS INVOLVING THE IMMUNE MECHANISM: ICD-10-CM

## 2024-05-16 DIAGNOSIS — N92.0 MENORRHAGIA WITH REGULAR CYCLE: ICD-10-CM

## 2024-05-16 DIAGNOSIS — N92.0 EXCESSIVE AND FREQUENT MENSTRUATION WITH REGULAR CYCLE: ICD-10-CM

## 2024-05-16 DIAGNOSIS — Z86.2 HISTORY OF ITP: ICD-10-CM

## 2024-05-16 LAB
ABO GROUP (TYPE) IN BLOOD: NORMAL
ANION GAP SERPL CALC-SCNC: 9 MMOL/L (ref 10–20)
ANTIBODY SCREEN: NORMAL
APTT PPP: 34 SECONDS (ref 27–38)
BUN SERPL-MCNC: 14 MG/DL (ref 6–23)
CALCIUM SERPL-MCNC: 8.6 MG/DL (ref 8.6–10.3)
CHLORIDE SERPL-SCNC: 108 MMOL/L (ref 98–107)
CO2 SERPL-SCNC: 24 MMOL/L (ref 21–32)
CREAT SERPL-MCNC: 0.85 MG/DL (ref 0.5–1.05)
EGFRCR SERPLBLD CKD-EPI 2021: 88 ML/MIN/1.73M*2
ERYTHROCYTE [DISTWIDTH] IN BLOOD BY AUTOMATED COUNT: 13.2 % (ref 11.5–14.5)
GLUCOSE SERPL-MCNC: 112 MG/DL (ref 74–99)
HCT VFR BLD AUTO: 35.1 % (ref 36–46)
HGB BLD-MCNC: 11.5 G/DL (ref 12–16)
INR PPP: 1.1 (ref 0.9–1.1)
MCH RBC QN AUTO: 30.6 PG (ref 26–34)
MCHC RBC AUTO-ENTMCNC: 32.8 G/DL (ref 32–36)
MCV RBC AUTO: 93 FL (ref 80–100)
NRBC BLD-RTO: 0 /100 WBCS (ref 0–0)
PLATELET # BLD AUTO: 103 X10*3/UL (ref 150–450)
POTASSIUM SERPL-SCNC: 4 MMOL/L (ref 3.5–5.3)
PROTHROMBIN TIME: 12.7 SECONDS (ref 9.8–12.8)
RBC # BLD AUTO: 3.76 X10*6/UL (ref 4–5.2)
RH FACTOR (ANTIGEN D): NORMAL
SODIUM SERPL-SCNC: 137 MMOL/L (ref 136–145)
WBC # BLD AUTO: 4.8 X10*3/UL (ref 4.4–11.3)

## 2024-05-16 PROCEDURE — 36415 COLL VENOUS BLD VENIPUNCTURE: CPT

## 2024-05-16 PROCEDURE — 85027 COMPLETE CBC AUTOMATED: CPT

## 2024-05-16 PROCEDURE — 80048 BASIC METABOLIC PNL TOTAL CA: CPT

## 2024-05-16 PROCEDURE — 86901 BLOOD TYPING SEROLOGIC RH(D): CPT

## 2024-05-16 PROCEDURE — 85610 PROTHROMBIN TIME: CPT

## 2024-05-16 PROCEDURE — 86850 RBC ANTIBODY SCREEN: CPT

## 2024-05-16 PROCEDURE — 85730 THROMBOPLASTIN TIME PARTIAL: CPT

## 2024-05-16 PROCEDURE — 86900 BLOOD TYPING SEROLOGIC ABO: CPT

## 2024-05-22 ENCOUNTER — HOSPITAL ENCOUNTER (OUTPATIENT)
Facility: HOSPITAL | Age: 44
Setting detail: OUTPATIENT SURGERY
Discharge: HOME | End: 2024-05-22
Attending: OBSTETRICS & GYNECOLOGY | Admitting: OBSTETRICS & GYNECOLOGY
Payer: COMMERCIAL

## 2024-05-22 ENCOUNTER — ANESTHESIA EVENT (OUTPATIENT)
Dept: OPERATING ROOM | Facility: HOSPITAL | Age: 44
End: 2024-05-22
Payer: COMMERCIAL

## 2024-05-22 ENCOUNTER — ANESTHESIA (OUTPATIENT)
Dept: OPERATING ROOM | Facility: HOSPITAL | Age: 44
End: 2024-05-22
Payer: COMMERCIAL

## 2024-05-22 VITALS
OXYGEN SATURATION: 100 % | HEART RATE: 76 BPM | TEMPERATURE: 98.2 F | WEIGHT: 160.5 LBS | RESPIRATION RATE: 16 BRPM | DIASTOLIC BLOOD PRESSURE: 66 MMHG | SYSTOLIC BLOOD PRESSURE: 102 MMHG | HEIGHT: 66 IN | BODY MASS INDEX: 25.79 KG/M2

## 2024-05-22 DIAGNOSIS — Z86.2 HISTORY OF ITP: ICD-10-CM

## 2024-05-22 DIAGNOSIS — N92.0 MENORRHAGIA WITH REGULAR CYCLE: Primary | ICD-10-CM

## 2024-05-22 LAB
ABO GROUP (TYPE) IN BLOOD: NORMAL
PREGNANCY TEST URINE, POC: NEGATIVE
RH FACTOR (ANTIGEN D): NORMAL

## 2024-05-22 PROCEDURE — 3700000002 HC GENERAL ANESTHESIA TIME - EACH INCREMENTAL 1 MINUTE: Performed by: OBSTETRICS & GYNECOLOGY

## 2024-05-22 PROCEDURE — 3600000002 HC OR TIME - INITIAL BASE CHARGE - PROCEDURE LEVEL TWO: Performed by: OBSTETRICS & GYNECOLOGY

## 2024-05-22 PROCEDURE — 7100000010 HC PHASE TWO TIME - EACH INCREMENTAL 1 MINUTE: Performed by: OBSTETRICS & GYNECOLOGY

## 2024-05-22 PROCEDURE — 7100000002 HC RECOVERY ROOM TIME - EACH INCREMENTAL 1 MINUTE: Performed by: OBSTETRICS & GYNECOLOGY

## 2024-05-22 PROCEDURE — 7100000001 HC RECOVERY ROOM TIME - INITIAL BASE CHARGE: Performed by: OBSTETRICS & GYNECOLOGY

## 2024-05-22 PROCEDURE — 3700000001 HC GENERAL ANESTHESIA TIME - INITIAL BASE CHARGE: Performed by: OBSTETRICS & GYNECOLOGY

## 2024-05-22 PROCEDURE — 2500000004 HC RX 250 GENERAL PHARMACY W/ HCPCS (ALT 636 FOR OP/ED): Performed by: ANESTHESIOLOGIST ASSISTANT

## 2024-05-22 PROCEDURE — A58558 PR HYSTEROSCOPY,W/ENDO BX: Performed by: ANESTHESIOLOGY

## 2024-05-22 PROCEDURE — 2500000004 HC RX 250 GENERAL PHARMACY W/ HCPCS (ALT 636 FOR OP/ED): Performed by: ANESTHESIOLOGY

## 2024-05-22 PROCEDURE — A58558 PR HYSTEROSCOPY,W/ENDO BX: Performed by: ANESTHESIOLOGIST ASSISTANT

## 2024-05-22 PROCEDURE — 7100000009 HC PHASE TWO TIME - INITIAL BASE CHARGE: Performed by: OBSTETRICS & GYNECOLOGY

## 2024-05-22 PROCEDURE — 88305 TISSUE EXAM BY PATHOLOGIST: CPT | Mod: TC,AHULAB | Performed by: OBSTETRICS & GYNECOLOGY

## 2024-05-22 PROCEDURE — A4217 STERILE WATER/SALINE, 500 ML: HCPCS | Performed by: OBSTETRICS & GYNECOLOGY

## 2024-05-22 PROCEDURE — 2720000007 HC OR 272 NO HCPCS: Performed by: OBSTETRICS & GYNECOLOGY

## 2024-05-22 PROCEDURE — 36415 COLL VENOUS BLD VENIPUNCTURE: CPT | Performed by: OBSTETRICS & GYNECOLOGY

## 2024-05-22 PROCEDURE — 58300 INSERT INTRAUTERINE DEVICE: CPT | Performed by: OBSTETRICS & GYNECOLOGY

## 2024-05-22 PROCEDURE — 3600000007 HC OR TIME - EACH INCREMENTAL 1 MINUTE - PROCEDURE LEVEL TWO: Performed by: OBSTETRICS & GYNECOLOGY

## 2024-05-22 PROCEDURE — 88305 TISSUE EXAM BY PATHOLOGIST: CPT | Performed by: STUDENT IN AN ORGANIZED HEALTH CARE EDUCATION/TRAINING PROGRAM

## 2024-05-22 PROCEDURE — 2500000004 HC RX 250 GENERAL PHARMACY W/ HCPCS (ALT 636 FOR OP/ED): Performed by: OBSTETRICS & GYNECOLOGY

## 2024-05-22 PROCEDURE — 58558 HYSTEROSCOPY BIOPSY: CPT | Performed by: OBSTETRICS & GYNECOLOGY

## 2024-05-22 RX ORDER — SODIUM CHLORIDE, SODIUM LACTATE, POTASSIUM CHLORIDE, CALCIUM CHLORIDE 600; 310; 30; 20 MG/100ML; MG/100ML; MG/100ML; MG/100ML
100 INJECTION, SOLUTION INTRAVENOUS CONTINUOUS
Status: DISCONTINUED | OUTPATIENT
Start: 2024-05-22 | End: 2024-05-22 | Stop reason: HOSPADM

## 2024-05-22 RX ORDER — ONDANSETRON HYDROCHLORIDE 2 MG/ML
4 INJECTION, SOLUTION INTRAVENOUS ONCE AS NEEDED
Status: DISCONTINUED | OUTPATIENT
Start: 2024-05-22 | End: 2024-05-22 | Stop reason: HOSPADM

## 2024-05-22 RX ORDER — LIDOCAINE HYDROCHLORIDE 10 MG/ML
0.1 INJECTION, SOLUTION EPIDURAL; INFILTRATION; INTRACAUDAL; PERINEURAL ONCE
Status: DISCONTINUED | OUTPATIENT
Start: 2024-05-22 | End: 2024-05-22 | Stop reason: HOSPADM

## 2024-05-22 RX ORDER — ADALIMUMAB-ADAZ 40 MG/.8ML
40 INJECTION, SOLUTION SUBCUTANEOUS
COMMUNITY

## 2024-05-22 RX ORDER — MIDAZOLAM HYDROCHLORIDE 1 MG/ML
INJECTION INTRAMUSCULAR; INTRAVENOUS AS NEEDED
Status: DISCONTINUED | OUTPATIENT
Start: 2024-05-22 | End: 2024-05-22

## 2024-05-22 RX ORDER — FENTANYL CITRATE 50 UG/ML
INJECTION, SOLUTION INTRAMUSCULAR; INTRAVENOUS AS NEEDED
Status: DISCONTINUED | OUTPATIENT
Start: 2024-05-22 | End: 2024-05-22

## 2024-05-22 RX ORDER — FENTANYL CITRATE 50 UG/ML
25 INJECTION, SOLUTION INTRAMUSCULAR; INTRAVENOUS EVERY 5 MIN PRN
Status: DISCONTINUED | OUTPATIENT
Start: 2024-05-22 | End: 2024-05-22 | Stop reason: HOSPADM

## 2024-05-22 RX ORDER — OXYCODONE HYDROCHLORIDE 5 MG/1
5 TABLET ORAL EVERY 4 HOURS PRN
Status: DISCONTINUED | OUTPATIENT
Start: 2024-05-22 | End: 2024-05-22 | Stop reason: HOSPADM

## 2024-05-22 RX ORDER — OXYCODONE HYDROCHLORIDE 5 MG/1
10 TABLET ORAL EVERY 4 HOURS PRN
Status: DISCONTINUED | OUTPATIENT
Start: 2024-05-22 | End: 2024-05-22 | Stop reason: HOSPADM

## 2024-05-22 RX ORDER — PROPOFOL 10 MG/ML
INJECTION, EMULSION INTRAVENOUS AS NEEDED
Status: DISCONTINUED | OUTPATIENT
Start: 2024-05-22 | End: 2024-05-22

## 2024-05-22 RX ORDER — FENTANYL CITRATE 50 UG/ML
50 INJECTION, SOLUTION INTRAMUSCULAR; INTRAVENOUS EVERY 5 MIN PRN
Status: DISCONTINUED | OUTPATIENT
Start: 2024-05-22 | End: 2024-05-22 | Stop reason: HOSPADM

## 2024-05-22 RX ORDER — ONDANSETRON HYDROCHLORIDE 2 MG/ML
INJECTION, SOLUTION INTRAVENOUS AS NEEDED
Status: DISCONTINUED | OUTPATIENT
Start: 2024-05-22 | End: 2024-05-22

## 2024-05-22 RX ORDER — SODIUM CHLORIDE 0.9 G/100ML
IRRIGANT IRRIGATION AS NEEDED
Status: DISCONTINUED | OUTPATIENT
Start: 2024-05-22 | End: 2024-05-22 | Stop reason: HOSPADM

## 2024-05-22 RX ADMIN — PROPOFOL 260 MG: 10 INJECTION, EMULSION INTRAVENOUS at 14:12

## 2024-05-22 RX ADMIN — FENTANYL CITRATE 50 MCG: 50 INJECTION, SOLUTION INTRAMUSCULAR; INTRAVENOUS at 14:12

## 2024-05-22 RX ADMIN — GLYCOPYRROLATE 0.2 MCG: 0.2 INJECTION, SOLUTION INTRAMUSCULAR; INTRAVITREAL at 14:27

## 2024-05-22 RX ADMIN — ONDANSETRON 4 MG: 2 INJECTION INTRAMUSCULAR; INTRAVENOUS at 14:24

## 2024-05-22 RX ADMIN — DEXAMETHASONE SODIUM PHOSPHATE 4 MG: 4 INJECTION, SOLUTION INTRAMUSCULAR; INTRAVENOUS at 14:24

## 2024-05-22 RX ADMIN — GLYCOPYRROLATE 0.2 MCG: 0.2 INJECTION, SOLUTION INTRAMUSCULAR; INTRAVITREAL at 14:28

## 2024-05-22 RX ADMIN — SODIUM CHLORIDE, POTASSIUM CHLORIDE, SODIUM LACTATE AND CALCIUM CHLORIDE 100 ML/HR: 600; 310; 30; 20 INJECTION, SOLUTION INTRAVENOUS at 11:41

## 2024-05-22 RX ADMIN — MIDAZOLAM HYDROCHLORIDE 2 MG: 1 INJECTION, SOLUTION INTRAMUSCULAR; INTRAVENOUS at 14:04

## 2024-05-22 SDOH — HEALTH STABILITY: MENTAL HEALTH: CURRENT SMOKER: 0

## 2024-05-22 ASSESSMENT — PAIN SCALES - GENERAL
PAINLEVEL_OUTOF10: 0 - NO PAIN

## 2024-05-22 ASSESSMENT — PAIN - FUNCTIONAL ASSESSMENT
PAIN_FUNCTIONAL_ASSESSMENT: 0-10

## 2024-05-22 ASSESSMENT — COLUMBIA-SUICIDE SEVERITY RATING SCALE - C-SSRS
2. HAVE YOU ACTUALLY HAD ANY THOUGHTS OF KILLING YOURSELF?: NO
6. HAVE YOU EVER DONE ANYTHING, STARTED TO DO ANYTHING, OR PREPARED TO DO ANYTHING TO END YOUR LIFE?: NO
1. IN THE PAST MONTH, HAVE YOU WISHED YOU WERE DEAD OR WISHED YOU COULD GO TO SLEEP AND NOT WAKE UP?: NO

## 2024-05-22 NOTE — ANESTHESIA POSTPROCEDURE EVALUATION
Patient: Ally Carpio    Procedure Summary       Date: 05/22/24 Room / Location: [facility] A OR 06 / Virtual U A OR    Anesthesia Start: 1407 Anesthesia Stop: 1446    Procedures:       D & C Hysteroscopy      Mirena IUD Insertion Diagnosis:       Menorrhagia with regular cycle      History of ITP      (Menorrhagia with regular cycle [N92.0])      (History of ITP [Z86.2])    Surgeons: Kwaku Schafer MD Responsible Provider: Krystal Moyer MD    Anesthesia Type: MAC ASA Status: 3            Anesthesia Type: MAC    Vitals Value Taken Time   /72 05/22/24 1531   Temp 36.6 °C (97.9 °F) 05/22/24 1442   Pulse 85 05/22/24 1540   Resp 15 05/22/24 1530   SpO2 99 % 05/22/24 1540   Vitals shown include unfiled device data.    Anesthesia Post Evaluation    Patient participation: complete - patient participated  Level of consciousness: awake and alert  Pain management: adequate  Airway patency: patent  Cardiovascular status: acceptable  Respiratory status: acceptable  Hydration status: acceptable  Postoperative Nausea and Vomiting: none    No notable events documented.

## 2024-05-22 NOTE — ANESTHESIA PREPROCEDURE EVALUATION
Patient: Ally Carpio    Procedure Information       Date/Time: 24 1230    Procedures:       D & C Hysteroscopy      Mirena IUD Insertion    Location: [facility] OR  [facility] OR    Surgeons: Kwaku Schafer MD          Vitals:    24 1131   BP: 113/67   Pulse: 54   Resp: 16   Temp: 36.8 °C (98.2 °F)   SpO2: 100%       Past Surgical History:   Procedure Laterality Date   •  SECTION, LOW TRANSVERSE  ,   • ESOPHAGOGASTRODUODENOSCOPY       Past Medical History:   Diagnosis Date   • Anemia    • Anxiety    • GERD (gastroesophageal reflux disease)    • Hx of idiopathic thrombocytopenic purpura     on steroids   • Hypothyroidism    • Sleep apnea        Current Facility-Administered Medications:   •  lactated Ringer's infusion, 100 mL/hr, intravenous, Continuous, Krystal Moyer MD, Last Rate: 100 mL/hr at 24 1141, 100 mL/hr at 24 1141  Prior to Admission medications    Medication Sig Start Date End Date Taking? Authorizing Provider   adalimumab-adaz (Hyrimoz) 40 mg/0.8 mL syringe Inject 40 mg under the skin every 14 (fourteen) days.   Yes Historical Provider, MD   chlorhexidine (Peridex) 0.12 % solution Use 15 mL in the mouth or throat if needed for wound care. 11/10/23  Yes Historical Provider, MD   cyanocobalamin (Vitamin B-12) 500 mcg tablet Take 2 tablets (1,000 mcg) by mouth once daily. 3/15/24 6/13/24 Yes Eboselummarvel Iseghohi, DO   levothyroxine (Synthroid, Levoxyl) 25 mcg tablet Take 0.5 tablets (12.5 mcg) by mouth once daily. 3/15/24 9/11/24 Yes Eboselumhen Iseghohi, DO   pantoprazole (ProtoNix) 40 mg EC tablet Take 1 tablet (40 mg) by mouth once daily. Do not crush, chew, or split. 24 Yes JORDAN Li-CNP   tacrolimus (Protopic) 0.1 % ointment  3/4/24  Yes Historical Provider, MD   tiZANidine (Zanaflex) 2 mg tablet Take 1 tablet (2 mg) by mouth every 6 hours if needed for muscle spasms. 24  Yes Chelsie Cooney, DO     Allergies   Allergen  Reactions   • Balsalazide Other     Mouth ulcers   • Mesalamine Other and Rash     Social History     Tobacco Use   • Smoking status: Former     Current packs/day: 0.00     Average packs/day: 1 pack/day for 12.5 years (12.5 ttl pk-yrs)     Types: Cigarettes     Start date: 2004     Quit date: 2017     Years since quittin.3   • Smokeless tobacco: Never   Substance Use Topics   • Alcohol use: Not Currently     Comment: socially         Chemistry    Lab Results   Component Value Date/Time     2024 0812    K 4.0 2024 08     (H) 2024 08    CO2 24 2024 08    BUN 14 2024    CREATININE 0.85 2024    Lab Results   Component Value Date/Time    CALCIUM 8.6 2024 08    ALKPHOS 35 2024 1446    AST 7 (L) 2024 1446    ALT 6 (L) 2024 1446    BILITOT 0.4 2024 1446          Lab Results   Component Value Date/Time    WBC 4.8 2024 0812    HGB 11.5 (L) 2024 08    HCT 35.1 (L) 2024     (L) 2024 08     Lab Results   Component Value Date/Time    PROTIME 12.7 2024    INR 1.1 2024 08     No results found for this or any previous visit (from the past 4464 hour(s)).  No results found for this or any previous visit from the past 1095 days.        Relevant Problems   Cardiac   (+) Mixed hyperlipidemia      Pulmonary   (+) Obstructive sleep apnea      GI   (+) Gastroesophageal reflux disease without esophagitis   (+) Ulcerative colitis (Multi)      Endocrine   (+) Class 2 obesity due to excess calories without serious comorbidity with body mass index (BMI) of 37.0 to 37.9 in adult   (+) Hypothyroidism   (+) Morbid obesity (Multi)      Hematology   (+) Chronic ITP (idiopathic thrombocytopenic purpura) (Multi)   (+) Chronic idiopathic thrombocytopenic purpura (Multi)   (+) Iron deficiency anemia      GYN   (+) Menorrhagia with regular cycle       Clinical information reviewed:   Tobacco   Allergies  Meds   Med Hx  Surg Hx  OB Status  Fam Hx  Soc   Hx        NPO Detail:  NPO/Void Status  Carbohydrate Drink Given Prior to Surgery? : N  Date of Last Liquid: 05/22/24  Time of Last Liquid: 0730  Date of Last Solid: 05/21/24  Time of Last Solid: 1930  Last Intake Type: Clear fluids  Time of Last Void: 1132         Physical Exam    Airway  Mallampati: I  TM distance: >3 FB  Neck ROM: full     Cardiovascular   Rhythm: regular  Rate: normal     Dental - normal exam     Pulmonary   Breath sounds clear to auscultation     Abdominal        Anesthesia Plan    History of general anesthesia?: no  History of complications of general anesthesia?: unknown/emergency    ASA 3     MAC   (ITP, PLTS 103.  Hb 11.5.  Denies easy bleeding/bruising. LLE weakness, states it is from spinal cord compression.  Per chart review, hx of generalized body pain, L face numbness.  Denies today. )  The patient is not a current smoker.    intravenous induction   Anesthetic plan and risks discussed with patient and spouse.    Plan discussed with CAA.

## 2024-05-22 NOTE — OP NOTE
D & C Hysteroscopy, Mirena IUD Insertion Operative Note     Date: 2024  OR Location: [facility] OR    Name: Ally Carpio, : 1980, Age: 43 y.o., MRN: [id number], Sex: female    Diagnosis  Pre-op Diagnosis     * Menorrhagia with regular cycle [N92.0]     * History of ITP [Z86.2] Post-op Diagnosis     * Menorrhagia with regular cycle [N92.0]     * History of ITP [Z86.2]     Procedures  D & C Hysteroscopy  08905 - NE HYSTEROSCOPY BX ENDOMETRIUM&/POLYPC W/WO D&C    Mirena IUD Insertion  77542 - NE INSERTION INTRAUTERINE DEVICE IUD      Surgeons      * Kwaku Schafer - Primary    Resident/Fellow/Other Assistant:  Surgeons and Role:  * No surgeons found with a matching role *    Procedure Summary  Anesthesia: Monitor Anesthesia Care  ASA: III  Anesthesia Staff: Anesthesiologist: Krystal Moyer MD  C-AA: BEATRIZ Broewr  Estimated Blood Loss: 10 mL  Intra-op Medications: Administrations occurring from 1230 to 1400 on 24:  * No intraprocedure medications in log *           Anesthesia Record               Intraprocedure I/O Totals          Intake    lactated Ringer's infusion 900.00 mL    Total Intake 900 mL       Output    Total Blood Loss - Surgical Delivery (mL) 5 mL    Total Output 5 mL       Net    Net Volume 895 mL       Other (could not be determined as input or output)    Surgical Delivery Estimated Blood Loss (mL) 5          Specimen: No specimens collected     Staff:   Circulator: Devika  Scrub Person: Jamee      Findings: 2 small polyps    Indications: Ally Carpio is an 43 y.o. female who is having surgery for Menorrhagia with regular cycle [N92.0]  History of ITP [Z86.2].     The patient was seen in the preoperative area. The risks, benefits, complications, treatment options, non-operative alternatives, expected recovery and outcomes were discussed with the patient. The possibilities of reaction to medication, pulmonary aspiration, injury to surrounding structures, bleeding, recurrent  infection, the need for additional procedures, failure to diagnose a condition, and creating a complication requiring transfusion or operation were discussed with the patient. The patient concurred with the proposed plan, giving informed consent.  The site of surgery was properly noted/marked if necessary per policy. The patient has been actively warmed in preoperative area. Preoperative antibiotics are not indicated. Venous thrombosis prophylaxis are not indicated.    Procedure Details: Under satisfactory anesthesia patient was placed in dorsolithotomy position.  A vaginal perineal prep was carried out patient was draped in usual manner.  Weighted speculum was placed in the anterior lip of cervix was grasped with single-tooth tenaculum.  Cervix was patulous already dilated diagnostic hysteroscope was placed in position.  Using a liquid medium next inspection of the uterine care was carried out.  Both ostium were identified small septum was noted 2 small polyps were noted in the lower fundus endocervical area.  Using a resector both polyps were removed without difficulty no defects or perforations were noted.  Curettage was then carried out with small amount of tissue which was also sent for pathology.  At this point no defects or perforations were noted procedure was terminated estimated blood loss approximately 20 cc patient is noted to be on her menses patient left recovery in good condition end of dictation  Complications:  None; patient tolerated the procedure well.    Disposition: PACU - hemodynamically stable.  Condition: stable     Attending Attestation: I performed the procedure.    Kwaku Schafer  Phone Number: [phone]

## 2024-05-22 NOTE — PERIOPERATIVE NURSING NOTE
1442- PHASE I - Patient to PACU bay at this time in stable condition. Bedside monitors applied to patient upon arrival to PACU. Report received from OR Team at bedside; states patient did go bradycardic during the procedure (30s BPM) and states they did give robinul to treat. Even though was a MAC, OR team wants her to stay in PACU for an hour to monitor. HR currently 68 bpm NSR.     1450-Patient tolerating sips of water at this time. Denies nausea     1459- Called patient's  and gave updates at this time     1544- Criteria met for patient to discharge from Phase I at this time     1545- PHASE II

## 2024-05-22 NOTE — DISCHARGE INSTRUCTIONS
Call to make appointment in 2 weeks  Call for fever and chills  Call for severe nausea and vomiting  Call for pain which exceeds home medication  Pelvic rest for 72 hours  No driving for 24 hours

## 2024-05-24 ENCOUNTER — APPOINTMENT (OUTPATIENT)
Dept: SLEEP MEDICINE | Facility: CLINIC | Age: 44
End: 2024-05-24
Payer: COMMERCIAL

## 2024-05-24 ENCOUNTER — TELEPHONE (OUTPATIENT)
Dept: OBSTETRICS AND GYNECOLOGY | Facility: CLINIC | Age: 44
End: 2024-05-24

## 2024-05-24 NOTE — TELEPHONE ENCOUNTER
pt had a D & C Hysteroscopy on 5/22 with a mirena insertion. called in experiencing mild cramping  Reports nausea and vomiting since 5/23/2024 .  Denies fever  First available appointment time was scheduled for 5/29/24 - RN checked other office locations for Patient  RN scheduled appointment  RN encouraged Patient to be seen in an ER or Urgent care if her symptoms became worse or if she developed a fever  Patient verbalized understanding  Gisela Harvey RN

## 2024-05-24 NOTE — TELEPHONE ENCOUNTER
Patient is experiencing nausea . She had a mirena insert in surgery Wednesday (5/22/24)  She is also have slight cramping.

## 2024-05-28 PROBLEM — G62.9 NEUROPATHY: Status: ACTIVE | Noted: 2024-05-28

## 2024-05-28 PROBLEM — G81.94 LEFT HEMIPARESIS (MULTI): Status: ACTIVE | Noted: 2024-05-28

## 2024-05-28 RX ORDER — GABAPENTIN 100 MG/1
CAPSULE ORAL
COMMUNITY
End: 2024-05-31 | Stop reason: SDUPTHER

## 2024-05-29 ENCOUNTER — OFFICE VISIT (OUTPATIENT)
Dept: OBSTETRICS AND GYNECOLOGY | Facility: CLINIC | Age: 44
End: 2024-05-29
Payer: COMMERCIAL

## 2024-05-29 VITALS — BODY MASS INDEX: 25.5 KG/M2 | WEIGHT: 158 LBS | DIASTOLIC BLOOD PRESSURE: 75 MMHG | SYSTOLIC BLOOD PRESSURE: 117 MMHG

## 2024-05-29 DIAGNOSIS — N92.0 MENORRHAGIA WITH REGULAR CYCLE: ICD-10-CM

## 2024-05-29 DIAGNOSIS — Z30.432 ENCOUNTER FOR IUD REMOVAL: ICD-10-CM

## 2024-05-29 DIAGNOSIS — R11.2 NAUSEA AND VOMITING, UNSPECIFIED VOMITING TYPE: Primary | ICD-10-CM

## 2024-05-29 PROCEDURE — 1036F TOBACCO NON-USER: CPT | Performed by: OBSTETRICS & GYNECOLOGY

## 2024-05-29 PROCEDURE — 58301 REMOVE INTRAUTERINE DEVICE: CPT | Performed by: OBSTETRICS & GYNECOLOGY

## 2024-05-29 PROCEDURE — 99214 OFFICE O/P EST MOD 30 MIN: CPT | Performed by: OBSTETRICS & GYNECOLOGY

## 2024-05-29 PROCEDURE — 3008F BODY MASS INDEX DOCD: CPT | Performed by: OBSTETRICS & GYNECOLOGY

## 2024-05-29 ASSESSMENT — ENCOUNTER SYMPTOMS
OCCASIONAL FEELINGS OF UNSTEADINESS: 0
LOSS OF SENSATION IN FEET: 0
DEPRESSION: 0

## 2024-05-29 ASSESSMENT — PAIN SCALES - GENERAL: PAINLEVEL: 4

## 2024-05-29 NOTE — PROGRESS NOTES
SUBJECTIVE    43 y.o.  Having periods female presents for   Chief Complaint   Patient presents with    Contraception     Patient here to discuss concerns with IUD  Patient had IUD placed 24  LMP 24  Patient complains of cramping,nausea and vomiting   Patient denies falls  Patient wants chaperone       Pt presents with concerns regarding her IUD. She had a hysteroscopy with polypectomy/D&C and then an IUD (Mirena) was placed. This was all to treat a h/o heavy menses (pt has a h/o ITP with low platelets).  Prior to this she had tried OCPs (stopped because of mood changes) and Provera (similar).    Since the procedure she reports that she has had significant nasuea along with committing after she eats.  This can be associated with mid abdominal pain.  She can keep some food down (especially if bland). Nausea is worse in the AM and early afternoon.  No fevers/chills.    Her pathology is pending. She had scant spotting after the procedure. She strongly desires the IUD removal today and is hoping removal of the polyps will improve her bleeding.    OB/GYN History  Patient's last menstrual period was 2024 (exact date).    Social History     Substance and Sexual Activity   Sexual Activity Yes    Partners: Male    Birth control/protection: Male Sterilization    Comment: Pt stopped her Norethindrone about one week ago- does not desire to be on birth control pills any longer         OB History    Para Term  AB Living   2 2 2     2   SAB IAB Ectopic Multiple Live Births           2      # Outcome Date GA Lbr Tyrese/2nd Weight Sex Delivery Anes PTL Lv   2 Term 09   3.629 kg F CS-Unspec EPI N CLIFFORD   1 Term 01/15/03   2.977 kg F CS-Unspec EPI N CLIFFORD      Complications: Intraamniotic Infection       Past Medical History  She has a past medical history of Anemia, Anxiety, GERD (gastroesophageal reflux disease), idiopathic thrombocytopenic purpura, Hypothyroidism, and Sleep apnea.    Surgical  History  She has a past surgical history that includes  section, low transverse (,) and Esophagogastroduodenoscopy.     Social History  She reports that she quit smoking about 7 years ago. Her smoking use included cigarettes. She started smoking about 19 years ago. She has a 12.5 pack-year smoking history. She has never used smokeless tobacco. She reports that she does not currently use alcohol. She reports that she does not use drugs.    Screenings  Social Determinants of Health     Tobacco Use: Medium Risk (2024)    Patient History     Smoking Tobacco Use: Former     Smokeless Tobacco Use: Never     Passive Exposure: Not on file   Alcohol Use: Not on file   Financial Resource Strain: Not on file   Food Insecurity: Not on file   Transportation Needs: Not on file   Physical Activity: Not on file   Stress: Not on file   Social Connections: Not on file   Intimate Partner Violence: Not on file   Depression: Not at risk (2024)    Received from [facility]    PHQ-2     PHQ-2 score: 2   Housing Stability: Not on file   Utilities: Not on file   Digital Equity: Not on file   Health Literacy: Not on file         OBJECTIVE  Vitals:    24 1358   BP: 117/75   Weight: 71.7 kg (158 lb)     Body mass index is 25.5 kg/m².     Chaperone: Present: yes  Physical Exam  Constitutional:       Appearance: Normal appearance.   Genitourinary:      No lesions in the vagina.      Right Labia: No rash, tenderness or lesions.     Left Labia: No tenderness, lesions or rash.     No vaginal erythema, bleeding or ulceration.      No cervical discharge, friability or lesion.   Abdominal:      General: Abdomen is flat. There is no distension.      Tenderness: There is no abdominal tenderness.   Neurological:      Mental Status: She is alert.      PROCEDURE: IUD Removal  Risks, benefits, and alternatives were reviewed. Written consent obtained.  The IUD strings were identified and the IUD removed in its entirety. Pt  tolerated the procedure well.    Immunization History   Administered Date(s) Administered    Flu vaccine (IIV4), preservative free *Check age/dose* 12/10/2021    Flu vaccine, quadrivalent, no egg protein, age 6 month or greater (FLUCELVAX) 10/29/2022, 09/30/2023    [place] SARS-CoV-2 Vaccination 04/12/2021    Pfizer COVID-19 vaccine, Fall 2023, 12 years and older, (30mcg/0.3mL) 09/30/2023    Pfizer COVID-19 vaccine, bivalent, age 12 years and older (30 mcg/0.3 mL) 10/28/2022    Pfizer Purple Cap SARS-CoV-2 11/12/2021        ASSESSMENT & PLAN  Problem List Items Addressed This Visit          Ob-Gyn Problems    Menorrhagia with regular cycle     Other Visit Diagnoses       Nausea and vomiting, unspecified vomiting type    -  Primary    Encounter for IUD removal                Follow up: Discussed uncertain association of the IUD and her nausea, but after discussion pt desired removal so IUD was removed without complication.  Pt planning to follow up with Dr. Schafer in 2 weeks. Will call office for any further problems.    Matt Ribeiro MD  Obstetrics & Gynecology  05/29/24

## 2024-05-31 ENCOUNTER — OFFICE VISIT (OUTPATIENT)
Dept: SLEEP MEDICINE | Facility: CLINIC | Age: 44
End: 2024-05-31
Payer: COMMERCIAL

## 2024-05-31 VITALS
OXYGEN SATURATION: 98 % | BODY MASS INDEX: 25.3 KG/M2 | WEIGHT: 157.4 LBS | HEIGHT: 66 IN | SYSTOLIC BLOOD PRESSURE: 94 MMHG | DIASTOLIC BLOOD PRESSURE: 66 MMHG | HEART RATE: 74 BPM

## 2024-05-31 DIAGNOSIS — G25.81 RESTLESS LEG SYNDROME: ICD-10-CM

## 2024-05-31 DIAGNOSIS — G47.33 OBSTRUCTIVE SLEEP APNEA: Primary | ICD-10-CM

## 2024-05-31 PROCEDURE — 99214 OFFICE O/P EST MOD 30 MIN: CPT | Performed by: NURSE PRACTITIONER

## 2024-05-31 PROCEDURE — 3008F BODY MASS INDEX DOCD: CPT | Performed by: NURSE PRACTITIONER

## 2024-05-31 PROCEDURE — 1036F TOBACCO NON-USER: CPT | Performed by: NURSE PRACTITIONER

## 2024-05-31 RX ORDER — GABAPENTIN 300 MG/1
300 CAPSULE ORAL 2 TIMES DAILY
Qty: 60 CAPSULE | Refills: 11 | Status: SHIPPED | OUTPATIENT
Start: 2024-05-31 | End: 2025-05-31

## 2024-05-31 ASSESSMENT — ENCOUNTER SYMPTOMS
DEPRESSION: 0
OCCASIONAL FEELINGS OF UNSTEADINESS: 0
LOSS OF SENSATION IN FEET: 0

## 2024-05-31 ASSESSMENT — SLEEP AND FATIGUE QUESTIONNAIRES
SLEEP_PROBLEM_INTERFERES_DAILY_ACTIVITIES: SOMEWHAT
WAKING_TOO_EARLY: MODERATE
HOW LIKELY ARE YOU TO NOD OFF OR FALL ASLEEP IN A CAR, WHILE STOPPED FOR A FEW MINUTES IN TRAFFIC: WOULD NEVER DOZE
HOW LIKELY ARE YOU TO NOD OFF OR FALL ASLEEP WHILE SITTING QUIETLY AFTER LUNCH WITHOUT ALCOHOL: WOULD NEVER DOZE
SITING INACTIVE IN A PUBLIC PLACE LIKE A CLASS ROOM OR A MOVIE THEATER: WOULD NEVER DOZE
HOW LIKELY ARE YOU TO NOD OFF OR FALL ASLEEP WHILE LYING DOWN TO REST IN THE AFTERNOON WHEN CIRCUMSTANCES PERMIT: SLIGHT CHANCE OF DOZING
ESS-CHAD TOTAL SCORE: 3
HOW LIKELY ARE YOU TO NOD OFF OR FALL ASLEEP WHEN YOU ARE A PASSENGER IN A CAR FOR AN HOUR WITHOUT A BREAK: WOULD NEVER DOZE
HOW LIKELY ARE YOU TO NOD OFF OR FALL ASLEEP WHILE WATCHING TV: SLIGHT CHANCE OF DOZING
WORRIED_DISTRESSED_DUE_TO_SLEEP: SOMEWHAT
HOW LIKELY ARE YOU TO NOD OFF OR FALL ASLEEP WHILE SITTING AND READING: SLIGHT CHANCE OF DOZING
DIFFICULTY_STAYING_ASLEEP: MILD
SATISFACTION_WITH_CURRENT_SLEEP_PATTERN: SATISFIED
HOW LIKELY ARE YOU TO NOD OFF OR FALL ASLEEP WHILE SITTING AND TALKING TO SOMEONE: WOULD NEVER DOZE
SLEEP_PROBLEM_NOTICEABLE_TO_OTHERS: A LITTLE

## 2024-05-31 ASSESSMENT — PATIENT HEALTH QUESTIONNAIRE - PHQ9
1. LITTLE INTEREST OR PLEASURE IN DOING THINGS: NOT AT ALL
SUM OF ALL RESPONSES TO PHQ9 QUESTIONS 1 AND 2: 0
2. FEELING DOWN, DEPRESSED OR HOPELESS: NOT AT ALL

## 2024-05-31 NOTE — ASSESSMENT & PLAN NOTE
Reviewed HSAT from 2023  Reviewed CPAP download in detail today  -Recommended repeat sleep testing due to significant weight loss. She is agreeable. Will plan to stop CPAP pending results.   -ENT referral placed for nasal symptoms. She is agreeable  -Humidity and air pressure range adjusted in the interim

## 2024-05-31 NOTE — PROGRESS NOTES
Patient: Ally Carpio    [id number]  : 1980 -- AGE 43 y.o.    Provider: Kaylee Bradley     Location [facility]   Service Date: 2024              [facility]  Followup Visit Note    HISTORY OF PRESENT ILLNESS     HISTORY OF PRESENT ILLNESS   Ally Carpio is a 43 y.o. female with h/o PHAN and Obesity who presents to a [facility] for followup. Her  is with her today in clinic and contributed to the history.     Assessment and plan from last visit:   Ms. ALLY SIERRA is a 42 year female with the following problems:    SLEEP DISORDERED BREATHING:   This is likely sleep apnea based on the the history and physical examination. She has not yet had a sleep study.  -HSAT is reasonable as patient likely has PHAN based on history and exam and does not have any of the following comorbidities: CHF, neuromuscular weakness, hypoventilation, or significant COPD.  -We consider treatment as indicated when testing is complete.     OBESITY with a BMI of 38. Her most recent Bicarb on BMP was 26 in 23    PROBABLE RESTLESS LEG SYNDROME: This occurs frequently.  Last ferritin 104 after IV iron x3. She has follow up pending with heme.  Discussed warm bath, massage, Tac[employer]'s Restless leg OTC PRN. She is agreeable    Current History    On today's visit, the patient reports recent weight loss. Was planning for bariatric surgery, but ended up losing weight due UC. Estimates about 60 lbs since December  Having trouble tolerating PAP some nights. Was sent a nasal mask which feels more comfortable than nasal pillows. Sometimes takes off inadvertently. Notes her nose feels congested through the night. Tried yvonne pot + fluticasone for about a month without benefit. Thinks she probably needs to see ENT but has not yet due to other health concerns. Air pressure feels comfortable.  notes she is snoring off CPAP  but not obviously stopping breathing. Feels CPAP is helpful in that is stops her from snoring. No appreciable benefit otherwise.    RLS Followup:   Was told she had a compressed disc in her back. Having fasciculations in legs and arms in the evenings and at rest. Sometimes keeps her awake.   Lost some blood with UC and has heavy periods. Had iron infusion last in Dec. Sees hematology again soon. Recent labs reviewed  Taking gabapentin at bedtime per neuro-- seems to help but wakes later in the night at times and limbs feel uncomfortable again  Sees new specialist in July     Daytime Symptoms    Patient reports DAYTIME SYMPTOMS: no daytime symptoms  Patient denies daytime symptoms including: Denies: excessive daytime sleepiness late to work/school due to sleepiness irritability during the day feeling sleepy when driving    Naps: Yes, for 2times per week for 45min. Naps are refreshing  Fatigue: denies feeling fatigue    Bedtime 10 PM  Asleep quickly  Wakes around 6 AM  Naps on weekends ~ 45 min 1 PM or so    ESS: 53  PARISH: 107  FOSQ: 35    REVIEW OF SYSTEMS     REVIEW OF SYSTEMS  Review of Systems  All systems negative unless otherwise stated in HPI.      ALLERGIES AND MEDICATIONS     ALLERGIES  Allergies   Allergen Reactions    Balsalazide Other     Mouth ulcers    Mesalamine Other and Rash       MEDICATIONS: She has a current medication list which includes the following prescription(s): hyrimoz - Inject 40 mg under the skin every 14 (fourteen) days, chlorhexidine - Use 15 mL in the mouth or throat if needed for wound care, cyanocobalamin - Take 2 tablets (1,000 mcg) by mouth once daily, levothyroxine - Take 0.5 tablets (12.5 mcg) by mouth once daily, tacrolimus, tizanidine - Take 1 tablet (2 mg) by mouth every 6 hours if needed for muscle spasms, gabapentin - Take 1 capsule (300 mg) by mouth 2 times a day, and pantoprazole - Take 1 tablet (40 mg) by mouth once daily. Do not crush, chew, or split.    PAST MEDICAL  "HISTORY : She  has a past medical history of Anemia, Anxiety, GERD (gastroesophageal reflux disease), idiopathic thrombocytopenic purpura, Hypothyroidism, and Sleep apnea.    PAST SURGICAL HISTORY: She  has a past surgical history that includes  section, low transverse (,) and Esophagogastroduodenoscopy.     FAMILY HISTORY: No changes since previous visit. Otherwise non-contributory as charted.     SOCIAL HISTORY  She  reports that she quit smoking about 7 years ago. Her smoking use included cigarettes. She started smoking about 19 years ago. She has a 12.5 pack-year smoking history. She has never used smokeless tobacco. She reports that she does not currently use alcohol. She reports that she does not use drugs.       PHYSICAL EXAM     VITAL SIGNS: BP 94/66 (BP Location: Right arm, Patient Position: Sitting)   Pulse 74   Ht 1.676 m (5' 6\")   Wt 71.4 kg (157 lb 6.4 oz)   LMP 2024 (Exact Date)   SpO2 98%   BMI 25.41 kg/m²      PREVIOUS WEIGHTS:  Wt Readings from Last 3 Encounters:   24 71.4 kg (157 lb 6.4 oz)   24 71.7 kg (158 lb)   24 72.8 kg (160 lb 7.9 oz)         RESULTS/DATA     Bicarbonate (mmol/L)   Date Value   2024 24   2024 25   2024 25     Iron (ug/dL)   Date Value   2024 47   2023 47   2023 36     % Saturation (%)   Date Value   2024 19 (L)   2023 16 (L)   2023 11 (L)     TIBC (ug/dL)   Date Value   2024 245   2023 297   2023 317     Ferritin (ng/mL)   Date Value   2023 81   2023 97   2023 185 (H)       PAP Adherence      Airsense 10 set up 23    Eson 2 fit pack due 24    [place] [employer]        ASSESSMENT/PLAN     Ms. Carpio is a 43 y.o. female and she returns in followup to the [facility] for PHAN.    Problem List, Orders, Assessment, Recommendations:  Problem List Items Addressed This Visit             ICD-10-CM    Obstructive " sleep apnea - Primary G47.33     Reviewed HSAT from 2023  Reviewed CPAP download in detail today  -Recommended repeat sleep testing due to significant weight loss. She is agreeable. Will plan to stop CPAP pending results.   -ENT referral placed for nasal symptoms. She is agreeable  -Humidity and air pressure range adjusted in the interim          Relevant Orders    Home sleep apnea test (HSAT)    Referral to ENT    Positive Airway Pressure (PAP) Therapy    Restless leg syndrome G25.81     Fasciculations in the evening and at rest + hx of iron deficiency anemia  - Follows with heme-- appointment in next few weeks  -Reviewed recent iron labs, takes iron infusions when needed  -Gabapentin 300 mg at bedtime currently; will increase to 300 mg twice nightly as needed. She is agreeable. Side effects discussed          Relevant Medications    gabapentin (Neurontin) 300 mg capsule         Disposition    Return to clinic in 6 months

## 2024-05-31 NOTE — ASSESSMENT & PLAN NOTE
Fasciculations in the evening and at rest + hx of iron deficiency anemia  - Follows with heme-- appointment in next few weeks  -Reviewed recent iron labs, takes iron infusions when needed  -Gabapentin 300 mg at bedtime currently; will increase to 300 mg twice nightly as needed. She is agreeable. Side effects discussed

## 2024-06-03 LAB
LABORATORY COMMENT REPORT: NORMAL
PATH REPORT.FINAL DX SPEC: NORMAL
PATH REPORT.GROSS SPEC: NORMAL
PATH REPORT.RELEVANT HX SPEC: NORMAL
PATH REPORT.TOTAL CANCER: NORMAL

## 2024-06-12 NOTE — PROGRESS NOTES
Chief Complaint:   Ally Carpio is a 43 year old woman here for a neurosurgical consultation for meylopathy, cervical and thoracic disc herniation. Referred by dr. Sarmiento.    HPI  Ally Carpio has history of ulcerative colitis and ITP.  Over the last 4 months or so, she has developed weakness in her hands more so on the right side and left leg weakness.  She has trouble with fine motor control.  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  Her balance is poor as well mostly she feels because of her left leg weakness.  She does not have significant neck pain other than when she flexes her neck down which she avoids doing for this reason.  She does have some mid back pain that wraps around her sides. Taking gabapentin 300 mg bid and tizanidine as needed.         Review of Systems  All other systems reviewed and negative other than what is already stated in this note.      Past Medical History:   Diagnosis Date    Anemia     Anxiety     GERD (gastroesophageal reflux disease)     Hx of idiopathic thrombocytopenic purpura     on steroids    Hypothyroidism     Sleep apnea        Patient Active Problem List   Diagnosis    Iron deficiency    Class 2 obesity due to excess calories without serious comorbidity with body mass index (BMI) of 37.0 to 37.9 in adult    Mixed hyperlipidemia    IFG (impaired fasting glucose)    Iron deficiency anemia    Hypothyroidism    Obstructive sleep apnea    Gastroesophageal reflux disease without esophagitis    Morbid obesity (Multi)    Psoriasis (a type of skin inflammation)    Chronic idiopathic thrombocytopenic purpura (Multi)    Ulcerative colitis (Multi)    Chronic ITP (idiopathic thrombocytopenic purpura) (Multi)    Menorrhagia with regular cycle    History of ITP    Left hemiparesis (Multi)    Neuropathy    Restless leg syndrome       Past Surgical History:   Procedure Laterality Date     SECTION, LOW  TRANSVERSE  ,    ESOPHAGOGASTRODUODENOSCOPY         Family History   Problem Relation Name Age of Onset    Colon cancer Mother Shari     COPD Mother Shari     Hyperlipidemia Father Archuleta     Glaucoma Brother      Colon cancer Mother's Brother      Breast cancer Father's Sister Na        Social History     Tobacco Use    Smoking status: Former     Current packs/day: 0.00     Average packs/day: 1 pack/day for 12.5 years (12.5 ttl pk-yrs)     Types: Cigarettes     Start date: 2004     Quit date:      Years since quittin.4    Smokeless tobacco: Never   Vaping Use    Vaping status: Never Used   Substance Use Topics    Alcohol use: Not Currently     Comment: socially    Drug use: Never         Current Outpatient Medications:     adalimumab-adaz (Hyrimoz) 40 mg/0.8 mL syringe, Inject 40 mg under the skin every 14 (fourteen) days., Disp: , Rfl:     chlorhexidine (Peridex) 0.12 % solution, Use 15 mL in the mouth or throat if needed for wound care., Disp: , Rfl:     cyanocobalamin (Vitamin B-12) 500 mcg tablet, Take 2 tablets (1,000 mcg) by mouth once daily., Disp: 180 tablet, Rfl: 0    gabapentin (Neurontin) 300 mg capsule, Take 1 capsule (300 mg) by mouth 2 times a day., Disp: 60 capsule, Rfl: 11    levothyroxine (Synthroid, Levoxyl) 25 mcg tablet, Take 0.5 tablets (12.5 mcg) by mouth once daily., Disp: 45 tablet, Rfl: 1    pantoprazole (ProtoNix) 40 mg EC tablet, Take 1 tablet (40 mg) by mouth once daily. Do not crush, chew, or split., Disp: 30 tablet, Rfl: 2    tacrolimus (Protopic) 0.1 % ointment, , Disp: , Rfl:     tiZANidine (Zanaflex) 2 mg tablet, Take 1 tablet (2 mg) by mouth every 6 hours if needed for muscle spasms., Disp: 120 tablet, Rfl: 0        Objective   There were no vitals filed for this visit.    no acute distress, well developed woman appearing her  stated age  normal sclera  moist mucus membranes  no peripheral edema   symmetric chest rise  nondistended abdomen  alert and oriented, pupils  equal and round, extraocular movements intact, bilateral  weakness 4 out of 5, worse on the right side.  Left leg weakness with hip flexion, knee extension, dorsiflexion, and plantarflexion 3-4 out of 5, grossly full strength in the right leg, normal sensation to light touch throughout, right patellar hyperreflexia, no dysmetria  normal mood      Assessment/Plan   I personally reviewed MRI of the cervical spine and thoracic spine done on 4/20/2024.  There is a large left C6-7 disc herniation with compression of the spinal cord and severe left foraminal stenosis at this level.  There is mild spondylosis throughout her cervical spine.  In the thoracic spine there is some disc disease as well with broad-based disc bulging at the T10-11 level.  This indents the anterior thecal sac without any significant spinal cord compression.  I also ordered and personally reviewed upright x-rays of the cervical spine done today, which shows some loss of lordosis, but no instability/spondylolisthesis.  She has progressive cervical myelopathy with weakness as described in the physical exam section with left leg and bilateral  weakness.  Because of this and the severity of neural compression at the C6-7 level, I recommended surgery.  I went over surgical options including anterior cervical discectomy and fusion, anterior cervical discectomy and total disc replacement, and posterior cervical decompression.  I think the best option is anterior cervical discectomy and total disc replacement.  I explained the surgery and risks of the surgery including weakness, numbness, csf leak, infection, and spinal instability.        Gustabo Catalan MD

## 2024-06-13 ENCOUNTER — LAB (OUTPATIENT)
Dept: LAB | Facility: LAB | Age: 44
End: 2024-06-13
Payer: COMMERCIAL

## 2024-06-13 ENCOUNTER — APPOINTMENT (OUTPATIENT)
Dept: NEUROSURGERY | Facility: CLINIC | Age: 44
End: 2024-06-13
Payer: COMMERCIAL

## 2024-06-13 ENCOUNTER — HOSPITAL ENCOUNTER (OUTPATIENT)
Dept: RADIOLOGY | Facility: HOSPITAL | Age: 44
Discharge: HOME | End: 2024-06-13
Payer: COMMERCIAL

## 2024-06-13 VITALS
HEIGHT: 66 IN | BODY MASS INDEX: 25.07 KG/M2 | SYSTOLIC BLOOD PRESSURE: 108 MMHG | WEIGHT: 156 LBS | DIASTOLIC BLOOD PRESSURE: 70 MMHG | HEART RATE: 72 BPM

## 2024-06-13 DIAGNOSIS — M50.00 HERNIATION OF INTERVERTEBRAL DISC OF CERVICAL SPINE WITH MYELOPATHY: Primary | ICD-10-CM

## 2024-06-13 DIAGNOSIS — G89.4 CHRONIC PAIN SYNDROME: ICD-10-CM

## 2024-06-13 DIAGNOSIS — K90.9 IDIOPATHIC STEATORRHEA (HHS-HCC): ICD-10-CM

## 2024-06-13 DIAGNOSIS — G62.9 NEUROPATHY: ICD-10-CM

## 2024-06-13 DIAGNOSIS — D50.9 IRON DEFICIENCY ANEMIA, UNSPECIFIED IRON DEFICIENCY ANEMIA TYPE: ICD-10-CM

## 2024-06-13 DIAGNOSIS — M47.812 CERVICAL SPONDYLOSIS WITHOUT MYELOPATHY: ICD-10-CM

## 2024-06-13 LAB
BASOPHILS # BLD AUTO: 0.05 X10*3/UL (ref 0–0.1)
BASOPHILS NFR BLD AUTO: 0.9 %
EOSINOPHIL # BLD AUTO: 0.13 X10*3/UL (ref 0–0.7)
EOSINOPHIL NFR BLD AUTO: 2.2 %
ERYTHROCYTE [DISTWIDTH] IN BLOOD BY AUTOMATED COUNT: 12.7 % (ref 11.5–14.5)
FERRITIN SERPL-MCNC: 31 NG/ML (ref 8–150)
HCT VFR BLD AUTO: 34.8 % (ref 36–46)
HGB BLD-MCNC: 11.7 G/DL (ref 12–16)
IMM GRANULOCYTES # BLD AUTO: 0.02 X10*3/UL (ref 0–0.7)
IMM GRANULOCYTES NFR BLD AUTO: 0.3 % (ref 0–0.9)
IRON SATN MFR SERPL: 13 % (ref 25–45)
IRON SERPL-MCNC: 33 UG/DL (ref 35–150)
LYMPHOCYTES # BLD AUTO: 1.21 X10*3/UL (ref 1.2–4.8)
LYMPHOCYTES NFR BLD AUTO: 20.8 %
MCH RBC QN AUTO: 30.3 PG (ref 26–34)
MCHC RBC AUTO-ENTMCNC: 33.6 G/DL (ref 32–36)
MCV RBC AUTO: 90 FL (ref 80–100)
MONOCYTES # BLD AUTO: 0.29 X10*3/UL (ref 0.1–1)
MONOCYTES NFR BLD AUTO: 5 %
NEUTROPHILS # BLD AUTO: 4.13 X10*3/UL (ref 1.2–7.7)
NEUTROPHILS NFR BLD AUTO: 70.8 %
NRBC BLD-RTO: 0 /100 WBCS (ref 0–0)
PLATELET # BLD AUTO: 111 X10*3/UL (ref 150–450)
RBC # BLD AUTO: 3.86 X10*6/UL (ref 4–5.2)
TIBC SERPL-MCNC: 264 UG/DL (ref 240–445)
UIBC SERPL-MCNC: 231 UG/DL (ref 110–370)
VIT B12 SERPL-MCNC: 227 PG/ML (ref 211–911)
WBC # BLD AUTO: 5.8 X10*3/UL (ref 4.4–11.3)

## 2024-06-13 PROCEDURE — 3008F BODY MASS INDEX DOCD: CPT | Performed by: NEUROLOGICAL SURGERY

## 2024-06-13 PROCEDURE — 36415 COLL VENOUS BLD VENIPUNCTURE: CPT

## 2024-06-13 PROCEDURE — 1036F TOBACCO NON-USER: CPT | Performed by: NEUROLOGICAL SURGERY

## 2024-06-13 PROCEDURE — 82728 ASSAY OF FERRITIN: CPT

## 2024-06-13 PROCEDURE — 85025 COMPLETE CBC W/AUTO DIFF WBC: CPT

## 2024-06-13 PROCEDURE — 82607 VITAMIN B-12: CPT

## 2024-06-13 PROCEDURE — 99205 OFFICE O/P NEW HI 60 MIN: CPT | Performed by: NEUROLOGICAL SURGERY

## 2024-06-13 PROCEDURE — 72040 X-RAY EXAM NECK SPINE 2-3 VW: CPT | Performed by: RADIOLOGY

## 2024-06-13 PROCEDURE — 83540 ASSAY OF IRON: CPT

## 2024-06-13 PROCEDURE — 72040 X-RAY EXAM NECK SPINE 2-3 VW: CPT

## 2024-06-13 PROCEDURE — 83550 IRON BINDING TEST: CPT

## 2024-06-13 ASSESSMENT — ENCOUNTER SYMPTOMS
DEPRESSION: 0
OCCASIONAL FEELINGS OF UNSTEADINESS: 1
LOSS OF SENSATION IN FEET: 1

## 2024-06-13 ASSESSMENT — PAIN SCALES - GENERAL: PAINLEVEL: 2

## 2024-06-14 ENCOUNTER — OFFICE VISIT (OUTPATIENT)
Dept: OBSTETRICS AND GYNECOLOGY | Facility: CLINIC | Age: 44
End: 2024-06-14
Payer: COMMERCIAL

## 2024-06-14 VITALS
BODY MASS INDEX: 24.75 KG/M2 | WEIGHT: 154 LBS | DIASTOLIC BLOOD PRESSURE: 64 MMHG | SYSTOLIC BLOOD PRESSURE: 116 MMHG | HEIGHT: 66 IN

## 2024-06-14 DIAGNOSIS — Z09 POSTOPERATIVE EXAMINATION: ICD-10-CM

## 2024-06-14 PROBLEM — M50.00 HERNIATION OF INTERVERTEBRAL DISC OF CERVICAL SPINE WITH MYELOPATHY: Status: ACTIVE | Noted: 2024-06-13

## 2024-06-14 PROCEDURE — 3008F BODY MASS INDEX DOCD: CPT | Performed by: OBSTETRICS & GYNECOLOGY

## 2024-06-14 PROCEDURE — 99213 OFFICE O/P EST LOW 20 MIN: CPT | Performed by: OBSTETRICS & GYNECOLOGY

## 2024-06-14 PROCEDURE — 1036F TOBACCO NON-USER: CPT | Performed by: OBSTETRICS & GYNECOLOGY

## 2024-06-14 RX ORDER — TIZANIDINE 2 MG/1
TABLET ORAL
Qty: 120 TABLET | Refills: 0 | Status: SHIPPED | OUTPATIENT
Start: 2024-06-14

## 2024-06-14 ASSESSMENT — ENCOUNTER SYMPTOMS
NEUROLOGICAL NEGATIVE: 0
PSYCHIATRIC NEGATIVE: 0
RESPIRATORY NEGATIVE: 0
CARDIOVASCULAR NEGATIVE: 0
HEMATOLOGIC/LYMPHATIC NEGATIVE: 0
ALLERGIC/IMMUNOLOGIC NEGATIVE: 0
EYES NEGATIVE: 0
ENDOCRINE NEGATIVE: 0
MUSCULOSKELETAL NEGATIVE: 0
CONSTITUTIONAL NEGATIVE: 0
GASTROINTESTINAL NEGATIVE: 0

## 2024-06-14 ASSESSMENT — PAIN SCALES - GENERAL: PAINLEVEL: 0-NO PAIN

## 2024-06-14 NOTE — PROGRESS NOTES
Emergency Department Attending Note    Patient: Max Matthews Age: 5 year old Sex: male   MRN: [id number] : 2017 Encounter Date: 2022       HISTORY OF PRESENT ILLNESS  -year-old male for evaluation of left tongue laceration.    History obtained on discussion with family at [facility].  No past medical history, no regular medications, vaccinations are up-to-date.    Family was contacted by school today that he had tripped and fallen, limited details about further mechanism.  This occurred around 9 AM, and he had bitten the left side of his tongue with this fall.  Was bleeding from the left side of his tongue, was bleeding on their way to the ER, stopped for approximately 3 minutes, and then resume bleeding, although less than initially at around 11 AM and has been continuing since then.    Family again with limited additional history, no known blunt head injury, no known loss of consciousness.  Child's been acting at his baseline, no nausea or vomiting.  Moving all extremities, no distress.  Patient himself is only complaining of tongue pain, no injuries elsewhere.  No difficulty breathing, no cough, no choking.      REVIEW OF SYSTEMS  Review of Systems   Constitutional: Negative for appetite change, crying and fever.   HENT: Negative for congestion, ear pain, rhinorrhea and sore throat.         Laceration   Eyes: Negative for discharge and redness.   Respiratory: Negative for cough, wheezing and stridor.    Cardiovascular: Negative for chest pain and cyanosis.   Gastrointestinal: Negative for abdominal pain, diarrhea, nausea and vomiting.   Genitourinary: Negative for decreased urine volume, dysuria, frequency and urgency.   Musculoskeletal: Negative for gait problem and myalgias.   Skin: Negative for color change, pallor and rash.   Neurological: Negative for speech difficulty and weakness.   Psychiatric/Behavioral: Negative for agitation, behavioral problems and confusion.              PAST  Subjective   Patient ID: Ally Carpio is a 43 y.o. female who presents for Post-op Follow-up (Last Pap 12/22/23 wnl. HPV -. Mammogram 4/25/23 -.).  Here postoperatively from diagnostic hysteroscopy D&C placement of Mirena IUD.  Patient notes that subsequently the Mirena was removed because she was complaining of cramping.  Patient notes she is not bleeding at this point.  Denies nausea vomiting fever chills no GI  complaints.  Some abdominal crampy burning sensation occasionally.  Reviewed findings and pathology with patient    Review of Systems  Crampy burning pain lower pelvis occasionally.  No inciting factors.  Self resolving.  All other systems negative    Objective   Physical Exam  Vitals reviewed.   Constitutional:       Appearance: Normal appearance. She is normal weight.   Cardiovascular:      Rate and Rhythm: Normal rate and regular rhythm.   Pulmonary:      Effort: Pulmonary effort is normal.      Breath sounds: Normal breath sounds.   Abdominal:      General: Abdomen is flat. Bowel sounds are normal.      Palpations: Abdomen is soft.   Genitourinary:     Comments: External genitalia unremarkable  Vagina clear  Cervix closed uterus normal anteverted  Adnexa without masses or tenderness  Perineum without lesions or swelling  Neurological:      General: No focal deficit present.      Mental Status: She is alert.   Psychiatric:         Mood and Affect: Mood normal.         Behavior: Behavior normal.         Thought Content: Thought content normal.         Judgment: Judgment normal.         Assessment/Plan   Diagnoses and all orders for this visit:  Postoperative examination     Patient doing well postoperatively.  Occasional burning sensation but appears to be rare self-limiting lasting only a few minutes.  Patient not concerned.  Will observe.  No bleeding.  Reviewed options with patient.  Did have Mirena IUD removed due to cramping.  Discussed medication versus surgical approach.  Discussed  HISTORY       No past medical history on file. No past surgical history on file.     Additional PMH (also as noted in hpi & pmh section):  [] Denies PMH  [] As Above Additional PSH (also as noted in hpi & PSH section) :  [] Denies PSH  [] Surgeries:   [] As above          No family history on file.    Additional SH:  [] Denies etoh [] Social etoh[] daily etoh  [] Denies tob [] Smoker   [] Denies illicit substances  [] Cocaine   [] Marijuana   [] Heroin  [x] Lives at home  [] Lives in nursing home / care facility  [] Lives in assisted living / group home Additional FH:          Prior to Admission medications    Not on File     Not on File        PHYSICAL EXAMINATION  ED Triage Vitals [11/29/22 0950]   BP (!) 108/73   Heart Rate 121   Resp 26   Temp 98.1 °F (36.7 °C)   SpO2 97 %       Physical Exam  Vitals and nursing note reviewed.   Constitutional:       General: He is active. He is not in acute distress.     Appearance: Normal appearance. He is not toxic-appearing.   HENT:      Head: Normocephalic and atraumatic.      Nose: Nose normal.      Mouth/Throat:      Mouth: Mucous membranes are moist.      Comments: 1 cm straight laceration the left side of tongue, not a through and through, through to the edge of tongue.  Some mild oozing from this.  Superior aspect oozing, on the inferior aspect is superficial abrasion with no active bleeding.  No difficulty managing secretions.  see picture     Neck: Normal range of motion and neck supple.   Eyes:      Conjunctiva/sclera: Conjunctivae normal.   Cardiovascular:      Rate and Rhythm: Normal rate and regular rhythm.      Heart sounds: Normal heart sounds.   Pulmonary:      Effort: Pulmonary effort is normal.      Breath sounds: Normal breath sounds.   Abdominal:      General: Bowel sounds are normal.      Palpations: Abdomen is soft.      Tenderness: There is no abdominal tenderness.   Musculoskeletal:         General: No deformity. Normal range of motion.   Skin:      placement of subdermal implant or injections.  Also discussed ablation or hysterectomy.  Patient at this point will keep menstrual calendar and will reevaluate in 3 months.  Will call before then if she continues to have issues instructions and precautions given    Kwaku Schafer MD 06/14/24 10:48 AM    General: Skin is warm.      Capillary Refill: Capillary refill takes less than 2 seconds.      Coloration: Skin is not cyanotic.      Findings: No rash.   Neurological:      General: No focal deficit present.      Mental Status: He is alert.      Cranial Nerves: No cranial nerve deficit.                RESULTS  Labs: No results found for this visit on 11/29/22.  Imaging:   No orders to display        [x] I personally reviewed the imaging study noted, my pertinent gross findings noted, pending final radiologist complete interpretation:    EKG: [x] I personally reviewed this EKG with pertinent gross findings noted, pending cardiologist review:       MEDICAL DECISION MAKING  MDM  Number of Diagnoses or Management Options  Laceration of tongue, initial encounter  Diagnosis management comments: 5-year-old male with well approximated tongue laceration on the left aspect of tongue, not through and through from the superior to inferior surface, not through to the lateral edge.  Does have some mild oozing of blood, improved after direct pressure by father.  Will give cold popsicle to encourage vasoconstriction and reevaluate.  If hemostatic, will likely be able to discharge home for continued symptomatic monitoring and management.  If having continued bleeding, may further require discussion with oral surgery if repair is indicated in this 5-year-old.         ED Medication Orders (From admission, onward)    Ordered Start     Status Ordering Provider    11/29/22 1000 11/29/22 1001  ibuprofen (CHILDRENS ADVIL) 100 MG/5ML suspension 152 mg  ONCE         Last MAR action: Given MAINE REECE        Procedures      IMPRESSION AND PLAN  ED Diagnosis   1. Laceration of tongue, initial encounter       Disposition:         There is no disposition no dispo time  There is no comment        Teaching-Supervisory Addendum-Brief    I participated in the following activities of this patients care: the medical history, the physical exam,  medical decision making, the procedure.    I personally performed: supervision of the patient's care, the medical history, the physical exam, the medical decision making.    The case was discussed with: the resident. rangel goodman   Procedures: I directly supervised the entire procedure.    Evaluation and management service: I agree with the evaluation and management decisions made in this patient's care.    Results interpretation: I agree with the study interpretation in this patient's care.       Mari Cabrera MD  11/29/2022 11:38 AM   Emergency Medicine Attending     Mari Cabrera MD  11/29/22 5393

## 2024-06-17 ENCOUNTER — INFUSION (OUTPATIENT)
Dept: HEMATOLOGY/ONCOLOGY | Facility: CLINIC | Age: 44
End: 2024-06-17
Payer: COMMERCIAL

## 2024-06-17 ENCOUNTER — OFFICE VISIT (OUTPATIENT)
Dept: HEMATOLOGY/ONCOLOGY | Facility: CLINIC | Age: 44
End: 2024-06-17
Payer: COMMERCIAL

## 2024-06-17 VITALS
HEART RATE: 46 BPM | DIASTOLIC BLOOD PRESSURE: 57 MMHG | WEIGHT: 153.55 LBS | TEMPERATURE: 98.1 F | SYSTOLIC BLOOD PRESSURE: 100 MMHG | RESPIRATION RATE: 18 BRPM | OXYGEN SATURATION: 98 % | BODY MASS INDEX: 24.78 KG/M2

## 2024-06-17 VITALS
RESPIRATION RATE: 17 BRPM | DIASTOLIC BLOOD PRESSURE: 50 MMHG | HEART RATE: 56 BPM | SYSTOLIC BLOOD PRESSURE: 90 MMHG | OXYGEN SATURATION: 98 %

## 2024-06-17 DIAGNOSIS — E61.1 IRON DEFICIENCY: ICD-10-CM

## 2024-06-17 DIAGNOSIS — D50.9 IRON DEFICIENCY ANEMIA, UNSPECIFIED IRON DEFICIENCY ANEMIA TYPE: Primary | ICD-10-CM

## 2024-06-17 DIAGNOSIS — K90.9 IDIOPATHIC STEATORRHEA (HHS-HCC): ICD-10-CM

## 2024-06-17 PROCEDURE — 99214 OFFICE O/P EST MOD 30 MIN: CPT | Performed by: PHYSICIAN ASSISTANT

## 2024-06-17 PROCEDURE — 2500000004 HC RX 250 GENERAL PHARMACY W/ HCPCS (ALT 636 FOR OP/ED): Performed by: PHYSICIAN ASSISTANT

## 2024-06-17 PROCEDURE — 3008F BODY MASS INDEX DOCD: CPT | Performed by: PHYSICIAN ASSISTANT

## 2024-06-17 PROCEDURE — 96365 THER/PROPH/DIAG IV INF INIT: CPT | Mod: INF

## 2024-06-17 RX ORDER — ALBUTEROL SULFATE 0.83 MG/ML
3 SOLUTION RESPIRATORY (INHALATION) AS NEEDED
Status: CANCELLED | OUTPATIENT
Start: 2024-12-14

## 2024-06-17 RX ORDER — EPINEPHRINE 0.3 MG/.3ML
0.3 INJECTION SUBCUTANEOUS EVERY 5 MIN PRN
Status: DISCONTINUED | OUTPATIENT
Start: 2024-06-17 | End: 2024-06-17 | Stop reason: HOSPADM

## 2024-06-17 RX ORDER — DIPHENHYDRAMINE HYDROCHLORIDE 50 MG/ML
50 INJECTION INTRAMUSCULAR; INTRAVENOUS AS NEEDED
Status: DISCONTINUED | OUTPATIENT
Start: 2024-06-17 | End: 2024-06-17 | Stop reason: HOSPADM

## 2024-06-17 RX ORDER — ALBUTEROL SULFATE 0.83 MG/ML
3 SOLUTION RESPIRATORY (INHALATION) AS NEEDED
Status: DISCONTINUED | OUTPATIENT
Start: 2024-06-17 | End: 2024-06-17 | Stop reason: HOSPADM

## 2024-06-17 RX ORDER — FAMOTIDINE 10 MG/ML
20 INJECTION INTRAVENOUS ONCE AS NEEDED
Status: CANCELLED | OUTPATIENT
Start: 2024-12-14

## 2024-06-17 RX ORDER — HEPARIN 100 UNIT/ML
500 SYRINGE INTRAVENOUS AS NEEDED
Status: CANCELLED | OUTPATIENT
Start: 2024-06-17

## 2024-06-17 RX ORDER — HEPARIN SODIUM,PORCINE/PF 10 UNIT/ML
50 SYRINGE (ML) INTRAVENOUS AS NEEDED
Status: CANCELLED | OUTPATIENT
Start: 2024-06-17

## 2024-06-17 RX ORDER — FAMOTIDINE 10 MG/ML
20 INJECTION INTRAVENOUS ONCE AS NEEDED
Status: DISCONTINUED | OUTPATIENT
Start: 2024-06-17 | End: 2024-06-17 | Stop reason: HOSPADM

## 2024-06-17 RX ORDER — DIPHENHYDRAMINE HYDROCHLORIDE 50 MG/ML
50 INJECTION INTRAMUSCULAR; INTRAVENOUS AS NEEDED
Status: CANCELLED | OUTPATIENT
Start: 2024-12-14

## 2024-06-17 RX ORDER — EPINEPHRINE 0.3 MG/.3ML
0.3 INJECTION SUBCUTANEOUS EVERY 5 MIN PRN
Status: CANCELLED | OUTPATIENT
Start: 2024-12-14

## 2024-06-17 ASSESSMENT — PAIN SCALES - GENERAL: PAINLEVEL: 3

## 2024-06-17 NOTE — PROGRESS NOTES
Patient Visit Information:   Visit Type: Benign Heme Follow-up     Cancer History:   Treatment Synopsis:    42 female referred by Dr. Cooney for CAMDEN and IV iron.    Upon review of labs noted to be anemic with iron deficiency ~1 year ago. Started on oral iron but can't tolerate due to GI upset so stopped taking it. Also of note is thrombocytopenia, also new, last cbc with normal plt count was in  and no labs done between  till now.    On assessment, reports heavy menses and has been experiencing PIERRE and heart palpitations. Notes that she was recently diagnosed with psoriasis but might also have psoriatic arthritis, work up being done now. Otherwise doing well. Denies F/C/night sweats, unintentional weight loss, anorexia, fatigue, HA, change in vision/hearing, palpitations, CP, SOB, n/v/d/c/abd pain, bleeding, melena, LAD, peripheral neuropathy, rash.     S/P 3 doses of Venofer, last given 2023. Had EGD and VCE on 2023 c/w gastritis. No c-scope performed.    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    History of Present Illness:   Patient presents for follow up. Patient reports worsening fatigue otherwise doing well.     Review of Systems:    12 pt ROS was performed, pertinent positive and negative findings as per HPI above, remainder of systems reviewed and are negative.    Allergies and Intolerances:          Allergies   Allergen Reactions    Balsalazide Other     Mouth ulcers    Mesalamine Other and Rash         Outpatient Medication Profile:   Current Outpatient Medications on File Prior to Visit   Medication Sig Dispense Refill    adalimumab-adaz (Hyrimoz) 40 mg/0.8 mL syringe Inject 40 mg under the skin every 14 (fourteen) days.      chlorhexidine (Peridex) 0.12 % solution Use 15 mL in the mouth or throat if needed  "for wound care.      gabapentin (Neurontin) 300 mg capsule Take 1 capsule (300 mg) by mouth 2 times a day. 60 capsule 11    levothyroxine (Synthroid, Levoxyl) 25 mcg tablet Take 0.5 tablets (12.5 mcg) by mouth once daily. 45 tablet 1    tacrolimus (Protopic) 0.1 % ointment       tiZANidine (Zanaflex) 2 mg tablet TAKE ONE TABLET BY MOUTH EVERY 6 HOURS AS NEEDED for muscle spasms 120 tablet 0    [] cyanocobalamin (Vitamin B-12) 500 mcg tablet Take 2 tablets (1,000 mcg) by mouth once daily. 180 tablet 0    pantoprazole (ProtoNix) 40 mg EC tablet Take 1 tablet (40 mg) by mouth once daily. Do not crush, chew, or split. 30 tablet 2    [DISCONTINUED] tiZANidine (Zanaflex) 2 mg tablet Take 1 tablet (2 mg) by mouth every 6 hours if needed for muscle spasms. 120 tablet 0     No current facility-administered medications on file prior to visit.         2024     3:45 PM 2024     1:58 PM 2024    11:11 AM 2024     8:35 AM 2024    10:23 AM 2024    10:11 AM 2024     1:56 PM   Vitals   Systolic 102 117 94 108 116 100 90   Diastolic 66 75 66 70 64 57 50   Heart Rate 76  74 72  46 56   Temp 36.8 °C (98.2 °F)     36.7 °C (98.1 °F)    Resp 16     18 17   Height (in)   1.676 m (5' 6\") 1.676 m (5' 6\") 1.676 m (5' 6\")     Weight (lb)  158 157.4 156 154 153.55    BMI  25.5 kg/m2 25.41 kg/m2 25.18 kg/m2 24.86 kg/m2 24.78 kg/m2    BSA (m2)  1.83 m2 1.82 m2 1.82 m2 1.8 m2 1.8 m2    Visit Report  Report Report Report Report Report Report       Physical Exam:   Constitutional: alert, awake and oriented, not in acute distress   HEENT: moist mucous membranes, normal nose   Neck: supple, no lymphadenopathy   EYES: PERRL, EOM intact, conjunctiva normal  Skin: no jaundice, rash or erythema  Neurological: AAOx3, no gross focal deficit   Psychiatric: normal mood and behavior     Labs:  Lab Results   Component Value Date    WBC 5.8 2024    NEUTROABS 4.13 2024    IGABSOL 0.02 2024    LYMPHSABS " 1.21 06/13/2024    MONOSABS 0.29 06/13/2024    EOSABS 0.13 06/13/2024    BASOSABS 0.05 06/13/2024    RBC 3.86 (L) 06/13/2024    MCV 90 06/13/2024    MCHC 33.6 06/13/2024    HGB 11.7 (L) 06/13/2024    HCT 34.8 (L) 06/13/2024     (L) 06/13/2024     Lab Results   Component Value Date    RETICCTPCT 2.0 08/04/2023      Lab Results   Component Value Date    CREATININE 0.85 05/16/2024    BUN 14 05/16/2024    EGFR 88 05/16/2024     05/16/2024    K 4.0 05/16/2024     (H) 05/16/2024    CO2 24 05/16/2024      Lab Results   Component Value Date    ALT 6 (L) 02/02/2024    AST 7 (L) 02/02/2024    ALKPHOS 35 02/02/2024    BILITOT 0.4 02/02/2024      Lab Results   Component Value Date    TSH 2.78 03/12/2024     Lab Results   Component Value Date    TSH 2.78 03/12/2024     Lab Results   Component Value Date    IRON 33 (L) 06/13/2024    TIBC 264 06/13/2024    FERRITIN 31 06/13/2024      Lab Results   Component Value Date    OJUGGFZR05 227 06/13/2024      Lab Results   Component Value Date    FOLATE 11.6 09/30/2023     Lab Results   Component Value Date    ALEXEY POSITIVE (A) 08/01/2023    RF <10 10/25/2023    SEDRATE 37 (H) 10/25/2023      Lab Results   Component Value Date    CRP 0.11 02/02/2024      Lab Results   Component Value Date    HAPTOGLOBIN 190 08/04/2023     Lab Results   Component Value Date    SPEP NORMAL 08/01/2023     Lab Results   Component Value Date     08/04/2023     08/04/2023     08/04/2023        Assessment:    42 female referred for CAMDEN and IV iron due to heavy menses. Can't tolerate oral iron.     S/P 3 doses of Venofer, last given 6/19/2023. Had EGD and VCE on 6/30/2023 c/w gastritis. No c-scope performed.    Thrombocytopenia w/u including abd US (r/o liver etiologies) unremarkble. Likely ITP    Plan:    Reviewed and discussed lab, imaging, and pathology results with patient in detail as well as diagnosis, prognosis, and treatment options.    Continue Venofer x 3 doses  today and continue to monitor every 6 months.    Platelet improve    Will send Rx for B12 injection has been on oral for few months with noc hange in levels\    BMBx w low level of  but otherwise no evidence of malignancy.    F/U w/GYN, GI, neuro    F/U w/PCP    RTC in 6 month    Patient verbalized understanding, and all her questions were answered to her satisfaction.     30 min spent with patient greater than 50 % of which was spent in consultation, counselling and coordination of care.

## 2024-06-17 NOTE — PROGRESS NOTES
Ally Carpio tolerated iron infusion without incident, observed upon completion of infusion for 30 minutes. Patient is aware of follow-up appointments. Pt denies dizziness/lightheadedness.

## 2024-06-18 ENCOUNTER — TELEPHONE (OUTPATIENT)
Dept: HEMATOLOGY/ONCOLOGY | Facility: CLINIC | Age: 44
End: 2024-06-18

## 2024-06-18 ENCOUNTER — CLINICAL SUPPORT (OUTPATIENT)
Dept: SLEEP MEDICINE | Facility: CLINIC | Age: 44
End: 2024-06-18
Payer: COMMERCIAL

## 2024-06-18 DIAGNOSIS — G47.33 OBSTRUCTIVE SLEEP APNEA: ICD-10-CM

## 2024-06-18 RX ORDER — CYANOCOBALAMIN 1000 UG/ML
1000 INJECTION, SOLUTION INTRAMUSCULAR; SUBCUTANEOUS SEE ADMIN INSTRUCTIONS
Qty: 30 ML | Refills: 3 | Status: SHIPPED | OUTPATIENT
Start: 2024-06-18

## 2024-06-18 RX ORDER — SYRINGE W-NEEDLE,DISPOSAB,3 ML 25GX5/8"
1 SYRINGE, EMPTY DISPOSABLE MISCELLANEOUS SEE ADMIN INSTRUCTIONS
Qty: 30 EACH | Refills: 11 | Status: SHIPPED | OUTPATIENT
Start: 2024-06-18

## 2024-06-18 NOTE — PROGRESS NOTES
Type of Study: HOME SLEEP STUDY - NOMAD     The patient received equipment and instructions for use of the [employer] Nomad HSAT device. The patient was instructed how to apply the effort belts, cannula, thermistor. It was also explained how the Nomad and oximeter components work.  The patient was asked to record their sleep for an 8-hour period.     The patient was informed of their responsibility for the device and acknowledged this by signing the HSAT device contract. The patient was asked to return the device on 06/19/2024 by 10 AM to the Respiratory Care Department at [facility].     The patient was instructed to call 911 as usual for any medical- emergencies while at home.  The patient was also given a phone number for troubleshooting when using the device in case there were additional questions.

## 2024-06-19 ENCOUNTER — APPOINTMENT (OUTPATIENT)
Dept: PRIMARY CARE | Facility: CLINIC | Age: 44
End: 2024-06-19
Payer: COMMERCIAL

## 2024-06-19 VITALS
TEMPERATURE: 98.4 F | DIASTOLIC BLOOD PRESSURE: 60 MMHG | BODY MASS INDEX: 24.69 KG/M2 | SYSTOLIC BLOOD PRESSURE: 102 MMHG | HEART RATE: 84 BPM | WEIGHT: 153 LBS | OXYGEN SATURATION: 99 %

## 2024-06-19 DIAGNOSIS — K21.9 GASTROESOPHAGEAL REFLUX DISEASE WITHOUT ESOPHAGITIS: ICD-10-CM

## 2024-06-19 DIAGNOSIS — K21.9 GASTROESOPHAGEAL REFLUX DISEASE, UNSPECIFIED WHETHER ESOPHAGITIS PRESENT: ICD-10-CM

## 2024-06-19 DIAGNOSIS — E03.9 HYPOTHYROIDISM, UNSPECIFIED TYPE: ICD-10-CM

## 2024-06-19 DIAGNOSIS — G81.94 LEFT HEMIPARESIS (MULTI): ICD-10-CM

## 2024-06-19 DIAGNOSIS — G89.4 CHRONIC PAIN SYNDROME: Primary | ICD-10-CM

## 2024-06-19 PROBLEM — E66.09 CLASS 2 OBESITY DUE TO EXCESS CALORIES WITHOUT SERIOUS COMORBIDITY WITH BODY MASS INDEX (BMI) OF 37.0 TO 37.9 IN ADULT: Status: RESOLVED | Noted: 2023-03-31 | Resolved: 2024-06-19

## 2024-06-19 PROBLEM — E66.01 MORBID OBESITY (MULTI): Status: RESOLVED | Noted: 2023-11-13 | Resolved: 2024-06-19

## 2024-06-19 PROBLEM — E66.812 CLASS 2 OBESITY DUE TO EXCESS CALORIES WITHOUT SERIOUS COMORBIDITY WITH BODY MASS INDEX (BMI) OF 37.0 TO 37.9 IN ADULT: Status: RESOLVED | Noted: 2023-03-31 | Resolved: 2024-06-19

## 2024-06-19 PROCEDURE — 3008F BODY MASS INDEX DOCD: CPT | Performed by: FAMILY MEDICINE

## 2024-06-19 PROCEDURE — 99214 OFFICE O/P EST MOD 30 MIN: CPT | Performed by: FAMILY MEDICINE

## 2024-06-19 RX ORDER — PANTOPRAZOLE SODIUM 40 MG/1
40 TABLET, DELAYED RELEASE ORAL DAILY
Qty: 90 TABLET | Refills: 1 | Status: SHIPPED | OUTPATIENT
Start: 2024-06-19 | End: 2024-12-16

## 2024-06-19 RX ORDER — TAPINAROF 10 MG/1000MG
CREAM TOPICAL
COMMUNITY
Start: 2024-06-04

## 2024-06-19 RX ORDER — SYRINGE,SAFETY WITH NEEDLE,1ML 27GX5/8"
SYRINGE, EMPTY DISPOSABLE MISCELLANEOUS
COMMUNITY
Start: 2024-06-18

## 2024-06-19 RX ORDER — LEVOTHYROXINE SODIUM 25 UG/1
12.5 TABLET ORAL DAILY
Qty: 45 TABLET | Refills: 1 | Status: SHIPPED | OUTPATIENT
Start: 2024-06-19 | End: 2024-12-16

## 2024-06-19 NOTE — PROGRESS NOTES
Assessment     ASSESSMENT/PLAN:      Patient Instructions   Continue appointment with neurology, will order additional test per patient request  Also patient requesting physical therapy referral      Signed by: Chelsie Cooney DO       FUTURE DIRECTION:   []    Subjective   SUBJECTIVE:     HPI : Patient is a 43 y.o. female who presents today for the following:     Paresthesia  Pt states that sx started 2/2024 when she began experiencing numbness and tingling in upper and lower extremities for a month.  Sx progressed burning sensation at the top of her head and burning sensation on bilateral cheeks and ears. This is associated with   increased fatigue that has been persistent throughout. She mentioned RUE weakness so she had an EMG on 3/22/2024 that was + for mild neuropathy across right wrist. Currently she mentions weakness of left leg, decree sensation of left face and forehead and right arm weakness that has persisted.  She also mentions experiencing sharp constant pain that radiates around her thoracic area to anterior abdomen that seem to be worse with eating as well as numbness in her upper back (right > left).  She had an MRI brain done on 3/15/2024 that did not show any acute changes but there was concern of scattered nonspecific white matter that could be due to small vessel ischemic changes versus demyelinating process.  Mentions experiencing occasional slurred speech and inability to multitask.  This has forced her to quit her job.  Was prescribed gabapentin 300 mg twice daily and tizanidine 2 mg as needed, without significant improvement    Back pain  Was seen by neurosurgery feels that back pain is due to his cervical versus thoracic compression.  Recommending anterior cervical discectomy and total disc replacement and posterior cervical compression          PHAN  Tried using cpap  Was experiencing nose sores, waiting for new mask     Hypothyroidism  Has been compliant with levothyroxine 12.5 mcg  "daily, recent thyroid levels were within the normal range    Ulcerative colitis  Following GI [facility]  Had tried mesalamine but experienced adverse reaction was then switched to balsalazide but noticed another adverse reaction.  Started on Hyrimoz q. 14 days    Psoriasis  Following dermatology  Has started tacrolimus ointment    CAM[place]/chronic idiopathic thrombocytopenia  Has been following hematology, she is status post IV Venofer  Patient was experiencing persistent vaginal bleeding, she is status post D&C with placement of Mirena  She is still seeing hematology for anemia and IV Venna fair transfusion        Review of Systems    Past Medical History:   Diagnosis Date    Anemia     Anxiety     GERD (gastroesophageal reflux disease)     Heart murmur     Hx of idiopathic thrombocytopenic purpura     on steroids    Hypothyroidism     Inflammatory bowel disease 2023    Sleep apnea         Past Surgical History:   Procedure Laterality Date     SECTION, LOW TRANSVERSE  ,    ESOPHAGOGASTRODUODENOSCOPY          Current Outpatient Medications   Medication Instructions    chlorhexidine (Peridex) 0.12 % solution 15 mL, Mouth/Throat, As needed    cyanocobalamin (VITAMIN B-12) 1,000 mcg, intramuscular, See admin instructions, Please inject daily x 7, followed by Weekly x 4 and then Monthly    gabapentin (NEURONTIN) 300 mg, oral, 2 times daily    Hyrimoz 40 mg, subcutaneous, Every 14 days    levothyroxine (SYNTHROID, LEVOXYL) 12.5 mcg, oral, Daily    pantoprazole (PROTONIX) 40 mg, oral, Daily, Do not crush, chew, or split.    syringe with needle (Syringe 3cc/22Gx1\") 3 mL 22 gauge x 1\" syringe 1 Application, miscellaneous, See admin instructions    tacrolimus (Protopic) 0.1 % ointment     tiZANidine (Zanaflex) 2 mg tablet TAKE ONE TABLET BY MOUTH EVERY 6 HOURS AS NEEDED for muscle spasms    UltiCare Safety Syringe 3 mL 22 gauge x 1\" syringe     Vtama 1 % cream         Allergies   Allergen Reactions    " Balsalazide Other     Mouth ulcers    Mesalamine Other and Rash        Social History     Socioeconomic History    Marital status:      Spouse name: Not on file    Number of children: Not on file    Years of education: Not on file    Highest education level: Not on file   Occupational History    Not on file   Tobacco Use    Smoking status: Former     Current packs/day: 0.00     Average packs/day: 1 pack/day for 12.5 years (12.5 ttl pk-yrs)     Types: Cigarettes     Start date: 2004     Quit date: 2017     Years since quittin.4    Smokeless tobacco: Never   Vaping Use    Vaping status: Never Used   Substance and Sexual Activity    Alcohol use: Not Currently     Comment: socially    Drug use: Never    Sexual activity: Yes     Partners: Male     Birth control/protection: Male Sterilization     Comment: Pt stopped her Norethindrone about one week ago- does not desire to be on birth control pills any longer   Other Topics Concern    Not on file   Social History Narrative    Not on file     Social Determinants of Health     Financial Resource Strain: Not on file   Food Insecurity: Not on file   Transportation Needs: Not on file   Physical Activity: Not on file   Stress: Not on file   Social Connections: Not on file   Intimate Partner Violence: Not on file   Housing Stability: Not on file        Family History   Problem Relation Name Age of Onset    Colon cancer Mother Shari     COPD Mother Shari     Hyperlipidemia Father Archuleta     Glaucoma Brother      Colon cancer Mother's Brother      Breast cancer Father's Sister Na     Colon cancer Mother's Brother Gary         Objective     OBJECTIVE:     Vitals:    24 1647   BP: 102/60   Pulse: 84   Temp: 36.9 °C (98.4 °F)   SpO2: 99%   Weight: 69.4 kg (153 lb)        Physical Exam  Constitutional:       Appearance: She is ill-appearing.   HENT:      Head: Normocephalic and atraumatic.      Nose: Nose normal.      Mouth/Throat:      Mouth: Mucous membranes are  moist.   Eyes:      General: No visual field deficit.     Pupils: Pupils are equal, round, and reactive to light.   Cardiovascular:      Rate and Rhythm: Normal rate and regular rhythm.      Pulses: Normal pulses.      Heart sounds: No murmur heard.  Pulmonary:      Effort: Pulmonary effort is normal.      Breath sounds: Normal breath sounds.   Abdominal:      Tenderness: There is no abdominal tenderness.   Musculoskeletal:         General: Normal range of motion.      Cervical back: Normal range of motion.   Skin:     General: Skin is warm and dry.   Neurological:      Mental Status: She is alert and oriented to person, place, and time.      Cranial Nerves: Cranial nerves 2-12 are intact.      Sensory: Sensation is intact.      Motor: Weakness (left lower extremity) present.      Coordination: Romberg sign negative. Coordination normal. Finger-Nose-Finger Test normal.   Psychiatric:         Mood and Affect: Mood normal.

## 2024-06-21 ENCOUNTER — INFUSION (OUTPATIENT)
Dept: HEMATOLOGY/ONCOLOGY | Facility: CLINIC | Age: 44
End: 2024-06-21
Payer: COMMERCIAL

## 2024-06-21 ENCOUNTER — APPOINTMENT (OUTPATIENT)
Dept: PRIMARY CARE | Facility: CLINIC | Age: 44
End: 2024-06-21
Payer: COMMERCIAL

## 2024-06-21 VITALS
BODY MASS INDEX: 24.69 KG/M2 | RESPIRATION RATE: 18 BRPM | TEMPERATURE: 97.3 F | HEART RATE: 51 BPM | WEIGHT: 153 LBS | OXYGEN SATURATION: 98 % | SYSTOLIC BLOOD PRESSURE: 95 MMHG | DIASTOLIC BLOOD PRESSURE: 59 MMHG

## 2024-06-21 DIAGNOSIS — E61.1 IRON DEFICIENCY: Primary | ICD-10-CM

## 2024-06-21 DIAGNOSIS — G81.94 LEFT HEMIPARESIS (MULTI): ICD-10-CM

## 2024-06-21 DIAGNOSIS — G89.4 CHRONIC PAIN SYNDROME: ICD-10-CM

## 2024-06-21 LAB — CK SERPL-CCNC: 42 U/L (ref 0–215)

## 2024-06-21 PROCEDURE — 96365 THER/PROPH/DIAG IV INF INIT: CPT | Mod: INF

## 2024-06-21 PROCEDURE — 87476 LYME DIS DNA AMP PROBE: CPT

## 2024-06-21 PROCEDURE — 2500000004 HC RX 250 GENERAL PHARMACY W/ HCPCS (ALT 636 FOR OP/ED): Performed by: PHYSICIAN ASSISTANT

## 2024-06-21 PROCEDURE — 83519 RIA NONANTIBODY: CPT

## 2024-06-21 PROCEDURE — 82550 ASSAY OF CK (CPK): CPT

## 2024-06-21 RX ORDER — FAMOTIDINE 10 MG/ML
20 INJECTION INTRAVENOUS ONCE AS NEEDED
Status: DISCONTINUED | OUTPATIENT
Start: 2024-06-21 | End: 2024-06-21 | Stop reason: HOSPADM

## 2024-06-21 RX ORDER — ALBUTEROL SULFATE 0.83 MG/ML
3 SOLUTION RESPIRATORY (INHALATION) AS NEEDED
Status: DISCONTINUED | OUTPATIENT
Start: 2024-06-21 | End: 2024-06-21 | Stop reason: HOSPADM

## 2024-06-21 RX ORDER — HEPARIN SODIUM,PORCINE/PF 10 UNIT/ML
50 SYRINGE (ML) INTRAVENOUS AS NEEDED
OUTPATIENT
Start: 2024-06-21

## 2024-06-21 RX ORDER — FAMOTIDINE 10 MG/ML
20 INJECTION INTRAVENOUS ONCE AS NEEDED
OUTPATIENT
Start: 2024-06-24

## 2024-06-21 RX ORDER — DIPHENHYDRAMINE HYDROCHLORIDE 50 MG/ML
50 INJECTION INTRAMUSCULAR; INTRAVENOUS AS NEEDED
Status: DISCONTINUED | OUTPATIENT
Start: 2024-06-21 | End: 2024-06-21 | Stop reason: HOSPADM

## 2024-06-21 RX ORDER — DIPHENHYDRAMINE HYDROCHLORIDE 50 MG/ML
50 INJECTION INTRAMUSCULAR; INTRAVENOUS AS NEEDED
OUTPATIENT
Start: 2024-06-24

## 2024-06-21 RX ORDER — EPINEPHRINE 0.3 MG/.3ML
0.3 INJECTION SUBCUTANEOUS EVERY 5 MIN PRN
Status: DISCONTINUED | OUTPATIENT
Start: 2024-06-21 | End: 2024-06-21 | Stop reason: HOSPADM

## 2024-06-21 RX ORDER — HEPARIN 100 UNIT/ML
500 SYRINGE INTRAVENOUS AS NEEDED
Status: DISCONTINUED | OUTPATIENT
Start: 2024-06-21 | End: 2024-06-21 | Stop reason: HOSPADM

## 2024-06-21 RX ORDER — HEPARIN 100 UNIT/ML
500 SYRINGE INTRAVENOUS AS NEEDED
OUTPATIENT
Start: 2024-06-21

## 2024-06-21 RX ORDER — EPINEPHRINE 0.3 MG/.3ML
0.3 INJECTION SUBCUTANEOUS EVERY 5 MIN PRN
OUTPATIENT
Start: 2024-06-24

## 2024-06-21 RX ORDER — HEPARIN SODIUM,PORCINE/PF 10 UNIT/ML
50 SYRINGE (ML) INTRAVENOUS AS NEEDED
Status: DISCONTINUED | OUTPATIENT
Start: 2024-06-21 | End: 2024-06-21 | Stop reason: HOSPADM

## 2024-06-21 RX ORDER — ALBUTEROL SULFATE 0.83 MG/ML
3 SOLUTION RESPIRATORY (INHALATION) AS NEEDED
OUTPATIENT
Start: 2024-06-24

## 2024-06-21 ASSESSMENT — PAIN SCALES - GENERAL: PAINLEVEL: 4

## 2024-06-21 NOTE — PATIENT INSTRUCTIONS
Continue appointment with neurology, will order additional test per patient request  Also patient requesting physical therapy referral

## 2024-06-21 NOTE — PROGRESS NOTES
Pt received dose 2 of 3 of venofer without complications. Pt utilizes MyChart and verbalized schedule. Pt left stable and independent.

## 2024-06-24 ENCOUNTER — INFUSION (OUTPATIENT)
Dept: HEMATOLOGY/ONCOLOGY | Facility: CLINIC | Age: 44
End: 2024-06-24
Payer: COMMERCIAL

## 2024-06-24 VITALS
OXYGEN SATURATION: 98 % | BODY MASS INDEX: 24.68 KG/M2 | RESPIRATION RATE: 16 BRPM | WEIGHT: 152.89 LBS | SYSTOLIC BLOOD PRESSURE: 98 MMHG | HEART RATE: 65 BPM | TEMPERATURE: 97.7 F | DIASTOLIC BLOOD PRESSURE: 62 MMHG

## 2024-06-24 DIAGNOSIS — E61.1 IRON DEFICIENCY: ICD-10-CM

## 2024-06-24 LAB
ACHR BIND AB SER-SCNC: 0 NMOL/L (ref 0–0.4)
ERYTHROCYTE [DISTWIDTH] IN BLOOD BY AUTOMATED COUNT: 12.7 % (ref 11.5–14.5)
HCT VFR BLD AUTO: 34.1 % (ref 36–46)
HGB BLD-MCNC: 11.2 G/DL (ref 12–16)
MCH RBC QN AUTO: 30.3 PG (ref 26–34)
MCHC RBC AUTO-ENTMCNC: 32.8 G/DL (ref 32–36)
MCV RBC AUTO: 92 FL (ref 80–100)
PLATELET # BLD AUTO: 102 X10*3/UL (ref 150–450)
RBC # BLD AUTO: 3.7 X10*6/UL (ref 4–5.2)
WBC # BLD AUTO: 4.4 X10*3/UL (ref 4.4–11.3)

## 2024-06-24 PROCEDURE — 96365 THER/PROPH/DIAG IV INF INIT: CPT | Mod: INF

## 2024-06-24 PROCEDURE — 85027 COMPLETE CBC AUTOMATED: CPT

## 2024-06-24 PROCEDURE — 2500000004 HC RX 250 GENERAL PHARMACY W/ HCPCS (ALT 636 FOR OP/ED): Performed by: PHYSICIAN ASSISTANT

## 2024-06-24 RX ORDER — ALBUTEROL SULFATE 0.83 MG/ML
3 SOLUTION RESPIRATORY (INHALATION) AS NEEDED
Status: DISCONTINUED | OUTPATIENT
Start: 2024-06-24 | End: 2024-06-24 | Stop reason: HOSPADM

## 2024-06-24 RX ORDER — DIPHENHYDRAMINE HYDROCHLORIDE 50 MG/ML
50 INJECTION INTRAMUSCULAR; INTRAVENOUS AS NEEDED
OUTPATIENT
Start: 2024-12-14

## 2024-06-24 RX ORDER — DIPHENHYDRAMINE HYDROCHLORIDE 50 MG/ML
50 INJECTION INTRAMUSCULAR; INTRAVENOUS AS NEEDED
Status: DISCONTINUED | OUTPATIENT
Start: 2024-06-24 | End: 2024-06-24 | Stop reason: HOSPADM

## 2024-06-24 RX ORDER — ALBUTEROL SULFATE 0.83 MG/ML
3 SOLUTION RESPIRATORY (INHALATION) AS NEEDED
OUTPATIENT
Start: 2024-12-14

## 2024-06-24 RX ORDER — FAMOTIDINE 10 MG/ML
20 INJECTION INTRAVENOUS ONCE AS NEEDED
OUTPATIENT
Start: 2024-12-14

## 2024-06-24 RX ORDER — HEPARIN 100 UNIT/ML
500 SYRINGE INTRAVENOUS AS NEEDED
OUTPATIENT
Start: 2024-06-24

## 2024-06-24 RX ORDER — EPINEPHRINE 0.3 MG/.3ML
0.3 INJECTION SUBCUTANEOUS EVERY 5 MIN PRN
OUTPATIENT
Start: 2024-12-14

## 2024-06-24 RX ORDER — HEPARIN SODIUM,PORCINE/PF 10 UNIT/ML
50 SYRINGE (ML) INTRAVENOUS AS NEEDED
OUTPATIENT
Start: 2024-06-24

## 2024-06-24 RX ORDER — FAMOTIDINE 10 MG/ML
20 INJECTION INTRAVENOUS ONCE AS NEEDED
Status: DISCONTINUED | OUTPATIENT
Start: 2024-06-24 | End: 2024-06-24 | Stop reason: HOSPADM

## 2024-06-24 RX ORDER — EPINEPHRINE 0.3 MG/.3ML
0.3 INJECTION SUBCUTANEOUS EVERY 5 MIN PRN
Status: DISCONTINUED | OUTPATIENT
Start: 2024-06-24 | End: 2024-06-24 | Stop reason: HOSPADM

## 2024-06-24 ASSESSMENT — PAIN SCALES - GENERAL: PAINLEVEL: 2

## 2024-06-24 NOTE — PROGRESS NOTES
Ally Carpio tolerated iron infusion without incident, observed upon completion of infusion for 30 minutes. Patient is aware of follow-up appointments.

## 2024-06-26 LAB
B BURGDOR DNA SPEC QL NAA+PROBE: NOT DETECTED
SPECIMEN SOURCE: NORMAL

## 2024-07-02 ENCOUNTER — APPOINTMENT (OUTPATIENT)
Dept: OBSTETRICS AND GYNECOLOGY | Facility: CLINIC | Age: 44
End: 2024-07-02
Payer: COMMERCIAL

## 2024-07-02 ENCOUNTER — EVALUATION (OUTPATIENT)
Dept: PHYSICAL THERAPY | Facility: HOSPITAL | Age: 44
End: 2024-07-02
Payer: COMMERCIAL

## 2024-07-02 DIAGNOSIS — G89.4 CHRONIC PAIN SYNDROME: ICD-10-CM

## 2024-07-02 DIAGNOSIS — M54.16 RADICULOPATHY, LUMBAR REGION: ICD-10-CM

## 2024-07-02 DIAGNOSIS — M54.12 RADICULOPATHY, CERVICAL: Primary | ICD-10-CM

## 2024-07-02 PROCEDURE — 97162 PT EVAL MOD COMPLEX 30 MIN: CPT | Mod: GP | Performed by: PHYSICAL THERAPIST

## 2024-07-02 PROCEDURE — 97110 THERAPEUTIC EXERCISES: CPT | Mod: GP | Performed by: PHYSICAL THERAPIST

## 2024-07-02 ASSESSMENT — PAIN SCALES - GENERAL: PAINLEVEL_OUTOF10: 6

## 2024-07-02 ASSESSMENT — ENCOUNTER SYMPTOMS
DEPRESSION: 0
OCCASIONAL FEELINGS OF UNSTEADINESS: 1
LOSS OF SENSATION IN FEET: 0

## 2024-07-02 ASSESSMENT — PAIN DESCRIPTION - DESCRIPTORS: DESCRIPTORS: BURNING;HEAVINESS

## 2024-07-02 ASSESSMENT — PAIN - FUNCTIONAL ASSESSMENT: PAIN_FUNCTIONAL_ASSESSMENT: 0-10

## 2024-07-02 NOTE — PROGRESS NOTES
Physical Therapy    Physical Therapy Cervical Spine Evaluation    Patient Name: Ally Carpio  MRN: [id number]  Today's Date: 2024  Time Calculation  Start Time: 1345  Stop Time: 1440  Time Calculation (min): 55 min  PT Evaluation Time Entry  PT Evaluation (Moderate) Time Entry: 40  PT Therapeutic Procedures Time Entry  Therapeutic Exercise Time Entry: 15             Visit Number: 1  Auth Dates: 60 visits    Current Problem  Problem List Items Addressed This Visit             ICD-10-CM    Radiculopathy, cervical - Primary M54.12    Relevant Orders    Follow Up In Physical Therapy    Radiculopathy, lumbar region M54.16    Relevant Orders    Follow Up In Physical Therapy    Chronic pain syndrome G89.4    Relevant Orders    Follow Up In Physical Therapy          General  Name and  confirmed with patient on this date.  Reason for Referral: Arm and leg weakness  Referred By: Dr. 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    Precautions  Precautions  STEADI Fall Risk Score (The score of 4 or more indicates an increased risk of falling): 2  Precautions Comment: No all risk       Pain  Pain Assessment: 0-10  0-10 (Numeric) Pain Score: 6  Pain Type: Neuropathic pain  Pain Location: Arm  Pain Orientation: Right  Pain Radiating Towards: middle two fingers  Pain Descriptors: Burning, Heaviness    SUBJECTIVE:   Chief complaint:  Patient reports pain and weakness in her right arm and     Pain Better: muscle relaxer  Pain/Sx Worse: use of arms/legs, the more activity, the worse the pain in the arm and weakness in the arm/leg get  Imaging: MRI  Prior level of function: independent, self employed LMT, outdoor activities  Current limitations: running, hiking, stairs, reaching, lifting, performing work activities  Home setup:   Work: self employed LMT  Patients goal: improve strength and function  Prior tx: chiropractor, massage    OBJECTIVE:    Posture: forward head, slightly rounded shoulders,  "flattened lumbar curve    Upper extremity ROM: WNL Unless documented below:  ROM in Degrees RIGHT LEFT   Shoulder Flexion     Shoulder Abduction     Shoulder ER     Shoulder IR     Elbow Flexion     Elbow Extension         Upper Extremity Strength: (5/5 unless noted)   RIGHT LEFT   Shoulder Flexion 4-    Shoulder Abduction 4-    Shoulder ER 4+    Shoulder IR     Elbow Flexion 4    Elbow Extension 4-    Thumb Extension 4    Intrinsics 4+      Cervical ROM:   Degrees   Flexion 75   Extension 65    RIGHT LEFT   Side bend 35 22   Rotation 70 82     Lumbar AROM: WFL and symmetrical (extension noted that it \"feels good\")   Degrees   Flexion    Extension     RIGHT LEFT   Side bend     Rotation       LE MMT  LE (5/5 unless noted) Right Left   Hip Flexion  4-   Hip IR prone  3+   Hip ER prone  4-   Hip Ext prone  3+   Knee Ext  4   Knee Flex  5   DF  3+   EV  4+      Neurological symptoms consistent myotomal pattern of right cervical radiculopathy of C5-7 and left lumbar radiculopathy of L1-4.    Special tests:  Jason Cervical repeated movements:   Lumbar Standing Extension = improved mobility, minimal change in LE MMT  Seated Thoracic Extension = improved mobility, no change in signs or sx  Seated Cervical Retraction = improved mobility, improved right biceps and triceps MMT    Other Measures  Lower Extremity Funtional Score (LEFS): 35     TREATMENT:  EXERCISES       Date 7/2/2024             VISIT# #1 # # #    REPS REPS REPS REPS          Repeated Movements:       Standing Lumbar Ext 2x10      Seated Thoracic Ext 2x10      Seated Cervical Retraction 2x10             Cervical Isometrics (6) X10 ea      Scapular Retraction x10                    Shuttle   SLP              Shuttle   TR/HR                     Tband   Lat Pulls       Tband   Chops       Tband   Mid Row       Tband   Mower starts              Quadruped:       Trans Abs       + Alt UE       + Alt LE       + Bird Dog       + Hydrant                              "                    Stretches:       Hamstring       Hip Flexor       Calf              UT       Levator              HEP See below           Access Code: [email or web address]  URL: [email or web address]  Date: 07/02/2024  Prepared by: Syed Farley    Exercises  - Standing Lumbar Extension with Counter  - 2 x daily - 2 sets - 10 reps  - Seated Thoracic Lumbar Extension  - 2 x daily - 2 sets - 10 reps -   hold  - Seated Cervical Retraction  - 2 x daily - 2 sets - 10 reps -   hold  - Seated Scapular Retraction  - 2 x daily - 2 sets - 10 reps  - Standing Isometric Cervical Flexion with Manual Resistance  - 2 x daily - 10 reps - 5 sec hold  - Standing Isometric Cervical Extension with Manual Resistance  - 2 x daily - 10 reps - 5 sec hold  - Standing Isometric Cervical Sidebending with Manual Resistance  - 2 x daily - 10 reps - 5 sec hold  - Seated Isometric Cervical Rotation  - 2 x daily - 10 reps - 5 sec hold    ASSESSEMENT  Pt is a 43 y.o.  referred for physical therapy by Chelsie Cooney, *  for neck pain. Pt presents with pain and neurological weakness consistent with cervical and lumbar derangements and dysfunction. Patient has some improvements in the UE MMT deficits after initial treatment today. This patient would benefit from a therapy program to restore prior level of function, decrease pain, increase AROM, increase strength and improve posture.    The physical therapy prognosis is good for the patient to achieve their goals.   The pt tolerated therapy treatment today with no adverse effects.  Barriers to therapy/learning include:  none    PLAN  The pt will be seen 2 days a week for 6 weeks.      The pt has been educated about the risks and benefits of physical therapy and gives consent for treatment.     The patient will benefit from physical therapy treatment to include: Treatment/Interventions: Education/ Instruction, Neuromuscular re-education, Therapeutic exercises, Therapeutic  activities, Manual therapy, Mechanical traction, Self care/ home management, Gait training, Electrical stimulation, Dry needling, Cryotherapy, Hot pack, Aquatic therapy    Goals:  Active       PT Problem       PT Goal 1       Start:  07/02/24    Expected End:  08/13/24       Independent home exercise program with 90% teach back capability by the patient           PT Goal 2       Start:  07/02/24    Expected End:  08/13/24       Improve MMT of right UE and left LE deficits to 4+/5 or better to improve joint stability with transfers, standing, walking, and lifting activities           PT Goal 3       Start:  07/02/24    Expected End:  08/13/24       Able to walk up 60 minutes without noting increased pain or fatigue in leg muscles           PT Goal 4       Start:  07/02/24    Expected End:  08/13/24       Functional Outcome LEFS score improves to >/= 65/80 (initially 35/80)            Patient Stated Goal 1       Start:  07/02/24    Expected End:  08/13/24       Able to perform massages again for work without increased pain or weakness in the arm

## 2024-07-03 ENCOUNTER — CLINICAL SUPPORT (OUTPATIENT)
Dept: PREADMISSION TESTING | Facility: HOSPITAL | Age: 44
End: 2024-07-03
Payer: COMMERCIAL

## 2024-07-03 DIAGNOSIS — M50.00 HERNIATION OF INTERVERTEBRAL DISC OF CERVICAL SPINE WITH MYELOPATHY: ICD-10-CM

## 2024-07-03 RX ORDER — BISMUTH SUBSALICYLATE 262 MG
1 TABLET,CHEWABLE ORAL DAILY
COMMUNITY

## 2024-07-05 ENCOUNTER — PATIENT MESSAGE (OUTPATIENT)
Dept: OBSTETRICS AND GYNECOLOGY | Facility: CLINIC | Age: 44
End: 2024-07-05
Payer: COMMERCIAL

## 2024-07-08 ENCOUNTER — TELEPHONE (OUTPATIENT)
Dept: OBSTETRICS AND GYNECOLOGY | Facility: CLINIC | Age: 44
End: 2024-07-08
Payer: COMMERCIAL

## 2024-07-08 NOTE — PROGRESS NOTES
"Date of Service: 7/9/2024  Patient: Ally Carpio  MRN: [id number]  Referring Provider: Chelsie Cooney, *  PCP: Chelsie Cooney DO    History of Present Illness:   Ms. Carpio is a 43 y.o. female who presents to neurology clinic for evaluation of leg weakness, muscle pain. Ally Carpio's past medical history is pertinent for ulcerative colitis, psoriasis, ITP.  She was previously seen by Dr. Sarmiento.    In December 2023 she was diagnosed with ulcerative colitis and started on prednisone 40 mg, which was tapered down by the end of January.  While tapering down her prednisone, she experienced a new onset right abdominal pain and bandlike pain rating from the back wrapping around the front.    Then around February when she stopped steroids she developed left leg weakness along with sharp pains in her limbs.  She had symmetric bilateral paresthesias ascending to her hips.  More recently she reports weakness in her hands, worse on the right and difficulty with fine motor tasks.  She showed me a video of muscle twitching in her arms and legs that will happen throughout the day.  She also reports poor balance due to the left lower extremity weakness.  The paresthesias and numbness have improved, but she reports the sensation of muscle tightness causing significant pain.  She also reports left face numbness to \"deep sensation\".  She is currently on gabapentin 600mg at night and tizanidine PRN. No bowel or bladder issues, except was having diarrhea due to ulcerative colitis, better since starting humira.    She had an EMG completed in 3/2024 which showed mild right median neuropathy across the wrist.  There is no evidence of cervical or lumbar radiculopathy on the right.  No evidence of peripheral polyneuropathy.  No evidence of myopathy.    For the issues above MRI brain was completed which showed nonspecific white matter hyperintensities.  She was evaluated at the [facility] for multiple " sclerosis.  MRI cervical and thoracic spine with and without contrast were completed.  No evidence of demyelinating disease and MS was thought to be very unlikely.  Her MRI cervical spine though did show disc protrusion at C6-C7 that impinged the cervical spinal cord. She was evaluated by neurosurgery given concern for cord compression at C6-C7 on cervical MRI.  Surgery was recommended and is planned for .  She is currently also in physical therapy.    She has also had extensive testing with blood work as detailed below.    Review of Systems:  The systems were reviewed with pertinent positives and negatives documented in the HPI.      Past Medical & Surgical History  Past Medical History:   Diagnosis Date    ALEXEY positive     Anxiety     pt denies    Chronic pain syndrome     GERD (gastroesophageal reflux disease)     Heart murmur     Hx of idiopathic thrombocytopenic purpura     follows with heme, 24: plt 111    Hypothyroidism     CAMDEN (iron deficiency anemia)     24: H/H 11.7/34.8, received IV venofer x 3 doses 2024    Inflammatory bowel disease 2023    Murmur     ECHO 23 unremarkable    Neuropathy     Psoriasis     Radiculopathy, cervical     Radiculopathy, lumbar region     RLS (restless legs syndrome)     Sleep apnea     resolved after weight loss    Ulcerative colitis (Multi)      Past Surgical History:   Procedure Laterality Date     SECTION, LOW TRANSVERSE      x 2, .     COLONOSCOPY      DILATION AND CURETTAGE OF UTERUS      UPPER GASTROINTESTINAL ENDOSCOPY       Social History:   Social History     Tobacco Use    Smoking status: Former     Current packs/day: 0.00     Average packs/day: 1 pack/day for 12.5 years (12.5 ttl pk-yrs)     Types: Cigarettes     Start date: 2004     Quit date: 2017     Years since quittin.5    Smokeless tobacco: Never   Substance Use Topics    Alcohol use: Not Currently     Family History:   No known family history of neuromuscular  "disorders     Medications:     Current Outpatient Medications:     adalimumab-adaz (Hyrimoz) 40 mg/0.8 mL syringe, Inject 40 mg under the skin every 14 (fourteen) days., Disp: , Rfl:     cyanocobalamin (Vitamin B-12) 1,000 mcg/mL injection, Inject 1 mL (1,000 mcg) into the muscle see administration instructions. Please inject daily x 7, followed by Weekly x 4 and then Monthly, Disp: 30 mL, Rfl: 3    gabapentin (Neurontin) 300 mg capsule, Take 1 capsule (300 mg) by mouth 2 times a day., Disp: 60 capsule, Rfl: 11    levothyroxine (Synthroid, Levoxyl) 25 mcg tablet, Take 0.5 tablets (12.5 mcg) by mouth once daily., Disp: 45 tablet, Rfl: 1    multivitamin tablet, Take 1 tablet by mouth once daily., Disp: , Rfl:     pantoprazole (ProtoNix) 40 mg EC tablet, Take 1 tablet (40 mg) by mouth once daily. Do not crush, chew, or split., Disp: 90 tablet, Rfl: 1    syringe with needle (Syringe 3cc/22Gx1\") 3 mL 22 gauge x 1\" syringe, 1 Application see administration instructions., Disp: 30 each, Rfl: 11    tacrolimus (Protopic) 0.1 % ointment, if needed., Disp: , Rfl:     tiZANidine (Zanaflex) 2 mg tablet, TAKE ONE TABLET BY MOUTH EVERY 6 HOURS AS NEEDED for muscle spasms, Disp: 120 tablet, Rfl: 0    UltiCare Safety Syringe 3 mL 22 gauge x 1\" syringe, , Disp: , Rfl:     Vtama 1 % cream, if needed., Disp: , Rfl:      General Physical Exam:  LMP 05/22/2024      She looks well and is not in any acute distress. Breathing comfortably on room air.     Neurological Exam:   Mental status reveals her to be alert and oriented. Speech is intact to conversation.     Cranial nerves:  CN 2   Visual fields full to confrontation.   CN 3, 4, 6   Pupils round, 3 mm in diameter, equally reactive to light. Lids symmetric; no ptosis. EOMs normal alignment, full range.   No nystagmus.   CN 5   Facial sensation intact bilaterally to light touch.   CN 7   Normal and symmetric facial strength. Nasolabial folds symmetric.   CN 8   Hearing intact to " conversation.   CN 9   Palate elevates symmetrically.   CN 11   Normal strength of shoulder shrug and neck turning.   CN 12   Tongue midline, with normal bulk and strength; no fasciculations.      Motor:  Muscle bulk: Normal throughout.  Muscle tone: Normal in both upper and lower extremities.  Movements: No fasciculations, tremors or other abnormal movement.  She did provide a video showing brief fasciculations in her distal forearm.    R L   5 5 Shoulder abduction  5 5 Elbow flexion  5 5 Elbow extension  5 5 Finger flexion  5 5 Finger extension  5- 5 Finger abduction  5 5- Hip flexion  5 5 Hip extension  5 5 Hip abduction (with hip flexed)  5 5 Knee flexion  5 5- Knee extension  5 5- Ankle dorsiflexion (when ankle is positioned in dorsiflexion position she is able to maintain the position with nearly full strength)  5 5 Ankle plantarflexion  5 5 Big toe extension  5 5 Toe flexion    Able to stand on each leg.  Able to squat to the ground and get up     Reflexes                         R     L  Triceps          3      3  Biceps           3      3  Brachiorad    3      3  Reyes       pos  pos  Patellar         2      2   Achilles         2      2    Babinski: toes downgoing to plantar stimulation. No clonus or other pathologic reflexes present.      Sensory:   Light Touch: Normal in all extremities  Pin: Normal in all extremities   Position: Normal in all extremities  Vibration: Normal in all extremities  Temperature: Normal     Coordination:  In both upper extremities, finger-nose-finger was intact without dysmetria or overshoot.   In both lower extremities, heel-to-shin was intact. CELSO were intact in both upper and lower extremities.      Gait:  Station was stable with a normal base.  Slow antalgic gait.  Mildly impaired tandem gait.  No Romberg sign was present.    Results:    Lab Results   Component Value Date    HGBA1C 5.8 (H) 09/30/2023     Lab Results   Component Value Date    OIAGJMZU51 227 06/13/2024      COPPER:   Lab Results   Component Value Date    COPPER 121.0 09/30/2023     IRON STUDIES:   Lab Results   Component Value Date    IRON 33 (L) 06/13/2024    TIBC 264 06/13/2024    FERRITIN 31 06/13/2024     Lab Results   Component Value Date    TSH 2.78 03/12/2024      Lab Results   Component Value Date    ALEXEY POSITIVE (A) 08/01/2023    ANATITER 1:160 08/01/2023     Lab Results   Component Value Date    CKTOTAL 42 06/21/2024       Lab Results   Component Value Date    SPEP NORMAL 08/01/2023     CBC:   Lab Results   Component Value Date    WBC 4.4 06/24/2024    HGB 11.2 (L) 06/24/2024    HCT 34.1 (L) 06/24/2024     (L) 06/24/2024     BMP:   Lab Results   Component Value Date     05/16/2024    K 4.0 05/16/2024     (H) 05/16/2024    CO2 24 05/16/2024    BUN 14 05/16/2024    CREATININE 0.85 05/16/2024    CALCIUM 8.6 05/16/2024    MG 2.16 03/12/2024     LFT:   Lab Results   Component Value Date    ALKPHOS 35 02/02/2024    BILITOT 0.4 02/02/2024    PROT 6.0 (L) 02/02/2024    ALBUMIN 3.8 02/02/2024    ALT 6 (L) 02/02/2024    AST 7 (L) 02/02/2024     Lyme PCR: Not detected  ACH R binding antibody: Negative  Normal ferritin  C-reactive protein less than 0.3  Hep C antibody nonreactive  Hep B surface antigen nonreactive  Hep B surface antibody negative  Hep B core antibody negative  Rheumatoid factor negative  Anticardiolipin antibody normal  Beta glycoprotein normal  C4 complement normal  C3 complement normal    Imaging:  MRI cervical spine ([facility])    At C6-C7 disc osteophyte complex impinges on the cervical cord causing moderate stenosis of the spinal canal.     MRI thoracic spine ([facility])    1.  No intrinsic signal abnormality within the cervical or thoracic   spinal cord.   2.  At C6-C7 disc osteophyte complex impinges on the cervical cord   causing moderate stenosis of the spinal canal.   3.  At T10-T11 there is mild stenosis of the spinal canal and minor cord   impingement.     MRI lumbar  spine    Narrative & Impression   Interpreted By:  Kaylee Kaba,   STUDY:  MRI of the lumbar spine without IV contrast;  5/10/2024 12:25 pm      INDICATION:  Signs/Symptoms:Pain.      COMPARISON:  None.      ACCESSION NUMBER(S):  [id number]      ORDERING CLINICIAN:  MOMO MADRIGAL      TECHNIQUE:  Sagittal and axial STIR and T1-weighted MRI images of the lumbar  spine were acquired using a spondylolysis protocol.  No contrast was  administered.      FINDINGS:  For counting purposes the last lumbarized vertebral body is labeled  L5.      Alignment, vertebral body heights and marrow signal pattern are  within normal limits.      There is desiccated disc signal at L3-L4, L4-L5 and L5-S1. Mild  degenerative endplate changes at L5-S1. There is a Schmorl's node  along the inferior endplate of L5.      The conus terminates at L1-L2 and is unremarkable. Paraspinal soft  tissues are unremarkable.      Evaluation by level:      T12-L1: No spinal canal or neural foraminal stenosis.      L1-L2: No spinal canal or neural foraminal stenosis      L2-L3: No spinal canal or neural foraminal stenosis      L3-L4: Disc bulge and facet arthrosis. No spinal canal stenosis. Mild  neural foraminal stenosis, left greater than right.      L4-L5: Right eccentric disc bulge and facet arthrosis. No spinal  canal stenosis. Mild bilateral neural foraminal stenosis. There may  be abutment of the exiting right L4 nerve root. Please correlate  clinically for symptoms of right L4 radiculopathy.      L5-S1: Disc bulge and facet arthrosis. No spinal canal stenosis. Mild  neural foraminal stenosis.      IMPRESSION:  Degenerative changes in the lower lumbar spine without spinal canal  stenosis. Mild neural foraminal narrowing at L3-L4, L4-L5 and L5-S1       EMG (3/22/2024)  This is a mildly abnormal study. There is electrophysiologic evidence consistent with a very mild, right median neuropathy across the wrist. Clinical correlation is needed to  determine if patient has any symptoms of this finding as can be seen in carpal   tunnel syndrome.     In addition, there is no electrophysiologic evidence of peripheral polyneuropathy, cervical or lumbar radiculopathy, brachial plexopathy or other entrapment neuropathy in the right upper or lower extremity.     There was no definitive electrophysiologic evidence of a left median neuropathy across the wrist.    Impression:  Ally Carpio is a 43 y.o. who presents with migratory paresthesias and extremity pain along with left lower extremity weakness.  Neurological exam shows normal cranial nerves.  Strength testing shows very mild weakness in right finger abduction, and mild weakness in left hip flexion, knee extension and dorsiflexion.  Notably there is some variation in strength on repeat testing.  No fasciculations were observed; however, she did show me a video of a brief fasciculation in her forearm.  No sensory deficits on exam today.  She is hyperreflexic in the bilateral upper extremities with positive Adin signs bilaterally.    She has had extensive workup for the testing above.    She has had extensive serological testing including normal CRP, negative acetylcholine receptor antibody testing, normal vitamin levels, normal copper, negative hepatitis B and C and Lyme, normal complement.  CK was normal  EMG of the right upper and lower extremity only showed mild incidental median neuropathy at the wrist.  No evidence of cervical or lumbar radiculopathy, myopathy or peripheral neuropathy.    MRI brain was unrevealing and nonspecific white matter changes were evaluated for multiple sclerosis at the [facility] [place] which was thought to be unlikely.    She had an MRI cervical spine with and without contrast which showed a herniated disc at C6-C7 with concern for spinal cord impingement.    She also had an MRI thoracic spine with and without contrast which showed mild central canal stenosis at T10-T11 but  no evidence of cord compression. Cervical spine surgery is planned in the upcoming weeks.   MRI lumbar spine showed mild neuroforaminal narrowing at L3-L4, L4-L5 and L5-S1    We reviewed the extensive workup that she has had to date.  No evidence of a peripheral nerve etiology at this time. There is evidence of spinal cord compression at C6-C7 from a herniated disc.  There is some evidence of myelopathic signs on exam including hyperreflexia and positive Adin signs bilaterally. Surgical intervention is planned in the upcoming weeks.      Following surgical recovery, she will notify me regarding her symptoms. If significant weakness remains we can proceed with an EMG of the left side.  In addition I prescribed pregabalin for her pain and recommend she stop gabapentin while trialing pregabalin.     Reviewed and approved by JASMIN BHAKTA on 7/8/24 at 11:48 AM.    I personally spent 60 minutes on the day of the visit completing the review of the medical record and outside records, obtaining history and performing an appropriate physical exam, patient care, counseling and education, placing orders, independently reviewing results, communicating with the patient, coordinating care and performing appropriate clinical documentation.

## 2024-07-08 NOTE — TELEPHONE ENCOUNTER
RN called Patient in response to [email or web address]hart message, verified  Patient  states the bleeding has slowed down significantly from when she MyChart messaged  Telehealth appt scheduled for 7/9/24 with Dr Hanh Harvey RNB

## 2024-07-09 ENCOUNTER — TELEMEDICINE (OUTPATIENT)
Dept: OBSTETRICS AND GYNECOLOGY | Facility: CLINIC | Age: 44
End: 2024-07-09
Payer: COMMERCIAL

## 2024-07-09 ENCOUNTER — APPOINTMENT (OUTPATIENT)
Dept: NEUROLOGY | Facility: CLINIC | Age: 44
End: 2024-07-09
Payer: COMMERCIAL

## 2024-07-09 VITALS
DIASTOLIC BLOOD PRESSURE: 60 MMHG | BODY MASS INDEX: 24.62 KG/M2 | SYSTOLIC BLOOD PRESSURE: 124 MMHG | WEIGHT: 153.2 LBS | HEIGHT: 66 IN

## 2024-07-09 VITALS
HEIGHT: 66 IN | HEART RATE: 52 BPM | DIASTOLIC BLOOD PRESSURE: 60 MMHG | WEIGHT: 153.2 LBS | SYSTOLIC BLOOD PRESSURE: 124 MMHG | BODY MASS INDEX: 24.62 KG/M2 | TEMPERATURE: 97.2 F

## 2024-07-09 DIAGNOSIS — G95.9 CERVICAL MYELOPATHY (MULTI): Primary | ICD-10-CM

## 2024-07-09 DIAGNOSIS — M79.7 FIBROMYALGIA: ICD-10-CM

## 2024-07-09 DIAGNOSIS — R93.89 ABNORMAL MRI: ICD-10-CM

## 2024-07-09 DIAGNOSIS — Z09 POSTOPERATIVE EXAMINATION: Primary | ICD-10-CM

## 2024-07-09 DIAGNOSIS — R53.1 WEAKNESS: ICD-10-CM

## 2024-07-09 PROCEDURE — 3008F BODY MASS INDEX DOCD: CPT | Performed by: STUDENT IN AN ORGANIZED HEALTH CARE EDUCATION/TRAINING PROGRAM

## 2024-07-09 PROCEDURE — 1036F TOBACCO NON-USER: CPT | Performed by: OBSTETRICS & GYNECOLOGY

## 2024-07-09 PROCEDURE — 99213 OFFICE O/P EST LOW 20 MIN: CPT | Mod: 95 | Performed by: OBSTETRICS & GYNECOLOGY

## 2024-07-09 PROCEDURE — 3008F BODY MASS INDEX DOCD: CPT | Performed by: OBSTETRICS & GYNECOLOGY

## 2024-07-09 PROCEDURE — 99215 OFFICE O/P EST HI 40 MIN: CPT | Performed by: STUDENT IN AN ORGANIZED HEALTH CARE EDUCATION/TRAINING PROGRAM

## 2024-07-09 PROCEDURE — 1036F TOBACCO NON-USER: CPT | Performed by: STUDENT IN AN ORGANIZED HEALTH CARE EDUCATION/TRAINING PROGRAM

## 2024-07-09 PROCEDURE — 99417 PROLNG OP E/M EACH 15 MIN: CPT | Performed by: STUDENT IN AN ORGANIZED HEALTH CARE EDUCATION/TRAINING PROGRAM

## 2024-07-09 PROCEDURE — 99213 OFFICE O/P EST LOW 20 MIN: CPT | Performed by: OBSTETRICS & GYNECOLOGY

## 2024-07-09 RX ORDER — PREGABALIN 75 MG/1
150 CAPSULE ORAL 2 TIMES DAILY
Qty: 120 CAPSULE | Refills: 5 | Status: SHIPPED | OUTPATIENT
Start: 2024-07-09 | End: 2025-01-05

## 2024-07-09 ASSESSMENT — PAIN SCALES - GENERAL: PAINLEVEL: 5

## 2024-07-09 NOTE — PATIENT INSTRUCTIONS
It was a pleasure seeing you today.    I have ordered a nerve and muscle test called an EMG to further evaluate your symptoms. I perform this test on Monday mornings at  [facility] in [place] and at [facility] on Friday afternoons. You can schedule this by calling [phone].     Start pregabalin 1 capsule twice a day. If tolerating after 2 weeks can increase to 2 capsule twice a day. Stop gabapentin.    If you have any questions or concerns please call my office at [phone].

## 2024-07-09 NOTE — PROGRESS NOTES
Subjective   Patient ID: Ally Carpio is a 43 y.o. female who presents for Follow-up (Last pap 12/22/23 wnl. HPV -. Mammogram 4/25/23 -.).  [facility]  Telehealth visit for patient's status post diagnostic hysteroscopy D&C.  Procedure was done due to heavy irregular bleeding.  Patient notes the last couple days had severe cramping and bleeding which is now tapered off.  Findings and pathology reviewed with patient.  Chart reviewed prior to speaking with patient 15 minutes.  Review of operation pathology laboratory and notes.  Discussed options with patient.  Observation unless bleeding becomes too heavy.  Medical management, surgical management, risks and benefits of all 3 methods reviewed with patient.  At this point she would like to try medical management.  Will order no syndrome 5 mg p.o. daily.  Patient will keep menstrual calendar and will call in 2 to 4 weeks with update.  Instructions and precautions given    Review of Systems  Vaginal bleeding all other systems negative    Objective   Physical Exam  Psychiatric:         Mood and Affect: Mood normal.         Behavior: Behavior normal.         Thought Content: Thought content normal.         Judgment: Judgment normal.     Assessment/Plan            Kwaku Schafer MD 07/09/24 12:26 PM

## 2024-07-10 ENCOUNTER — TREATMENT (OUTPATIENT)
Dept: PHYSICAL THERAPY | Facility: HOSPITAL | Age: 44
End: 2024-07-10
Payer: COMMERCIAL

## 2024-07-10 DIAGNOSIS — M54.16 RADICULOPATHY, LUMBAR REGION: ICD-10-CM

## 2024-07-10 DIAGNOSIS — G89.4 CHRONIC PAIN SYNDROME: ICD-10-CM

## 2024-07-10 DIAGNOSIS — M54.12 RADICULOPATHY, CERVICAL: Primary | ICD-10-CM

## 2024-07-10 PROCEDURE — 97110 THERAPEUTIC EXERCISES: CPT | Mod: GP,CQ

## 2024-07-10 ASSESSMENT — PAIN SCALES - GENERAL: PAINLEVEL_OUTOF10: 5 - MODERATE PAIN

## 2024-07-10 ASSESSMENT — PAIN - FUNCTIONAL ASSESSMENT: PAIN_FUNCTIONAL_ASSESSMENT: 0-10

## 2024-07-10 ASSESSMENT — PAIN DESCRIPTION - DESCRIPTORS: DESCRIPTORS: ACHING

## 2024-07-10 NOTE — PROGRESS NOTES
Physical Therapy    Physical Therapy Treatment    Patient Name: Ally Carpio  MRN: [id number]  : 1980   Today's Date: 7/10/2024  Time Calculation  Start Time: 0805  Stop Time: 0850  Time Calculation (min): 45 min  PT Therapeutic Procedures Time Entry  Therapeutic Exercise Time Entry: 45     Visit # 2            Current Problem  1. Radiculopathy, cervical  Follow Up In Physical Therapy      2. Chronic pain syndrome  Follow Up In Physical Therapy      3. Radiculopathy, lumbar region  Follow Up In Physical Therapy            Subjective     Pt has been working on HEP daily. She hasn't had any issues with exercises. She just feels achiness today. She had a difficult night sleeping because she changed her medication yesterday from Gabapentin to Lyrica. She has not taken medication today but does not want to continue taking Lyrica. She wants to know when she will be able to return to therapy post disc replacement.        Precautions  Precautions  STEADI Fall Risk Score (The score of 4 or more indicates an increased risk of falling): 2  Precautions Comment: None       Pain  Pain Assessment: 0-10  0-10 (Numeric) Pain Score: 5 - Moderate pain  Pain Type: Neuropathic pain  Pain Location:  (right arm and karen.LEs)  Pain Descriptors: Aching    Objective    Initiated therapy   Reviewed HEP    Treatments:    EXERCISES       Date 2024 7/10/24            VISIT# #1 #2 # #    REPS REPS REPS REPS          Repeated Movements:       Standing Lumbar Ext 2x10 reviewed     Seated Thoracic Ext 2x10 reviewed     Seated Cervical Retraction 2x10 reviewed            Cervical Isometrics (6) X10 ea X10 ea     Scapular Retraction x10 Reviewed                    Shuttle   SLP         2x10; R 3b, L 2b     Shuttle   TR/HR  1x10 2B                    Tband   Lat Pulls  Red 2x10     Tband   Chops  Red 2x10 ea     Tband   Mid Row  Red 2x10     Tband   Mower starts              Quadruped:       Trans Abs       + Alt UE       + Alt LE      "  + Bird Dog       + Hydrant                                                 Stretches:       Hamstring  Seated 3x30\" ea     Hip Flexor       Calf  RB 3x30\"            UT  3x20\" ea     Levator  3x20\" ea            HEP See below           Assessment:  Pt will be released by surgeon to continue PT, post procedure. Pt demonstrated understanding of HEP. Pt had difficulty with HR/TR on Shuttle, very little motion observed even when instructed to focus on RLE due to LLE weakness. Pt unable to continue due to increased pain. Pt able to complete tband exercises, cervical stretches, and gastroc stretch without increased symptoms. Pt noted left quad burning sensation with seated HS stretch. No change in pain at end of session.        Plan:   Assess response to treatment. Add quadraped exercises.    OP EDUCATION:       Goals:  Active       PT Problem       PT Goal 1       Start:  07/02/24    Expected End:  08/13/24       Independent home exercise program with 90% teach back capability by the patient           PT Goal 2       Start:  07/02/24    Expected End:  08/13/24       Improve MMT of right UE and left LE deficits to 4+/5 or better to improve joint stability with transfers, standing, walking, and lifting activities           PT Goal 3       Start:  07/02/24    Expected End:  08/13/24       Able to walk up 60 minutes without noting increased pain or fatigue in leg muscles           PT Goal 4       Start:  07/02/24    Expected End:  08/13/24       Functional Outcome LEFS score improves to >/= 65/80 (initially 35/80)            Patient Stated Goal 1       Start:  07/02/24    Expected End:  08/13/24       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  "

## 2024-07-12 ENCOUNTER — LAB (OUTPATIENT)
Dept: LAB | Facility: LAB | Age: 44
End: 2024-07-12
Payer: COMMERCIAL

## 2024-07-12 ENCOUNTER — PRE-ADMISSION TESTING (OUTPATIENT)
Dept: PREADMISSION TESTING | Facility: HOSPITAL | Age: 44
End: 2024-07-12
Payer: COMMERCIAL

## 2024-07-12 VITALS
SYSTOLIC BLOOD PRESSURE: 100 MMHG | OXYGEN SATURATION: 98 % | RESPIRATION RATE: 16 BRPM | WEIGHT: 148.81 LBS | TEMPERATURE: 97.5 F | HEIGHT: 66 IN | BODY MASS INDEX: 23.92 KG/M2 | DIASTOLIC BLOOD PRESSURE: 60 MMHG | HEART RATE: 51 BPM

## 2024-07-12 DIAGNOSIS — Z01.818 PRE-OP TESTING: Primary | ICD-10-CM

## 2024-07-12 DIAGNOSIS — Z01.818 PRE-OP TESTING: ICD-10-CM

## 2024-07-12 LAB
ABO GROUP (TYPE) IN BLOOD: NORMAL
ANION GAP SERPL CALC-SCNC: 9 MMOL/L (ref 10–20)
ANTIBODY SCREEN: NORMAL
APPEARANCE UR: CLEAR
APTT PPP: 36 SECONDS (ref 27–38)
ATRIAL RATE: 59 BPM
BACTERIA #/AREA URNS AUTO: ABNORMAL /HPF
BASOPHILS # BLD AUTO: 0.05 X10*3/UL (ref 0–0.1)
BASOPHILS NFR BLD AUTO: 0.8 %
BILIRUB UR STRIP.AUTO-MCNC: NEGATIVE MG/DL
BUN SERPL-MCNC: 10 MG/DL (ref 6–23)
CALCIUM SERPL-MCNC: 8.2 MG/DL (ref 8.6–10.3)
CHLORIDE SERPL-SCNC: 105 MMOL/L (ref 98–107)
CO2 SERPL-SCNC: 29 MMOL/L (ref 21–32)
COLOR UR: YELLOW
CREAT SERPL-MCNC: 0.69 MG/DL (ref 0.5–1.05)
EGFRCR SERPLBLD CKD-EPI 2021: >90 ML/MIN/1.73M*2
EOSINOPHIL # BLD AUTO: 0.18 X10*3/UL (ref 0–0.7)
EOSINOPHIL NFR BLD AUTO: 3 %
ERYTHROCYTE [DISTWIDTH] IN BLOOD BY AUTOMATED COUNT: 13.4 % (ref 11.5–14.5)
EST. AVERAGE GLUCOSE BLD GHB EST-MCNC: 97 MG/DL
GLUCOSE SERPL-MCNC: 64 MG/DL (ref 74–99)
GLUCOSE UR STRIP.AUTO-MCNC: NORMAL MG/DL
HBA1C MFR BLD: 5 %
HCT VFR BLD AUTO: 38.6 % (ref 36–46)
HGB BLD-MCNC: 12.5 G/DL (ref 12–16)
IMM GRANULOCYTES # BLD AUTO: 0.02 X10*3/UL (ref 0–0.7)
IMM GRANULOCYTES NFR BLD AUTO: 0.3 % (ref 0–0.9)
INR PPP: 1.2 (ref 0.9–1.1)
KETONES UR STRIP.AUTO-MCNC: NEGATIVE MG/DL
LEUKOCYTE ESTERASE UR QL STRIP.AUTO: NEGATIVE
LYMPHOCYTES # BLD AUTO: 1.04 X10*3/UL (ref 1.2–4.8)
LYMPHOCYTES NFR BLD AUTO: 17.3 %
MCH RBC QN AUTO: 30.6 PG (ref 26–34)
MCHC RBC AUTO-ENTMCNC: 32.4 G/DL (ref 32–36)
MCV RBC AUTO: 94 FL (ref 80–100)
MONOCYTES # BLD AUTO: 0.41 X10*3/UL (ref 0.1–1)
MONOCYTES NFR BLD AUTO: 6.8 %
MUCOUS THREADS #/AREA URNS AUTO: ABNORMAL /LPF
NEUTROPHILS # BLD AUTO: 4.31 X10*3/UL (ref 1.2–7.7)
NEUTROPHILS NFR BLD AUTO: 71.8 %
NITRITE UR QL STRIP.AUTO: NEGATIVE
NRBC BLD-RTO: 0 /100 WBCS (ref 0–0)
OVALOCYTES BLD QL SMEAR: NORMAL
P AXIS: 46 DEGREES
P OFFSET: 206 MS
P ONSET: 146 MS
PH UR STRIP.AUTO: 7 [PH]
PLATELET # BLD AUTO: 100 X10*3/UL (ref 150–450)
POTASSIUM SERPL-SCNC: 4.3 MMOL/L (ref 3.5–5.3)
PR INTERVAL: 158 MS
PROT UR STRIP.AUTO-MCNC: NORMAL MG/DL
PROTHROMBIN TIME: 13 SECONDS (ref 9.8–12.8)
Q ONSET: 225 MS
QRS COUNT: 9 BEATS
QRS DURATION: 72 MS
QT INTERVAL: 422 MS
QTC CALCULATION(BAZETT): 417 MS
QTC FREDERICIA: 419 MS
R AXIS: -15 DEGREES
RBC # BLD AUTO: 4.09 X10*6/UL (ref 4–5.2)
RBC # UR STRIP.AUTO: NEGATIVE /UL
RBC #/AREA URNS AUTO: ABNORMAL /HPF
RBC MORPH BLD: NORMAL
RH FACTOR (ANTIGEN D): NORMAL
SODIUM SERPL-SCNC: 139 MMOL/L (ref 136–145)
SP GR UR STRIP.AUTO: 1.03
SQUAMOUS #/AREA URNS AUTO: ABNORMAL /HPF
T AXIS: 31 DEGREES
T OFFSET: 436 MS
UROBILINOGEN UR STRIP.AUTO-MCNC: NORMAL MG/DL
VENTRICULAR RATE: 59 BPM
WBC # BLD AUTO: 6 X10*3/UL (ref 4.4–11.3)
WBC #/AREA URNS AUTO: ABNORMAL /HPF

## 2024-07-12 PROCEDURE — 99204 OFFICE O/P NEW MOD 45 MIN: CPT | Performed by: NURSE PRACTITIONER

## 2024-07-12 PROCEDURE — 86900 BLOOD TYPING SEROLOGIC ABO: CPT

## 2024-07-12 PROCEDURE — 81001 URINALYSIS AUTO W/SCOPE: CPT

## 2024-07-12 PROCEDURE — 93005 ELECTROCARDIOGRAM TRACING: CPT | Performed by: NURSE PRACTITIONER

## 2024-07-12 PROCEDURE — 83036 HEMOGLOBIN GLYCOSYLATED A1C: CPT

## 2024-07-12 PROCEDURE — 86850 RBC ANTIBODY SCREEN: CPT

## 2024-07-12 PROCEDURE — 85730 THROMBOPLASTIN TIME PARTIAL: CPT

## 2024-07-12 PROCEDURE — 80048 BASIC METABOLIC PNL TOTAL CA: CPT

## 2024-07-12 PROCEDURE — 86901 BLOOD TYPING SEROLOGIC RH(D): CPT

## 2024-07-12 PROCEDURE — 85025 COMPLETE CBC W/AUTO DIFF WBC: CPT

## 2024-07-12 PROCEDURE — 87081 CULTURE SCREEN ONLY: CPT | Mod: AHULAB | Performed by: NURSE PRACTITIONER

## 2024-07-12 PROCEDURE — 85610 PROTHROMBIN TIME: CPT

## 2024-07-12 PROCEDURE — 36415 COLL VENOUS BLD VENIPUNCTURE: CPT

## 2024-07-12 RX ORDER — CHLORHEXIDINE GLUCONATE ORAL RINSE 1.2 MG/ML
SOLUTION DENTAL
Qty: 475 ML | Refills: 0 | Status: SHIPPED | OUTPATIENT
Start: 2024-07-12 | End: 2024-07-20 | Stop reason: HOSPADM

## 2024-07-12 ASSESSMENT — ENCOUNTER SYMPTOMS
PALPITATIONS: 0
BRUISES/BLEEDS EASILY: 1
VOMITING: 0
DYSPNEA WITH EXERTION: 0
ARTHRALGIAS: 1
NEUROLOGICAL NEGATIVE: 1
DYSPNEA AT REST: 0
DIARRHEA: 0
NECK NEGATIVE: 1
CONSTIPATION: 0
CONSTITUTIONAL NEGATIVE: 1
NAUSEA: 0
ABDOMINAL PAIN: 0
MYALGIAS: 1

## 2024-07-12 NOTE — CPM/PAT H&P
[facility] Evaluation       Name: Ally Carpio (Ally Carpio)  /Age: 1980/43 y.o.         Date of Consult: 24     Referring Provider: Dr. CINDY Catalan    Surgery, Date, and Length: C6-C7 Cervical Spine Arthroplasty ~ Anterior Approach - Right  24, 180 min    Ally Carpio is a 43 year-old female who presents to the [facility] for perioperative risk assessment prior to surgery.    Ally Carpio has history of ulcerative colitis and ITP.  Over the last 4 months or so, she has developed weakness in her hands more so on the right side and left leg weakness.  She has trouble with fine motor control.  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  Her balance is poor as well mostly she feels because of her left leg weakness.  She does not have significant neck pain other than when she flexes her neck down which she avoids doing for this reason.  She does have some mid back pain that wraps around her sides. Taking gabapentin 300 mg bid and tizanidine as needed.     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    This note was created in part upon personal review of patient's medical records.      Patient is scheduled to have a C6-C7 Cervical Spine Arthroplasty ~ Anterior Approach - Right       Pt denies any past history of anesthetic complications such as PONV, awareness, prolonged sedation, dental damage, aspiration, cardiac arrest, difficult intubation, difficult I.V. access or unexpected hospital admissions.  NO malignant hyperthermia and or pseudocholinesterase deficiency.  No history of blood transfusions     The patient is not a [employer] and will accept blood and blood products if medically indicated.   Type and screen sent.     Past Medical History:   Diagnosis Date    ALEXEY positive     Anxiety     pt denies    Chronic pain syndrome     GERD  (gastroesophageal reflux disease)     Heart murmur     Hx of idiopathic thrombocytopenic purpura     follows with heme, 24: plt 111    Hypothyroidism     CAMDEN (iron deficiency anemia)     24: H/H 11.7/34.8, received IV venofer x 3 doses 2024    Inflammatory bowel disease 2023    Murmur     ECHO 23 unremarkable    Neuropathy     Psoriasis     Radiculopathy, cervical     Radiculopathy, lumbar region     RLS (restless legs syndrome)     Sleep apnea     resolved after weight loss    Ulcerative colitis (Multi)        Past Surgical History:   Procedure Laterality Date     SECTION, LOW TRANSVERSE      x 2, .     COLONOSCOPY      DILATION AND CURETTAGE OF UTERUS      UPPER GASTROINTESTINAL ENDOSCOPY         Family History   Problem Relation Name Age of Onset    Colon cancer Mother Shari     COPD Mother Shari     Hyperlipidemia Father Archuleta     Prostate cancer Father Archuleta     Hyperlipidemia Sister      Hypothyroidism Sister      Diverticulitis Sister      No Known Problems Sister      No Known Problems Brother      Colon cancer Mother's Brother      Colon cancer Mother's Brother Gary     Breast cancer Father's Sister Na      Social History     Socioeconomic History    Marital status:    Tobacco Use    Smoking status: Former     Current packs/day: 0.00     Average packs/day: 1 pack/day for 12.5 years (12.5 ttl pk-yrs)     Types: Cigarettes     Start date: 2004     Quit date: 2017     Years since quittin.5    Smokeless tobacco: Never   Vaping Use    Vaping status: Never Used   Substance and Sexual Activity    Alcohol use: Not Currently    Drug use: Never    Sexual activity: Yes     Partners: Male     Birth control/protection: Male Sterilization     Comment: Pt stopped her Norethindrone about one week ago- does not desire to be on birth control pills any longer        Allergies   Allergen Reactions    Balsalazide Other     Mouth ulcers    Mesalamine Rash         Current Outpatient  "Medications:     adalimumab-adaz (Hyrimoz) 40 mg/0.8 mL syringe, Inject 40 mg under the skin every 14 (fourteen) days. LAST DOSE 6/27/24, Disp: , Rfl:     cyanocobalamin (Vitamin B-12) 1,000 mcg/mL injection, Inject 1 mL (1,000 mcg) into the muscle see administration instructions. Please inject daily x 7, followed by Weekly x 4 and then Monthly (Patient taking differently: Inject 1 mL (1,000 mcg) into the muscle see administration instructions. Please inject daily x 7, followed by Weekly x 4 and then Monthly, LAST DOSE 7/5/24, DUE 7/12/24), Disp: 30 mL, Rfl: 3    levothyroxine (Synthroid, Levoxyl) 25 mcg tablet, Take 0.5 tablets (12.5 mcg) by mouth once daily., Disp: 45 tablet, Rfl: 1    multivitamin tablet, Take 1 tablet by mouth once daily., Disp: , Rfl:     pantoprazole (ProtoNix) 40 mg EC tablet, Take 1 tablet (40 mg) by mouth once daily. Do not crush, chew, or split., Disp: 90 tablet, Rfl: 1    pregabalin (Lyrica) 75 mg capsule, Take 2 capsules (150 mg) by mouth 2 times a day., Disp: 120 capsule, Rfl: 5    tacrolimus (Protopic) 0.1 % ointment, if needed., Disp: , Rfl:     tiZANidine (Zanaflex) 2 mg tablet, TAKE ONE TABLET BY MOUTH EVERY 6 HOURS AS NEEDED for muscle spasms, Disp: 120 tablet, Rfl: 0    Vtama 1 % cream, if needed., Disp: , Rfl:     chlorhexidine (Peridex) 0.12 % solution, Swish for 30 seconds and spit 15mL of solution the night before and morning of surgery, Disp: 475 mL, Rfl: 0    gabapentin (Neurontin) 300 mg capsule, Take 1 capsule (300 mg) by mouth 2 times a day. (Patient not taking: Reported on 7/12/2024), Disp: 60 capsule, Rfl: 11    syringe with needle (Syringe 3cc/22Gx1\") 3 mL 22 gauge x 1\" syringe, 1 Application see administration instructions., Disp: 30 each, Rfl: 11    UltiCare Safety Syringe 3 mL 22 gauge x 1\" syringe, , Disp: , Rfl:       PAT ROS:   Constitutional:   neg    Neuro/Psych:   neg    Eyes:    use of corrective lenses  Ears:    hearing aides  Nose:   Mouth:   neg    Throat: " "  neg    Neck:   neg    Cardio:    Functional 4 Mets. Patient denies SOB walking up 2 flights of stairs    no chest pain   no palpitations   no dyspnea   no PIERRE  Respiratory:   Endocrine:   GI:    no abdominal pain   no constipation   no diarrhea   no nausea   no vomiting  :   neg    Musculoskeletal:    Muscle pain in right arm and bilateral legs   arthralgias   myalgias  Hematologic:    bruises/bleeds easily   no history of blood transfusion   no blood clots  Skin:  neg        Physical Exam  Physical exam within normal limits.          PAT AIRWAY:   Airway:     Mallampati::  II    Neck ROM::  Full   No broken teeth, no dentures and no missing teeth          Visit Vitals  /60   Pulse 51   Temp 36.4 °C (97.5 °F) (Tympanic)   Resp 16   Ht 1.665 m (5' 5.55\")   Wt 67.5 kg (148 lb 13 oz)   LMP 05/22/2024   SpO2 98%   BMI 24.35 kg/m²   OB Status Having periods   Smoking Status Former   BSA 1.77 m²     Plan    Cardiovascular:  Patient denies any chest pain, tightness, heaviness, pressure, radiating pain, palpitations, irregular heartbeats, lightheadedness, cough, congestion, shortness of breath, PIERRE, PND, near syncope, weight loss or gain.    EKG in PAT on 07/12/24  Sinus bradycardia @ 50 bpm  Low voltage QRS  Septal infarct, age undetermined  Abnormal ECG    RCRI: 0 Risk of Mace: 3.9%    Pulm:  Patient has a history of sleep apnea. She had a recent sleep study done and she does not wear a CPAP anymore.    Denies any shortness of breath or activity intolerance    Endo-  Hypothyroidism-Synthroid-patient to take on dos    GI/:  Ulcerative Colitis-  Hyrimoz-patient to hold until after surgery    GERD-protonix-patient to take on dos    Heme:    Patient has a history of anemia. In June patient had three iron infusions.   She has also had a low platelet count. She follows with hematology for this.     Patient instructed to ambulate as soon as possible postoperatively to decrease thromboembolic risk.    Initiate " mechanical DVT prophylaxis as soon as possible and initiate chemical prophylaxis when deemed safe from a bleeding standpoint post surgery.    Caprini=4    Risk assessment complete.  Patient is scheduled for an intermediate surgical risk procedure.      Labs/testing obtained in PAT on 07/12/24  CBC, BMP, Coags, UA, MRSA, T&S, HgA1c  Lab Results   Component Value Date    WBC 6.0 07/12/2024    HGB 12.5 07/12/2024    HCT 38.6 07/12/2024    MCV 94 07/12/2024     (L) 07/12/2024      Lab Results   Component Value Date    GLUCOSE 64 (L) 07/12/2024    CALCIUM 8.2 (L) 07/12/2024     07/12/2024    K 4.3 07/12/2024    CO2 29 07/12/2024     07/12/2024    BUN 10 07/12/2024    CREATININE 0.69 07/12/2024      Lab Results   Component Value Date    INR 1.2 (H) 07/12/2024    INR 1.1 05/16/2024    INR 1.0 11/29/2023    PROTIME 13.0 (H) 07/12/2024    PROTIME 12.7 05/16/2024    PROTIME 11.7 09/30/2023      Lab Results   Component Value Date    HGBA1C 5.0 07/12/2024        Labs reviewed, patient has a history of thrombocytopenia. Plts are decreased but stable.     Follow up: MRSA and UA    Preoperative medication instructions were provided and reviewed with the patient.  Any additional testing or evaluation was explained to the patient.  Nothing by mouth instructions were discussed and patient's questions were answered prior to conclusion to this encounter.  Patient verbalized understanding of preoperative instructions given in preadmission testing; discharge instructions available in EMR.    This note was dictated with speech recognition.  Minor errors may have been detected during use of speech recognition.

## 2024-07-12 NOTE — PREPROCEDURE INSTRUCTIONS
"   Medication List            Accurate as of July 12, 2024  9:26 AM. Always use your most recent med list.                chlorhexidine 0.12 % solution  Commonly known as: Peridex  Swish for 30 seconds and spit 15mL of solution the night before and morning of surgery     cyanocobalamin 1,000 mcg/mL injection  Commonly known as: Vitamin B-12  Inject 1 mL (1,000 mcg) into the muscle see administration instructions. Please inject daily x 7, followed by Weekly x 4 and then Monthly  Medication Adjustments for Surgery: Stop 1 day before surgery     gabapentin 300 mg capsule  Commonly known as: Neurontin  Take 1 capsule (300 mg) by mouth 2 times a day.  Medication Adjustments for Surgery: Take morning of surgery with sip of water, no other fluids     Hyrimoz 40 mg/0.8 mL syringe  Generic drug: adalimumab-adaz  Notes to patient: Hold until after surgery     levothyroxine 25 mcg tablet  Commonly known as: Synthroid, Levoxyl  Take 0.5 tablets (12.5 mcg) by mouth once daily.  Medication Adjustments for Surgery: Take morning of surgery with sip of water, no other fluids     multivitamin tablet  Medication Adjustments for Surgery: Stop 7 days before surgery     pantoprazole 40 mg EC tablet  Commonly known as: ProtoNix  Take 1 tablet (40 mg) by mouth once daily. Do not crush, chew, or split.  Medication Adjustments for Surgery: Take morning of surgery with sip of water, no other fluids     pregabalin 75 mg capsule  Commonly known as: Lyrica  Take 2 capsules (150 mg) by mouth 2 times a day.  Medication Adjustments for Surgery: Take morning of surgery with sip of water, no other fluids     Syringe 3cc/22Gx1\" 3 mL 22 gauge x 1\" syringe  Generic drug: syringe with needle  1 Application see administration instructions.     tacrolimus 0.1 % ointment  Commonly known as: Protopic  Medication Adjustments for Surgery: Continue until night before surgery     tiZANidine 2 mg tablet  Commonly known as: Zanaflex  TAKE ONE TABLET BY MOUTH EVERY " "6 HOURS AS NEEDED for muscle spasms  Notes to patient: May take on day of surgery if needed     UltiCare Safety Syringe 3 mL 22 gauge x 1\" syringe  Generic drug: syringe with needle, safety     Vtama 1 % cream  Generic drug: tapinarof  Medication Adjustments for Surgery: Continue until night before surgery                      **Concerning above medication instructions, if medication is normally taken at night, continue normal schedule.**  **DO NOT TAKE NIGHT PRIOR AND MORNING OF SURGERY**    CONTACT SURGEON'S OFFICE IF YOU DEVELOP:  * Fever = 100.4 F   * New respiratory symptoms (e.g. cough, shortness of breath, respiratory distress, sore throat)  * Recent loss of taste or smell  *Flu like symptoms such as headache, fatigue or gastrointestinal symptoms  * You develop any open sores, shingles, burning or painful urination   AND/OR:  * You no longer wish to have the surgery.  * Any other personal circumstances change that may lead to the need to cancel or defer this surgery.  *You were admitted to any hospital within one week of your planned procedure.    SMOKING:  *Quitting smoking can make a huge difference to your health and recovery from surgery.    *If you need help with quitting, call [phone].    THE DAY BEFORE SURGERY:   Nothing by mouth (no solids or liquids) after midnight.   If diabetic, please check fasting blood sugar upon waking up.    If fasting sugar is <80 mg/dl, please drink 100ml/3oz of apple juice no later than 2 hours prior to arrival time.      SURGICAL TIME  *You will be contacted between 2 p.m. and 6 p.m. the business day before your surgery with your arrival time.  *If you haven't received a call by 6pm, call [phone].  *Scheduled surgery times may change and you will be notified if this occurs-check your personal voicemail for any updates.    ON THE MORNING OF SURGERY:  *Wear comfortable, loose fitting clothing.   *Do not use moisturizers, creams, lotions or perfume.  *All jewelry " and valuables should be left at home.  *Prosthetic devices such as contact lenses, hearing aids, dentures, eyelash extensions, hairpins and body piercing must be removed before surgery.    BRING WITH YOU:  *Photo ID and insurance card  *Current list of medicines and allergies  *Pacemaker/Defibrillator/Heart stent cards  *CPAP machine and mask  *Slings/splints/crutches  *Copy of your complete Advanced Directive/DHPOA-if applicable  *Neurostimulator implant remote    PARKING AND ARRIVAL:  *Check in at the Main Entrance desk and let them know you are here for surgery.  *You will be directed to the 2nd floor surgical waiting area.    AFTER OUTPATIENT SURGERY:  *A responsible adult MUST accompany you at the time of discharge and stay with you for 24 hours after your surgery.  *You may NOT drive yourself home after surgery.  *You may use a taxi or ride sharing service ([employer], [employer]) to return home ONLY if you are accompanied by a friend or family member.  *Instructions for resuming your medications will be provided by your surgeon.       Home Preoperative Antibacterial Shower     What is a home preoperative antibacterial shower?  This shower is a way of cleaning the skin with a germ killing soap before surgery.  The soap contains chlorhexidine, commonly known as CHG.  CHG is a soap for your skin with germ killing ability.  Let your doctor know if you are allergic to chlorhexidine.    Why do I need to take a preoperative antibacterial shower?  Skin is not sterile.  It is best to try to make your skin as free of germs as possible before surgery.  Proper cleansing with a germ killing soap before surgery can lower the number of germs on your skin.  This helps to reduce the risk of infection at the surgical site.  Following the instructions listed below will help you prepare your skin for surgery.      How do I use the CHG skin cleanser?  Steps:  Begin using your CHG soap five days before your scheduled surgery on  ________________________.    Days 1-4 Shower before bed:  Wash your face and genitals with your normal soap and rinse.    Wash and rinse your hair using the CHG soap. Rinse completely, do not condition your   Hair.          3.    Apply the CHG soap to a clean wet washcloth.  Turn the water off or move away                From the water spray to avoid premature rinsing of the CHG soap as you are applying.     4.   Lather your entire body from the neck down.  Do not use on your face or genitals.   Pay special attention to the area(s) where your incision(s) will be located unless they are on your face.  Avoid scrubbing your skin too hard.  The important point is to have the CHG soap sit on your skin for 3 minutes.    When the 3 minutes are up, turn on the water and rinse the CHG soap off your body completely.   Pat yourself dry with a clean, freshly-laundered towel.  Dress in clean, freshly laundered night clothes.    Be sure to sleep with clean, freshly laundered sheets.  Day 5:  Last shower is the morning before surgery: Follow above Instructions.    NOTE:    *Hair extensions should be removed    *Keep CHG soap out of eyes and ear canals   *DO NOT wash with regular soap on your body after you have used the CHG        soap solution  *DO NOT apply powders, lotions, or perfume.  *Deodorant may be used days 1-4, BUT NOT the day of surgery    Who should I contact if I have any questions regarding the use of CHG soap?  Call the [facility], Preadmission Testing at [phone] if you have any questions.              Patient Information: Pre-Operative Infection Prevention Measures     Why did I have my nose, under my arms and groin swabbed?  The purpose of the swab is to identify Staphylococcus aureus inside your nose or on your skin.  The swab was sent to the laboratory for culture.  A positive swab/culture for Staphylococcus aureus is called colonization or carriage.      What is  Staphylococcus aureus?  Staphylococcus aureus, also known as “staph”, is a germ found on the skin or in the nose of healthy people.  Sometimes Staphylococcus aureus can get into the body and cause an infection.  This can be minor (such as pimples, boils or other skin problems).  It might also be serious (such as blood infection, pneumonia or a surgical site infection).    What is Staphylococcus aureus colonization or carriage?  Colonization or carriage means that a person has the germ but is not sick from it.  These bacteria can be spread on the hands or when breathing or sneezing.    How is Staphylococcus aureus spread?  It is most often spread by close contact with a person or item that carries it.    What happens if my culture is positive for Staphylococcus aureus?  Your doctor/medical team will use this information to guide any antibiotic treatment which may be necessary.  Regardless of the culture results, we will clean the inside of your nose with a betadine swab just before you have your surgery.      Will I get an infection if I have Staphylococcus aureus in my nose or on my skin?  Anyone can get an infection with Staphylococcus aureus.  However, the best way to reduce your risk of infection is to follow the instructions provided to you for the use of your CHG soap and dental rinse.        Who should I contact if I have any questions?  Call the [facility], Preadmission Testing at [phone] if you have any questions.           Patient Information: Oral/Dental Rinse  **This is a prescription; pick it up at your preferred local pharmacy **  What is oral/dental rinse?   It is a mouthwash. It is a way of cleaning the mouth with a germ killing solution before your surgery.  The solution contains chlorhexidine, commonly known as CHG.   It is used inside the mouth to kill a bacteria known as Staphylococcus aureus.  Let your doctor know if you are allergic to Chlorhexidine.    Why do  I need to use CHG oral/dental rinse?  The CHG oral/dental rinse helps to kill a bacteria in your mouth known a Staphylococcus aureus.     This reduces the risk of infection at the surgical site.      Using your CHG oral/dental rinse  STEPS:  Use your CHG oral/dental rinse after you brush your teeth the night before (at bedtime) and the morning of your surgery.  Follow all directions on your prescription label.    Use the cap on the container to measure 15ml (fill cap to fill line)  Swish (gargle if you can) the mouthwash in your mouth for at least 30 seconds, (do not to swallow) spit out  After you use your CHG rinse, do not rinse your mouth with water, drink or eat.  Please refer to prescription label for the appropriate time to resume oral intake  Dental rinse comes in one size bottle: 473ml ~16oz.  You will have leftover    rinse, discard after this use.    What side effects might I have using the CHG oral/dental rinse?  CHG rinse will stick to plaque on the teeth.  Brush and floss just before use.  Teeth brushing will help avoid staining of plaque during use.    Who should I contact if I have questions about the CHG oral/dental rinse?  Please call [facility], Preadmission Testing at [phone] if you have any questions

## 2024-07-12 NOTE — H&P (VIEW-ONLY)
[facility]/Formerly [facility] Evaluation       Name: Ally Carpio (Ally Carpio)  /Age: 1980/43 y.o.         Date of Consult: 24     Referring Provider: Dr. CINDY Catalan    Surgery, Date, and Length: C6-C7 Cervical Spine Arthroplasty ~ Anterior Approach - Right  24, 180 min    Ally Carpio is a 43 year-old female who presents to the [facility] for perioperative risk assessment prior to surgery.    Ally Carpio has history of ulcerative colitis and ITP.  Over the last 4 months or so, she has developed weakness in her hands more so on the right side and left leg weakness.  She has trouble with fine motor control.  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  Her balance is poor as well mostly she feels because of her left leg weakness.  She does not have significant neck pain other than when she flexes her neck down which she avoids doing for this reason.  She does have some mid back pain that wraps around her sides. Taking gabapentin 300 mg bid and tizanidine as needed.     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    This note was created in part upon personal review of patient's medical records.      Patient is scheduled to have a C6-C7 Cervical Spine Arthroplasty ~ Anterior Approach - Right       Pt denies any past history of anesthetic complications such as PONV, awareness, prolonged sedation, dental damage, aspiration, cardiac arrest, difficult intubation, difficult I.V. access or unexpected hospital admissions.  NO malignant hyperthermia and or pseudocholinesterase deficiency.  No history of blood transfusions     The patient is not a Druze and will accept blood and blood products if medically indicated.   Type and screen sent.     Past Medical History:   Diagnosis Date    ALEXEY positive     Anxiety     pt denies    Chronic pain syndrome     GERD  (gastroesophageal reflux disease)     Heart murmur     Hx of idiopathic thrombocytopenic purpura     follows with heme, 24: plt 111    Hypothyroidism     CAMDEN (iron deficiency anemia)     24: H/H 11.7/34.8, received IV venofer x 3 doses 2024    Inflammatory bowel disease 2023    Murmur     ECHO 23 unremarkable    Neuropathy     Psoriasis     Radiculopathy, cervical     Radiculopathy, lumbar region     RLS (restless legs syndrome)     Sleep apnea     resolved after weight loss    Ulcerative colitis (Multi)        Past Surgical History:   Procedure Laterality Date     SECTION, LOW TRANSVERSE      x 2, .     COLONOSCOPY      DILATION AND CURETTAGE OF UTERUS      UPPER GASTROINTESTINAL ENDOSCOPY         Family History   Problem Relation Name Age of Onset    Colon cancer Mother Shari     COPD Mother Shair     Hyperlipidemia Father Archuleta     Prostate cancer Father Archuleta     Hyperlipidemia Sister      Hypothyroidism Sister      Diverticulitis Sister      No Known Problems Sister      No Known Problems Brother      Colon cancer Mother's Brother      Colon cancer Mother's Brother Gary     Breast cancer Father's Sister Na      Social History     Socioeconomic History    Marital status:    Tobacco Use    Smoking status: Former     Current packs/day: 0.00     Average packs/day: 1 pack/day for 12.5 years (12.5 ttl pk-yrs)     Types: Cigarettes     Start date: 2004     Quit date: 2017     Years since quittin.5    Smokeless tobacco: Never   Vaping Use    Vaping status: Never Used   Substance and Sexual Activity    Alcohol use: Not Currently    Drug use: Never    Sexual activity: Yes     Partners: Male     Birth control/protection: Male Sterilization     Comment: Pt stopped her Norethindrone about one week ago- does not desire to be on birth control pills any longer        Allergies   Allergen Reactions    Balsalazide Other     Mouth ulcers    Mesalamine Rash         Current Outpatient  "Medications:     adalimumab-adaz (Hyrimoz) 40 mg/0.8 mL syringe, Inject 40 mg under the skin every 14 (fourteen) days. LAST DOSE 6/27/24, Disp: , Rfl:     cyanocobalamin (Vitamin B-12) 1,000 mcg/mL injection, Inject 1 mL (1,000 mcg) into the muscle see administration instructions. Please inject daily x 7, followed by Weekly x 4 and then Monthly (Patient taking differently: Inject 1 mL (1,000 mcg) into the muscle see administration instructions. Please inject daily x 7, followed by Weekly x 4 and then Monthly, LAST DOSE 7/5/24, DUE 7/12/24), Disp: 30 mL, Rfl: 3    levothyroxine (Synthroid, Levoxyl) 25 mcg tablet, Take 0.5 tablets (12.5 mcg) by mouth once daily., Disp: 45 tablet, Rfl: 1    multivitamin tablet, Take 1 tablet by mouth once daily., Disp: , Rfl:     pantoprazole (ProtoNix) 40 mg EC tablet, Take 1 tablet (40 mg) by mouth once daily. Do not crush, chew, or split., Disp: 90 tablet, Rfl: 1    pregabalin (Lyrica) 75 mg capsule, Take 2 capsules (150 mg) by mouth 2 times a day., Disp: 120 capsule, Rfl: 5    tacrolimus (Protopic) 0.1 % ointment, if needed., Disp: , Rfl:     tiZANidine (Zanaflex) 2 mg tablet, TAKE ONE TABLET BY MOUTH EVERY 6 HOURS AS NEEDED for muscle spasms, Disp: 120 tablet, Rfl: 0    Vtama 1 % cream, if needed., Disp: , Rfl:     chlorhexidine (Peridex) 0.12 % solution, Swish for 30 seconds and spit 15mL of solution the night before and morning of surgery, Disp: 475 mL, Rfl: 0    gabapentin (Neurontin) 300 mg capsule, Take 1 capsule (300 mg) by mouth 2 times a day. (Patient not taking: Reported on 7/12/2024), Disp: 60 capsule, Rfl: 11    syringe with needle (Syringe 3cc/22Gx1\") 3 mL 22 gauge x 1\" syringe, 1 Application see administration instructions., Disp: 30 each, Rfl: 11    UltiCare Safety Syringe 3 mL 22 gauge x 1\" syringe, , Disp: , Rfl:       PAT ROS:   Constitutional:   neg    Neuro/Psych:   neg    Eyes:    use of corrective lenses  Ears:    hearing aides  Nose:   Mouth:   neg    Throat: " "  neg    Neck:   neg    Cardio:    Functional 4 Mets. Patient denies SOB walking up 2 flights of stairs    no chest pain   no palpitations   no dyspnea   no PIERRE  Respiratory:   Endocrine:   GI:    no abdominal pain   no constipation   no diarrhea   no nausea   no vomiting  :   neg    Musculoskeletal:    Muscle pain in right arm and bilateral legs   arthralgias   myalgias  Hematologic:    bruises/bleeds easily   no history of blood transfusion   no blood clots  Skin:  neg        Physical Exam  Physical exam within normal limits.          PAT AIRWAY:   Airway:     Mallampati::  II    Neck ROM::  Full   No broken teeth, no dentures and no missing teeth          Visit Vitals  /60   Pulse 51   Temp 36.4 °C (97.5 °F) (Tympanic)   Resp 16   Ht 1.665 m (5' 5.55\")   Wt 67.5 kg (148 lb 13 oz)   LMP 05/22/2024   SpO2 98%   BMI 24.35 kg/m²   OB Status Having periods   Smoking Status Former   BSA 1.77 m²     Plan    Cardiovascular:  Patient denies any chest pain, tightness, heaviness, pressure, radiating pain, palpitations, irregular heartbeats, lightheadedness, cough, congestion, shortness of breath, PIERRE, PND, near syncope, weight loss or gain.    EKG in PAT on 07/12/24  Sinus bradycardia @ 50 bpm  Low voltage QRS  Septal infarct, age undetermined  Abnormal ECG    RCRI: 0 Risk of Mace: 3.9%    Pulm:  Patient has a history of sleep apnea. She had a recent sleep study done and she does not wear a CPAP anymore.    Denies any shortness of breath or activity intolerance    Endo-  Hypothyroidism-Synthroid-patient to take on dos    GI/:  Ulcerative Colitis-  Hyrimoz-patient to hold until after surgery    GERD-protonix-patient to take on dos    Heme:    Patient has a history of anemia. In June patient had three iron infusions.   She has also had a low platelet count. She follows with hematology for this.     Patient instructed to ambulate as soon as possible postoperatively to decrease thromboembolic risk.    Initiate " mechanical DVT prophylaxis as soon as possible and initiate chemical prophylaxis when deemed safe from a bleeding standpoint post surgery.    Caprini=4    Risk assessment complete.  Patient is scheduled for an intermediate surgical risk procedure.      Labs/testing obtained in PAT on 07/12/24  CBC, BMP, Coags, UA, MRSA, T&S, HgA1c  Lab Results   Component Value Date    WBC 6.0 07/12/2024    HGB 12.5 07/12/2024    HCT 38.6 07/12/2024    MCV 94 07/12/2024     (L) 07/12/2024      Lab Results   Component Value Date    GLUCOSE 64 (L) 07/12/2024    CALCIUM 8.2 (L) 07/12/2024     07/12/2024    K 4.3 07/12/2024    CO2 29 07/12/2024     07/12/2024    BUN 10 07/12/2024    CREATININE 0.69 07/12/2024      Lab Results   Component Value Date    INR 1.2 (H) 07/12/2024    INR 1.1 05/16/2024    INR 1.0 11/29/2023    PROTIME 13.0 (H) 07/12/2024    PROTIME 12.7 05/16/2024    PROTIME 11.7 09/30/2023      Lab Results   Component Value Date    HGBA1C 5.0 07/12/2024        Labs reviewed, patient has a history of thrombocytopenia. Plts are decreased but stable.     Follow up: MRSA and UA    Preoperative medication instructions were provided and reviewed with the patient.  Any additional testing or evaluation was explained to the patient.  Nothing by mouth instructions were discussed and patient's questions were answered prior to conclusion to this encounter.  Patient verbalized understanding of preoperative instructions given in preadmission testing; discharge instructions available in EMR.    This note was dictated with speech recognition.  Minor errors may have been detected during use of speech recognition.

## 2024-07-14 LAB — STAPHYLOCOCCUS SPEC CULT: ABNORMAL

## 2024-07-16 ENCOUNTER — TREATMENT (OUTPATIENT)
Dept: PHYSICAL THERAPY | Facility: HOSPITAL | Age: 44
End: 2024-07-16
Payer: COMMERCIAL

## 2024-07-16 ENCOUNTER — APPOINTMENT (OUTPATIENT)
Dept: NEUROLOGY | Facility: CLINIC | Age: 44
End: 2024-07-16
Payer: COMMERCIAL

## 2024-07-16 DIAGNOSIS — M54.16 RADICULOPATHY, LUMBAR REGION: ICD-10-CM

## 2024-07-16 DIAGNOSIS — G89.4 CHRONIC PAIN SYNDROME: ICD-10-CM

## 2024-07-16 DIAGNOSIS — M54.12 RADICULOPATHY, CERVICAL: Primary | ICD-10-CM

## 2024-07-16 PROCEDURE — 97110 THERAPEUTIC EXERCISES: CPT | Mod: GP,CQ

## 2024-07-16 ASSESSMENT — PROMIS GLOBAL HEALTH SCALE
RATE_AVERAGE_PAIN: 4
RATE_QUALITY_OF_LIFE: FAIR
RATE_MENTAL_HEALTH: GOOD
CARRYOUT_PHYSICAL_ACTIVITIES: MODERATELY
RATE_GENERAL_HEALTH: FAIR
EMOTIONAL_PROBLEMS: RARELY
CARRYOUT_SOCIAL_ACTIVITIES: FAIR
RATE_SOCIAL_SATISFACTION: GOOD
RATE_PHYSICAL_HEALTH: FAIR
RATE_AVERAGE_FATIGUE: MODERATE

## 2024-07-16 ASSESSMENT — PAIN SCALES - GENERAL: PAINLEVEL_OUTOF10: 5 - MODERATE PAIN

## 2024-07-16 ASSESSMENT — PAIN - FUNCTIONAL ASSESSMENT: PAIN_FUNCTIONAL_ASSESSMENT: 0-10

## 2024-07-16 ASSESSMENT — PAIN DESCRIPTION - DESCRIPTORS: DESCRIPTORS: ACHING

## 2024-07-16 NOTE — PROGRESS NOTES
Physical Therapy    Physical Therapy Treatment    Patient Name: Ally Carpio  MRN: [id number]  : 1980   Today's Date: 2024  Time Calculation  Start Time: 1033  Stop Time: 1115  Time Calculation (min): 42 min  PT Therapeutic Procedures Time Entry  Therapeutic Exercise Time Entry: 42     Visit # 3            Current Problem  1. Radiculopathy, cervical  Follow Up In Physical Therapy      2. Chronic pain syndrome  Follow Up In Physical Therapy      3. Radiculopathy, lumbar region  Follow Up In Physical Therapy            Subjective     Patient reported she feels more weakness present in RLE from right RUE; she doesn't feel as strong as she did last week.  She has noticed that DF  in RLE feels weak. She noted weather seems to be affecting her movement.     Patient felt fine after initial treatment, no increased soreness. She feels exercises are helping. She did have nerve pain that initiated in right ear and radiated superiorly 3x in the same day. It was extremely painful, she is unsure if it was related to her TMJ.    Precautions  Precautions  STEADI Fall Risk Score (The score of 4 or more indicates an increased risk of falling): 2  Precautions Comment: none       Pain  Pain Assessment: 0-10  0-10 (Numeric) Pain Score: 5 - Moderate pain  Pain Location:  (right arm; karen. LEs)  Pain Descriptors: Aching    Objective    Added mower starts  Added hip flexor stretch on step  Added quadruped UE and LE    Treatments:    EXERCISES       Date 2024 7/10/24 7/16/24           VISIT# #1 #2 #3 #    REPS REPS REPS REPS          Repeated Movements:       Standing Lumbar Ext 2x10 reviewed     Seated Thoracic Ext 2x10 reviewed     Seated Cervical Retraction 2x10 reviewed            Cervical Isometrics (6) X10 ea X10 ea     Scapular Retraction x10 Reviewed                    Shuttle   SLP         2x10; R 3b, L 2b 2x10; R 3b, L 2b    Shuttle   TR/HR  1x10 2B  2x10 2b                  Tband   Lat Pulls  Red 2x10 Red  "2x10    Tband   Chops  Red 2x10 ea Red 2x10    Tband   Mid Row  Red 2x10 Red 2x10    Tband   Mower starts   Red 2x10           Quadruped:       Trans Abs   10x    + Alt UE   10x2    + Alt LE   10x2    + Bird Dog       + Hydrant                                                 Stretches:       Hamstring  Seated 3x30\" ea Standing 3x30\"ea    Hip Flexor   3x30\" R @ steps    Calf  RB 3x30\"            UT  3x20\" ea 2x20\" ea    Levator  3x20\" ea 2x20\" ea           HEP See below           Assessment:  Pt reported mower start tband exercises felt more challenging than other tband exercises. Pt had some difficulty keeping spine neutral for quadruped alt UE/LE exercises, increaesed RUE discomfort with repetition with WB through arm. No change in pain at end of session.      Plan:  Progress as tolerated. Pt to have surgery this Friday.        OP EDUCATION:       Goals:  Active       PT Problem       PT Goal 1       Start:  07/02/24    Expected End:  08/13/24       Independent home exercise program with 90% teach back capability by the patient           PT Goal 2       Start:  07/02/24    Expected End:  08/13/24       Improve MMT of right UE and left LE deficits to 4+/5 or better to improve joint stability with transfers, standing, walking, and lifting activities           PT Goal 3       Start:  07/02/24    Expected End:  08/13/24       Able to walk up 60 minutes without noting increased pain or fatigue in leg muscles           PT Goal 4       Start:  07/02/24    Expected End:  08/13/24       Functional Outcome LEFS score improves to >/= 65/80 (initially 35/80)            Patient Stated Goal 1       Start:  07/02/24    Expected End:  08/13/24       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  "

## 2024-07-17 ENCOUNTER — APPOINTMENT (OUTPATIENT)
Dept: PRIMARY CARE | Facility: CLINIC | Age: 44
End: 2024-07-17
Payer: COMMERCIAL

## 2024-07-18 ENCOUNTER — OFFICE VISIT (OUTPATIENT)
Dept: PRIMARY CARE | Facility: CLINIC | Age: 44
End: 2024-07-18
Payer: COMMERCIAL

## 2024-07-18 ENCOUNTER — ANESTHESIA EVENT (OUTPATIENT)
Dept: OPERATING ROOM | Facility: HOSPITAL | Age: 44
End: 2024-07-18
Payer: COMMERCIAL

## 2024-07-18 ENCOUNTER — TREATMENT (OUTPATIENT)
Dept: PHYSICAL THERAPY | Facility: HOSPITAL | Age: 44
End: 2024-07-18
Payer: COMMERCIAL

## 2024-07-18 VITALS
WEIGHT: 153 LBS | SYSTOLIC BLOOD PRESSURE: 86 MMHG | OXYGEN SATURATION: 98 % | TEMPERATURE: 98 F | HEART RATE: 54 BPM | HEIGHT: 66 IN | DIASTOLIC BLOOD PRESSURE: 52 MMHG | BODY MASS INDEX: 24.59 KG/M2

## 2024-07-18 DIAGNOSIS — Z23 NEED FOR VACCINATION: ICD-10-CM

## 2024-07-18 DIAGNOSIS — M54.16 RADICULOPATHY, LUMBAR REGION: ICD-10-CM

## 2024-07-18 DIAGNOSIS — Z00.00 ROUTINE GENERAL MEDICAL EXAMINATION AT A HEALTH CARE FACILITY: Primary | ICD-10-CM

## 2024-07-18 DIAGNOSIS — G89.4 CHRONIC PAIN SYNDROME: ICD-10-CM

## 2024-07-18 DIAGNOSIS — M54.12 RADICULOPATHY, CERVICAL: Primary | ICD-10-CM

## 2024-07-18 PROBLEM — G47.33 OBSTRUCTIVE SLEEP APNEA: Status: RESOLVED | Noted: 2023-10-18 | Resolved: 2024-07-18

## 2024-07-18 PROBLEM — D69.3 CHRONIC IDIOPATHIC THROMBOCYTOPENIC PURPURA (MULTI): Status: RESOLVED | Noted: 2024-03-15 | Resolved: 2024-07-18

## 2024-07-18 PROCEDURE — 99396 PREV VISIT EST AGE 40-64: CPT | Performed by: FAMILY MEDICINE

## 2024-07-18 PROCEDURE — 97110 THERAPEUTIC EXERCISES: CPT | Mod: GP,CQ

## 2024-07-18 PROCEDURE — 3008F BODY MASS INDEX DOCD: CPT | Performed by: FAMILY MEDICINE

## 2024-07-18 ASSESSMENT — PROMIS GLOBAL HEALTH SCALE
RATE_GENERAL_HEALTH: FAIR
RATE_MENTAL_HEALTH: GOOD
RATE_PHYSICAL_HEALTH: FAIR
RATE_AVERAGE_FATIGUE: MODERATE
CARRYOUT_SOCIAL_ACTIVITIES: FAIR
RATE_SOCIAL_SATISFACTION: GOOD
RATE_AVERAGE_PAIN: 4
RATE_QUALITY_OF_LIFE: FAIR
CARRYOUT_PHYSICAL_ACTIVITIES: MODERATELY
EMOTIONAL_PROBLEMS: RARELY

## 2024-07-18 ASSESSMENT — PAIN SCALES - GENERAL: PAINLEVEL_OUTOF10: 5 - MODERATE PAIN

## 2024-07-18 ASSESSMENT — PAIN DESCRIPTION - DESCRIPTORS: DESCRIPTORS: ACHING

## 2024-07-18 ASSESSMENT — PAIN - FUNCTIONAL ASSESSMENT: PAIN_FUNCTIONAL_ASSESSMENT: 0-10

## 2024-07-18 NOTE — PROGRESS NOTES
Physical Therapy    Physical Therapy Treatment    Patient Name: Ally Carpio  MRN: [id number]  : 1980   Today's Date: 2024  Time Calculation  Start Time: 0950  Stop Time: 1029  Time Calculation (min): 39 min  PT Therapeutic Procedures Time Entry  Therapeutic Exercise Time Entry: 39     Visit # 4            Current Problem  1. Radiculopathy, cervical  Follow Up In Physical Therapy      2. Chronic pain syndrome  Follow Up In Physical Therapy      3. Radiculopathy, lumbar region  Follow Up In Physical Therapy            Subjective     Pt noted she has spasms every night and feels achy in the morning. She has not had pain in ear since the weekend.      Precautions  Precautions  STEADI Fall Risk Score (The score of 4 or more indicates an increased risk of falling): 2  Precautions Comment: none       Pain  Pain Assessment: 0-10  0-10 (Numeric) Pain Score: 5 - Moderate pain  Pain Type: Neuropathic pain  Pain Location:  (right arm and karen. LEs)  Pain Descriptors: Aching    Objective    Added bird dogs and hydrants in quadruped     Treatments:    EXERCISES       Date 2024 7/10/24 7/16/24 7/18/24          VISIT# #1 #2 #3 #4    REPS REPS REPS REPS          Repeated Movements:       Standing Lumbar Ext 2x10 reviewed     Seated Thoracic Ext 2x10 reviewed     Seated Cervical Retraction 2x10 reviewed            Cervical Isometrics (6) X10 ea X10 ea     Scapular Retraction x10 Reviewed                    Shuttle   SLP         2x10; R 3b, L 2b 2x10; R 3b, L 2b 2x10; R 3b, L 2b   Shuttle   TR/HR  1x10 2B  2x10 2b 2x10 2b                 Tband   Lat Pulls  Red 2x10 Red 2x10 Green 2x10   Tband   Chops  Red 2x10 ea Red 2x10 Green 2x10   Tband   Mid Row  Red 2x10 Red 2x10 Green 2x10   Tband   Mower starts   Red 2x10 Green 2x10          Quadruped:       Trans Abs   10x    + Alt UE   10x2    + Alt LE   10x2    + Bird Dog    10x1 ea   + Hydrant    10x1 ea                                             Stretches:      "  Hamstring  Seated 3x30\" ea Standing 3x30\"ea Standing 3x30\"ea   Hip Flexor   3x30\" R @ steps 3x30\" karen supine   Calf  RB 3x30\"  RB 3x30\"          UT  3x20\" ea 2x20\" ea    Levator  3x20\" ea 2x20\" ea           HEP See below           Assessment:  Leg press feels more challenging today for patient. Pt able to achieve good hip flexor stretch supine on higher table bilaterally. Pt fatigued with quadruped exercises.    Plan:  Progress as tolerated. Pt to have surgery this Friday and will contact us once she has timeframe to resume therapy.       OP EDUCATION:       Goals:  Active       PT Problem       PT Goal 1       Start:  07/02/24    Expected End:  08/13/24       Independent home exercise program with 90% teach back capability by the patient           PT Goal 2       Start:  07/02/24    Expected End:  08/13/24       Improve MMT of right UE and left LE deficits to 4+/5 or better to improve joint stability with transfers, standing, walking, and lifting activities           PT Goal 3       Start:  07/02/24    Expected End:  08/13/24       Able to walk up 60 minutes without noting increased pain or fatigue in leg muscles           PT Goal 4       Start:  07/02/24    Expected End:  08/13/24       Functional Outcome LEFS score improves to >/= 65/80 (initially 35/80)            Patient Stated Goal 1       Start:  07/02/24    Expected End:  08/13/24       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  "

## 2024-07-18 NOTE — PROGRESS NOTES
Assessment     ASSESSMENT/PLAN:      Patient Instructions   Will get titers for MMR and varicella and fill out paperwork once that is done      Signed by: Chelsie Cooney DO       FUTURE DIRECTION:   []    Subjective   SUBJECTIVE:     HPI : Patient is a 43 y.o. 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     Review of Systems    Past Medical History:   Diagnosis Date    ALEXEY positive     Anxiety     pt denies    Chronic pain syndrome     GERD (gastroesophageal reflux disease)     Heart murmur     Hx of idiopathic thrombocytopenic purpura     follows with heme, 24: plt 111    Hypothyroidism     CAMDEN (iron deficiency anemia)     24: H/H 11.7/34.8, received IV venofer x 3 doses 2024    Inflammatory bowel disease 2023    Murmur     ECHO 23 unremarkable    Neuropathy     Psoriasis     Radiculopathy, cervical     Radiculopathy, lumbar region     RLS (restless legs syndrome)     Ulcerative colitis (Multi)         Past Surgical History:   Procedure Laterality Date     SECTION, LOW TRANSVERSE      x 2, .     COLONOSCOPY      DILATION AND CURETTAGE OF UTERUS      UPPER GASTROINTESTINAL ENDOSCOPY          Current Outpatient Medications   Medication Instructions    chlorhexidine (Peridex) 0.12 % solution Swish for 30 seconds and spit 15mL of solution the night before and morning of surgery    cyanocobalamin (VITAMIN B-12) 1,000 mcg, intramuscular, See admin instructions, Please inject daily x 7, followed by Weekly x 4 and then Monthly    Hyrimoz 40 mg, subcutaneous, Every 14 days, LAST DOSE 24    levothyroxine (SYNTHROID, LEVOXYL) 12.5 mcg, oral, Daily    multivitamin tablet 1 tablet, oral, Daily    pantoprazole (PROTONIX) 40 mg, oral, Daily, Do not crush, chew, or split.    pregabalin (LYRICA) 150 mg, oral, 2 times daily    syringe with needle (Syringe  "3cc/22Gx1\") 3 mL 22 gauge x 1\" syringe 1 Application, miscellaneous, See admin instructions    tacrolimus (Protopic) 0.1 % ointment if needed.    tiZANidine (Zanaflex) 2 mg tablet TAKE ONE TABLET BY MOUTH EVERY 6 HOURS AS NEEDED for muscle spasms    UltiCare Safety Syringe 3 mL 22 gauge x 1\" syringe     Vtama 1 % cream if needed.        Allergies   Allergen Reactions    Balsalazide Other     Mouth ulcers    Mesalamine Rash        Social History     Socioeconomic History    Marital status:      Spouse name: Not on file    Number of children: Not on file    Years of education: Not on file    Highest education level: Not on file   Occupational History    Not on file   Tobacco Use    Smoking status: Former     Current packs/day: 0.00     Average packs/day: 1 pack/day for 12.5 years (12.5 ttl pk-yrs)     Types: Cigarettes     Start date: 2004     Quit date:      Years since quittin.5    Smokeless tobacco: Never   Vaping Use    Vaping status: Never Used   Substance and Sexual Activity    Alcohol use: Not Currently    Drug use: Never    Sexual activity: Yes     Partners: Male     Birth control/protection: Male Sterilization     Comment: Pt stopped her Norethindrone about one week ago- does not desire to be on birth control pills any longer   Other Topics Concern    Not on file   Social History Narrative    Not on file     Social Determinants of Health     Financial Resource Strain: Not on file   Food Insecurity: Not on file   Transportation Needs: Not on file   Physical Activity: Not on file   Stress: Not on file   Social Connections: Not on file   Intimate Partner Violence: Not on file   Housing Stability: Not on file        Family History   Problem Relation Name Age of Onset    Colon cancer Mother Shari     COPD Mother Shari     Hyperlipidemia Father Archuleta     Prostate cancer Father Archuleta     Hyperlipidemia Sister      Hypothyroidism Sister      Diverticulitis Sister      No Known Problems Sister      No " "Known Problems Brother      Colon cancer Mother's Brother      Colon cancer Mother's Brother Gary     Breast cancer Father's Sister Na         Objective     OBJECTIVE:     Vitals:    07/18/24 1526   BP: 86/52   Pulse: 54   Temp: 36.7 °C (98 °F)   SpO2: 98%   Weight: 69.4 kg (153 lb)   Height: 1.676 m (5' 6\")        Physical Exam  HENT:      Head: Normocephalic and atraumatic.      Nose: Nose normal.      Mouth/Throat:      Mouth: Mucous membranes are moist.   Eyes:      General: No visual field deficit.     Pupils: Pupils are equal, round, and reactive to light.   Cardiovascular:      Rate and Rhythm: Normal rate and regular rhythm.      Pulses: Normal pulses.      Heart sounds: No murmur heard.  Pulmonary:      Effort: Pulmonary effort is normal.      Breath sounds: Normal breath sounds.   Abdominal:      Tenderness: There is no abdominal tenderness.   Musculoskeletal:         General: Normal range of motion.      Cervical back: Normal range of motion.   Skin:     General: Skin is warm and dry.   Neurological:      General: No focal deficit present.      Mental Status: She is alert.      Cranial Nerves: Cranial nerves 2-12 are intact. No cranial nerve deficit.      Sensory: Sensation is intact.      Motor: Weakness (4/5 weakness in LLE) present.      Coordination: Coordination is intact. Coordination normal. Finger-Nose-Finger Test normal.      Gait: Gait is intact.      Deep Tendon Reflexes:      Reflex Scores:       Bicep reflexes are 2+ on the right side and 2+ on the left side.       Brachioradialis reflexes are 2+ on the right side and 2+ on the left side.       Patellar reflexes are 2+ on the right side and 2+ on the left side.  Psychiatric:         Mood and Affect: Mood normal.             "

## 2024-07-19 ENCOUNTER — APPOINTMENT (OUTPATIENT)
Dept: RADIOLOGY | Facility: HOSPITAL | Age: 44
End: 2024-07-19
Payer: COMMERCIAL

## 2024-07-19 ENCOUNTER — ANESTHESIA (OUTPATIENT)
Dept: OPERATING ROOM | Facility: HOSPITAL | Age: 44
End: 2024-07-19
Payer: COMMERCIAL

## 2024-07-19 ENCOUNTER — HOSPITAL ENCOUNTER (OUTPATIENT)
Dept: NEUROLOGY | Facility: HOSPITAL | Age: 44
Discharge: HOME | End: 2024-07-19
Payer: COMMERCIAL

## 2024-07-19 ENCOUNTER — HOSPITAL ENCOUNTER (OUTPATIENT)
Facility: HOSPITAL | Age: 44
Discharge: HOME | End: 2024-07-20
Attending: NEUROLOGICAL SURGERY | Admitting: NEUROLOGICAL SURGERY
Payer: COMMERCIAL

## 2024-07-19 DIAGNOSIS — M50.00 HERNIATION OF INTERVERTEBRAL DISC OF CERVICAL SPINE WITH MYELOPATHY: ICD-10-CM

## 2024-07-19 DIAGNOSIS — G95.9 MYELOPATHY (MULTI): Primary | ICD-10-CM

## 2024-07-19 LAB — PREGNANCY TEST URINE, POC: NEGATIVE

## 2024-07-19 PROCEDURE — C1713 ANCHOR/SCREW BN/BN,TIS/BN: HCPCS | Performed by: NEUROLOGICAL SURGERY

## 2024-07-19 PROCEDURE — 22856 TOT DISC ARTHRP 1NTRSPC CRV: CPT | Performed by: NEUROLOGICAL SURGERY

## 2024-07-19 PROCEDURE — 3700000001 HC GENERAL ANESTHESIA TIME - INITIAL BASE CHARGE: Performed by: NEUROLOGICAL SURGERY

## 2024-07-19 PROCEDURE — 3700000002 HC GENERAL ANESTHESIA TIME - EACH INCREMENTAL 1 MINUTE: Performed by: NEUROLOGICAL SURGERY

## 2024-07-19 PROCEDURE — 3600000009 HC OR TIME - EACH INCREMENTAL 1 MINUTE - PROCEDURE LEVEL FOUR: Performed by: NEUROLOGICAL SURGERY

## 2024-07-19 PROCEDURE — 95955 EEG DURING SURGERY: CPT

## 2024-07-19 PROCEDURE — 81025 URINE PREGNANCY TEST: CPT | Performed by: NEUROLOGICAL SURGERY

## 2024-07-19 PROCEDURE — C1889 IMPLANT/INSERT DEVICE, NOC: HCPCS | Performed by: NEUROLOGICAL SURGERY

## 2024-07-19 PROCEDURE — 2500000001 HC RX 250 WO HCPCS SELF ADMINISTERED DRUGS (ALT 637 FOR MEDICARE OP): Performed by: STUDENT IN AN ORGANIZED HEALTH CARE EDUCATION/TRAINING PROGRAM

## 2024-07-19 PROCEDURE — 72040 X-RAY EXAM NECK SPINE 2-3 VW: CPT

## 2024-07-19 PROCEDURE — 2500000005 HC RX 250 GENERAL PHARMACY W/O HCPCS: Performed by: NEUROLOGICAL SURGERY

## 2024-07-19 PROCEDURE — 97161 PT EVAL LOW COMPLEX 20 MIN: CPT | Mod: GP

## 2024-07-19 PROCEDURE — 2500000005 HC RX 250 GENERAL PHARMACY W/O HCPCS: Performed by: ANESTHESIOLOGIST ASSISTANT

## 2024-07-19 PROCEDURE — 2500000004 HC RX 250 GENERAL PHARMACY W/ HCPCS (ALT 636 FOR OP/ED): Performed by: ANESTHESIOLOGY

## 2024-07-19 PROCEDURE — 7100000011 HC EXTENDED STAY RECOVERY HOURLY - NURSING UNIT

## 2024-07-19 PROCEDURE — 7100000002 HC RECOVERY ROOM TIME - EACH INCREMENTAL 1 MINUTE: Performed by: NEUROLOGICAL SURGERY

## 2024-07-19 PROCEDURE — 2720000007 HC OR 272 NO HCPCS: Performed by: NEUROLOGICAL SURGERY

## 2024-07-19 PROCEDURE — 2500000002 HC RX 250 W HCPCS SELF ADMINISTERED DRUGS (ALT 637 FOR MEDICARE OP, ALT 636 FOR OP/ED): Performed by: STUDENT IN AN ORGANIZED HEALTH CARE EDUCATION/TRAINING PROGRAM

## 2024-07-19 PROCEDURE — 2500000004 HC RX 250 GENERAL PHARMACY W/ HCPCS (ALT 636 FOR OP/ED): Performed by: ANESTHESIOLOGIST ASSISTANT

## 2024-07-19 PROCEDURE — 76000 FLUOROSCOPY <1 HR PHYS/QHP: CPT

## 2024-07-19 PROCEDURE — 3600000004 HC OR TIME - INITIAL BASE CHARGE - PROCEDURE LEVEL FOUR: Performed by: NEUROLOGICAL SURGERY

## 2024-07-19 PROCEDURE — 2780000003 HC OR 278 NO HCPCS: Performed by: NEUROLOGICAL SURGERY

## 2024-07-19 PROCEDURE — 7100000001 HC RECOVERY ROOM TIME - INITIAL BASE CHARGE: Performed by: NEUROLOGICAL SURGERY

## 2024-07-19 DEVICE — SIMPLIFY DISC SIZE 2, HT 4
Type: IMPLANTABLE DEVICE | Site: SPINE CERVICAL | Status: FUNCTIONAL
Brand: SIMPLIFY

## 2024-07-19 RX ORDER — ONDANSETRON HYDROCHLORIDE 2 MG/ML
4 INJECTION, SOLUTION INTRAVENOUS ONCE AS NEEDED
Status: DISCONTINUED | OUTPATIENT
Start: 2024-07-19 | End: 2024-07-19 | Stop reason: HOSPADM

## 2024-07-19 RX ORDER — ONDANSETRON HYDROCHLORIDE 2 MG/ML
4 INJECTION, SOLUTION INTRAVENOUS EVERY 8 HOURS PRN
Status: DISCONTINUED | OUTPATIENT
Start: 2024-07-19 | End: 2024-07-20 | Stop reason: HOSPADM

## 2024-07-19 RX ORDER — PREGABALIN 75 MG/1
150 CAPSULE ORAL 2 TIMES DAILY
Status: DISCONTINUED | OUTPATIENT
Start: 2024-07-19 | End: 2024-07-20 | Stop reason: HOSPADM

## 2024-07-19 RX ORDER — MIDAZOLAM HYDROCHLORIDE 1 MG/ML
INJECTION INTRAMUSCULAR; INTRAVENOUS AS NEEDED
Status: DISCONTINUED | OUTPATIENT
Start: 2024-07-19 | End: 2024-07-19

## 2024-07-19 RX ORDER — KETOROLAC TROMETHAMINE 30 MG/ML
INJECTION, SOLUTION INTRAMUSCULAR; INTRAVENOUS AS NEEDED
Status: DISCONTINUED | OUTPATIENT
Start: 2024-07-19 | End: 2024-07-19

## 2024-07-19 RX ORDER — DROPERIDOL 2.5 MG/ML
0.62 INJECTION, SOLUTION INTRAMUSCULAR; INTRAVENOUS ONCE AS NEEDED
Status: DISCONTINUED | OUTPATIENT
Start: 2024-07-19 | End: 2024-07-19 | Stop reason: HOSPADM

## 2024-07-19 RX ORDER — ROCURONIUM BROMIDE 10 MG/ML
INJECTION, SOLUTION INTRAVENOUS AS NEEDED
Status: DISCONTINUED | OUTPATIENT
Start: 2024-07-19 | End: 2024-07-19

## 2024-07-19 RX ORDER — ACETAMINOPHEN 325 MG/1
650 TABLET ORAL EVERY 6 HOURS
Status: DISCONTINUED | OUTPATIENT
Start: 2024-07-19 | End: 2024-07-20 | Stop reason: HOSPADM

## 2024-07-19 RX ORDER — FENTANYL CITRATE 50 UG/ML
50 INJECTION, SOLUTION INTRAMUSCULAR; INTRAVENOUS EVERY 5 MIN PRN
Status: DISCONTINUED | OUTPATIENT
Start: 2024-07-19 | End: 2024-07-19 | Stop reason: HOSPADM

## 2024-07-19 RX ORDER — FENTANYL CITRATE 50 UG/ML
12.5 INJECTION, SOLUTION INTRAMUSCULAR; INTRAVENOUS EVERY 5 MIN PRN
Status: DISCONTINUED | OUTPATIENT
Start: 2024-07-19 | End: 2024-07-19 | Stop reason: HOSPADM

## 2024-07-19 RX ORDER — PROPOFOL 10 MG/ML
INJECTION, EMULSION INTRAVENOUS AS NEEDED
Status: DISCONTINUED | OUTPATIENT
Start: 2024-07-19 | End: 2024-07-19

## 2024-07-19 RX ORDER — OXYCODONE HYDROCHLORIDE 5 MG/1
10 TABLET ORAL EVERY 4 HOURS PRN
Status: DISCONTINUED | OUTPATIENT
Start: 2024-07-19 | End: 2024-07-20 | Stop reason: HOSPADM

## 2024-07-19 RX ORDER — SODIUM CHLORIDE, SODIUM LACTATE, POTASSIUM CHLORIDE, CALCIUM CHLORIDE 600; 310; 30; 20 MG/100ML; MG/100ML; MG/100ML; MG/100ML
100 INJECTION, SOLUTION INTRAVENOUS CONTINUOUS
Status: DISCONTINUED | OUTPATIENT
Start: 2024-07-19 | End: 2024-07-19 | Stop reason: HOSPADM

## 2024-07-19 RX ORDER — HEPARIN SODIUM 5000 [USP'U]/ML
5000 INJECTION, SOLUTION INTRAVENOUS; SUBCUTANEOUS EVERY 8 HOURS
Status: DISCONTINUED | OUTPATIENT
Start: 2024-07-20 | End: 2024-07-20 | Stop reason: HOSPADM

## 2024-07-19 RX ORDER — SODIUM CHLORIDE 0.9 G/100ML
IRRIGANT IRRIGATION AS NEEDED
Status: DISCONTINUED | OUTPATIENT
Start: 2024-07-19 | End: 2024-07-19 | Stop reason: HOSPADM

## 2024-07-19 RX ORDER — LIDOCAINE HYDROCHLORIDE 10 MG/ML
0.1 INJECTION, SOLUTION EPIDURAL; INFILTRATION; INTRACAUDAL; PERINEURAL ONCE
Status: DISCONTINUED | OUTPATIENT
Start: 2024-07-19 | End: 2024-07-19 | Stop reason: HOSPADM

## 2024-07-19 RX ORDER — FENTANYL CITRATE 50 UG/ML
25 INJECTION, SOLUTION INTRAMUSCULAR; INTRAVENOUS EVERY 5 MIN PRN
Status: DISCONTINUED | OUTPATIENT
Start: 2024-07-19 | End: 2024-07-19 | Stop reason: HOSPADM

## 2024-07-19 RX ORDER — DEXTROSE 50 % IN WATER (D50W) INTRAVENOUS SYRINGE
12.5
Status: DISCONTINUED | OUTPATIENT
Start: 2024-07-19 | End: 2024-07-20 | Stop reason: HOSPADM

## 2024-07-19 RX ORDER — PHENYLEPHRINE HCL IN 0.9% NACL 1 MG/10 ML
SYRINGE (ML) INTRAVENOUS AS NEEDED
Status: DISCONTINUED | OUTPATIENT
Start: 2024-07-19 | End: 2024-07-19

## 2024-07-19 RX ORDER — TIZANIDINE 4 MG/1
2 TABLET ORAL EVERY 8 HOURS PRN
Status: DISCONTINUED | OUTPATIENT
Start: 2024-07-19 | End: 2024-07-20 | Stop reason: HOSPADM

## 2024-07-19 RX ORDER — PANTOPRAZOLE SODIUM 40 MG/1
40 TABLET, DELAYED RELEASE ORAL DAILY
Status: DISCONTINUED | OUTPATIENT
Start: 2024-07-19 | End: 2024-07-20 | Stop reason: HOSPADM

## 2024-07-19 RX ORDER — METHOCARBAMOL 100 MG/ML
1000 INJECTION, SOLUTION INTRAMUSCULAR; INTRAVENOUS ONCE
Status: COMPLETED | OUTPATIENT
Start: 2024-07-19 | End: 2024-07-19

## 2024-07-19 RX ORDER — ONDANSETRON HYDROCHLORIDE 2 MG/ML
INJECTION, SOLUTION INTRAVENOUS AS NEEDED
Status: DISCONTINUED | OUTPATIENT
Start: 2024-07-19 | End: 2024-07-19

## 2024-07-19 RX ORDER — FENTANYL CITRATE 50 UG/ML
INJECTION, SOLUTION INTRAMUSCULAR; INTRAVENOUS AS NEEDED
Status: DISCONTINUED | OUTPATIENT
Start: 2024-07-19 | End: 2024-07-19

## 2024-07-19 RX ORDER — LIDOCAINE HYDROCHLORIDE 20 MG/ML
INJECTION, SOLUTION EPIDURAL; INFILTRATION; INTRACAUDAL; PERINEURAL AS NEEDED
Status: DISCONTINUED | OUTPATIENT
Start: 2024-07-19 | End: 2024-07-19

## 2024-07-19 RX ORDER — CEFAZOLIN 1 G/1
INJECTION, POWDER, FOR SOLUTION INTRAVENOUS AS NEEDED
Status: DISCONTINUED | OUTPATIENT
Start: 2024-07-19 | End: 2024-07-19

## 2024-07-19 RX ORDER — NORETHINDRONE AND ETHINYL ESTRADIOL 0.5-0.035
KIT ORAL AS NEEDED
Status: DISCONTINUED | OUTPATIENT
Start: 2024-07-19 | End: 2024-07-19

## 2024-07-19 RX ORDER — ONDANSETRON 4 MG/1
4 TABLET, FILM COATED ORAL EVERY 8 HOURS PRN
Status: DISCONTINUED | OUTPATIENT
Start: 2024-07-19 | End: 2024-07-20 | Stop reason: HOSPADM

## 2024-07-19 RX ORDER — OXYCODONE HYDROCHLORIDE 5 MG/1
5 TABLET ORAL EVERY 4 HOURS PRN
Status: DISCONTINUED | OUTPATIENT
Start: 2024-07-19 | End: 2024-07-20 | Stop reason: HOSPADM

## 2024-07-19 RX ORDER — HYDROMORPHONE HYDROCHLORIDE 1 MG/ML
INJECTION, SOLUTION INTRAMUSCULAR; INTRAVENOUS; SUBCUTANEOUS AS NEEDED
Status: DISCONTINUED | OUTPATIENT
Start: 2024-07-19 | End: 2024-07-19

## 2024-07-19 RX ORDER — AMOXICILLIN 250 MG
2 CAPSULE ORAL 2 TIMES DAILY
Status: DISCONTINUED | OUTPATIENT
Start: 2024-07-19 | End: 2024-07-20 | Stop reason: HOSPADM

## 2024-07-19 RX ORDER — DEXTROSE 50 % IN WATER (D50W) INTRAVENOUS SYRINGE
25
Status: DISCONTINUED | OUTPATIENT
Start: 2024-07-19 | End: 2024-07-20 | Stop reason: HOSPADM

## 2024-07-19 RX ORDER — LEVOTHYROXINE SODIUM 25 UG/1
12.5 TABLET ORAL DAILY
Status: DISCONTINUED | OUTPATIENT
Start: 2024-07-19 | End: 2024-07-20 | Stop reason: HOSPADM

## 2024-07-19 RX ORDER — HYDROMORPHONE HYDROCHLORIDE 0.2 MG/ML
0.2 INJECTION INTRAMUSCULAR; INTRAVENOUS; SUBCUTANEOUS EVERY 4 HOURS PRN
Status: DISCONTINUED | OUTPATIENT
Start: 2024-07-19 | End: 2024-07-20 | Stop reason: HOSPADM

## 2024-07-19 RX ORDER — NALOXONE HYDROCHLORIDE 0.4 MG/ML
0.2 INJECTION, SOLUTION INTRAMUSCULAR; INTRAVENOUS; SUBCUTANEOUS EVERY 5 MIN PRN
Status: DISCONTINUED | OUTPATIENT
Start: 2024-07-19 | End: 2024-07-20 | Stop reason: HOSPADM

## 2024-07-19 RX ADMIN — PANTOPRAZOLE SODIUM 40 MG: 40 TABLET, DELAYED RELEASE ORAL at 15:19

## 2024-07-19 RX ADMIN — LEVOTHYROXINE SODIUM 12.5 MCG: 0.03 TABLET ORAL at 15:19

## 2024-07-19 RX ADMIN — TIZANIDINE 2 MG: 4 TABLET ORAL at 15:19

## 2024-07-19 RX ADMIN — OXYCODONE HYDROCHLORIDE 5 MG: 5 TABLET ORAL at 15:19

## 2024-07-19 RX ADMIN — ACETAMINOPHEN 650 MG: 325 TABLET ORAL at 22:28

## 2024-07-19 RX ADMIN — OXYCODONE HYDROCHLORIDE 10 MG: 5 TABLET ORAL at 22:28

## 2024-07-19 RX ADMIN — ACETAMINOPHEN 650 MG: 325 TABLET ORAL at 15:19

## 2024-07-19 RX ADMIN — PREGABALIN 150 MG: 75 CAPSULE ORAL at 22:28

## 2024-07-19 RX ADMIN — METHOCARBAMOL 1000 MG: 100 INJECTION INTRAMUSCULAR; INTRAVENOUS at 11:15

## 2024-07-19 SDOH — SOCIAL STABILITY: SOCIAL INSECURITY: DOES ANYONE TRY TO KEEP YOU FROM HAVING/CONTACTING OTHER FRIENDS OR DOING THINGS OUTSIDE YOUR HOME?: NO

## 2024-07-19 SDOH — SOCIAL STABILITY: SOCIAL INSECURITY: DO YOU FEEL UNSAFE GOING BACK TO THE PLACE WHERE YOU ARE LIVING?: NO

## 2024-07-19 SDOH — SOCIAL STABILITY: SOCIAL INSECURITY: ABUSE: ADULT

## 2024-07-19 SDOH — SOCIAL STABILITY: SOCIAL INSECURITY: DO YOU FEEL ANYONE HAS EXPLOITED OR TAKEN ADVANTAGE OF YOU FINANCIALLY OR OF YOUR PERSONAL PROPERTY?: NO

## 2024-07-19 SDOH — SOCIAL STABILITY: SOCIAL INSECURITY: WERE YOU ABLE TO COMPLETE ALL THE BEHAVIORAL HEALTH SCREENINGS?: YES

## 2024-07-19 SDOH — SOCIAL STABILITY: SOCIAL INSECURITY: ARE YOU OR HAVE YOU BEEN THREATENED OR ABUSED PHYSICALLY, EMOTIONALLY, OR SEXUALLY BY ANYONE?: NO

## 2024-07-19 SDOH — SOCIAL STABILITY: SOCIAL INSECURITY: HAS ANYONE EVER THREATENED TO HURT YOUR FAMILY OR YOUR PETS?: NO

## 2024-07-19 SDOH — SOCIAL STABILITY: SOCIAL INSECURITY: HAVE YOU HAD THOUGHTS OF HARMING ANYONE ELSE?: NO

## 2024-07-19 SDOH — SOCIAL STABILITY: SOCIAL INSECURITY: ARE THERE ANY APPARENT SIGNS OF INJURIES/BEHAVIORS THAT COULD BE RELATED TO ABUSE/NEGLECT?: NO

## 2024-07-19 SDOH — SOCIAL STABILITY: SOCIAL INSECURITY: HAVE YOU HAD ANY THOUGHTS OF HARMING ANYONE ELSE?: NO

## 2024-07-19 ASSESSMENT — PAIN - FUNCTIONAL ASSESSMENT
PAIN_FUNCTIONAL_ASSESSMENT: UNABLE TO SELF-REPORT
PAIN_FUNCTIONAL_ASSESSMENT: 0-10
PAIN_FUNCTIONAL_ASSESSMENT: UNABLE TO SELF-REPORT
PAIN_FUNCTIONAL_ASSESSMENT: 0-10

## 2024-07-19 ASSESSMENT — COGNITIVE AND FUNCTIONAL STATUS - GENERAL
PATIENT BASELINE BEDBOUND: NO
MOVING FROM LYING ON BACK TO SITTING ON SIDE OF FLAT BED WITH BEDRAILS: A LITTLE
CLIMB 3 TO 5 STEPS WITH RAILING: A LITTLE
MOBILITY SCORE: 19
TURNING FROM BACK TO SIDE WHILE IN FLAT BAD: A LITTLE
DAILY ACTIVITIY SCORE: 24
WALKING IN HOSPITAL ROOM: A LITTLE
MOBILITY SCORE: 24
MOVING TO AND FROM BED TO CHAIR: A LITTLE

## 2024-07-19 ASSESSMENT — PAIN SCALES - GENERAL
PAINLEVEL_OUTOF10: 6
PAINLEVEL_OUTOF10: 2
PAINLEVEL_OUTOF10: 7
PAINLEVEL_OUTOF10: 3
PAINLEVEL_OUTOF10: 2
PAINLEVEL_OUTOF10: 3
PAINLEVEL_OUTOF10: 3
PAINLEVEL_OUTOF10: 2
PAINLEVEL_OUTOF10: 7
PAINLEVEL_OUTOF10: 6
PAINLEVEL_OUTOF10: 2

## 2024-07-19 ASSESSMENT — ACTIVITIES OF DAILY LIVING (ADL)
ADL_ASSISTANCE: INDEPENDENT
HEARING - LEFT EAR: FUNCTIONAL
JUDGMENT_ADEQUATE_SAFELY_COMPLETE_DAILY_ACTIVITIES: YES
TOILETING: INDEPENDENT
GROOMING: INDEPENDENT
DRESSING YOURSELF: INDEPENDENT
ASSISTIVE_DEVICE: EYEGLASSES
BATHING: INDEPENDENT
FEEDING YOURSELF: INDEPENDENT
HEARING - RIGHT EAR: FUNCTIONAL
PATIENT'S MEMORY ADEQUATE TO SAFELY COMPLETE DAILY ACTIVITIES?: YES
ADEQUATE_TO_COMPLETE_ADL: YES
WALKS IN HOME: INDEPENDENT

## 2024-07-19 ASSESSMENT — PATIENT HEALTH QUESTIONNAIRE - PHQ9
2. FEELING DOWN, DEPRESSED OR HOPELESS: NOT AT ALL
1. LITTLE INTEREST OR PLEASURE IN DOING THINGS: NOT AT ALL
SUM OF ALL RESPONSES TO PHQ9 QUESTIONS 1 & 2: 0

## 2024-07-19 ASSESSMENT — LIFESTYLE VARIABLES
HOW OFTEN DO YOU HAVE A DRINK CONTAINING ALCOHOL: NEVER
SKIP TO QUESTIONS 9-10: 1
PRESCIPTION_ABUSE_PAST_12_MONTHS: NO
HOW MANY STANDARD DRINKS CONTAINING ALCOHOL DO YOU HAVE ON A TYPICAL DAY: PATIENT DOES NOT DRINK
AUDIT-C TOTAL SCORE: 0
SUBSTANCE_ABUSE_PAST_12_MONTHS: NO
AUDIT-C TOTAL SCORE: 0
HOW OFTEN DO YOU HAVE 6 OR MORE DRINKS ON ONE OCCASION: NEVER

## 2024-07-19 ASSESSMENT — PAIN DESCRIPTION - LOCATION
LOCATION: NECK
LOCATION: BACK
LOCATION: BACK

## 2024-07-19 ASSESSMENT — PAIN SCALES - PAIN ASSESSMENT IN ADVANCED DEMENTIA (PAINAD): TOTALSCORE: MEDICATION (SEE MAR)

## 2024-07-19 NOTE — PROGRESS NOTES
Physical Therapy    Physical Therapy Evaluation    Patient Name: Ally Carpio  MRN: [id number]  Today's Date: 7/19/2024   Time Calculation  Start Time: 1526  Stop Time: 1546  Time Calculation (min): 20 min    Assessment/Plan   PT Assessment  PT Assessment Results: Decreased strength, Decreased endurance, Impaired balance, Decreased mobility, Orthopedic restrictions, Pain  Rehab Prognosis: Good  Barriers to Discharge: None identified  Evaluation/Treatment Tolerance: Patient tolerated treatment well  Medical Staff Made Aware: Yes  Strengths: Ability to acquire knowledge, Premorbid level of function  Barriers to Participation:  (None identified)  End of Session Communication: Bedside nurse  Assessment Comment: Pt presents today with decreased BLE strength, balance, and activity tolerance. Pt would benefit from continued PT to practice stair negotiation and review spinal precautions to maximize saftey/independence with functional mobility at D/C.  End of Session Patient Position: Up in chair, Alarm on  IP OR SWING BED PT PLAN  Inpatient or Swing Bed: Inpatient  PT Plan  Treatment/Interventions: Bed mobility, Transfer training, Gait training, Stair training, Balance training, Strengthening, Endurance training, Therapeutic exercise, Therapeutic activity, Home exercise program, Positioning  PT Plan: Ongoing PT  PT Frequency: Daily  PT Discharge Recommendations: Low intensity level of continued care  PT Recommended Transfer Status: Independent  PT - OK to Discharge: Yes (Per PT POC)    Subjective   General Visit Information:  General  Reason for Referral: 42 y/o F s/p C6-C7 cervical spine arthroplasty-anterior approach on 7/19/24  Referred By: JEWEL Church  Past Medical History Relevant to Rehab:   Past Medical History:   Diagnosis Date    ALEXEY positive     Anxiety     pt denies    Chronic pain syndrome     GERD (gastroesophageal reflux disease)     Heart murmur     Hx of idiopathic thrombocytopenic purpura     follows  with heme, 6.13.24: plt 111    Hypothyroidism     CAM[place] (iron deficiency anemia)     6.13.24: H/H 11.7/34.8, received IV venofer x 3 doses 6/2024    Inflammatory bowel disease 12/2023    Murmur     ECHO 9/26/23 unremarkable    Neuropathy     Psoriasis     Radiculopathy, cervical     Radiculopathy, lumbar region     RLS (restless legs syndrome)     Ulcerative colitis (Multi)      Family/Caregiver Present: No  Prior to Session Communication: Bedside nurse  Patient Position Received: Bed, 3 rail up, Alarm on  Preferred Learning Style: auditory, kinesthetic, visual, verbal  General Comment: Pt supine in bed upon PT arrival. Cleared to participate with RN, and agreeable to  PT evaluation.  Home Living:  Home Living  Type of Home: House  Lives With: Spouse (Pt reports her older children  also in the home)  Home Adaptive Equipment: Cane, Reacher (Adjustable bed)  Home Layout: Two level, Bed/bath upstairs, Stairs to alternate level with rails  Alternate Level Stairs-Number of Steps: Full flight to bed/bath on second level with L HR. 13-15 steps to basement for access to pt's dogs with R HR  Home Access: Level entry  Bathroom Shower/Tub: Walk-in shower  Bathroom Toilet: Standard  Bathroom Equipment:  (Shower seat)  Prior Level of Function:  Prior Function Per Pt/Caregiver Report  Level of 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 Pt denies recent falls  Precautions:  Precautions  Medical Precautions: Fall precautions, Spinal precautions  Post-Surgical Precautions: Spinal precautions    Objective   Pain:  Pain Assessment  Pain Assessment: 0-10  0-10 (Numeric) Pain Score: 6  Pain Type: Surgical pain  Pain Location: Neck  Pain Orientation:  Right  Pain Interventions: Ambulation/increased activity, Repositioned  Response to Interventions: 7/10 pain reported  Cognition:  Cognition  Orientation Level: Oriented X4  Insight: Mild  Impulsive: Within functional limits    General Assessments:  Activity Tolerance  Endurance: Tolerates 10 - 20 min exercise with multiple rests    Sensation  Sensation Comment: Pt reports chronic n/t in the feet    Coordination  Movements are Fluid and Coordinated: Yes  Coordination Comment: Finger to thumb approximation intact bilaterally    Postural Control  Postural Control: Within Functional Limits    Static Sitting Balance  Static Sitting-Balance Support: Bilateral upper extremity supported, Feet supported  Static Sitting-Level of Assistance: Distant supervision  Dynamic Sitting Balance  Dynamic Sitting-Balance Support: Bilateral upper extremity supported (Single LE supported on floor)  Dynamic Sitting-Comments: During BLE MMT    Static Standing Balance  Static Standing-Balance Support: No upper extremity supported  Static Standing-Level of Assistance: Close supervision  Static Standing-Comment/Number of Minutes: Without AD  Dynamic Standing Balance  Dynamic Standing-Balance Support: Bilateral upper extremity supported  Dynamic Standing-Comments: Close supervision without AD  Functional Assessments:  Bed Mobility  Bed Mobility: Yes  Bed Mobility 1  Bed Mobility 1: Supine to sitting  Level of Assistance 1: Distant supervision  Bed Mobility Comments 1: Pt able to progress BLE off the EOB using bed rails to assist trunk into upright sitting. HOB elevated.    Transfers  Transfer: Yes  Transfer 1  Transfer From 1: Bed to  Transfer to 1: Stand  Technique 1: Sit to stand  Transfer Level of Assistance 1: Independent (Progressed from SBA)  Transfers 2  Transfer From 2: Stand to  Transfer to 2: Chair with arms  Technique 2: Stand to sit  Transfer Level of Assistance 2: Close supervision  Trials/Comments 2: Fair BLE positioning against  chair before attempting to sit. Pt demonstrates single UE reach for the armrest of the chair to assist in eccentric control. Fair eccentric control noted on descent.    Ambulation/Gait Training  Ambulation/Gait Training Performed: Yes  Ambulation/Gait Training 1  Surface 1: Level tile  Device 1: No device  Assistance 1: Close supervision, Minimal verbal cues  Comments/Distance (ft) 1: 110 ft x 1, 130 ft x 1 with a step through pattern. VC to refrain from cervical rotation. Fair yi and step length with increased medial/lateral sway. Fair reciprocal arm swing observed. No LOB observed. Pt reporting LE fatigue toward end of second trial.    Stairs  Stairs: Yes  Stairs  Rails 1: Right, Left  Device 1: Railing  Assistance 1: Close supervision, Contact guard  Comment/Number of Steps 1: Pt ascended/descended 4 steps x 2. First trial performed with a step over step pattern and R HR. Pt reports difficulty progressing LLE. Pt instructed through step-to pattern. Second attempt performed with variable step-to and step-through pattern with L HR support. Variable CGA to close supervision provided. No LOB noted.  Extremity/Trunk Assessments:  RLE   RLE : Exceptions to WFL  Strength RLE  R Hip Flexion: 4-/5  R Knee Extension: 4-/5  R Ankle Dorsiflexion: 4-/5  R Ankle Plantar Flexion: 3+/5  LLE   LLE : Exceptions to WFL  Strength LLE  L Hip Flexion: 4-/5  L Knee Extension: 3-/5  L Ankle Dorsiflexion: 2-/5  L Ankle Plantar Flexion: 2+/5  Outcome Measures:  [facility] Basic Mobility  Turning from your back to your side while in a flat bed without using bedrails: A little  Moving from lying on your back to sitting on the side of a flat bed without using bedrails: A little  Moving to and from bed to chair (including a wheelchair): A little  Standing up from a chair using your arms (e.g. wheelchair or bedside chair): None  To walk in hospital room: A little  Climbing 3-5 steps with railing: A little  Basic Mobility - Total Score:  19    Encounter Problems       Encounter Problems (Active)       Balance       Goal 1 (Progressing)       Start:  07/19/24    Expected End:  08/02/24       Pt performs all sitting and standing balance mod IND within spinal precautions            Mobility       STG - Patient will navigate 4-6 steps with single HR support mod IND (Progressing)       Start:  07/19/24    Expected End:  08/02/24            STG - Patient will ambulate (Progressing)       Start:  07/19/24    Expected End:  08/02/24       200 ft IND            PT Transfers       STG - Patient to transfer to and from sit to supine (Progressing)       Start:  07/19/24    Expected End:  08/02/24       Mod IND using the log roll technique         STG - Patient will transfer sit to and from stand (Progressing)       Start:  07/19/24    Expected End:  08/02/24       IND              Education Documentation  Handouts, taught by Kojo Schmid PT at 7/19/2024  4:42 PM.  Learner: Patient  Readiness: Acceptance  Method: Explanation, Demonstration, Handout  Response: Verbalizes Understanding    Precautions, taught by Kojo Schmid PT at 7/19/2024  4:42 PM.  Learner: Patient  Readiness: Acceptance  Method: Explanation, Demonstration, Handout  Response: Verbalizes Understanding    Body Mechanics, taught by Kojo Schmid PT at 7/19/2024  4:42 PM.  Learner: Patient  Readiness: Acceptance  Method: Explanation, Demonstration, Handout  Response: Verbalizes Understanding    Mobility Training, taught by Kojo Schmid PT at 7/19/2024  4:42 PM.  Learner: Patient  Readiness: Acceptance  Method: Explanation, Demonstration, Handout  Response: Verbalizes Understanding    Education Comments  No comments found.

## 2024-07-19 NOTE — OP NOTE
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 [M50.00]     Procedures  C6-C7 Cervical Spine Arthroplasty ~ Anterior Approach  19283 - [place] TOTAL DISC ARTHRP ANT SINGLE INTERSPACE CERVICAL      Surgeons      * Gustabo Ray - Primary    Resident/Fellow/Other Assistant:  Surgeons and Role:     * Ulisses Church MD - Resident - Assisting    Procedure Summary  Anesthesia: General  ASA: I  Anesthesia Staff: Anesthesiologist: Atul Velazco MD  CRNA: JORDAN Somers-CRNA  C-AA: BEATRIZ Bahena  Estimated Blood Loss: 10 mL  Intra-op Medications:   Administrations occurring from 0730 to 1030 on 24:   Medication Name Total Dose   lidocaine-epinephrine PF (Xylocaine W/EPI) 1 %-1:200,000 injection 7 mL   sodium chloride 0.9 % irrigation solution 1,000 mL              Anesthesia Record               Intraprocedure I/O Totals          Intake    LR bolus 1700.00 mL    Total Intake 1700 mL       Output    Est. Blood Loss 10 mL    Total Output 10 mL       Net    Net Volume 1690 mL          Specimen: No specimens collected     Staff:   Circulator: Elida  Scrub Person: Kaylee Child Scrub: Heraclio  Relief Circulator: Michelet         Drains and/or Catheters:   Closed/Suction Drain Anterior Neck Bulb 10 Fr. (Active)   Dressing Status Clean;Dry 24 1038   Drainage Appearance Serosanguineous 24 1038   Status To bulb suction 24 1038       Tourniquet Times:         Implants:  Implants       Type Name Action Serial No.      Other Simplify Disc Size 2, Ht 4 Implanted N/A              Findings: see procedure details    Indications: Ally Carpio is  an 43 y.o. 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     The patient was seen in the preoperative area. The risks, benefits, complications, treatment options, non-operative alternatives, expected recovery and outcomes were discussed with the patient. The possibilities of reaction to medication, pulmonary aspiration, injury to surrounding structures, bleeding, recurrent infection, the need for additional procedures, failure to diagnose a condition, and creating a complication requiring transfusion or operation were discussed with the patient. The patient concurred with the proposed plan, giving informed consent.  The site of surgery was properly noted/marked if necessary per policy. The patient has been actively warmed in preoperative area. Preoperative antibiotics have been ordered and given within 1 hours of incision. Venous thrombosis prophylaxis have been ordered including bilateral sequential compression devices    Procedure Details:   After induction of general anesthesia, the patient was placed supine on the operative table, and all dependent portions were carefully padded.  The head was placed in gentle extension using a shoulder roll.  Neuromonitoring was instituted using SSEPs.  Anterior neck was scrubbed, prepped, and  draped in the usual sterile fashion.  A transverse incision was marked out just lateral to the midline over the C6-7 level using lateral fluoroscopy on the right.      This was injected with local anesthesia and carried out sharply.  Subcutaneous tissue was divided using monopolar cautery through the platysmal layer, and subplatysmal dissection was carried out using bipolar cautery and Metzenbaum scissors.  The carotid sheath was retracted laterally, and the pharynx retracted medially.  The ventral spine was palpated and deep cervical fascia was opened sharply and swept away using Kittner dissectors.  Intraoperative  fluoroscopy was again used to confirm the levels.      The medial attachment of longus colli was released bilaterally at C6-7 levels with electrocautery.  The exposure was maintained with the self retaining retractor system. The disk space was identified, and Macon distraction pins were placed into the C6 and C7 vertebral bodies and distraction placed across the interspace.  The anterior longitudinal ligament was incised.     The disk material and cartilage endplates were removed with curettes and rongeurs.  The posterior longitudinal ligament was identified.  The operative microscope was brought into the field for remainder of the case for microdissection and used until closure.  The decompression was begun.   The posterior osteophytes were removed with a matchstick bur for exposure of the PLL.  The posterior longitudinal ligament was incised and removed with exposure and decompression of the dura ensuring complete bilateral decompression of the central canal and bilateral nerve roots.      Once an adequate decompression was achieved, the disc space was taken off distraction and the Macon pins were removed and hemostasis achieved with bone wax and attention was directed to the total disc replacement.  Using a trial and fluoroscopy, a 4 mm medium size Nuvasive Simplify implant was picked out. Keel cuts were made at the endplates and the implant was tapped into the C6-7 disc space under fluoroscopic guidance.     The wound was copiously irrigated.  Hemostasis was achieved. A 10 round prevertebral DONTAE drain was placed and secured to the skin with 3-0 prolene suture.  The platysma and subdermal layer were closed with interrupted 3-0 Vicryl layers.  The skin was closed with running 4-0 monocryl subcuticular suture. The wound was washed, dried and sealed with glue.  The patient was then handed over to the anesthesia team, extubated, and taken to the recovery area in stable condition. There were no SSEP neuro-monitoring  changes throughout the case.       Complications:  None; patient tolerated the procedure well.    Disposition: PACU - hemodynamically stable.  Condition: stable       Attending Attestation: I was present for the entire procedure.    Gustabo Catalan  Phone Number: [phone]

## 2024-07-19 NOTE — HOSPITAL COURSE
42 YO F h/o ulcerative colitis, ITP, p/w myelopathy, found to have C6-7 disc herniation, 7/19 s/p C6-7 TDR    Exam  LUE D/B/T 5, HG/IO 4  RUE D/B/T 5, HG/IO 4-  LLE HF 4 KE 4- DF2 PF4  RLE 5/5    Floor  drain  Uprights  PTOT  Dispo when able

## 2024-07-19 NOTE — CARE PLAN
The patient's goals for the shift include maintain adequate pain control and safety throughout shift    The clinical goals for the shift include maintain comfort and safety throughout shift

## 2024-07-19 NOTE — ANESTHESIA POSTPROCEDURE EVALUATION
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    Anesthesia Post Evaluation    Patient participation: complete - patient participated  Level of consciousness: awake  Pain management: adequate  Airway patency: patent  Cardiovascular status: acceptable  Respiratory status: acceptable  Hydration status: acceptable  Postoperative Nausea and Vomiting: none        No notable events documented.

## 2024-07-19 NOTE — ANESTHESIA PREPROCEDURE EVALUATION
Patient: Ally Carpio    Procedure Information       Anesthesia Start Date/Time: 07/19/24 0734    Procedure: C6-C7 Cervical Spine Arthroplasty ~ Anterior Approach (Right: Spine Cervical)    Location: [facility] A OR 06 / Virtual [facility] A OR    Surgeons: Gustabo Catalan MD            Relevant Problems   Cardiac   (+) Mixed hyperlipidemia      Neuro   (+) Radiculopathy, cervical   (+) Radiculopathy, lumbar region      GI   (+) Gastroesophageal reflux disease without esophagitis   (+) Ulcerative colitis (Multi)      Endocrine   (+) Hypothyroidism      Hematology   (+) Chronic ITP (idiopathic thrombocytopenic purpura) (Multi)   (+) Iron deficiency anemia      Musculoskeletal   (+) Chronic pain syndrome      GYN   (+) Menorrhagia with regular cycle       Clinical information reviewed:    Allergies  Meds     OB Status           NPO Detail:  NPO/Void Status  Date of Last Liquid: 07/18/24  Time of Last Liquid: 1900  Date of Last Solid: 07/18/24  Time of Last Solid: 1900         Physical Exam    Airway  Mallampati: I  TM distance: >3 FB  Neck ROM: full     Cardiovascular    Dental    Pulmonary    Abdominal            Anesthesia Plan    History of general anesthesia?: yes  History of complications of general anesthesia?: no    ASA 1     general     intravenous induction   Anesthetic plan and risks discussed with patient.    Plan discussed with CRNA.

## 2024-07-19 NOTE — BRIEF OP NOTE
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 [M50.00]     Procedures  C6-C7 Cervical Spine Arthroplasty ~ Anterior Approach  72686 - [place] TOTAL DISC ARTHRP ANT SINGLE INTERSPACE CERVICAL      Surgeons      * Gustabo Catalan - Primary    Resident/Fellow/Other Assistant:  Surgeons and Role:     * Ulisses Church MD - Resident - Assisting    Procedure Summary  Anesthesia: General  ASA: I  Anesthesia Staff: Anesthesiologist: Atul Velazco MD  CRNA: JORDAN Somers-CRNA  C-AA: BEATRIZ Bahena  Estimated Blood Loss: 10mL  Intra-op Medications:   Administrations occurring from 0730 to 1030 on 24:   Medication Name Total Dose   lidocaine-epinephrine PF (Xylocaine W/EPI) 1 %-1:200,000 injection 7 mL              Anesthesia Record               Intraprocedure I/O Totals          Output    Est. Blood Loss 10 mL    Total Output 10 mL          Specimen: No specimens collected     Staff:   Circulator: Elida  Scrub Person: Kaylee Child Scrub: Heraclio Child Circulator: Michelet          Findings: good placement of C6-7 disc replacement    Complications:  None; patient tolerated the procedure well.     Disposition: PACU - hemodynamically stable.  Condition: stable  Specimens Collected: No specimens collected  Attending Attestation:     Gustabo Catalan  Phone Number: [phone]

## 2024-07-19 NOTE — ANESTHESIA PROCEDURE NOTES
Airway  Date/Time: 7/19/2024 8:05 AM  Urgency: elective    Airway not difficult    Staffing  Performed: AZAR   Authorized by: Atul Velazco MD    Performed by: BEATRIZ Bahena  Patient location during procedure: OR    Indications and Patient Condition  Indications for airway management: anesthesia  Spontaneous ventilation: present  Sedation level: deep  Preoxygenated: yes  Patient position: sniffing  Mask difficulty assessment: 1 - vent by mask    Final Airway Details  Final airway type: endotracheal airway      Successful airway: ETT  Cuffed: yes   Successful intubation technique: direct laryngoscopy  Facilitating devices/methods: intubating stylet and cricoid pressure  Endotracheal tube insertion site: oral  Blade: Deanne  Blade size: #3  ETT size (mm): 7.0  Cormack-Lehane Classification: grade I - full view of glottis  Placement verified by: chest auscultation and capnometry   Measured from: teeth  ETT to teeth (cm): 21  Number of attempts at approach: 1

## 2024-07-19 NOTE — ANESTHESIA PROCEDURE NOTES
Peripheral IV  Date/Time: 7/19/2024 8:10 AM  Inserted by: BEATRIZ Bahena    Placement  Needle size: 20 G  Laterality: left  Location: hand  Local anesthetic: none  Site prep: alcohol  Technique: anatomical landmarks  Attempts: 1

## 2024-07-19 NOTE — CARE PLAN
The patient's goals for the shift include maintain adequate pain control and safety throughout shift    The clinical goals for the shift include maintain comfort and safety throughout shift    Problem: Fall/Injury  Goal: Not fall by end of shift  Outcome: Progressing  Goal: Be free from injury by end of the shift  Outcome: Progressing  Goal: Verbalize understanding of personal risk factors for fall in the hospital  Outcome: Progressing  Goal: Verbalize understanding of risk factor reduction measures to prevent injury from fall in the home  Outcome: Progressing  Goal: Use assistive devices by end of the shift  Outcome: Progressing  Goal: Pace activities to prevent fatigue by end of the shift  Outcome: Progressing     Problem: Pain  Goal: Takes deep breaths with improved pain control throughout the shift  Outcome: Progressing  Goal: Turns in bed with improved pain control throughout the shift  Outcome: Progressing  Goal: Walks with improved pain control throughout the shift  Outcome: Progressing  Goal: Performs ADL's with improved pain control throughout shift  Outcome: Progressing  Goal: Participates in PT with improved pain control throughout the shift  Outcome: Progressing  Goal: Free from opioid side effects throughout the shift  Outcome: Progressing  Goal: Free from acute confusion related to pain meds throughout the shift  Outcome: Progressing     Problem: Discharge Planning  Goal: Discharge to home or other facility with appropriate resources  Outcome: Progressing     Problem: Chronic Conditions and Co-morbidities  Goal: Patient's chronic conditions and co-morbidity symptoms are monitored and maintained or improved  Outcome: Progressing

## 2024-07-20 VITALS
RESPIRATION RATE: 17 BRPM | BODY MASS INDEX: 24.8 KG/M2 | WEIGHT: 154.32 LBS | HEIGHT: 66 IN | TEMPERATURE: 97.2 F | OXYGEN SATURATION: 98 % | SYSTOLIC BLOOD PRESSURE: 101 MMHG | DIASTOLIC BLOOD PRESSURE: 63 MMHG | HEART RATE: 47 BPM

## 2024-07-20 PROCEDURE — 97530 THERAPEUTIC ACTIVITIES: CPT | Mod: GO

## 2024-07-20 PROCEDURE — 2500000004 HC RX 250 GENERAL PHARMACY W/ HCPCS (ALT 636 FOR OP/ED): Performed by: STUDENT IN AN ORGANIZED HEALTH CARE EDUCATION/TRAINING PROGRAM

## 2024-07-20 PROCEDURE — 96372 THER/PROPH/DIAG INJ SC/IM: CPT | Performed by: STUDENT IN AN ORGANIZED HEALTH CARE EDUCATION/TRAINING PROGRAM

## 2024-07-20 PROCEDURE — 97165 OT EVAL LOW COMPLEX 30 MIN: CPT | Mod: GO

## 2024-07-20 PROCEDURE — 7100000011 HC EXTENDED STAY RECOVERY HOURLY - NURSING UNIT

## 2024-07-20 PROCEDURE — 2500000001 HC RX 250 WO HCPCS SELF ADMINISTERED DRUGS (ALT 637 FOR MEDICARE OP): Performed by: STUDENT IN AN ORGANIZED HEALTH CARE EDUCATION/TRAINING PROGRAM

## 2024-07-20 PROCEDURE — 2500000002 HC RX 250 W HCPCS SELF ADMINISTERED DRUGS (ALT 637 FOR MEDICARE OP, ALT 636 FOR OP/ED): Performed by: STUDENT IN AN ORGANIZED HEALTH CARE EDUCATION/TRAINING PROGRAM

## 2024-07-20 RX ORDER — ACETAMINOPHEN 325 MG/1
650 TABLET ORAL EVERY 6 HOURS PRN
Qty: 30 TABLET | Refills: 0 | Status: SHIPPED | OUTPATIENT
Start: 2024-07-20

## 2024-07-20 RX ORDER — AMOXICILLIN 250 MG
2 CAPSULE ORAL DAILY
Qty: 14 TABLET | Refills: 0 | Status: SHIPPED | OUTPATIENT
Start: 2024-07-20 | End: 2024-07-27

## 2024-07-20 RX ORDER — OXYCODONE HYDROCHLORIDE 5 MG/1
5 TABLET ORAL EVERY 6 HOURS PRN
Qty: 28 TABLET | Refills: 0 | Status: SHIPPED | OUTPATIENT
Start: 2024-07-20 | End: 2024-07-27

## 2024-07-20 RX ADMIN — OXYCODONE HYDROCHLORIDE 10 MG: 5 TABLET ORAL at 02:46

## 2024-07-20 RX ADMIN — TIZANIDINE 2 MG: 4 TABLET ORAL at 02:46

## 2024-07-20 RX ADMIN — PANTOPRAZOLE SODIUM 40 MG: 40 TABLET, DELAYED RELEASE ORAL at 08:25

## 2024-07-20 RX ADMIN — SENNOSIDES AND DOCUSATE SODIUM 2 TABLET: 50; 8.6 TABLET ORAL at 08:25

## 2024-07-20 RX ADMIN — PREGABALIN 150 MG: 75 CAPSULE ORAL at 08:25

## 2024-07-20 RX ADMIN — LEVOTHYROXINE SODIUM 12.5 MCG: 0.03 TABLET ORAL at 06:48

## 2024-07-20 RX ADMIN — ACETAMINOPHEN 650 MG: 325 TABLET ORAL at 08:25

## 2024-07-20 RX ADMIN — ACETAMINOPHEN 650 MG: 325 TABLET ORAL at 02:46

## 2024-07-20 RX ADMIN — HEPARIN SODIUM 5000 UNITS: 5000 INJECTION INTRAVENOUS; SUBCUTANEOUS at 08:25

## 2024-07-20 RX ADMIN — HEPARIN SODIUM 5000 UNITS: 5000 INJECTION INTRAVENOUS; SUBCUTANEOUS at 00:45

## 2024-07-20 ASSESSMENT — COGNITIVE AND FUNCTIONAL STATUS - GENERAL
DAILY ACTIVITIY SCORE: 21
DRESSING REGULAR UPPER BODY CLOTHING: A LITTLE
DRESSING REGULAR LOWER BODY CLOTHING: A LITTLE
HELP NEEDED FOR BATHING: A LITTLE

## 2024-07-20 ASSESSMENT — ACTIVITIES OF DAILY LIVING (ADL)
ADL_ASSISTANCE: INDEPENDENT
BATHING_ASSISTANCE: STAND BY

## 2024-07-20 ASSESSMENT — PAIN SCALES - GENERAL
PAINLEVEL_OUTOF10: 7
PAINLEVEL_OUTOF10: 4

## 2024-07-20 ASSESSMENT — PAIN - FUNCTIONAL ASSESSMENT: PAIN_FUNCTIONAL_ASSESSMENT: 0-10

## 2024-07-20 NOTE — PROGRESS NOTES
Occupational Therapy    Evaluation/Treatment    Patient Name: Ally Carpio  MRN: [id number]  : 1980  Today's Date: 24  Time Calculation  Start Time: 1103  Stop Time: 1120  Time Calculation (min): 17 min       Assessment:  OT Assessment: Patient is doing well with ADL and functional mobility and has no further skilled OT needs at this time.  Prognosis: Excellent  Barriers to Discharge: None  Evaluation/Treatment Tolerance: Patient tolerated treatment well  Medical Staff Made Aware: Yes  End of Session Communication: Bedside nurse  End of Session Patient Position: Bed, 2 rail up, Alarm off, not on at start of session (Spouse present.  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 s/p C6-C7 Cervical Spine Arthroplasty ~ Anterior Approach   Past Medical History Relevant to Rehab: PMH: Ulcerative colitis, ITP; b/l hand weakness (right worse), LLE weakness, and impaired balance x4 months  Family/Caregiver Present: Yes (Spouse present)  Prior to Session Communication: Bedside nurse  Patient Position Received: Bed, 2 rail up, Alarm off, not on at start of session  General Comment: Agreeable to eval    Precautions:  Medical Precautions: Fall precautions, Spinal precautions  Post-Surgical Precautions: Spinal precautions       Pain:  Pain Assessment  Pain Assessment: 0-10  0-10 (Numeric) Pain Score: 4  Pain Type: Surgical pain  Pain Location: Neck  Pain Orientation: Anterior  Pain Interventions: Ambulation/increased activity, Repositioned  Response to Interventions: Favorable; no c/o increased pain    Objective   Cognition:  Overall Cognitive  Status: Within Functional Limits  Orientation Level: Oriented X4      Home Living:  Type of Home: House  Lives With: Spouse (Children; Dogs)  Home Adaptive Equipment: Reacher, Cane (Adjustable bed and wedge pillow)  Home Layout: Two level, Bed/bath upstairs, Stairs to alternate level with rails  Home Access: Level entry  Bathroom Shower/Tub: Walk-in shower  Bathroom Toilet: Standard  Bathroom Equipment: Shower chair with back    Prior Function:  Level of [place]: Independent with ADLs and functional transfers, Independent with homemaking with ambulation  ADL Assistance: Independent  Homemaking Assistance: Independent  Ambulatory Assistance: Independent (Mod I at times; uses cane as needed)  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 due to hand weakness)  Hand Dominance: Right       ADL:  Eating Assistance: Independent  Grooming Assistance: Independent (Mod I)  Grooming Deficit: Increased time to complete  Bathing Assistance: Stand by  Bathing Deficit: Supervision/safety  UE Dressing Assistance:  (Supervision)  UE Dressing Deficit: Setup  LE Dressing Assistance:  (Supervision)  LE Dressing Deficit: Setup  Toileting Assistance with Device: Modified independent    Activity Tolerance:  Endurance: Endurance does not limit participation in activity     Bed Mobility/Transfers: Bed Mobility  Bed Mobility: Yes  Bed Mobility 1  Bed Mobility 1: Supine to sitting, Sitting to supine  Level of Assistance 1: Modified independent  Bed Mobility Comments 1: Hob elevated - patient sleeps in elevated position at home    Transfers  Transfer: Yes  Transfer 1  Transfer From 1: Bed to  Transfer to 1: Stand  Technique 1: Sit to stand  Transfer Device 1:  (no device)  Transfer Level of Assistance 1: Independent    Functional Mobility:  Functional Mobility  Functional Mobility Performed: Yes  Functional Mobility 1  Surface 1: Level tile  Device 1: No device  Assistance 1: Distant supervision, Minimal verbal cues  Comments 1:  Patient able to walk in lutz; no LOB noted; no assist req'd; no c/o increased pain.  Instructed on scanning ahead to avoid neck flexion, twisting at torso, and turning slowly to maintain spine precautions and increase safety.  Patient expressed understanding and was able to return demo.    Sitting Balance:  Static Sitting Balance  Static Sitting-Level of Assistance: Independent    Standing Balance:  Static Standing Balance  Static Standing-Level of Assistance: Independent  Dynamic Standing Balance  Dynamic Standing-Balance: Turning  Dynamic Standing-Comments: Supervision       Therapy/Activity: Therapeutic Activity  Therapeutic Activity Performed: Yes  Therapeutic Activity 1: Reviewed spine precautions.  Instructed on safe parameters for participation in desired IADL and LB strengthening exercises upon return home.  Patient expressed understanding.       Vision:Vision - Basic Assessment  Current Vision: No visual deficits       Strength:  Strength Comments: WFL       Coordination:  Movements are Fluid and Coordinated: Yes     Hand Function:  Hand Function  Gross Grasp: Functional  Coordination: Functional    Extremities: RUE   RUE : Within Functional Limits and LUE   LUE: Within Functional Limits    Outcome Measures: [facility] Daily Activity  Putting on and taking off regular lower body clothing: A little  Bathing (including washing, rinsing, drying): A little  Putting on and taking off regular upper body clothing: A little  Toileting, which includes using toilet, bedpan or urinal: None  Taking care of personal grooming such as brushing teeth: None  Eating Meals: None  Daily Activity - Total Score: 21    Education Documentation  Handouts, taught by Stormy Garcia OT at 7/20/2024 11:47 AM.  Learner: Family, Patient  Readiness: Acceptance  Method: Explanation, Demonstration, Handout  Response: Verbalizes Understanding, Demonstrated Understanding    Body Mechanics, taught by Stormy Garcia OT at 7/20/2024 11:47  AM.  Learner: Family, Patient  Readiness: Acceptance  Method: Explanation, Demonstration, Handout  Response: Verbalizes Understanding, Demonstrated Understanding    Precautions, taught by Stormy Garcia OT at 7/20/2024 11:47 AM.  Learner: Family, Patient  Readiness: Acceptance  Method: Explanation, Demonstration, Handout  Response: Verbalizes Understanding, Demonstrated Understanding    ADL Training, taught by Stormy Garcia OT at 7/20/2024 11:47 AM.  Learner: Family, Patient  Readiness: Acceptance  Method: Explanation, Demonstration, Handout  Response: Verbalizes Understanding, Demonstrated Understanding    Education Comments  No comments found.

## 2024-07-20 NOTE — DISCHARGE SUMMARY
"Discharge Diagnosis  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    Issues Requiring Follow-Up  wound    Test Results Pending At Discharge  Pending Labs       No current pending labs.            Hospital Course  44 YO F h/o ulcerative colitis, ITP, p/w myelopathy, found to have C6-7 disc herniation, 7/19 s/p C6-7 TDR, uprights show hardware in posn, 7/20 drain dc'd    Patient was appropriate for discharge home   Pertinent Physical Exam At Time of Discharge  Physical Exam  LUE D/B/T 5, HG/IO 4  RUE D/B/T 5, HG/IO 4-  LLE HF 4 KE 4- DF2 PF4  RLE 5/5  Incision cdi  Home Medications     Medication List      CONTINUE taking these medications     Syringe 3cc/22Gx1\" 3 mL 22 gauge x 1\" syringe; Generic drug: syringe   with needle; 1 Application see administration instructions.   UltiCare Safety Syringe 3 mL 22 gauge x 1\" syringe; Generic drug:   syringe with needle, safety     ASK your doctor about these medications     chlorhexidine 0.12 % solution; Commonly known as: Peridex; Swish for 30   seconds and spit 15mL of solution the night before and morning of surgery   cyanocobalamin 1,000 mcg/mL injection; Commonly known as: Vitamin B-12;   Inject 1 mL (1,000 mcg) into the muscle see administration instructions.   Please inject daily x 7, followed by Weekly x 4 and then Monthly   Hyrimoz 40 mg/0.8 mL syringe; Generic drug: adalimumab-adaz   levothyroxine 25 mcg tablet; Commonly known as: Synthroid, Levoxyl; Take   0.5 tablets (12.5 mcg) by mouth once daily.   multivitamin tablet   pantoprazole 40 mg EC tablet; Commonly known as: ProtoNix; Take 1 tablet   (40 mg) by mouth once daily. Do not crush, chew, or split.   pregabalin 75 mg capsule; Commonly known as: Lyrica; Take 2 capsules   (150 mg) by mouth 2 times a day.   tacrolimus 0.1 % ointment; Commonly known as: Protopic   tiZANidine 2 mg tablet; Commonly known as: Zanaflex; TAKE ONE TABLET BY   MOUTH EVERY 6 HOURS AS NEEDED for muscle spasms   Vtama " 1 % cream; Generic drug: tapinarof       Outpatient Follow-Up  Future Appointments   Date Time Provider Department Center   8/7/2024  9:15 AM Soo Diaz MD [id number] [facility]   8/27/2024  2:15 PM Gustabo Catalan MD [id number] [facility]   9/9/2024  9:00 AM [facility] [id number] NEURODG EMG EQUIP 1 [id number] [facility]   9/10/2024 10:10 AM Deena Thibodeaux DO [id number] [facility]   9/17/2024 11:30 AM Kwaku Schafer MD [id number] [facility]   10/24/2024 11:30 AM Jama Mckeon MD [id number] [facility]   12/16/2024  9:30 AM Ursula Salinas PA-C [id number] [place]   12/16/2024 10:00 AM INF 11 GEAUGA SCCGEAINF [facility]       Samuel Wilks MD

## 2024-07-20 NOTE — PROGRESS NOTES
"Ally Carpio is a 43 y.o. 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    Subjective   NAEON       Objective     Physical Exam  Exam  LUE D/B/T 5, HG/IO 4  RUE D/B/T 5, HG/IO 4-  LLE HF 4 KE 4- DF2 PF4  RLE 5/5  Incision cdi    Last Recorded Vitals  Blood pressure 102/66, pulse 54, temperature 36.6 °C (97.9 °F), temperature source Oral, resp. rate 18, height 1.676 m (5' 6\"), weight 70 kg (154 lb 5.2 oz), SpO2 98%.  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

## 2024-07-22 ENCOUNTER — LAB (OUTPATIENT)
Dept: LAB | Facility: LAB | Age: 44
End: 2024-07-22
Payer: COMMERCIAL

## 2024-07-22 DIAGNOSIS — G89.4 CHRONIC PAIN SYNDROME: ICD-10-CM

## 2024-07-22 DIAGNOSIS — Z23 NEED FOR VACCINATION: ICD-10-CM

## 2024-07-22 LAB
MEV IGG SER QL IA: POSITIVE
MUMPS IGG ANTIBODY INDEX: 2.9 IA
MUV IGG SER IA-ACNC: POSITIVE
RUBEOLA IGG ANTIBODY INDEX: 2.2 IA
RUBV IGG SERPL IA-ACNC: 1.6 IA
RUBV IGG SERPL QL IA: POSITIVE
VARICELLA ZOSTER IGG INDEX: 6.9 IA
VZV IGG SER QL IA: POSITIVE

## 2024-07-22 PROCEDURE — 86317 IMMUNOASSAY INFECTIOUS AGENT: CPT

## 2024-07-22 PROCEDURE — 36415 COLL VENOUS BLD VENIPUNCTURE: CPT

## 2024-07-22 PROCEDURE — 86787 VARICELLA-ZOSTER ANTIBODY: CPT

## 2024-07-22 PROCEDURE — 86735 MUMPS ANTIBODY: CPT

## 2024-07-22 PROCEDURE — 86765 RUBEOLA ANTIBODY: CPT

## 2024-07-22 RX ORDER — TIZANIDINE 2 MG/1
2 TABLET ORAL EVERY 8 HOURS PRN
Qty: 90 TABLET | Refills: 1 | Status: SHIPPED | OUTPATIENT
Start: 2024-07-22 | End: 2024-09-20

## 2024-07-24 ENCOUNTER — TELEPHONE (OUTPATIENT)
Dept: PRIMARY CARE | Facility: CLINIC | Age: 44
End: 2024-07-24
Payer: COMMERCIAL

## 2024-08-01 ENCOUNTER — TELEPHONE (OUTPATIENT)
Dept: NEUROSURGERY | Facility: HOSPITAL | Age: 44
End: 2024-08-01
Payer: COMMERCIAL

## 2024-08-01 NOTE — PROGRESS NOTES
8/7/2024 New Patient Visit for PHAN and nasal symptoms    Patient presents today after significant weight loss with improvement in sleep apnea but maintains a significant complaint of nasal obstruction not responsive to clinical treatment.  Patient has had different rounds of both nasal corticosteroids and antihistaminics without improvement in nasal obstruction.     Home Sleep test performed 6/18/2024 to assess PHAN improvement after significant weight loss (38 BMI to 24 BMI)   The patient spent 353 min (82%) supine, and spent 78 min (18%) non-supine.   The REI3% was 5.8/hr. The REI4% was 1.5/hr. KIMBERLEE was 0.0/hr.   The supine REI3% was 6.4; non-supine REI3% was 3.1.   The supine REI4% was 1.7; non-supine REI4% was 0.8.   The mean oxygen saturation was 96% during the monitoring time.   The oxygen saturation was < = 88% for 0 minutes. The SpO2 karson was 91%.     Previous Home Sleep Study 8/1/2023  FATUMA 3% was 8.0 , supine was 10.8 and non supine was 6.3 events per hour  FATUMA 4% was 4.8 , supine was 7.7 and non supine was 3.3 events per hour  KIMBERLEE was per hour 0.0  The oxygen saturation was < = 88% for 0 minutes  Spo2 akrson was 86%      CPAP supplies provided by [facility]  Compliance Report  Usage 04/29/2024 - 06/27/2024  Usage days 35/60 days (58%)  >= 4 hours 16 days (27%)  < 4 hours 19 days (32%)  Usage hours 134 hours 5 minutes  Average usage (total days) 2 hours 14 minutes  Average usage (days used) 3 hours 50 minutes  Median usage (days used) 3 hours 15 minutes  Total used hours (value since last reset - 06/27/2024) 1,273 hours  AirSense 10 AutoSet  Serial number [id number]  Mode AutoSet  Min Pressure 4 cmH2O  Max Pressure 8 cmH2O  EPR Fulltime  EPR level 3  Response Standard  Therapy  Pressure - cmH2O Median: 5.2 95th percentile: 6.5 Maximum: 7.1  Leaks - L/min Median: 6.5 95th percentile: 19.6 Maximum: 31.2  Events per hour AI: 1.0 HI: 0.1 AHI: 1.1  Apnea Index Central: 0.5 Obstructive: 0.2 Unknown:  0.3  RERA Index 0.0      Physical Exam:    GENERAL:  Well-developed, well-nourished.      EYES:  Ocular motility intact.       EARS:  Otoscopy of external auditory canals and tympanic membranes is normal with clinical speech reception thresholds grossly intact. No mass/lesion of auricle.      NOSE:   Anterior rhinoscopy shows septum is deviated to the right anteriorly and bilateral inferior turbinate enlarged  Right Turbinate: 3  Left Turbinate: 4  Nasal Valve collapse: No, Isha Maneuver is negative  Nasal mucosa is unremarkable.  There are no other masses polyps or purulent drainage.      NECK:  No cervical lymphadenopathy, no neck mass/crepitus/ asymmetry, trachea is midline, no thyroid enlargement/tenderness/mass.      OROPHARYNX:   Tonsil size: 1  Modified Mallampatti tongue position: 3  Tongue position is Freidman 3  Scalloping is noted.   Soft Palate: is low-lying;  is redundant     MAXILLOFACIAL:   On frontal repose, patient shows assymmetrical facial thirds, symmetrical facial fifths.   There is not paranasal flattening.  There is not deep nasolabial folds.   On profile view, the patient has a inadequate facial profile,  with pronounced negative overjet and Class 3 Facial Skeletal relationship. There is a dorsal nasal hump.  Underdeveloped maxilla overprojected mandible.        DENTAL:  The occlusal plane is flat. Negative 3mm Overjet.   Right - Class 3 canine, Class 3 molar  Left - Class 3 canine, Class 3 molar  There is no transverse discrepancy or narrow maxilla.  Dentition: There is adequate dentition. There is dental crowding.        TMJ:  On TMJ examination, the maximal incisal opening is about 45 mm. The jaw does not deviate upon opening. There is TMJ click/pop. There is TMJ tenderness upon function.      NEURO/PSYCH:  Cranial nerves grossly intact, oriented x3 (time, place, person), appropriate mood and affect.      RESPIRATION:  Symmetric expansion during respiration, normal respiratory effort.       CARDIOVASCULAR: Pulse is regular rhythm and rate      Procedure: Diagnostic Nasal/ Pharyngeal Endoscopy     Indication for procedure: To evaluate static and dynamic upper anatomy not visible by anterior rhinoscopy and oral cavitiy examination for anatomic risk factors of obstructive sleep apnea.      Anesthesia: 1% phenylephrine,4% lidocaine topical spray     Description:   A flexible endoscope was used to examine the left and right nasal cavities.  The nasal valve areas were examined for abnormalities or collapse.  The inferior and middle turbinates were evaluated and abnormalities noted. The scope was then passed through the nasopharygneal, oropharyngeal, and hypopharyngeal airway. The Shelby Maneuver was performed with the patient in sitting position.Collapse was assessed during a maximal inspiratory effort against a closed mouth and sealed nose. The patient tolerated the procedure without complications and was returned to ambulatory status.       Findings:   Nasopharynx: There is not adenoid hypertrophy.   Oropharynx: There is not palatine tonsillar hypertrophy.   Tongue base is significantly retropositioned due to dentofacial deformity.   With Romero maneuver, soft palatal collapse is grade 2, lateral pharyngeal wall collapse is grade 1.  Hypopharynx: There is not lingual tonsillar hypertrophy, with lingual tonsils of Grade 2. Vocal Cord position with Grade 3 view.  With Romero maneuver, base of tongue collapse is grade 3. This is improved with the patient doing a jaw thrust maneuver.    Diagnose:  Deviated septum, hypertrophied turbinates  Underdeveloped maxilla   Class 3 dentofacial deformity    Plan:  Patient has failed clinical treatment and septoplasty is discussed.   Patient will reach out if she wants to proceed.

## 2024-08-01 NOTE — TELEPHONE ENCOUNTER
I had the pleasure of speaking with Ms. Carpio who had recent Cervical Surgery with Dr. Catalan on 7/19/24. She continues to be in the recovery phase and noted within the past 1-2 days that her right leg is now having some weakness. She has a history of Left leg weakness per her last visit with Dr. Catalan. We spoke about post operative recovery and swelling. We spoke about early mobilization for recovery. We noted if her weakness and pain worsen that she feels unsafe she should get emergent help via Emergency department of EMS. She does have a follow up apt with Dr. Catalan in a few weeks where her symptoms post operative will be re evaluated. She will call back with any questions, concerns or updates and agrees with the follow up plan.

## 2024-08-07 ENCOUNTER — APPOINTMENT (OUTPATIENT)
Dept: OTOLARYNGOLOGY | Facility: CLINIC | Age: 44
End: 2024-08-07
Payer: COMMERCIAL

## 2024-08-07 VITALS — TEMPERATURE: 96.5 F | WEIGHT: 152.7 LBS | HEIGHT: 66 IN | BODY MASS INDEX: 24.54 KG/M2

## 2024-08-07 DIAGNOSIS — G47.33 OBSTRUCTIVE SLEEP APNEA: ICD-10-CM

## 2024-08-07 PROCEDURE — 99204 OFFICE O/P NEW MOD 45 MIN: CPT

## 2024-08-07 PROCEDURE — 31575 DIAGNOSTIC LARYNGOSCOPY: CPT

## 2024-08-07 PROCEDURE — 1036F TOBACCO NON-USER: CPT

## 2024-08-07 PROCEDURE — 3008F BODY MASS INDEX DOCD: CPT

## 2024-08-07 ASSESSMENT — ENCOUNTER SYMPTOMS: DEPRESSION: 0

## 2024-08-23 NOTE — PROGRESS NOTES
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    She feels like the dexterity and fine motor control in her hands have improved but her balance if anything has gotten worse.  She still has weakness of the left leg and now feels like her right leg is weak as well.  She has somewhat effort limited exam of her lower extremities but is difficult to evaluate and grossly full strength in her arms.    She has no Adin sign/clonus/pathologic hyperreflexia.    Her incision has healed nicely.  Upright x-rays of the cervical spine done postoperatively on 7/19/2024 shows the graft in excellent position.     I think there is something else going on other than just her cervical myelopathy.  She does have subcortical white matter T2/FLAIR hyperintensities on MRI of the brain which can be seen in demyelinating disease.  She also has thoracic and lumbar spondylosis, but does not have any signs of thoracic myelopathy on exam.  She needs further evaluation with neurology and is scheduled to see Dr. Mckeon and have EMG testing done.     Gustabo Catalan MD

## 2024-08-27 ENCOUNTER — OFFICE VISIT (OUTPATIENT)
Dept: NEUROSURGERY | Facility: CLINIC | Age: 44
End: 2024-08-27
Payer: COMMERCIAL

## 2024-08-27 VITALS
WEIGHT: 153 LBS | HEIGHT: 66 IN | SYSTOLIC BLOOD PRESSURE: 113 MMHG | BODY MASS INDEX: 24.59 KG/M2 | TEMPERATURE: 97.6 F | DIASTOLIC BLOOD PRESSURE: 70 MMHG | HEART RATE: 57 BPM

## 2024-08-27 DIAGNOSIS — M50.00 HERNIATION OF INTERVERTEBRAL DISC OF CERVICAL SPINE WITH MYELOPATHY: Primary | ICD-10-CM

## 2024-08-27 DIAGNOSIS — R53.1 WEAKNESS: Primary | ICD-10-CM

## 2024-08-27 PROCEDURE — 3008F BODY MASS INDEX DOCD: CPT | Performed by: NEUROLOGICAL SURGERY

## 2024-08-27 PROCEDURE — 99211 OFF/OP EST MAY X REQ PHY/QHP: CPT | Performed by: NEUROLOGICAL SURGERY

## 2024-08-27 PROCEDURE — 1036F TOBACCO NON-USER: CPT | Performed by: NEUROLOGICAL SURGERY

## 2024-08-27 ASSESSMENT — PAIN SCALES - GENERAL: PAINLEVEL: 3

## 2024-08-27 NOTE — PROGRESS NOTES
She reports new right sided weakness in the lower extremity along with worsening left sided weakness since around March. Previous MRI brain imaging in March showed nonspecific white matter changes most pronounced within left frontal lobe. In light of the new weakness I recommend repeating MRI brain and cervical spine with contrast for further evaluation of an ongoing demyelinating processes.    Reviewed and approved by JASMIN BHAKTA on 8/27/24 at 3:02 PM.

## 2024-08-28 DIAGNOSIS — F40.240 CLAUSTROPHOBIA: Primary | ICD-10-CM

## 2024-08-28 RX ORDER — LORAZEPAM 1 MG/1
1 TABLET ORAL AS NEEDED
Qty: 2 TABLET | Refills: 0 | Status: SHIPPED | OUTPATIENT
Start: 2024-08-28

## 2024-09-03 ENCOUNTER — APPOINTMENT (OUTPATIENT)
Dept: NEUROLOGY | Facility: CLINIC | Age: 44
End: 2024-09-03
Payer: COMMERCIAL

## 2024-09-06 ENCOUNTER — HOSPITAL ENCOUNTER (OUTPATIENT)
Dept: RADIOLOGY | Facility: HOSPITAL | Age: 44
Discharge: HOME | End: 2024-09-06
Payer: COMMERCIAL

## 2024-09-06 VITALS — HEIGHT: 66 IN | WEIGHT: 150 LBS | BODY MASS INDEX: 24.11 KG/M2

## 2024-09-06 DIAGNOSIS — Z12.31 SCREENING MAMMOGRAM FOR BREAST CANCER: ICD-10-CM

## 2024-09-06 PROCEDURE — 77067 SCR MAMMO BI INCL CAD: CPT | Performed by: RADIOLOGY

## 2024-09-06 PROCEDURE — 77063 BREAST TOMOSYNTHESIS BI: CPT | Performed by: RADIOLOGY

## 2024-09-06 PROCEDURE — 77067 SCR MAMMO BI INCL CAD: CPT

## 2024-09-09 ENCOUNTER — HOSPITAL ENCOUNTER (OUTPATIENT)
Dept: NEUROLOGY | Facility: CLINIC | Age: 44
Discharge: HOME | End: 2024-09-09
Payer: COMMERCIAL

## 2024-09-09 ENCOUNTER — OFFICE VISIT (OUTPATIENT)
Dept: NEUROLOGY | Facility: CLINIC | Age: 44
End: 2024-09-09
Payer: COMMERCIAL

## 2024-09-09 ENCOUNTER — APPOINTMENT (OUTPATIENT)
Dept: NEUROLOGY | Facility: CLINIC | Age: 44
End: 2024-09-09
Payer: COMMERCIAL

## 2024-09-09 DIAGNOSIS — R53.1 WEAKNESS: Primary | ICD-10-CM

## 2024-09-09 DIAGNOSIS — R53.1 WEAKNESS: ICD-10-CM

## 2024-09-09 PROCEDURE — 95886 MUSC TEST DONE W/N TEST COMP: CPT | Performed by: STUDENT IN AN ORGANIZED HEALTH CARE EDUCATION/TRAINING PROGRAM

## 2024-09-09 PROCEDURE — 95909 NRV CNDJ TST 5-6 STUDIES: CPT | Performed by: STUDENT IN AN ORGANIZED HEALTH CARE EDUCATION/TRAINING PROGRAM

## 2024-09-09 PROCEDURE — 99213 OFFICE O/P EST LOW 20 MIN: CPT | Performed by: STUDENT IN AN ORGANIZED HEALTH CARE EDUCATION/TRAINING PROGRAM

## 2024-09-09 NOTE — PROGRESS NOTES
"Date of Service: 9/9/2024  Patient: Ally Carpio  MRN: [id number]  Referring Provider: No ref. provider found  PCP: Deena Thibodeaux DO    History of Present Illness:   Ms. Carpio is a 43 y.o. female who presents for follow-up of leg weakness, muscle pain after EMG testing. Ally Carpio's past medical history is pertinent for ulcerative colitis, psoriasis, ITP.  She was last seen 7/9/2024.    Since her last visit she underwent C6-C7 cervical spine arthroplasty.  She reports some improvement in the dexterity of her hands, but complains that the distal right lower extremity feels weaker since the last visit.  She reports difficulty going up stairs.  She also reports tightness throughout the right lower extremity.  She denies any new numbness or paresthesias.  No radicular pain down the leg.    To recap:  In December 2023 she was diagnosed with ulcerative colitis and started on prednisone 40 mg, which was tapered down by the end of January.  While tapering down her prednisone, she experienced a new onset right abdominal pain and bandlike pain rating from the back wrapping around the front.    Then around February when she stopped steroids she developed left leg weakness along with sharp pains in her limbs.  She had symmetric bilateral paresthesias ascending to her hips.  More recently she reports weakness in her hands, worse on the right and difficulty with fine motor tasks.  She showed me a video of muscle twitching in her arms and legs that will happen throughout the day.  She also reports poor balance due to the left lower extremity weakness.  The paresthesias and numbness have improved, but she reports the sensation of muscle tightness causing significant pain.  She also reports left face numbness to \"deep sensation\".  She is currently on gabapentin 600mg at night and tizanidine PRN. No bowel or bladder issues, except was having diarrhea due to ulcerative colitis, better since starting humira.    She " had an EMG completed in 3/2024 which showed mild right median neuropathy across the wrist.  There is no evidence of cervical or lumbar radiculopathy on the right.  No evidence of peripheral polyneuropathy.  No evidence of myopathy.    For the issues above, MRI brain was completed which showed nonspecific white matter hyperintensities.  She was evaluated at the [facility] for multiple sclerosis.  MRI cervical and thoracic spine with and without contrast were completed.  No evidence of demyelinating disease and MS was thought to be very unlikely.  Her MRI cervical spine though did show disc protrusion at C6-C7 that impinged the cervical spinal cord. She was evaluated by neurosurgery given concern for cord compression at C6-C7 on cervical MRI and underwent C6-C7 cervical arthroplasty.    She has also had extensive testing with blood work as detailed below.    Review of Systems:  The systems were reviewed with pertinent positives and negatives documented in the HPI.      Past Medical & Surgical History  Past Medical History:   Diagnosis Date    ALEXEY positive     Anxiety     pt denies    Chronic pain syndrome     GERD (gastroesophageal reflux disease)     Heart murmur     Hx of idiopathic thrombocytopenic purpura     follows with heme, .24: plt 111    Hypothyroidism     [place] (iron deficiency anemia)     ..24: H/H 11.7/34.8, received IV venofer x 3 doses 2024    Inflammatory bowel disease 2023    Murmur     ECHO 23 unremarkable    Neuropathy     Psoriasis     Radiculopathy, cervical     Radiculopathy, lumbar region     RLS (restless legs syndrome)     Ulcerative colitis (Multi)      Past Surgical History:   Procedure Laterality Date     SECTION, LOW TRANSVERSE      x 2, .     COLONOSCOPY      DILATION AND CURETTAGE OF UTERUS      UPPER GASTROINTESTINAL ENDOSCOPY       Social History:   Social History     Tobacco Use    Smoking status: Former     Current packs/day: 0.00     Average  "packs/day: 1 pack/day for 12.5 years (12.5 ttl pk-yrs)     Types: Cigarettes     Start date: 2004     Quit date: 2017     Years since quittin.6    Smokeless tobacco: Never   Substance Use Topics    Alcohol use: Not Currently     Family History:   No known family history of neuromuscular disorders     Medications:     Current Outpatient Medications:     acetaminophen (Tylenol) 325 mg tablet, Take 2 tablets (650 mg) by mouth every 6 hours if needed for mild pain (1 - 3)., Disp: 30 tablet, Rfl: 0    adalimumab-adaz (Hyrimoz) 40 mg/0.8 mL syringe, Inject 40 mg under the skin every 14 (fourteen) days. LAST DOSE 24, Disp: , Rfl:     cyanocobalamin (Vitamin B-12) 1,000 mcg/mL injection, Inject 1 mL (1,000 mcg) into the muscle see administration instructions. Please inject daily x 7, followed by Weekly x 4 and then Monthly, Disp: 30 mL, Rfl: 3    levothyroxine (Synthroid, Levoxyl) 25 mcg tablet, Take 0.5 tablets (12.5 mcg) by mouth once daily., Disp: 45 tablet, Rfl: 1    LORazepam (Ativan) 1 mg tablet, Take 1 tablet (1 mg) by mouth if needed ([place] with MRI) for up to 2 doses., Disp: 2 tablet, Rfl: 0    multivitamin tablet, Take 1 tablet by mouth once daily., Disp: , Rfl:     norethindrone (Aygestin) 5 mg tablet, Take 1 tablet (5 mg) by mouth once daily., Disp: 30 tablet, Rfl: 0    pantoprazole (ProtoNix) 40 mg EC tablet, Take 1 tablet (40 mg) by mouth once daily. Do not crush, chew, or split., Disp: 90 tablet, Rfl: 1    pregabalin (Lyrica) 75 mg capsule, Take 2 capsules (150 mg) by mouth 2 times a day., Disp: 120 capsule, Rfl: 5    syringe with needle (Syringe 3cc/22Gx1\") 3 mL 22 gauge x 1\" syringe, 1 Application see administration instructions., Disp: 30 each, Rfl: 11    tacrolimus (Protopic) 0.1 % ointment, if needed., Disp: , Rfl:     tiZANidine (Zanaflex) 2 mg tablet, Take 1 tablet (2 mg) by mouth every 8 hours if needed for muscle spasms., Disp: 90 tablet, Rfl: 1    UltiCare Safety Syringe 3 mL 22 " "gauge x 1\" syringe, , Disp: , Rfl:     Vtama 1 % cream, if needed., Disp: , Rfl:      General Physical Exam:  LMP 08/27/2024      She looks well and is not in any acute distress. Breathing comfortably on room air.     Neurological Exam:   Mental status reveals her to be alert and oriented. Speech is intact to conversation.     Motor:  Muscle bulk: Normal throughout.  Muscle tone: Normal in both upper and lower extremities.  Movements: No fasciculations. On activation of various muscles in the right lower extremity there is tremulousness    R L   5 5 Shoulder abduction  5 5 Elbow flexion  5 5 Elbow extension  5 5 Finger abduction  5 5- Hip flexion  5 5 Hip extension  5 5 Knee flexion  5 5 Knee extension  5 5 Ankle dorsiflexion  5 5 Ankle plantarflexion  5 5 Big toe extension  5 5 Toe flexion     Reflexes                         R     L  Triceps          3      3  Biceps           3      3  Brachiorad    3      3  Patellar         2      2   Achilles         2      2    Babinski: toes downgoing to plantar stimulation. No clonus or other pathologic reflexes present.      Sensory:   Light Touch: Normal in all extremities  Vibration: Normal in all extremities    Gait:  Station was stable with a normal base.  Slow antalgic gait.      Results:    Lab Results   Component Value Date    HGBA1C 5.0 07/12/2024     Lab Results   Component Value Date    AUKHVFBQ23 227 06/13/2024     COPPER:   Lab Results   Component Value Date    COPPER 121.0 09/30/2023     IRON STUDIES:   Lab Results   Component Value Date    IRON 33 (L) 06/13/2024    TIBC 264 06/13/2024    FERRITIN 31 06/13/2024     Lab Results   Component Value Date    TSH 2.78 03/12/2024      Lab Results   Component Value Date    ALEXEY POSITIVE (A) 08/01/2023    ANATITER 1:160 08/01/2023     Lab Results   Component Value Date    CKTOTAL 42 06/21/2024       Lab Results   Component Value Date    SPEP NORMAL 08/01/2023     CBC:   Lab Results   Component Value Date    WBC 6.0 " 07/12/2024    HGB 12.5 07/12/2024    HCT 38.6 07/12/2024     (L) 07/12/2024     BMP:   Lab Results   Component Value Date     07/12/2024    K 4.3 07/12/2024     07/12/2024    CO2 29 07/12/2024    BUN 10 07/12/2024    CREATININE 0.69 07/12/2024    CALCIUM 8.2 (L) 07/12/2024    MG 2.16 03/12/2024     LFT:   Lab Results   Component Value Date    ALKPHOS 35 02/02/2024    BILITOT 0.4 02/02/2024    PROT 6.0 (L) 02/02/2024    ALBUMIN 3.8 02/02/2024    ALT 6 (L) 02/02/2024    AST 7 (L) 02/02/2024     Lyme PCR: Not detected  ACH R binding antibody: Negative  Normal ferritin  C-reactive protein less than 0.3  Hep C antibody nonreactive  Hep B surface antigen nonreactive  Hep B surface antibody negative  Hep B core antibody negative  Rheumatoid factor negative  Anticardiolipin antibody normal  Beta glycoprotein normal  C4 complement normal  C3 complement normal    Imaging:  MRI cervical spine ([facility])    At C6-C7 disc osteophyte complex impinges on the cervical cord causing moderate stenosis of the spinal canal.     MRI thoracic spine ([facility])    1.  No intrinsic signal abnormality within the cervical or thoracic   spinal cord.   2.  At C6-C7 disc osteophyte complex impinges on the cervical cord   causing moderate stenosis of the spinal canal.   3.  At T10-T11 there is mild stenosis of the spinal canal and minor cord   impingement.     MRI lumbar spine    Narrative & Impression   Interpreted By:  Kaylee Kaba,   STUDY:  MRI of the lumbar spine without IV contrast;  5/10/2024 12:25 pm      INDICATION:  Signs/Symptoms:Pain.      COMPARISON:  None.      ACCESSION NUMBER(S):  [id number]      ORDERING CLINICIAN:  MOMO MADRIGAL      TECHNIQUE:  Sagittal and axial STIR and T1-weighted MRI images of the lumbar  spine were acquired using a spondylolysis protocol.  No contrast was  administered.      FINDINGS:  For counting purposes the last lumbarized vertebral body is labeled  L5.      Alignment, vertebral body  heights and marrow signal pattern are  within normal limits.      There is desiccated disc signal at L3-L4, L4-L5 and L5-S1. Mild  degenerative endplate changes at L5-S1. There is a Schmorl's node  along the inferior endplate of L5.      The conus terminates at L1-L2 and is unremarkable. Paraspinal soft  tissues are unremarkable.      Evaluation by level:      T12-L1: No spinal canal or neural foraminal stenosis.      L1-L2: No spinal canal or neural foraminal stenosis      L2-L3: No spinal canal or neural foraminal stenosis      L3-L4: Disc bulge and facet arthrosis. No spinal canal stenosis. Mild  neural foraminal stenosis, left greater than right.      L4-L5: Right eccentric disc bulge and facet arthrosis. No spinal  canal stenosis. Mild bilateral neural foraminal stenosis. There may  be abutment of the exiting right L4 nerve root. Please correlate  clinically for symptoms of right L4 radiculopathy.      L5-S1: Disc bulge and facet arthrosis. No spinal canal stenosis. Mild  neural foraminal stenosis.      IMPRESSION:  Degenerative changes in the lower lumbar spine without spinal canal  stenosis. Mild neural foraminal narrowing at L3-L4, L4-L5 and L5-S1       EMG (3/22/2024)  This is a mildly abnormal study. There is electrophysiologic evidence consistent with a very mild, right median neuropathy across the wrist. Clinical correlation is needed to determine if patient has any symptoms of this finding as can be seen in carpal   tunnel syndrome.     In addition, there is no electrophysiologic evidence of peripheral polyneuropathy, cervical or lumbar radiculopathy, brachial plexopathy or other entrapment neuropathy in the right upper or lower extremity.     There was no definitive electrophysiologic evidence of a left median neuropathy across the wrist.    EMG (9/9/2024)    This is a normal study of the right lower extremity.  There was no electrophysiological evidence of a lumbosacral radiculopathy, plexopathy or  peripheral neuropathy.  In addition there is no evidence of myopathy.    Impression:  Ally Carpio is a 43 y.o. who presents with migratory paresthesias and extremity pain along with complaints of bilateral lower extremity weakness, which she reports is worse in the right distal lower extremity since the last visit.  She recently underwent C6-C7 cervical arthroplasty for cord compression at this level.    Strength testing is overall normal throughout the bilateral lower extremities.  With activation of various muscles in the right lower extremity there is tremulous activity but with maximum effort strength appears full.  No fasciculations were observed. No sensory deficits on exam today.  She is hyperreflexic in the bilateral upper extremities    She has had extensive workup for the reported symptoms above.    She has had extensive serological testing including normal CRP, negative acetylcholine receptor antibody testing, normal vitamin levels, normal copper, negative hepatitis B and C and Lyme, normal complement.  CK was normal.  EMG completed in 3/2024 of the right upper and lower extremity only showed mild incidental median neuropathy at the wrist.  No evidence of cervical or lumbar radiculopathy, myopathy or peripheral neuropathy.  Repeat EMG of the right lower extremity today due to complaint of worsening weakness was normal and did not show evidence of lumbosacral radiculopathy, lumbosacral plexopathy or peripheral neuropathy.  MRI brain was unrevealing and nonspecific white matter changes were evaluated for multiple sclerosis at the [facility] which was thought to be unlikely.  Changes were thought to be dilated perivascular spaces.  She had an MRI cervical spine with and without contrast which showed a herniated disc at C6-C7 with concern for spinal cord impingement.  She underwent C6/C7 cervical arthroplasty with improvement in hand dexterity, but no improvement in reported lower extremity  She  also had an MRI thoracic spine with and without contrast which showed mild central canal stenosis at T10-T11 but no evidence of cord compression.   MRI lumbar spine showed mild neuroforaminal narrowing at L3-L4, L4-L5 and L5-S1    Notably on exam strength appears full but there is tremulousness with muscle activation throughout bilateral lower extremities and give way weakness. No evidence of a peripheral nerve etiology or myopathy on extensive testing.     We discussed repeating MRI brain and cervical spine given the previous non-specific white matter lesions.     Reviewed and approved by JASMIN BHAKTA on 9/9/24 at 11:04 AM.    I personally spent 25 minutes on the day of the visit completing the review of the medical record and outside records, obtaining history and performing an appropriate physical exam, patient care, counseling and education, placing orders, independently reviewing results, communicating with the patient, coordinating care and performing appropriate clinical documentation.

## 2024-09-10 ENCOUNTER — HOSPITAL ENCOUNTER (OUTPATIENT)
Dept: RADIOLOGY | Facility: CLINIC | Age: 44
Discharge: HOME | End: 2024-09-10
Payer: COMMERCIAL

## 2024-09-10 ENCOUNTER — APPOINTMENT (OUTPATIENT)
Dept: PRIMARY CARE | Facility: CLINIC | Age: 44
End: 2024-09-10
Payer: COMMERCIAL

## 2024-09-10 VITALS
OXYGEN SATURATION: 97 % | BODY MASS INDEX: 24.05 KG/M2 | DIASTOLIC BLOOD PRESSURE: 70 MMHG | WEIGHT: 149 LBS | TEMPERATURE: 97.2 F | HEART RATE: 56 BPM | SYSTOLIC BLOOD PRESSURE: 118 MMHG

## 2024-09-10 DIAGNOSIS — E78.2 MIXED HYPERLIPIDEMIA: ICD-10-CM

## 2024-09-10 DIAGNOSIS — N92.0 MENORRHAGIA WITH REGULAR CYCLE: ICD-10-CM

## 2024-09-10 DIAGNOSIS — E03.9 HYPOTHYROIDISM, UNSPECIFIED TYPE: Primary | ICD-10-CM

## 2024-09-10 DIAGNOSIS — R73.01 IFG (IMPAIRED FASTING GLUCOSE): ICD-10-CM

## 2024-09-10 DIAGNOSIS — D50.0 IRON DEFICIENCY ANEMIA DUE TO CHRONIC BLOOD LOSS: ICD-10-CM

## 2024-09-10 DIAGNOSIS — R53.1 WEAKNESS: ICD-10-CM

## 2024-09-10 DIAGNOSIS — D69.3 CHRONIC ITP (IDIOPATHIC THROMBOCYTOPENIC PURPURA) (MULTI): ICD-10-CM

## 2024-09-10 DIAGNOSIS — R29.898 WEAKNESS OF BOTH LOWER EXTREMITIES: ICD-10-CM

## 2024-09-10 DIAGNOSIS — K51.819 OTHER ULCERATIVE COLITIS WITH COMPLICATION (MULTI): ICD-10-CM

## 2024-09-10 PROBLEM — G95.9 MYELOPATHY (MULTI): Status: RESOLVED | Noted: 2024-07-19 | Resolved: 2024-09-10

## 2024-09-10 PROBLEM — D50.9 IRON DEFICIENCY ANEMIA: Status: RESOLVED | Noted: 2023-04-07 | Resolved: 2024-09-10

## 2024-09-10 PROCEDURE — 70553 MRI BRAIN STEM W/O & W/DYE: CPT | Performed by: RADIOLOGY

## 2024-09-10 PROCEDURE — A9575 INJ GADOTERATE MEGLUMI 0.1ML: HCPCS | Performed by: STUDENT IN AN ORGANIZED HEALTH CARE EDUCATION/TRAINING PROGRAM

## 2024-09-10 PROCEDURE — 1036F TOBACCO NON-USER: CPT | Performed by: FAMILY MEDICINE

## 2024-09-10 PROCEDURE — 99215 OFFICE O/P EST HI 40 MIN: CPT | Performed by: FAMILY MEDICINE

## 2024-09-10 PROCEDURE — 72156 MRI NECK SPINE W/O & W/DYE: CPT | Performed by: RADIOLOGY

## 2024-09-10 PROCEDURE — 70553 MRI BRAIN STEM W/O & W/DYE: CPT

## 2024-09-10 PROCEDURE — 72156 MRI NECK SPINE W/O & W/DYE: CPT

## 2024-09-10 PROCEDURE — 2550000001 HC RX 255 CONTRASTS: Performed by: STUDENT IN AN ORGANIZED HEALTH CARE EDUCATION/TRAINING PROGRAM

## 2024-09-10 RX ORDER — GADOTERATE MEGLUMINE 376.9 MG/ML
13 INJECTION INTRAVENOUS
Status: COMPLETED | OUTPATIENT
Start: 2024-09-10 | End: 2024-09-10

## 2024-09-10 RX ORDER — NORETHINDRONE 5 MG/1
5 TABLET ORAL DAILY
Qty: 84 TABLET | Refills: 3 | Status: CANCELLED | OUTPATIENT
Start: 2024-09-10

## 2024-09-10 ASSESSMENT — ENCOUNTER SYMPTOMS
SPEECH DIFFICULTY: 1
VOMITING: 0
DIZZINESS: 0
CONSTIPATION: 0
DYSURIA: 0
COUGH: 0
WEAKNESS: 1
SHORTNESS OF BREATH: 0
FEVER: 0
SORE THROAT: 0
ABDOMINAL PAIN: 0
NAUSEA: 0
PALPITATIONS: 0
CHILLS: 0
APPETITE CHANGE: 0
RHINORRHEA: 0
FATIGUE: 0
DIARRHEA: 0

## 2024-09-10 NOTE — ASSESSMENT & PLAN NOTE
Patient wishes to transfer care from [facility].  Referral placed to Dr. Cash.

## 2024-09-10 NOTE — PROGRESS NOTES
Subjective   Patient ID: Ally Carpio is a 43 y.o. female who presents for GERD (recheck).    Ally has been having issues with weakness in her upper arms and lower legs.  She did have cervical surgery which helped improve her symptoms in her arms, but she is still having weakness in both of her lower legs.  Right seems worse than left.  Symptoms seems to be constant however are occasionally worse.  She has been seeing a neurologist.  She also has been seen by multiple sclerosis specialist who did not feel that she had multiple sclerosis.  Her neurologist recently ordered a repeat brain MRI to reassess lesions.    She is taking her thyroid medicine daily.  No missed doses.    She is following with hematology for iron transfusions for iron deficiency anemia.  She is scheduled for follow-up with her OB/GYN to discuss possible hysterectomy.         Review of Systems   Constitutional:  Negative for appetite change, chills, fatigue and fever.   HENT:  Negative for congestion, rhinorrhea and sore throat.    Eyes:  Negative for visual disturbance.   Respiratory:  Negative for cough and shortness of breath.    Cardiovascular:  Negative for chest pain and palpitations.   Gastrointestinal:  Negative for abdominal pain, constipation, diarrhea, nausea and vomiting.   Genitourinary:  Negative for dysuria.   Neurological:  Positive for speech difficulty and weakness. Negative for dizziness.       Objective   /70   Pulse 56   Temp 36.2 °C (97.2 °F)   Wt 67.6 kg (149 lb)   LMP 08/27/2024   SpO2 97%   BMI 24.05 kg/m²     Physical Exam  Constitutional:       General: She is not in acute distress.  HENT:      Head: Normocephalic and atraumatic.      Right Ear: Tympanic membrane normal.      Left Ear: Tympanic membrane normal.      Nose: No congestion or rhinorrhea.      Mouth/Throat:      Mouth: Mucous membranes are moist.      Pharynx: No oropharyngeal exudate or posterior oropharyngeal erythema.   Eyes:       Extraocular Movements: Extraocular movements intact.   Cardiovascular:      Rate and Rhythm: Normal rate and regular rhythm.      Heart sounds: No murmur heard.  Pulmonary:      Effort: Pulmonary effort is normal.      Breath sounds: No wheezing or rhonchi.   Abdominal:      General: There is no distension.      Palpations: Abdomen is soft.      Tenderness: There is no abdominal tenderness. There is no guarding or rebound.   Musculoskeletal:         General: No swelling or tenderness.      Cervical back: Neck supple.      Right lower leg: No edema.      Left lower leg: No edema.   Lymphadenopathy:      Cervical: No cervical adenopathy.   Skin:     General: Skin is warm and dry.   Neurological:      Mental Status: She is alert.      Cranial Nerves: No cranial nerve deficit.      Motor: No weakness.      Coordination: Coordination normal.      Gait: Gait normal.      Comments: +5/5 strength of bilateral upper and lower extremities   Psychiatric:         Mood and Affect: Mood normal.         Behavior: Behavior normal.         Assessment/Plan   Problem List Items Addressed This Visit             ICD-10-CM    Iron deficiency anemia due to chronic blood loss D50.0     Regiment per hematology.  Patient currently receiving iron transfusions         Mixed hyperlipidemia E78.2     Check lipid panel with next labs.         Relevant Orders    Lipid Panel    IFG (impaired fasting glucose) R73.01     Recent hemoglobin A1c 5.0%.         Relevant Orders    Comprehensive Metabolic Panel    Hemoglobin A1C    Hypothyroidism - Primary E03.9     TSH stable.          Relevant Orders    CBC and Auto Differential    Ulcerative colitis (Multi) K51.90     Patient wishes to transfer care from [facility].  Referral placed to Dr. Cash.         Relevant Orders    CBC and Auto Differential    Referral to Gastroenterology    Chronic ITP (idiopathic thrombocytopenic purpura) (Multi) D69.3     Following with hematology.         Menorrhagia  with regular cycle N92.0    Weakness of both lower extremities R29.898     No weakness observed on exam today.  Patient is scheduled for follow-up brain MRI.              Time Based Billing:  - Prep Time on Date of Patient Encounter:  6 minutes  - Time Directly with Patient/Family/Caregiver:   32 minutes  - Documentation Time:   5 minutes    Total Minutes:  43

## 2024-09-13 DIAGNOSIS — R90.82 WHITE MATTER ABNORMALITY ON MRI OF BRAIN: Primary | ICD-10-CM

## 2024-09-16 NOTE — PROGRESS NOTES
"Counseling:  The patient was counseled regarding diagnostic results, instructions for management, risk factor reductions, prognosis, patient and family education, impressions, risks and benefits of treatment options and importance of compliance with treatment.      Chief Complaint:   The patient presents today for evaluation of bradycardia, hypotension and fatigue.     History Of Present Illness:    ANT MESA is a 43 year old female patient who presents today for evaluation of bradycardia and fatigue. Her PMH is significant for GERD and PHAN. The patient reports a 3-month history of symptomatic bradycardia and hypotension. She is currently on tizanidine.      Last Recorded Vitals:  Vitals:    24 1446   BP: 100/62   BP Location: Left arm   Pulse: 52   SpO2: 99%   Weight: 67.6 kg (149 lb)   Height: 1.676 m (5' 6\")       Past Surgical History:  She has a past surgical history that includes  section, low transverse; Colonoscopy; Upper gastrointestinal endoscopy; and Dilation and curettage of uterus.      Social History:  She reports that she quit smoking about 7 years ago. Her smoking use included cigarettes. She started smoking about 20 years ago. She has a 12.5 pack-year smoking history. She has never used smokeless tobacco. She reports that she does not currently use alcohol. She reports that she does not use drugs.    Family History:  Family History   Problem Relation Name Age of Onset    Colon cancer Mother Shari     COPD Mother Shari     Hyperlipidemia Father Archuleta     Prostate cancer Father Archuleta     Hyperlipidemia Sister      Hypothyroidism Sister      Diverticulitis Sister      No Known Problems Sister      No Known Problems Brother      Colon cancer Mother's Brother      Colon cancer Mother's Brother Gary     Breast cancer Father's Sister Na       Allergies:  Balsalazide and Mesalamine    Outpatient Medications:  Current Outpatient Medications   Medication Instructions    cyanocobalamin " "(VITAMIN B-12) 1,000 mcg, intramuscular, See admin instructions, Please inject daily x 7, followed by Weekly x 4 and then Monthly    Hyrimoz 40 mg, subcutaneous, Every 14 days, LAST DOSE 6/27/24    levothyroxine (SYNTHROID, LEVOXYL) 12.5 mcg, oral, Daily    LORazepam (ATIVAN) 1 mg, oral, As needed    multivitamin tablet 1 tablet, oral, Daily    norethindrone (AYGESTIN) 5 mg, oral, Daily    pantoprazole (PROTONIX) 40 mg, oral, Daily, Do not crush, chew, or split.    pregabalin (LYRICA) 150 mg, oral, 2 times daily    syringe with needle (Syringe 3cc/22Gx1\") 3 mL 22 gauge x 1\" syringe 1 Application, miscellaneous, See admin instructions    tacrolimus (Protopic) 0.1 % ointment if needed.    tiZANidine (ZANAFLEX) 2 mg, oral, Every 8 hours PRN    UltiCare Safety Syringe 3 mL 22 gauge x 1\" syringe     Vtama 1 % cream if needed.     Review of Systems   All other systems reviewed and are negative.     Physical Exam:  Constitutional:       Appearance: Healthy appearance. Not in distress.   Neck:      Vascular: No JVR. JVD normal.   Pulmonary:      Effort: Pulmonary effort is normal.      Breath sounds: Normal breath sounds. No wheezing. No rhonchi. No rales.   Chest:      Chest wall: Not tender to palpatation.   Cardiovascular:      PMI at left midclavicular line. Normal rate. Regular rhythm. Normal S1. Normal S2.       Murmurs: There is no murmur.      No gallop.  No click. No rub.   Pulses:     Intact distal pulses.   Edema:     Peripheral edema absent.   Abdominal:      General: Bowel sounds are normal.      Palpations: Abdomen is soft.      Tenderness: There is no abdominal tenderness.   Musculoskeletal: Normal range of motion.         General: No tenderness. Skin:     General: Skin is warm and dry.   Neurological:      General: No focal deficit present.      Mental Status: Alert and oriented to person, place and time.     Last Labs:  CBC -  Lab Results   Component Value Date    WBC 6.0 07/12/2024    HGB 12.5 07/12/2024 "    HCT 38.6 07/12/2024    MCV 94 07/12/2024     (L) 07/12/2024       CMP -  Lab Results   Component Value Date    CALCIUM 8.2 (L) 07/12/2024    PROT 6.0 (L) 02/02/2024    ALBUMIN 3.8 02/02/2024    AST 7 (L) 02/02/2024    ALT 6 (L) 02/02/2024    ALKPHOS 35 02/02/2024    BILITOT 0.4 02/02/2024       LIPID PANEL -   Lab Results   Component Value Date    CHOL 183 09/30/2023    HDL 26.6 09/30/2023    CHHDL 6.9 09/30/2023    VLDL 15 09/30/2023    TRIG 74 09/30/2023    NHDL 156 (H) 09/30/2023       RENAL FUNCTION PANEL -   Lab Results   Component Value Date    K 4.3 07/12/2024       Lab Results   Component Value Date    HGBA1C 5.0 07/12/2024       Last Cardiology Tests:  09/26/2023 - TTE  Left ventricular systolic function is normal with a 65% estimated ejection fraction.     Lab review: I have personally reviewed the laboratory result(s).     Assessment/Plan   1) Cardiovascular Risk Stratification Prior to Bariatric Surgery  H/O palpitations s/t anemia requiring 6 iron infusions  Denies CP, chest discomfort or SOB  No past h/o h/o HTN or diabetes  Sleep study 08/01/2023 with mild sleep apnea with a SpO2 karson of 86.0% - will be starting CPAP therapy   ECG with ? old anteroseptal infarct  TTE 09/26/2023 with LVEF 65%  Low risk for planned surgery    2) Bradycardia, Hypotension, Fatigue  3-month history of symptomatic bradycardia and hypotension  TSH 03/12/2024 WNL at 2.78  Currently on tizanidine   Advised to followup with PCP re: alternative to tizanidine as this is the likely culprit to her symptoms   Stress test recommended; however, patient is unable to perform s/t neurological dysfunction    F/U 3 months           Scribe Attestation  By signing my name below, I, Rene Rose   attest that this documentation has been prepared under the direction and in the presence of Gerson Barksdale MD.

## 2024-09-17 ENCOUNTER — OFFICE VISIT (OUTPATIENT)
Dept: CARDIOLOGY | Facility: HOSPITAL | Age: 44
End: 2024-09-17
Payer: COMMERCIAL

## 2024-09-17 ENCOUNTER — PREP FOR PROCEDURE (OUTPATIENT)
Dept: OBSTETRICS AND GYNECOLOGY | Facility: CLINIC | Age: 44
End: 2024-09-17

## 2024-09-17 ENCOUNTER — OFFICE VISIT (OUTPATIENT)
Dept: OBSTETRICS AND GYNECOLOGY | Facility: CLINIC | Age: 44
End: 2024-09-17
Payer: COMMERCIAL

## 2024-09-17 VITALS
SYSTOLIC BLOOD PRESSURE: 106 MMHG | WEIGHT: 150 LBS | BODY MASS INDEX: 24.11 KG/M2 | HEIGHT: 66 IN | DIASTOLIC BLOOD PRESSURE: 62 MMHG

## 2024-09-17 VITALS
OXYGEN SATURATION: 99 % | BODY MASS INDEX: 23.95 KG/M2 | WEIGHT: 149 LBS | SYSTOLIC BLOOD PRESSURE: 100 MMHG | DIASTOLIC BLOOD PRESSURE: 62 MMHG | HEIGHT: 66 IN | HEART RATE: 52 BPM

## 2024-09-17 DIAGNOSIS — Z01.818 PREOPERATIVE EXAM FOR GYNECOLOGIC SURGERY: ICD-10-CM

## 2024-09-17 DIAGNOSIS — N92.1 MENORRHAGIA WITH IRREGULAR CYCLE: Primary | ICD-10-CM

## 2024-09-17 DIAGNOSIS — G89.4 CHRONIC PAIN SYNDROME: Primary | ICD-10-CM

## 2024-09-17 PROCEDURE — 93010 ELECTROCARDIOGRAM REPORT: CPT | Performed by: INTERNAL MEDICINE

## 2024-09-17 PROCEDURE — 99213 OFFICE O/P EST LOW 20 MIN: CPT | Performed by: INTERNAL MEDICINE

## 2024-09-17 PROCEDURE — 3008F BODY MASS INDEX DOCD: CPT | Performed by: INTERNAL MEDICINE

## 2024-09-17 PROCEDURE — 99214 OFFICE O/P EST MOD 30 MIN: CPT | Performed by: OBSTETRICS & GYNECOLOGY

## 2024-09-17 PROCEDURE — 93005 ELECTROCARDIOGRAM TRACING: CPT | Performed by: INTERNAL MEDICINE

## 2024-09-17 PROCEDURE — 3008F BODY MASS INDEX DOCD: CPT | Performed by: OBSTETRICS & GYNECOLOGY

## 2024-09-17 RX ORDER — SODIUM CHLORIDE, SODIUM LACTATE, POTASSIUM CHLORIDE, CALCIUM CHLORIDE 600; 310; 30; 20 MG/100ML; MG/100ML; MG/100ML; MG/100ML
20 INJECTION, SOLUTION INTRAVENOUS CONTINUOUS
OUTPATIENT
Start: 2024-09-17

## 2024-09-17 ASSESSMENT — ENCOUNTER SYMPTOMS
MUSCULOSKELETAL NEGATIVE: 0
NEUROLOGICAL NEGATIVE: 0
GASTROINTESTINAL NEGATIVE: 0
PSYCHIATRIC NEGATIVE: 0
RESPIRATORY NEGATIVE: 0
ALLERGIC/IMMUNOLOGIC NEGATIVE: 0
ENDOCRINE NEGATIVE: 0
CARDIOVASCULAR NEGATIVE: 0
EYES NEGATIVE: 0
HEMATOLOGIC/LYMPHATIC NEGATIVE: 0
CONSTITUTIONAL NEGATIVE: 0

## 2024-09-17 ASSESSMENT — PAIN SCALES - GENERAL: PAINLEVEL: 0-NO PAIN

## 2024-09-17 NOTE — PROGRESS NOTES
Subjective   Patient ID: Ally Carpio is a 43 y.o. female who presents for Vaginal Bleeding (Heavy bleeding last pap 12/22/23 wnl HPV negative last mammogram 9/6/24 benign).  Patient with a history of irregular menses.  Has been treated in the past with various hormonal treatments and Mirena IUD.  These have made her feel sick and she is declined to restart them.  Is status post diagnostic hysteroscopy D&C chart reviewed along with previous operative notes and pathology    Vaginal Bleeding    Bleeding has been irregular.  Some cramping    Review of Systems   Genitourinary:  Positive for vaginal bleeding.   Other systems negative    Objective   Physical Exam  Vitals reviewed.   Constitutional:       Appearance: Normal appearance. She is normal weight.   Cardiovascular:      Rate and Rhythm: Normal rate and regular rhythm.   Pulmonary:      Effort: Pulmonary effort is normal.      Breath sounds: Normal breath sounds.   Abdominal:      General: Abdomen is flat. Bowel sounds are normal.      Palpations: Abdomen is soft.      Tenderness: There is no abdominal tenderness.   Neurological:      General: No focal deficit present.      Mental Status: She is alert.   Psychiatric:         Mood and Affect: Mood normal.         Behavior: Behavior normal.         Thought Content: Thought content normal.         Judgment: Judgment normal.     Assessment/Plan   Diagnoses and all orders for this visit:  Menorrhagia with irregular cycle     Chart extensively reviewed.  Patient has a long history of menorrhagia with irregular cycles.  Patient had several transfusions due to blood loss.  Been treated with various hormonal therapies including Mirena and oral.  Has not done well on these.  Has continued to have irregular bleeding.  Did have recent diagnostic hysteroscopy D&C with placement of Mirena pathology reviewed Mirena was subsidy removed.  Options reviewed with patient and partner.  Discussed hormonal therapy again versus  hysterectomy.  Patient has had 2  sections higher risk for ablation although possible.  Risks and benefits reviewed with patient.  Chart reviewed.  Patient at this point would like to have hysterectomy done discussed options for hysterectomy.  At this point we will schedule for robotically assisted laparoscopic hysterectomy bilateral salpingectomy and cystoscopy.  Patient understands this may turn into an open case depending on the adhesions are noted in the pelvis and where they are.  Will schedule    Kwaku Schafer MD 24 11:45 AM

## 2024-09-17 NOTE — LETTER
"2024     Deena Thibodeaux DO  [address]  [address]  [facility] [address]    Patient: Ally Carpio   YOB: 1980   Date of Visit: 2024       Dear Dr. Deena Thibodeaux DO:    Thank you for referring Ally Carpio to me for evaluation. Below are my notes for this consultation.  If you have questions, please do not hesitate to call me. I look forward to following your patient along with you.       Sincerely,     Gerson Barksdale MD      CC: No Recipients  ______________________________________________________________________________________    Counseling:  The patient was counseled regarding diagnostic results, instructions for management, risk factor reductions, prognosis, patient and family education, impressions, risks and benefits of treatment options and importance of compliance with treatment.      Chief Complaint:   The patient presents today for evaluation of bradycardia, hypotension and fatigue.     History Of Present Illness:    ALLY CARPOI is a 43 year old female patient who presents today for evaluation of bradycardia and fatigue. Her PMH is significant for GERD and PHAN. The patient reports a 3-month history of symptomatic bradycardia and hypotension. She is currently on tizanidine.      Last Recorded Vitals:  Vitals:    24 1446   BP: 100/62   BP Location: Left arm   Pulse: 52   SpO2: 99%   Weight: 67.6 kg (149 lb)   Height: 1.676 m (5' 6\")       Past Surgical History:  She has a past surgical history that includes  section, low transverse; Colonoscopy; Upper gastrointestinal endoscopy; and Dilation and curettage of uterus.      Social History:  She reports that she quit smoking about 7 years ago. Her smoking use included cigarettes. She started smoking about 20 years ago. She has a 12.5 pack-year smoking history. She has never used smokeless tobacco. She reports that she does not currently use alcohol. She reports that she does not use " "drugs.    Family History:  Family History   Problem Relation Name Age of Onset   • Colon cancer Mother Shari    • COPD Mother Shari    • Hyperlipidemia Father Archuleta    • Prostate cancer Father Archuleta    • Hyperlipidemia Sister     • Hypothyroidism Sister     • Diverticulitis Sister     • No Known Problems Sister     • No Known Problems Brother     • Colon cancer Mother's Brother     • Colon cancer Mother's Brother Gary    • Breast cancer Father's Sister Na       Allergies:  Balsalazide and Mesalamine    Outpatient Medications:  Current Outpatient Medications   Medication Instructions   • cyanocobalamin (VITAMIN B-12) 1,000 mcg, intramuscular, See admin instructions, Please inject daily x 7, followed by Weekly x 4 and then Monthly   • Hyrimoz 40 mg, subcutaneous, Every 14 days, LAST DOSE 6/27/24   • levothyroxine (SYNTHROID, LEVOXYL) 12.5 mcg, oral, Daily   • LORazepam (ATIVAN) 1 mg, oral, As needed   • multivitamin tablet 1 tablet, oral, Daily   • norethindrone (AYGESTIN) 5 mg, oral, Daily   • pantoprazole (PROTONIX) 40 mg, oral, Daily, Do not crush, chew, or split.   • pregabalin (LYRICA) 150 mg, oral, 2 times daily   • syringe with needle (Syringe 3cc/22Gx1\") 3 mL 22 gauge x 1\" syringe 1 Application, miscellaneous, See admin instructions   • tacrolimus (Protopic) 0.1 % ointment if needed.   • tiZANidine (ZANAFLEX) 2 mg, oral, Every 8 hours PRN   • UltiCare Safety Syringe 3 mL 22 gauge x 1\" syringe    • Vtama 1 % cream if needed.     Review of Systems   All other systems reviewed and are negative.     Physical Exam:  Constitutional:       Appearance: Healthy appearance. Not in distress.   Neck:      Vascular: No JVR. JVD normal.   Pulmonary:      Effort: Pulmonary effort is normal.      Breath sounds: Normal breath sounds. No wheezing. No rhonchi. No rales.   Chest:      Chest wall: Not tender to palpatation.   Cardiovascular:      PMI at left midclavicular line. Normal rate. Regular rhythm. Normal S1. Normal S2.       " Murmurs: There is no murmur.      No gallop.  No click. No rub.   Pulses:     Intact distal pulses.   Edema:     Peripheral edema absent.   Abdominal:      General: Bowel sounds are normal.      Palpations: Abdomen is soft.      Tenderness: There is no abdominal tenderness.   Musculoskeletal: Normal range of motion.         General: No tenderness. Skin:     General: Skin is warm and dry.   Neurological:      General: No focal deficit present.      Mental Status: Alert and oriented to person, place and time.     Last Labs:  CBC -  Lab Results   Component Value Date    WBC 6.0 07/12/2024    HGB 12.5 07/12/2024    HCT 38.6 07/12/2024    MCV 94 07/12/2024     (L) 07/12/2024       CMP -  Lab Results   Component Value Date    CALCIUM 8.2 (L) 07/12/2024    PROT 6.0 (L) 02/02/2024    ALBUMIN 3.8 02/02/2024    AST 7 (L) 02/02/2024    ALT 6 (L) 02/02/2024    ALKPHOS 35 02/02/2024    BILITOT 0.4 02/02/2024       LIPID PANEL -   Lab Results   Component Value Date    CHOL 183 09/30/2023    HDL 26.6 09/30/2023    CHHDL 6.9 09/30/2023    VLDL 15 09/30/2023    TRIG 74 09/30/2023    NHDL 156 (H) 09/30/2023       RENAL FUNCTION PANEL -   Lab Results   Component Value Date    K 4.3 07/12/2024       Lab Results   Component Value Date    HGBA1C 5.0 07/12/2024       Last Cardiology Tests:  09/26/2023 - TTE  Left ventricular systolic function is normal with a 65% estimated ejection fraction.     Lab review: I have personally reviewed the laboratory result(s).     Assessment/Plan  1) Cardiovascular Risk Stratification Prior to Bariatric Surgery  H/O palpitations s/t anemia requiring 6 iron infusions  Denies CP, chest discomfort or SOB  No past h/o h/o HTN or diabetes  Sleep study 08/01/2023 with mild sleep apnea with a SpO2 karson of 86.0% - will be starting CPAP therapy   ECG with ? old anteroseptal infarct  TTE 09/26/2023 with LVEF 65%  Low risk for planned surgery    2) Bradycardia, Hypotension, Fatigue  3-month history of  symptomatic bradycardia and hypotension  TSH 03/12/2024 WNL at 2.78  Currently on tizanidine   Advised to followup with PCP re: alternative to tizanidine as this is the likely culprit to her symptoms   Stress test recommended; however, patient is unable to perform s/t neurological dysfunction    F/U 3 months           Scribe Attestation  By signing my name below, INusrat, Scribe   attest that this documentation has been prepared under the direction and in the presence of Gerson Barksdale MD.

## 2024-09-17 NOTE — PATIENT INSTRUCTIONS
Dr. Barksdale has recommended that you followup with your primary care provider to discuss an alternative to tizanidine as this medication is the likely culprit to your low blood pressure and heart rate.  Followup with Dr. Barksdale in 3 months.    If you have any questions or cardiac concerns, please call our office at [phone].

## 2024-09-18 ENCOUNTER — APPOINTMENT (OUTPATIENT)
Dept: PRIMARY CARE | Facility: CLINIC | Age: 44
End: 2024-09-18
Payer: COMMERCIAL

## 2024-09-18 VITALS
OXYGEN SATURATION: 98 % | WEIGHT: 151 LBS | TEMPERATURE: 98 F | SYSTOLIC BLOOD PRESSURE: 72 MMHG | BODY MASS INDEX: 24.37 KG/M2 | HEART RATE: 53 BPM | DIASTOLIC BLOOD PRESSURE: 46 MMHG

## 2024-09-18 DIAGNOSIS — M62.81 MUSCLE WEAKNESS: ICD-10-CM

## 2024-09-18 DIAGNOSIS — F33.1 DEPRESSION, MAJOR, RECURRENT, MODERATE: ICD-10-CM

## 2024-09-18 DIAGNOSIS — G89.4 CHRONIC PAIN SYNDROME: ICD-10-CM

## 2024-09-18 DIAGNOSIS — G47.00 INSOMNIA, UNSPECIFIED TYPE: ICD-10-CM

## 2024-09-18 DIAGNOSIS — I95.9 HYPOTENSION, UNSPECIFIED HYPOTENSION TYPE: Primary | ICD-10-CM

## 2024-09-18 DIAGNOSIS — M62.838 MUSCLE SPASM: ICD-10-CM

## 2024-09-18 PROCEDURE — 99214 OFFICE O/P EST MOD 30 MIN: CPT | Performed by: FAMILY MEDICINE

## 2024-09-18 PROCEDURE — 1036F TOBACCO NON-USER: CPT | Performed by: FAMILY MEDICINE

## 2024-09-18 RX ORDER — MAGNESIUM GLUCONATE 27.5 (500)
27.5 TABLET ORAL 2 TIMES DAILY
Qty: 90 TABLET | Refills: 1 | Status: SHIPPED | OUTPATIENT
Start: 2024-09-18

## 2024-09-18 RX ORDER — HYDROXYZINE HYDROCHLORIDE 10 MG/1
10-20 TABLET, FILM COATED ORAL NIGHTLY PRN
Qty: 30 TABLET | Refills: 0 | Status: SHIPPED | OUTPATIENT
Start: 2024-09-18 | End: 2024-10-18

## 2024-09-18 RX ORDER — FLUOXETINE HYDROCHLORIDE 20 MG/1
20 CAPSULE ORAL DAILY
Qty: 90 CAPSULE | Refills: 1 | Status: SHIPPED | OUTPATIENT
Start: 2024-09-18

## 2024-09-18 ASSESSMENT — ENCOUNTER SYMPTOMS
WEAKNESS: 1
DYSPHORIC MOOD: 1
FATIGUE: 1

## 2024-09-18 NOTE — PROGRESS NOTES
Subjective   Patient ID: Ally Carpio is a 43 y.o. female who presents for Depression (Discuss feeling depressed- discuss medication ) and Spasms (Discuss muscle spasms and different muscle relaxer Dr. Barksdale ).    Ally has not been feeling well.  She continues to have muscle weakness and feels fatigued.  Her blood pressure has been running low.  She did see her cardiologist yesterday who recommended stopping the tizanidine as it may be affecting her heart.  She is concerned about having severe muscle spasms overnight.  Typically she cannot sleep without taking the tizanidine.    Her mood has also been depressed recently she is overwhelmed with her medical problems that no one can seem to figure out or help her with.  In the past she did well on Prozac and would like to restart the medication.         Review of Systems   Constitutional:  Positive for fatigue.   Neurological:  Positive for weakness.   Psychiatric/Behavioral:  Positive for dysphoric mood.        Objective   BP (!) 72/46   Pulse 53   Temp 36.7 °C (98 °F)   Wt 68.5 kg (151 lb)   LMP 08/27/2024   SpO2 98%   BMI 24.37 kg/m²     Physical Exam  Constitutional:       General: She is not in acute distress.     Appearance: Normal appearance.   HENT:      Head: Normocephalic.   Pulmonary:      Effort: Pulmonary effort is normal.   Neurological:      General: No focal deficit present.      Mental Status: She is alert.   Psychiatric:         Mood and Affect: Mood is depressed.         Assessment/Plan   Diagnoses and all orders for this visit:  Hypotension, unspecified hypotension type  Comments:  Stop tizanidine. [facility] for adrenal insufficiency.  Orders:  -     Cortisol; Future  -     Renin Activity; Future  -     Aldosterone; Future  Muscle weakness  Comments:  [facility] for adrenal insuffiency.  Orders:  -     Comprehensive Metabolic Panel; Future  -     Magnesium; Future  -     C-reactive protein; Future  -     Renin Activity; Future  -      Aldosterone; Future  Muscle spasm  Comments:  Stop tizandinie. Start magnesium 500mg daily.  Orders:  -     magnesium gluconate (Magonate) 27.5 mg magne- sium (500 mg) tablet; Take 1 tablet (27.5 mg) by mouth 2 times a day.  Depression, major, recurrent, moderate (Multi)  Comments:  Start fluoxetine 20mg and hydorxyzine prn.  Orders:  -     FLUoxetine (PROzac) 20 mg capsule; Take 1 capsule (20 mg) by mouth once daily.  Chronic pain syndrome  Comments:  Consider duloxetine.  Insomnia, unspecified type  Comments:  Start hydroxyaine as needed.  Orders:  -     hydrOXYzine HCL (Atarax) 10 mg tablet; Take 1-2 tablets (10-20 mg) by mouth as needed at bedtime (insomnia).

## 2024-09-19 ENCOUNTER — LAB (OUTPATIENT)
Dept: LAB | Facility: LAB | Age: 44
End: 2024-09-19
Payer: COMMERCIAL

## 2024-09-19 ENCOUNTER — DOCUMENTATION (OUTPATIENT)
Dept: PHYSICAL THERAPY | Facility: HOSPITAL | Age: 44
End: 2024-09-19

## 2024-09-19 DIAGNOSIS — M62.81 MUSCLE WEAKNESS: ICD-10-CM

## 2024-09-19 DIAGNOSIS — I95.9 HYPOTENSION, UNSPECIFIED HYPOTENSION TYPE: ICD-10-CM

## 2024-09-19 PROBLEM — N92.1 MENORRHAGIA WITH IRREGULAR CYCLE: Status: ACTIVE | Noted: 2024-09-17

## 2024-09-19 LAB
ALBUMIN SERPL BCP-MCNC: 3.3 G/DL (ref 3.4–5)
ALP SERPL-CCNC: 40 U/L (ref 33–110)
ALT SERPL W P-5'-P-CCNC: 7 U/L (ref 7–45)
ANION GAP SERPL CALC-SCNC: 10 MMOL/L (ref 10–20)
AST SERPL W P-5'-P-CCNC: 8 U/L (ref 9–39)
BILIRUB SERPL-MCNC: 0.3 MG/DL (ref 0–1.2)
BUN SERPL-MCNC: 8 MG/DL (ref 6–23)
CALCIUM SERPL-MCNC: 8 MG/DL (ref 8.6–10.3)
CHLORIDE SERPL-SCNC: 105 MMOL/L (ref 98–107)
CO2 SERPL-SCNC: 28 MMOL/L (ref 21–32)
CORTIS SERPL-MCNC: 12.1 UG/DL (ref 2.5–20)
CREAT SERPL-MCNC: 0.68 MG/DL (ref 0.5–1.05)
CRP SERPL-MCNC: 0.23 MG/DL
EGFRCR SERPLBLD CKD-EPI 2021: >90 ML/MIN/1.73M*2
GLUCOSE SERPL-MCNC: 90 MG/DL (ref 74–99)
MAGNESIUM SERPL-MCNC: 1.74 MG/DL (ref 1.6–2.4)
POTASSIUM SERPL-SCNC: 4 MMOL/L (ref 3.5–5.3)
PROT SERPL-MCNC: 5.3 G/DL (ref 6.4–8.2)
SODIUM SERPL-SCNC: 139 MMOL/L (ref 136–145)

## 2024-09-19 PROCEDURE — 84244 ASSAY OF RENIN: CPT

## 2024-09-19 PROCEDURE — 36415 COLL VENOUS BLD VENIPUNCTURE: CPT

## 2024-09-19 PROCEDURE — 82088 ASSAY OF ALDOSTERONE: CPT

## 2024-09-19 PROCEDURE — 80053 COMPREHEN METABOLIC PANEL: CPT

## 2024-09-19 PROCEDURE — 86140 C-REACTIVE PROTEIN: CPT

## 2024-09-19 PROCEDURE — 83735 ASSAY OF MAGNESIUM: CPT

## 2024-09-19 PROCEDURE — 82533 TOTAL CORTISOL: CPT

## 2024-09-20 ENCOUNTER — APPOINTMENT (OUTPATIENT)
Dept: PRIMARY CARE | Facility: CLINIC | Age: 44
End: 2024-09-20
Payer: COMMERCIAL

## 2024-09-21 LAB — ALDOST SERPL-MCNC: 7 NG/DL

## 2024-09-23 LAB — RENIN PLAS-CCNC: 1.6 NG/ML/HR

## 2024-09-24 ENCOUNTER — APPOINTMENT (OUTPATIENT)
Dept: RADIOLOGY | Facility: HOSPITAL | Age: 44
End: 2024-09-24
Payer: COMMERCIAL

## 2024-09-24 ENCOUNTER — OFFICE VISIT (OUTPATIENT)
Dept: NEUROLOGY | Facility: HOSPITAL | Age: 44
End: 2024-09-24
Payer: COMMERCIAL

## 2024-09-24 ENCOUNTER — TELEPHONE (OUTPATIENT)
Dept: NEUROLOGY | Facility: CLINIC | Age: 44
End: 2024-09-24

## 2024-09-24 ENCOUNTER — LAB (OUTPATIENT)
Dept: LAB | Facility: LAB | Age: 44
End: 2024-09-24
Payer: COMMERCIAL

## 2024-09-24 VITALS
RESPIRATION RATE: 18 BRPM | DIASTOLIC BLOOD PRESSURE: 75 MMHG | SYSTOLIC BLOOD PRESSURE: 118 MMHG | HEIGHT: 66 IN | TEMPERATURE: 98.2 F | BODY MASS INDEX: 24.27 KG/M2 | HEART RATE: 67 BPM | WEIGHT: 151 LBS

## 2024-09-24 DIAGNOSIS — G37.9 CNS DEMYELINATION (MULTI): Primary | ICD-10-CM

## 2024-09-24 DIAGNOSIS — G37.9 CNS DEMYELINATION (MULTI): ICD-10-CM

## 2024-09-24 LAB
ALBUMIN SERPL BCP-MCNC: 3.7 G/DL (ref 3.4–5)
ALP SERPL-CCNC: 49 U/L (ref 33–110)
ALT SERPL W P-5'-P-CCNC: 12 U/L (ref 7–45)
ANION GAP SERPL CALC-SCNC: 9 MMOL/L (ref 10–20)
AST SERPL W P-5'-P-CCNC: 11 U/L (ref 9–39)
BASOPHILS # BLD AUTO: 0.04 X10*3/UL (ref 0–0.1)
BASOPHILS NFR BLD AUTO: 0.8 %
BILIRUB SERPL-MCNC: 0.2 MG/DL (ref 0–1.2)
BUN SERPL-MCNC: 11 MG/DL (ref 6–23)
CALCIUM SERPL-MCNC: 8.5 MG/DL (ref 8.6–10.6)
CHLORIDE SERPL-SCNC: 107 MMOL/L (ref 98–107)
CO2 SERPL-SCNC: 29 MMOL/L (ref 21–32)
CREAT SERPL-MCNC: 0.67 MG/DL (ref 0.5–1.05)
EGFRCR SERPLBLD CKD-EPI 2021: >90 ML/MIN/1.73M*2
EOSINOPHIL # BLD AUTO: 0.18 X10*3/UL (ref 0–0.7)
EOSINOPHIL NFR BLD AUTO: 3.6 %
ERYTHROCYTE [DISTWIDTH] IN BLOOD BY AUTOMATED COUNT: 12.8 % (ref 11.5–14.5)
GLUCOSE SERPL-MCNC: 106 MG/DL (ref 74–99)
HBV CORE AB SER QL: NONREACTIVE
HBV SURFACE AB SER-ACNC: >1000 MIU/ML
HBV SURFACE AG SERPL QL IA: NONREACTIVE
HCT VFR BLD AUTO: 36.3 % (ref 36–46)
HCV AB SER QL: NONREACTIVE
HGB BLD-MCNC: 11.8 G/DL (ref 12–16)
IGA SERPL-MCNC: 202 MG/DL (ref 70–400)
IGG SERPL-MCNC: 814 MG/DL (ref 700–1600)
IGM SERPL-MCNC: 154 MG/DL (ref 40–230)
IMM GRANULOCYTES # BLD AUTO: 0.02 X10*3/UL (ref 0–0.7)
IMM GRANULOCYTES NFR BLD AUTO: 0.4 % (ref 0–0.9)
LYMPHOCYTES # BLD AUTO: 0.78 X10*3/UL (ref 1.2–4.8)
LYMPHOCYTES NFR BLD AUTO: 15.6 %
MCH RBC QN AUTO: 30 PG (ref 26–34)
MCHC RBC AUTO-ENTMCNC: 32.5 G/DL (ref 32–36)
MCV RBC AUTO: 92 FL (ref 80–100)
MONOCYTES # BLD AUTO: 0.3 X10*3/UL (ref 0.1–1)
MONOCYTES NFR BLD AUTO: 6 %
NEUTROPHILS # BLD AUTO: 3.68 X10*3/UL (ref 1.2–7.7)
NEUTROPHILS NFR BLD AUTO: 73.6 %
NRBC BLD-RTO: 0 /100 WBCS (ref 0–0)
PLATELET # BLD AUTO: 104 X10*3/UL (ref 150–450)
POTASSIUM SERPL-SCNC: 3.8 MMOL/L (ref 3.5–5.3)
PROT SERPL-MCNC: 6.1 G/DL (ref 6.4–8.2)
RBC # BLD AUTO: 3.93 X10*6/UL (ref 4–5.2)
SODIUM SERPL-SCNC: 141 MMOL/L (ref 136–145)
VARICELLA ZOSTER IGG INDEX: 6.6 IA
VZV IGG SER QL IA: POSITIVE
WBC # BLD AUTO: 5 X10*3/UL (ref 4.4–11.3)

## 2024-09-24 PROCEDURE — 86481 TB AG RESPONSE T-CELL SUSP: CPT

## 2024-09-24 PROCEDURE — 86363 MOG-IGG1 ANTB FLO CYTMTRY EA: CPT

## 2024-09-24 PROCEDURE — 87340 HEPATITIS B SURFACE AG IA: CPT

## 2024-09-24 PROCEDURE — 86704 HEP B CORE ANTIBODY TOTAL: CPT

## 2024-09-24 PROCEDURE — 86787 VARICELLA-ZOSTER ANTIBODY: CPT

## 2024-09-24 PROCEDURE — 86803 HEPATITIS C AB TEST: CPT

## 2024-09-24 PROCEDURE — 86053 AQAPRN-4 ANTB FLO CYTMTRY EA: CPT

## 2024-09-24 PROCEDURE — 1036F TOBACCO NON-USER: CPT | Performed by: PSYCHIATRY & NEUROLOGY

## 2024-09-24 PROCEDURE — 85025 COMPLETE CBC W/AUTO DIFF WBC: CPT

## 2024-09-24 PROCEDURE — 3008F BODY MASS INDEX DOCD: CPT | Performed by: PSYCHIATRY & NEUROLOGY

## 2024-09-24 PROCEDURE — 86706 HEP B SURFACE ANTIBODY: CPT

## 2024-09-24 PROCEDURE — 82164 ANGIOTENSIN I ENZYME TEST: CPT

## 2024-09-24 PROCEDURE — 99417 PROLNG OP E/M EACH 15 MIN: CPT | Performed by: PSYCHIATRY & NEUROLOGY

## 2024-09-24 PROCEDURE — 86256 FLUORESCENT ANTIBODY TITER: CPT

## 2024-09-24 PROCEDURE — 99215 OFFICE O/P EST HI 40 MIN: CPT | Performed by: PSYCHIATRY & NEUROLOGY

## 2024-09-24 PROCEDURE — 36415 COLL VENOUS BLD VENIPUNCTURE: CPT

## 2024-09-24 PROCEDURE — 83921 ORGANIC ACID SINGLE QUANT: CPT

## 2024-09-24 PROCEDURE — 82784 ASSAY IGA/IGD/IGG/IGM EACH: CPT

## 2024-09-24 PROCEDURE — 86769 SARS-COV-2 COVID-19 ANTIBODY: CPT

## 2024-09-24 PROCEDURE — 82652 VIT D 1 25-DIHYDROXY: CPT

## 2024-09-24 PROCEDURE — 80053 COMPREHEN METABOLIC PANEL: CPT

## 2024-09-24 RX ORDER — ERGOCALCIFEROL 1.25 MG/1
1.25 CAPSULE ORAL
Qty: 4 CAPSULE | Refills: 11 | Status: SHIPPED | OUTPATIENT
Start: 2024-09-29 | End: 2024-09-24 | Stop reason: SDUPTHER

## 2024-09-24 RX ORDER — ERGOCALCIFEROL 1.25 MG/1
1.25 CAPSULE ORAL
Qty: 4 CAPSULE | Refills: 11 | Status: SHIPPED | OUTPATIENT
Start: 2024-09-29 | End: 2025-09-29

## 2024-09-24 RX ORDER — TIZANIDINE 2 MG/1
2 TABLET ORAL DAILY
Qty: 30 TABLET | Refills: 3 | Status: SHIPPED | OUTPATIENT
Start: 2024-09-24 | End: 2025-01-22

## 2024-09-24 RX ORDER — ALPRAZOLAM 0.25 MG/1
0.25 TABLET ORAL AS NEEDED
Qty: 2 TABLET | Refills: 0 | Status: SHIPPED | OUTPATIENT
Start: 2024-09-24

## 2024-09-24 ASSESSMENT — PAIN SCALES - GENERAL: PAINLEVEL: 4

## 2024-09-24 NOTE — TELEPHONE ENCOUNTER
Ally Carpio called. She is scheduling her STAT MRI She will need oral sedation .Please send to [facility] (on file). She is also requesting a muscle relaxer that you had discussed at her visit this morning, also send to [facility].

## 2024-09-24 NOTE — PATIENT INSTRUCTIONS
You were evaluated for body tingling, leg weakness, transient bladder dysfunction, frequent spasms, and recent visual blurring and eye pain. Your brain MRI showed demyelinating lesions (similar to what is seen in MS and related disorders) but your spinal cord did not show any lesions. Your symptoms, the findings on brain MRI, and your history of autoimmune disorders are highly suggestive of inflammation of the spinal cord (myelitis) that was not picked up by the MRIs.    We need to rule out MS and its mimickers including conditions that can cause MRI negative myelitis (like MOGAD) and systemic autoimmune disorders that can mimic psoriasis, ulcerative colitis, and MS (like Behcet's disease and sarcoidosis).     I will order STAT MRI of your orbits, thoracic cord, and CT of your chest along with several blood tests to evaluate those mimickers.     I will refer you to PT.     Will prescribe vitamin D 37981 units weekly.     Based on the scan, we can decide whether or not we should give you a trial of intravenous steroids.     In the meantime, please hold off on hyrimoz and discuss zeposia instead with your GI doctor.     Follow up after four weeks.     Thank you for visiting the clinic today    Paul Holt MD

## 2024-09-24 NOTE — PROGRESS NOTES
Subjective     Ally Carpio is a 43 y.o. right handed female with hx of UC recently diagnosed in 1/2024 currently on adalimumab since late April of 2024, psoriasis since 2023 (previously on topical treatment only), lTP since November of 2023 (never required treatment), chronic menorrhagia, iron deficiency anemia, RLS since 2014, prior smoking (quit in 2017 smoked 1 ppd for 12 years before that), and hypothyroidism who is referred by Dr. Mckeon for evaluation.       History of Present Illness   [facility] NEURO IMMUNOLOGY HPI: Date of Onset: 2/2024  Date of Diagnosis: diagnosis not finalized yet  Last MRI Brain: 9/2024  Last MRI Cervical: 9/2024  Last MRI Thoracis: 4/2024  Last OCT/VEP: not done  Last CBC: NA  Disease Course at Onset: RR  Disease Course Now: RR  Current Disease Modifying Therapies: None  Previous Disease Modifying Therapies: prednisone  Cerebrospinal Fluid: not done  Gary [place] Virus: not done  Varicella Zoster Virus: not done  Hep Panel: negative  Neuromyelitis Optica: not done  Myelin Oligodendrocyte Glycoprotein: not done    HPI  Patient was diagnosed with ITP in 2023 based on blood counts but was not started on treatment as she was not symptomatic apart from chronic menorrhagia, which she had for years. Shortly after, she started having skin rash and was diagnosed with psoriasis and started on topical treatment. She experienced painful mouth ulcers. Early 2024, developed GI symptoms and was diagnosed with UC after colonoscopy and positive biopsy. She was given prednisone with improvement of GI symptoms. However, as she was weaning down prednisone, she developed tingling in the whole body including all limbs, trunk, face, and scalp. She also developed a woozy and nauseous feeling. Months after, she started having weakness in the LLE and then the RLE a few months after that. She had transient bladder urgency and incontinence that later resolved on its own. She started having painful spasms in  all limbs and in the trunk on the right side below the costal margin. She also started having word finding difficulty and brain fogginess. Most recently she noticed visual blurriness in both eyes with pain behind the right eye.     She had a brain MRI in March of 2024 that showed a few large demyelinating-like lesions in true periventricular and juxtacortical locations without enhancement. She was referred to the [facility] where she saw Dr. Rustam Roberto who asked her to hold off on adalimumab treatment, which was planned by her GI doctor, and obtained MRI of the C/T spine. Unexpectedly, the spine MRI came back without any clear spinal cord demyelination. She did have a cervical disc causing cervical canal stenosis but without abnormal cord signal. 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 After that, she noticed some improvement in her hand tingling and possibly in urinary symptoms. However, her motor symptoms continued to worsen after the surgery including new RLE weakness. She was cleared for adalimumab after demyelination was felt less likely (brain lesions were thought to be related to dilated perivascular spaces). However, after she started adalimumab for UC in April 2024, she noticed worsening of her neurological symptoms especially the BLE weakness and the tonic spasms. She Saw Dr. Mckeon who repeated brain and cervical MRIs in early Sepetember, which showed stable findings. EMG was normal. He subsequently referred her to the MS clinic.     MS Symptom Review: Weakness: yes left then right leg  Sensory Changes: yes presenting symptom in the whole body   Incoordination: yes both hands  Falling: once  Gait Change: limping  Painful Vision Loss: bilateral R>L visual blurring with pain + floaters in the right eye  Double Vision: No double vision   Vertigo: woozy and nauseous  Facial/Bulvar Weakness: No facial/bulvar weakness  Bladder/Bowel  "Dysfunction: yes transient frequency, urgency, and occasional incontinence  Spasticity: No spasticity  Tonic Spasms: yes isometric painful spasms in all limbs and the right part of the trunk below the ribcage  Tremors: No tremors  Restless Leg Syndrome: yes for at least ten years both legs related to iron deficiency   Dystonia: dystonic flexion of the toes and twitching of the right thumb  Other Movement Disorders: twitches allover the body  Heat Sensitivity: heat makes her tingling and nausea worse but makes her muscles feel better  Fatigue: yes  Depression: depressed mood because of all the symptoms  Anxiety: yes but better  Cognitive Changes: word finding difficulty   Disease Modifying Therapies: No Disease modifying therapies   Side Effects: No side effects  Is the patient taking Vitamin D supplements? no  Is the patient taking Symptomatic medications? Pregabalin,        Past Surgical History:   Procedure Laterality Date     SECTION, LOW TRANSVERSE      x 2, .     COLONOSCOPY      DILATION AND CURETTAGE OF UTERUS      UPPER GASTROINTESTINAL ENDOSCOPY       Social History     Tobacco Use    Smoking status: Former     Current packs/day: 0.00     Average packs/day: 1 pack/day for 12.5 years (12.5 ttl pk-yrs)     Types: Cigarettes     Start date: 2004     Quit date: 2017     Years since quittin.7    Smokeless tobacco: Never   Substance Use Topics    Alcohol use: Not Currently     Allergies   Allergen Reactions    Balsalazide Other     Mouth ulcers    Mesalamine Rash     Visit Vitals  /75   Pulse 67   Temp 36.8 °C (98.2 °F)   Resp 18   Ht 1.676 m (5' 6\")   Wt 68.5 kg (151 lb)   LMP 2024   BMI 24.37 kg/m²   OB Status Having periods   Smoking Status Former   BSA 1.79 m²       Objective   Neurological Exam  Mental Status  Awake, alert and oriented to person, place and time. Recent and remote memory are intact. Speech is normal. Language is fluent with no aphasia. Attention and " concentration are normal.    Cranial Nerves  CN II: Visual fields full to confrontation.  CN III, IV, VI: Extraocular movements intact bilaterally.   Right pupil: 2 mm.   Left pupil: 4 mm.  CN V: Facial sensation is normal.  CN VII: Full and symmetric facial movement.  CN VIII: Hearing is normal.  CN IX, X: Palate elevates symmetrically. Normal gag reflex.  CN XI: Shoulder shrug strength is normal.  CN XII: Tongue midline without atrophy or fasciculations.    Motor  Normal muscle bulk throughout. No abnormal involuntary movements.  4/5 BLE most pronounced distally.    Sensory  Right hemisensory level T8.    Reflexes                                            Right                      Left  Brachioradialis                    2+                         2+  Biceps                                 2+                         2+  Triceps                                2+                          Patellar                                3+                         3+  Achilles                                3+                         3+    Right pathological reflexes: Ankle clonus absent.  Left pathological reflexes: Ankle clonus absent.    Coordination  Right: Finger-to-nose normal. Rapid alternating movement normal.Left: Finger-to-nose normal. Rapid alternating movement normal.    Gait    High steppage gait wide-based gait. .  Physical Exam  Eyes:      Extraocular Movements: Extraocular movements intact.   Neurological:      Deep Tendon Reflexes:      Reflex Scores:       Tricep reflexes are 2+ on the right side.       Bicep reflexes are 2+ on the right side and 2+ on the left side.       Brachioradialis reflexes are 2+ on the right side and 2+ on the left side.       Patellar reflexes are 3+ on the right side and 3+ on the left side.       Achilles reflexes are 3+ on the right side and 3+ on the left side.  Psychiatric:         Speech: Speech normal.       CLINICAL SCORES  Scales and Scores: Expanded Disability Status  Scale (EDSS)  Pyramidal Functions: Mild to moderate paraparesis or hemiparesis (detectable weakness but most function sustained for short periods, fatigue a problem) severe monoparesis (almost no function)  Cerebellar Functions: Mild ataxia (tremor or clumsy movements easily seen, minor interference with function)  Brainstem Functions: Normal  Sensory Function: Mild decrease in touch or pain or position sense, and/or moderate decrease in vibration in one or two limbs or vibratory (c/s figure writing) decrease alone in three or four limbs  Bowel and Bladder Function: Normal  Visual Function: Normal  Cerebral (or Mental) Functions: Mild decrease in mentation  [place] Expanded Disability Status Scale (EDSS): Fully ambulatory without aid self-sufficient up and about some 12 hours a day despite relatively severe disability consisting of one FS grade 4    Assessment/Plan     This is a 43 y.o. year old right handed female  With a PMH of UC recently diagnosed in 1/2024 currently on adalimumab since late April of 2024, psoriasis since 2023 (previously on topical treatment only), lTP since November of 2023 (never required treatment), chronic menorrhagia, iron deficiency anemia, RLS since 2014, prior smoking (quit in 2017 smoked 1 ppd for 12 years before that), and hypothyroidism who presents with several sequential neurological symptoms since February of 2024 including paraesthesia of all limbs, trunk, and face, LLE then RLE weakness, transient bladder dysfunction, painful isometric tonic spasms in all limbs and truncal (below the right costal margin), wooziness, nausea, brain fogginess, headaches, and most recently bilateral visual blurring with right eye pain. Symptoms started as she was weaning down prednisone for UC. Symptoms improved only partially after cervical decompression for disc prolapse but then continued to worsen especially after she started adalimumab for UC.  The Neurological Exam showed unequal L>R pupils,  mild BLE weakness distal > proximal, right hemisensory level T8, and high-steppage wide-based gait.   The MRI brain showed a few non-enhancing large demyelinating-like lesions in periventricular, subcortical, and juxtacortical locations stable in September compared to March 2024. MRI cervical spine without cord lesions in April and September 2024 but did show central disc prolapse with canal stenosis that was released. MRI thoracic spine in April did not show cord lesions either. EMG normal.   Cerebrospinal Fluid not done  OCT/VEP not done  MS Mimics not ruled out  The overall picture is suspicious for a systemic autoimmune disease mimicking ITP, UC, psoriasis, and MS since all four conditions occurred within nearly a one-year-period. She admits to painful mouth ulcers so Behchet's is on the top of the differential. Also sarcoidosis. Alternatively, she may have multiple coexisting autoimmune disorders. Her neurological presentation is highly suspicious for myelitis (acute onset while tapering down prednisone, MS juanito, tonic spasms, brain demyelinating-like lesion, worsening with humira, etc.) but the scans have been negative. Therefore, causes of MRI negative myelitis should be considered especially MOGAD and less so NMOSD. She may also simply have coexisting MS.   Disease Modifying Therapies: she should stop adalimumab and consider switching to an agent that would work for both UC and MS like zeposia once mimickers are ruled out.     PLAN:  You were evaluated for body tingling, leg weakness, transient bladder dysfunction, frequent spasms, and recent visual blurring and eye pain. Your brain MRI showed demyelinating lesions (similar to what is seen in MS and related disorders) but your spinal cord did not show any lesions. Your symptoms, the findings on brain MRI, and your history of autoimmune disorders are highly suggestive of inflammation of the spinal cord (myelitis) that was not picked up by the MRIs.    We need  to rule out MS and its mimickers including conditions that can cause MRI negative myelitis (like MOGAD) and systemic autoimmune disorders that can mimic psoriasis, ulcerative colitis, and MS (like Behcet's disease and sarcoidosis).     I will order STAT MRI of your orbits, thoracic cord, and CT of your chest along with several blood tests to evaluate those mimickers (MOG, AQP4, HALB51, ACE).     I will refer you to PT.     Will prescribe vitamin D 92097 units weekly.     Based on the scan, we can decide whether or not we should give you a trial of intravenous steroids.     In the meantime, please hold off on hyrimoz and discuss zeposia instead with your GI doctor.     Follow up after four weeks.     May need LP and OCT.     Will share the note with the referring physician via EMR    The impression and plan were discussed with the patient and family (if present) in details and all questions answered.      Paul Holt MD]

## 2024-09-25 ENCOUNTER — HOSPITAL ENCOUNTER (OUTPATIENT)
Dept: RADIOLOGY | Facility: CLINIC | Age: 44
Discharge: HOME | End: 2024-09-25
Payer: COMMERCIAL

## 2024-09-25 ENCOUNTER — HOSPITAL ENCOUNTER (OUTPATIENT)
Dept: RADIOLOGY | Facility: HOSPITAL | Age: 44
Discharge: HOME | End: 2024-09-25
Payer: COMMERCIAL

## 2024-09-25 ENCOUNTER — APPOINTMENT (OUTPATIENT)
Dept: RADIOLOGY | Facility: HOSPITAL | Age: 44
End: 2024-09-25
Payer: COMMERCIAL

## 2024-09-25 DIAGNOSIS — G37.9 CNS DEMYELINATION (MULTI): ICD-10-CM

## 2024-09-25 LAB
1,25(OH)2D SERPL-MCNC: 69.5 PG/ML (ref 19.9–79.3)
ACE SERPL-CCNC: 29 U/L (ref 16–85)
HLA-B51 QL: NEGATIVE

## 2024-09-25 PROCEDURE — A9575 INJ GADOTERATE MEGLUMI 0.1ML: HCPCS | Performed by: PSYCHIATRY & NEUROLOGY

## 2024-09-25 PROCEDURE — 70543 MRI ORBT/FAC/NCK W/O &W/DYE: CPT | Performed by: RADIOLOGY

## 2024-09-25 PROCEDURE — 70543 MRI ORBT/FAC/NCK W/O &W/DYE: CPT

## 2024-09-25 PROCEDURE — 72157 MRI CHEST SPINE W/O & W/DYE: CPT

## 2024-09-25 PROCEDURE — 2550000001 HC RX 255 CONTRASTS: Performed by: PSYCHIATRY & NEUROLOGY

## 2024-09-25 PROCEDURE — 71260 CT THORAX DX C+: CPT | Performed by: RADIOLOGY

## 2024-09-25 PROCEDURE — 71260 CT THORAX DX C+: CPT

## 2024-09-25 RX ORDER — GADOTERATE MEGLUMINE 376.9 MG/ML
14 INJECTION INTRAVENOUS
Status: COMPLETED | OUTPATIENT
Start: 2024-09-25 | End: 2024-09-25

## 2024-09-26 DIAGNOSIS — H46.9 OPTIC NEURITIS: Primary | ICD-10-CM

## 2024-09-26 LAB
NIL(NEG) CONTROL SPOT COUNT: NORMAL
PANEL A SPOT COUNT: 0
PANEL B SPOT COUNT: 0
POS CONTROL SPOT COUNT: NORMAL
T-SPOT. TB INTERPRETATION: NEGATIVE

## 2024-09-26 RX ORDER — ALBUTEROL SULFATE 0.83 MG/ML
2.5 SOLUTION RESPIRATORY (INHALATION) ONCE AS NEEDED
OUTPATIENT
Start: 2024-09-26

## 2024-09-26 RX ORDER — METHYLPREDNISOLONE SODIUM SUCCINATE 125 MG/2ML
1000 INJECTION INTRAMUSCULAR; INTRAVENOUS ONCE
OUTPATIENT
Start: 2024-09-26 | End: 2024-09-26

## 2024-09-26 RX ORDER — PANTOPRAZOLE SODIUM 40 MG/1
40 TABLET, DELAYED RELEASE ORAL ONCE
OUTPATIENT
Start: 2024-09-26 | End: 2024-09-26

## 2024-09-26 RX ORDER — EPINEPHRINE 1 MG/ML
0.01 INJECTION INTRAMUSCULAR; INTRAVENOUS; SUBCUTANEOUS ONCE AS NEEDED
OUTPATIENT
Start: 2024-09-26

## 2024-09-26 RX ORDER — DIPHENHYDRAMINE HYDROCHLORIDE 50 MG/ML
50 INJECTION INTRAMUSCULAR; INTRAVENOUS ONCE AS NEEDED
OUTPATIENT
Start: 2024-09-26

## 2024-09-27 ENCOUNTER — APPOINTMENT (OUTPATIENT)
Dept: CARDIOLOGY | Facility: HOSPITAL | Age: 44
End: 2024-09-27
Payer: COMMERCIAL

## 2024-09-27 LAB — METHYLMALONATE SERPL-SCNC: 0.1 UMOL/L (ref 0–0.4)

## 2024-09-29 LAB
JC VIRUS AB (SJC): POSITIVE
JCV INDEX VALUE (SJC): 2.81

## 2024-09-30 DIAGNOSIS — R93.89 ABNORMAL MRI: Primary | ICD-10-CM

## 2024-09-30 LAB
SARS-COV-2 AB SERPL-IMP: POSITIVE
SARS-COV-2 IGG SERPL IA-ACNC: >800 AU/ML

## 2024-09-30 RX ORDER — PREDNISONE 10 MG/1
TABLET ORAL
Qty: 15 TABLET | Refills: 0 | Status: SHIPPED | OUTPATIENT
Start: 2024-09-30

## 2024-10-01 LAB
AQP4 H2O CHANNEL IGG SERPL QL: NEGATIVE
IMMUNOLOGIST REVIEW: NORMAL
MOG IGG1 SERPL QL FC: NEGATIVE

## 2024-10-06 NOTE — PROGRESS NOTES
Physical Therapy    Discharge Summary    Name: Ally Carpio  MRN: [id number]  : 1980  Date: 24    Discharge Summary: PT    Discharge Information: Date of discharge 24    Therapy Summary: Patient was on hold d/t planned surgery. Patient being discharged d/t 2 months post that planned surgery w/o resuming PT.    Rehab Discharge Reason: Other Unknown status, no further visits scheduled.

## 2024-10-07 ENCOUNTER — INFUSION (OUTPATIENT)
Dept: HEMATOLOGY/ONCOLOGY | Facility: CLINIC | Age: 44
End: 2024-10-07
Payer: COMMERCIAL

## 2024-10-07 VITALS
DIASTOLIC BLOOD PRESSURE: 70 MMHG | OXYGEN SATURATION: 100 % | WEIGHT: 148.92 LBS | HEART RATE: 50 BPM | RESPIRATION RATE: 16 BRPM | BODY MASS INDEX: 24.04 KG/M2 | TEMPERATURE: 97.7 F | SYSTOLIC BLOOD PRESSURE: 110 MMHG

## 2024-10-07 DIAGNOSIS — D50.0 IRON DEFICIENCY ANEMIA DUE TO CHRONIC BLOOD LOSS: ICD-10-CM

## 2024-10-07 DIAGNOSIS — H46.9 OPTIC NEURITIS: ICD-10-CM

## 2024-10-07 PROCEDURE — 2500000004 HC RX 250 GENERAL PHARMACY W/ HCPCS (ALT 636 FOR OP/ED): Performed by: PSYCHIATRY & NEUROLOGY

## 2024-10-07 PROCEDURE — 96365 THER/PROPH/DIAG IV INF INIT: CPT | Mod: INF

## 2024-10-07 RX ORDER — DIPHENHYDRAMINE HYDROCHLORIDE 50 MG/ML
50 INJECTION INTRAMUSCULAR; INTRAVENOUS ONCE AS NEEDED
Status: CANCELLED | OUTPATIENT
Start: 2024-10-08

## 2024-10-07 RX ORDER — EPINEPHRINE 1 MG/ML
0.01 INJECTION INTRAMUSCULAR; INTRAVENOUS; SUBCUTANEOUS ONCE AS NEEDED
Status: DISCONTINUED | OUTPATIENT
Start: 2024-10-07 | End: 2024-10-07 | Stop reason: HOSPADM

## 2024-10-07 RX ORDER — ALBUTEROL SULFATE 0.83 MG/ML
2.5 SOLUTION RESPIRATORY (INHALATION) ONCE AS NEEDED
Status: CANCELLED | OUTPATIENT
Start: 2024-10-08

## 2024-10-07 RX ORDER — ALBUTEROL SULFATE 0.83 MG/ML
2.5 SOLUTION RESPIRATORY (INHALATION) ONCE AS NEEDED
Status: DISCONTINUED | OUTPATIENT
Start: 2024-10-07 | End: 2024-10-07 | Stop reason: HOSPADM

## 2024-10-07 RX ORDER — EPINEPHRINE 1 MG/ML
0.01 INJECTION INTRAMUSCULAR; INTRAVENOUS; SUBCUTANEOUS ONCE AS NEEDED
Status: CANCELLED | OUTPATIENT
Start: 2024-10-08

## 2024-10-07 RX ORDER — DIPHENHYDRAMINE HYDROCHLORIDE 50 MG/ML
50 INJECTION INTRAMUSCULAR; INTRAVENOUS ONCE AS NEEDED
Status: DISCONTINUED | OUTPATIENT
Start: 2024-10-07 | End: 2024-10-07 | Stop reason: HOSPADM

## 2024-10-07 RX ORDER — HEPARIN SODIUM,PORCINE/PF 10 UNIT/ML
50 SYRINGE (ML) INTRAVENOUS AS NEEDED
Status: CANCELLED | OUTPATIENT
Start: 2024-10-07

## 2024-10-07 RX ORDER — HEPARIN 100 UNIT/ML
500 SYRINGE INTRAVENOUS AS NEEDED
Status: CANCELLED | OUTPATIENT
Start: 2024-10-07

## 2024-10-07 ASSESSMENT — PAIN SCALES - GENERAL: PAINLEVEL: 4

## 2024-10-07 NOTE — PROGRESS NOTES
Ally Carpio completed methylprednisone infusion. Experiences some burning w the infusion. IV site with good blood return. Rate slowed then infusion completed without incident.  No questions or concerns at this time.

## 2024-10-08 ENCOUNTER — INFUSION (OUTPATIENT)
Dept: HEMATOLOGY/ONCOLOGY | Facility: CLINIC | Age: 44
End: 2024-10-08
Payer: COMMERCIAL

## 2024-10-08 VITALS
OXYGEN SATURATION: 98 % | TEMPERATURE: 96.8 F | SYSTOLIC BLOOD PRESSURE: 102 MMHG | BODY MASS INDEX: 24.05 KG/M2 | RESPIRATION RATE: 16 BRPM | WEIGHT: 149.03 LBS | HEART RATE: 50 BPM | DIASTOLIC BLOOD PRESSURE: 60 MMHG

## 2024-10-08 DIAGNOSIS — H46.9 OPTIC NEURITIS: ICD-10-CM

## 2024-10-08 PROCEDURE — 2500000004 HC RX 250 GENERAL PHARMACY W/ HCPCS (ALT 636 FOR OP/ED): Performed by: PSYCHIATRY & NEUROLOGY

## 2024-10-08 PROCEDURE — 96365 THER/PROPH/DIAG IV INF INIT: CPT | Mod: INF

## 2024-10-08 RX ORDER — EPINEPHRINE 1 MG/ML
0.01 INJECTION INTRAMUSCULAR; INTRAVENOUS; SUBCUTANEOUS ONCE AS NEEDED
Status: CANCELLED | OUTPATIENT
Start: 2024-10-09

## 2024-10-08 RX ORDER — HEPARIN 100 UNIT/ML
500 SYRINGE INTRAVENOUS AS NEEDED
OUTPATIENT
Start: 2024-10-08

## 2024-10-08 RX ORDER — DIPHENHYDRAMINE HYDROCHLORIDE 50 MG/ML
50 INJECTION INTRAMUSCULAR; INTRAVENOUS ONCE AS NEEDED
Status: CANCELLED | OUTPATIENT
Start: 2024-10-09

## 2024-10-08 RX ORDER — HEPARIN SODIUM,PORCINE/PF 10 UNIT/ML
50 SYRINGE (ML) INTRAVENOUS AS NEEDED
OUTPATIENT
Start: 2024-10-08

## 2024-10-08 RX ORDER — ALBUTEROL SULFATE 0.83 MG/ML
2.5 SOLUTION RESPIRATORY (INHALATION) ONCE AS NEEDED
Status: CANCELLED | OUTPATIENT
Start: 2024-10-09

## 2024-10-08 ASSESSMENT — PAIN SCALES - GENERAL: PAINLEVEL: 4

## 2024-10-08 NOTE — PROGRESS NOTES
Allymarcos Carpio self ambulated to [facility] for methylprednisolone .  Pt tolerated treatment without incident.  Gait steady upon DC home.  Allymarianne Carpio denies further questions/concerns/comments and agrees to POC going forward.

## 2024-10-09 ENCOUNTER — INFUSION (OUTPATIENT)
Dept: HEMATOLOGY/ONCOLOGY | Facility: CLINIC | Age: 44
End: 2024-10-09
Payer: COMMERCIAL

## 2024-10-09 VITALS
DIASTOLIC BLOOD PRESSURE: 66 MMHG | SYSTOLIC BLOOD PRESSURE: 103 MMHG | RESPIRATION RATE: 16 BRPM | BODY MASS INDEX: 24.15 KG/M2 | HEART RATE: 51 BPM | TEMPERATURE: 96.8 F | WEIGHT: 149.6 LBS | OXYGEN SATURATION: 99 %

## 2024-10-09 DIAGNOSIS — H46.9 OPTIC NEURITIS: ICD-10-CM

## 2024-10-09 PROCEDURE — 96365 THER/PROPH/DIAG IV INF INIT: CPT | Mod: INF

## 2024-10-09 PROCEDURE — 2500000004 HC RX 250 GENERAL PHARMACY W/ HCPCS (ALT 636 FOR OP/ED): Performed by: PSYCHIATRY & NEUROLOGY

## 2024-10-09 RX ORDER — DIPHENHYDRAMINE HYDROCHLORIDE 50 MG/ML
50 INJECTION INTRAMUSCULAR; INTRAVENOUS ONCE AS NEEDED
Status: DISCONTINUED | OUTPATIENT
Start: 2024-10-09 | End: 2024-10-09 | Stop reason: HOSPADM

## 2024-10-09 RX ORDER — ALBUTEROL SULFATE 0.83 MG/ML
2.5 SOLUTION RESPIRATORY (INHALATION) ONCE AS NEEDED
Status: DISCONTINUED | OUTPATIENT
Start: 2024-10-09 | End: 2024-10-09 | Stop reason: HOSPADM

## 2024-10-09 RX ORDER — ALBUTEROL SULFATE 0.83 MG/ML
2.5 SOLUTION RESPIRATORY (INHALATION) ONCE AS NEEDED
OUTPATIENT
Start: 2024-10-09

## 2024-10-09 RX ORDER — EPINEPHRINE 1 MG/ML
0.01 INJECTION INTRAMUSCULAR; INTRAVENOUS; SUBCUTANEOUS ONCE AS NEEDED
OUTPATIENT
Start: 2024-10-09

## 2024-10-09 RX ORDER — EPINEPHRINE 1 MG/ML
0.01 INJECTION INTRAMUSCULAR; INTRAVENOUS; SUBCUTANEOUS ONCE AS NEEDED
Status: DISCONTINUED | OUTPATIENT
Start: 2024-10-09 | End: 2024-10-09 | Stop reason: HOSPADM

## 2024-10-09 RX ORDER — DIPHENHYDRAMINE HYDROCHLORIDE 50 MG/ML
50 INJECTION INTRAMUSCULAR; INTRAVENOUS ONCE AS NEEDED
OUTPATIENT
Start: 2024-10-09

## 2024-10-09 ASSESSMENT — PAIN SCALES - GENERAL: PAINLEVEL: 4

## 2024-10-11 ENCOUNTER — TELEPHONE (OUTPATIENT)
Dept: NEUROLOGY | Facility: CLINIC | Age: 44
End: 2024-10-11
Payer: COMMERCIAL

## 2024-10-11 NOTE — TELEPHONE ENCOUNTER
Ally Carpio called. She had her infusion yesterday and today she feels weak and tired. She would like to know if this is expected or what she should do he resolve this.  [phone] (home)

## 2024-10-11 NOTE — TELEPHONE ENCOUNTER
Spoke with Ally and provided counseling.    Nilton Silva, PharmD, BCPS, MSCS  Clinical Pharmacy Specialist- Neurology/MS  [facility]  Phone: [phone]  Fax: [phone]  10/11/24  2:37 PM

## 2024-10-14 ENCOUNTER — EVALUATION (OUTPATIENT)
Dept: PHYSICAL THERAPY | Facility: HOSPITAL | Age: 44
End: 2024-10-14
Payer: COMMERCIAL

## 2024-10-14 DIAGNOSIS — G47.00 INSOMNIA, UNSPECIFIED TYPE: ICD-10-CM

## 2024-10-14 DIAGNOSIS — G37.9 CNS DEMYELINATION (MULTI): Primary | ICD-10-CM

## 2024-10-14 PROCEDURE — 97163 PT EVAL HIGH COMPLEX 45 MIN: CPT | Mod: GP

## 2024-10-14 PROCEDURE — 97110 THERAPEUTIC EXERCISES: CPT | Mod: GP

## 2024-10-14 RX ORDER — HYDROXYZINE HYDROCHLORIDE 10 MG/1
10-20 TABLET, FILM COATED ORAL NIGHTLY PRN
Qty: 90 TABLET | Refills: 0 | Status: SHIPPED | OUTPATIENT
Start: 2024-10-14 | End: 2024-11-13

## 2024-10-14 SDOH — ECONOMIC STABILITY: FOOD INSECURITY: WITHIN THE PAST 12 MONTHS, YOU WORRIED THAT YOUR FOOD WOULD RUN OUT BEFORE YOU GOT MONEY TO BUY MORE.: NEVER TRUE

## 2024-10-14 SDOH — ECONOMIC STABILITY: FOOD INSECURITY: WITHIN THE PAST 12 MONTHS, THE FOOD YOU BOUGHT JUST DIDN'T LAST AND YOU DIDN'T HAVE MONEY TO GET MORE.: NEVER TRUE

## 2024-10-14 ASSESSMENT — PAIN DESCRIPTION - DESCRIPTORS: DESCRIPTORS: ACHING;THROBBING;TIGHTNESS

## 2024-10-14 ASSESSMENT — ENCOUNTER SYMPTOMS
LOSS OF SENSATION IN FEET: 1
OCCASIONAL FEELINGS OF UNSTEADINESS: 1
DEPRESSION: 0

## 2024-10-14 ASSESSMENT — PAIN - FUNCTIONAL ASSESSMENT: PAIN_FUNCTIONAL_ASSESSMENT: 0-10

## 2024-10-14 ASSESSMENT — PAIN SCALES - GENERAL: PAINLEVEL_OUTOF10: 4

## 2024-10-14 NOTE — PROGRESS NOTES
Physical Therapy Evaluation    Patient Name: Ally Carpio  MRN: [id number]  : 1980   Today's Date: 10/14/2024  Time Calculation  Start Time: 840  Stop Time: 938  Time Calculation (min): 58 min  PT Evaluation Time Entry  PT Evaluation (Complex) Time Entry: 50  PT Therapeutic Procedures Time Entry  Therapeutic Exercise Time Entry: 8     Visit #    1    Current Problem  1. CNS demyelination (Multi)  PT eval and treat    Follow Up In Physical Therapy          Subjective   General:  General  Referred By: Dr Holt  Past Medical History Relevant to Rehab: MS, c  Pt is a 42 yo female with insidious onset of extremity weakness and fatigue. Weakness started on the left and as progressed to increased weakness in bilateral extremities, states that has been working on getting diagnosis since march.  Pt underwent C6-C7 Cervical Spine Arthroplasty Anterior Approach on 24 despite not feeling that this was causing her weakness and no change in her symptoms following surgery.  Pt states that all testing is indicating MS but awaiting final testing to solidify diagnosis.  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  Increased difficulty getting up stairs, difficulty thinking and concentrating due to brain fog, face goes numb intermittently, feels tension in head.  Pt reporting improvement in pain since starting Lyrica, stating that the Gabapentin just made her sleepy. Gets LE pain with prolonged sitting, feels like muscles are all tightening up.    Precautions:  Precautions  STEADI Fall Risk Score (The score of 4 or more indicates an increased risk of falling): 9    Pain:  Pain Assessment: 0-10  0-10 (Numeric) Pain Score: 4  Pain Type: Neuropathic pain  Pain Descriptors: Aching, Throbbing, Tightness  Pain Frequency: Constant/continuous  Multiple Pain Sites: Two  Current: 4/10  Best: 2/10  Worst: 6/10    Home Living:  Lives in  a 2 story home, 2 steps to enter from garage, 2nd floor BR, first floor 1/2 bath, has to go to the basement to let the dogs out. Has several older children that assist her with home maintenance    Prior Function Per Pt/Caregiver Report:   Independent with ADL's and ambulation    Objective   Sensation:   Decreased sensation to light touch in left L2 through L5      Strength:   Measured with seated MMT'ing, grossly WNL's with exception of those listed below      Right Left    Hip:      Flexion 2/5 2/5 Tremors with all hip MMT   Abduction 3+/5 3+/5    Adduction 4-/5 4-/5          Knee:      Flexion 3/5 3/5    Extension 2/5 2/5          Ankle:      DF Unable to lift off floor  -once assisted into DF can hold and perform  controlled eccentric  movement Unable to lift  Able to assist with DF  , difficulty holding in DF and  decreased control with eccentric  movement     PF            Gait:   Pt ambulates with modified independence without Assistive Device, decrease pace and step length, increased left hip flexion with decreased ankle DF     Outcome Measures:  [place] Balance Scale  1. Sitting to Standing: Able to stand independently using hands  2. Standing Unsupported: Able to stand safely for 2 minutes  3. Sitting with Back Unsupported but Feet Supported on Floor or on a Stool: Able to sit safely and securely for 2 minutes  4. Standing to Sitting: Sits safely with minimal use of hands  5.  Transfers: Able to transfer safely definite need of hands  6. Standing Unsupported with Eyes Closed: Able to stand 3 seconds  7. Standing Unsupported with Feet Together: Able to place feet together independently but unable to hold for 30 seconds  8. Reach Forward with Outstretched Arm While Standing: Can reach forward 5 cm (2 inches)  9.  Object from Floor from a Standing Position: Unable to  but reaches 2-5 cm (1-2 inches) from slipper and keeps balance independently  10. Turning to Look Behind Over Left and Right  Shoulders While Standing: Looks behind one side only other side shows less weight shift  11. Turn 360 Degrees: Able to turn 360 degrees safely but slowly  12. Place Alternate Foot on Step or Stool While Standing Unsupported: Able to complete greater than 2 steps needs minimal assist  13. Standing Unsupported One Foot in Front: Able to take small step independently and hold 30 seconds  14. Standing on One Leg: Tries to lift leg unable to hold 3 seconds but remains standing independently  Merino Balance Score: 35   Treatment:                                         Date: 10/14                                           VISIT# #1 #   EXERCISES Eval         NuStep (w/ CP)          RB Calf Str          Hip Flexion     Hip Extension     Hip Abduction     Hip Adduction                         NMES               HEP        OP EDUCATION:  Outpatient Education  Individual(s) Educated: Patient  Education Provided: POC, Physiology, Home Safety  Risk and Benefits Discussed with Patient/Caregiver/Other: yes  Patient/Caregiver Demonstrated Understanding: yes  Plan of Care Discussed and Agreed Upon: yes  Patient Response to Education: Patient/Caregiver Verbalized Understanding of Information, Patient/Caregiver Performed Return Demonstration of Exercises/Activities, Patient/Caregiver Asked Appropriate Questions  Assessment   PT Assessment  PT Assessment Results: Decreased strength, Decreased range of motion, Decreased endurance, Impaired balance, Decreased mobility, Decreased coordination, Impaired sensation, Pain  Rehab Prognosis: Good      Plan  Treatment/Interventions: Aquatic therapy, Education/ Instruction, Gait training, Neuromuscular re-education, Therapeutic activities, Therapeutic exercises  PT Plan: Skilled PT  PT Frequency: 2 times per week  Duration: 6-8 weeks  Rehab Potential: Good  Plan of Care Agreement: Patient    Goals:  Active       CNS Demyelination       1. Assess for and discuss bracing options- AFO vs x-strap.   Request Rx for appropriate bracing       Start:  10/14/24    Expected End:  10/30/24            2. Educate pt on FES devices and contact information so she can set up trial if interested.       Start:  10/14/24    Expected End:  10/30/24            3.  Pt will demonstrate independence with HEP to reinforce gains made in treatment        Start:  10/14/24    Expected End:  11/13/24            4. Pt will have an increase in LE strength by 1/3 MMT grade to increase safety and mobility       Start:  10/14/24    Expected End:  11/27/24            5. Pt will have an increase in Merino Balance score to 40/56 for decreased risk for falls        Start:  10/14/24    Expected End:  11/27/24

## 2024-10-14 NOTE — LETTER
October 16, 2024    Tory Barkley, PT  [address] [facility]  formerly [facility] [address]    Patient: Ally Carpio   YOB: 1980   Date of Visit: 10/14/2024       Dear Paul Holt MD  [address]  [place] [address]    The attached plan of care is being sent to you because your patient’s medical reimbursement requires that you certify the plan of care. Your signature is required to allow uninterrupted insurance coverage.      You may indicate your approval by signing below and faxing this form back to us at Dept Fax: [phone].    Please call Dept: [phone] with any questions or concerns.    Thank you for this referral,        Tory Barkley, PT  [place] [id number] [facility] [id number] [phone]    Payer: Payor: MARY / Plan: ANTHEM HMP / Product Type: *No Product type* /                                                                         Date:     Dear Tory Barkley, PT,     Re: Ms. Ally Carpio, MRN:[id number]    I certify that I have reviewed the attached plan of care and it is medically necessary for Ms. Ally Carpio (1980) who is under my care.          ______________________________________                    _________________  Provider name and credentials                                           Date and time                                                                                           Plan of Care 10/14/24   Effective from: 10/14/2024  Effective to: 1/12/2025    Active  Plan ID: [id number]           Participants as of 10/16/2024    Name Type Comments Contact Info    Paul Holt MD Referring Provider  [phone]    Tory Barkley, PT Physical Therapist  [phone]      Last Plan Note     Author: Tory Barkley PT Status: Incomplete Last edited: 10/14/2024  8:45 AM         Physical Therapy Evaluation    Patient Name: Ally HOLLOWAY  Shirin  MRN: [id number]  : 1980   Today's Date: 10/14/2024  Time Calculation  Start Time: 0840  Stop Time: 0938  Time Calculation (min): 58 min  PT Evaluation Time Entry  PT Evaluation (Complex) Time Entry: 50  PT Therapeutic Procedures Time Entry  Therapeutic Exercise Time Entry: 8     Visit #    1    Current Problem  1. CNS demyelination (Multi)  PT eval and treat    Follow Up In Physical Therapy          Subjective  General:  General  Referred By: Dr Holt  Past Medical History Relevant to Rehab: MS, c  Pt is a 44 yo female with insidious onset of extremity weakness and fatigue. Weakness started on the left and as progressed to increased weakness in bilateral extremities, states that has been working on getting diagnosis since march.  Pt underwent C6-C7 Cervical Spine Arthroplasty Anterior Approach on 24 despite not feeling that this was causing her weakness and no change in her symptoms following surgery.  Pt states that all testing is indicating MS but awaiting final testing to solidify diagnosis.  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  Increased difficulty getting up stairs, difficulty thinking and concentrating due to brain fog, face goes numb intermittently, feels tension in head.  Pt reporting improvement in pain since starting Lyrica, stating that the Gabapentin just made her sleepy. Gets LE pain with prolonged sitting, feels like muscles are all tightening up.    Precautions:  Precautions  STEADI Fall Risk Score (The score of 4 or more indicates an increased risk of falling): 9    Pain:  Pain Assessment: 0-10  0-10 (Numeric) Pain Score: 4  Pain Type: Neuropathic pain  Pain Descriptors: Aching, Throbbing, Tightness  Pain Frequency: Constant/continuous  Multiple Pain Sites: Two  Current: 4/10  Best: 2/10  Worst: 6/10    Home Living:  Lives in a 2 story home, 2 steps to enter from garage, 2nd floor BR,  first floor 1/2 bath, has to go to the basement to let the dogs out. Has several older children that assist her with home maintenance    Prior Function Per Pt/Caregiver Report:   Independent with ADL's and ambulation    Objective  Sensation:   Decreased sensation to light touch in left L2 through L5      Strength:   Measured with seated MMT'ing, grossly WNL's with exception of those listed below      Right Left    Hip:      Flexion 2/5 2/5 Tremors with all hip MMT   Abduction 3+/5 3+/5    Adduction 4-/5 4-/5          Knee:      Flexion 3/5 3/5    Extension 2/5 2/5          Ankle:      DF Unable to lift off floor  -once assisted into DF can hold and perform  controlled eccentric  movement Unable to lift  Able to assist with DF  , difficulty holding in DF and  decreased control with eccentric  movement     PF            Gait:   Pt ambulates with modified independence without Assistive Device, decrease pace and step length, increased left hip flexion with decreased ankle DF     Outcome Measures:  Merino Balance Scale  1. Sitting to Standing: Able to stand independently using hands  2. Standing Unsupported: Able to stand safely for 2 minutes  3. Sitting with Back Unsupported but Feet Supported on Floor or on a Stool: Able to sit safely and securely for 2 minutes  4. Standing to Sitting: Sits safely with minimal use of hands  5.  Transfers: Able to transfer safely definite need of hands  6. Standing Unsupported with Eyes Closed: Able to stand 3 seconds  7. Standing Unsupported with Feet Together: Able to place feet together independently but unable to hold for 30 seconds  8. Reach Forward with Outstretched Arm While Standing: Can reach forward 5 cm (2 inches)  9.  Object from Floor from a Standing Position: Unable to  but reaches 2-5 cm (1-2 inches) from slipper and keeps balance independently  10. Turning to Look Behind Over Left and Right Shoulders While Standing: Looks behind one side only other side shows  less weight shift  11. Turn 360 Degrees: Able to turn 360 degrees safely but slowly  12. Place Alternate Foot on Step or Stool While Standing Unsupported: Able to complete greater than 2 steps needs minimal assist  13. Standing Unsupported One Foot in Front: Able to take small step independently and hold 30 seconds  14. Standing on One Leg: Tries to lift leg unable to hold 3 seconds but remains standing independently  Merino Balance Score: 35   Treatment:                                         Date: 10/14                                           VISIT# #1 #   EXERCISES Eval         NuStep (w/ CP)          RB Calf Str          Hip Flexion     Hip Extension     Hip Abduction     Hip Adduction                         NMES               HEP        OP EDUCATION:  Outpatient Education  Individual(s) Educated: Patient  Education Provided: POC, Physiology, Home Safety  Risk and Benefits Discussed with Patient/Caregiver/Other: yes  Patient/Caregiver Demonstrated Understanding: yes  Plan of Care Discussed and Agreed Upon: yes  Patient Response to Education: Patient/Caregiver Verbalized Understanding of Information, Patient/Caregiver Performed Return Demonstration of Exercises/Activities, Patient/Caregiver Asked Appropriate Questions  Assessment   PT Assessment  PT Assessment Results: Decreased strength, Decreased range of motion, Decreased endurance, Impaired balance, Decreased mobility, Decreased coordination, Impaired sensation, Pain  Rehab Prognosis: Good      Plan  Treatment/Interventions: Aquatic therapy, Education/ Instruction, Gait training, Neuromuscular re-education, Therapeutic activities, Therapeutic exercises  PT Plan: Skilled PT  PT Frequency: 2 times per week  Duration: 6-8 weeks  Rehab Potential: Good  Plan of Care Agreement: Patient    Goals:  Active       CNS Demyelination       1. Assess for and discuss bracing options- AFO vs x-strap.  Request Rx for appropriate bracing       Start:  10/14/24    Expected  End:  10/30/24            2. Educate pt on FES devices and contact information so she can set up trial if interested.       Start:  10/14/24    Expected End:  10/30/24            3.  Pt will demonstrate independence with HEP to reinforce gains made in treatment        Start:  10/14/24    Expected End:  11/13/24            4. Pt will have an increase in LE strength by 1/3 MMT grade to increase safety and mobility       Start:  10/14/24    Expected End:  11/27/24            5. Pt will have an increase in Merino Balance score to 40/56 for decreased risk for falls        Start:  10/14/24    Expected End:  11/27/24

## 2024-10-14 NOTE — TELEPHONE ENCOUNTER
Portal message from pt,    Hello, I was wondering if I can get a refill on the hydrozyzine it really helps with sleep. The Prozac is making my mood flat I don’t feel happy or sad so not sure if it needs upped?   Thank you   Ally THOMAS 12/10/24  Pt compliant  Ok for RF?  Please advise. Thanks. BELLA

## 2024-10-15 ENCOUNTER — TELEPHONE (OUTPATIENT)
Dept: NEUROLOGY | Facility: CLINIC | Age: 44
End: 2024-10-15
Payer: COMMERCIAL

## 2024-10-15 ENCOUNTER — OFFICE VISIT (OUTPATIENT)
Dept: NEUROLOGY | Facility: HOSPITAL | Age: 44
End: 2024-10-15
Payer: COMMERCIAL

## 2024-10-15 VITALS
WEIGHT: 149 LBS | HEIGHT: 66 IN | SYSTOLIC BLOOD PRESSURE: 117 MMHG | BODY MASS INDEX: 23.95 KG/M2 | DIASTOLIC BLOOD PRESSURE: 63 MMHG | HEART RATE: 57 BPM

## 2024-10-15 DIAGNOSIS — G37.9 CNS DEMYELINATION (MULTI): Primary | ICD-10-CM

## 2024-10-15 PROCEDURE — 3008F BODY MASS INDEX DOCD: CPT | Performed by: PSYCHIATRY & NEUROLOGY

## 2024-10-15 PROCEDURE — 99215 OFFICE O/P EST HI 40 MIN: CPT | Performed by: PSYCHIATRY & NEUROLOGY

## 2024-10-15 NOTE — PROGRESS NOTES
Subjective     Ally Carpio is a 43 y.o. right handed female with hx of UC recently diagnosed in 1/2024 currently on adalimumab since late April of 2024, psoriasis since 2023 (previously on topical treatment only), lTP since November of 2023 (never required treatment), chronic menorrhagia, iron deficiency anemia, RLS since 2014, prior smoking (quit in 2017 smoked 1 ppd for 12 years before that), and hypothyroidism who is referred by Dr. Mckeon for evaluation.       History of Present Illness   [facility] NEURO IMMUNOLOGY HPI: Date of Onset: 2/2024  Date of Diagnosis: diagnosis not finalized yet  Last MRI Brain: 9/2024  Last MRI Cervical: 9/2024  Last MRI Thoracis: 9/2024  Last OCT/VEP: not done  Last CBC: NA  Disease Course at Onset: RR  Disease Course Now: RR  Current Disease Modifying Therapies: None  Previous Disease Modifying Therapies: prednisone  Cerebrospinal Fluid: not done  Gary Alamo Virus: Positive 9/2024, index 2.8  Varicella Zoster Virus: Positive 9/2024  Hep Panel: negative  Neuromyelitis Optica: negative 9/2024  Myelin Oligodendrocyte Glycoprotein: negative 9/2024    HPI  Interval hx:  She tested negative for MOG, AQP4, and HLAB51. CT chest negative for sarcoidosis. MRI thoracic did not show any lesions. It did show central disc prolapse with canal stenosis but without cord changes. MRI orbit showed questionable small area of enhancement in the retrobulbar left optic nerve that was seen on coronal but not axial post-CHRISTINA images. I arranged for outpatient solumedrol for her to see if it will help. Her vision and headache improved only for one days after solumedrol and then she became even worse after completing the three days with more headache and more fatigue.     MS Symptom Review: Weakness: yes left then right leg  Sensory Changes: yes presenting symptom in the whole body   Incoordination: yes both hands  Falling: once  Gait Change: limping  Painful Vision Loss: bilateral R>L visual blurring  "with pain + floaters in the right eye  Double Vision: No double vision   Vertigo: woozy and nauseous  Facial/Bulvar Weakness: No facial/bulvar weakness  Bladder/Bowel Dysfunction: yes transient frequency, urgency, and occasional incontinence  Spasticity: No spasticity  Tonic Spasms: yes isometric painful spasms in all limbs and the right part of the trunk below the ribcage  Tremors: No tremors  Restless Leg Syndrome: yes for at least ten years both legs related to iron deficiency   Dystonia: dystonic flexion of the toes and twitching of the right thumb  Other Movement Disorders: twitches allover the body  Heat Sensitivity: heat makes her tingling and nausea worse but makes her muscles feel better  Fatigue: yes  Depression: depressed mood because of all the symptoms  Anxiety: yes but better  Cognitive Changes: word finding difficulty   Disease Modifying Therapies: No Disease modifying therapies   Side Effects: No side effects  Is the patient taking Vitamin D supplements? no  Is the patient taking Symptomatic medications? Pregabalin,        Past Surgical History:   Procedure Laterality Date     SECTION, LOW TRANSVERSE      x 2, .     COLONOSCOPY      DILATION AND CURETTAGE OF UTERUS      UPPER GASTROINTESTINAL ENDOSCOPY       Social History     Tobacco Use    Smoking status: Former     Current packs/day: 0.00     Average packs/day: 1 pack/day for 12.5 years (12.5 ttl pk-yrs)     Types: Cigarettes     Start date: 2004     Quit date: 2017     Years since quittin.7    Smokeless tobacco: Never   Substance Use Topics    Alcohol use: Not Currently     Allergies   Allergen Reactions    Balsalazide Other     Mouth ulcers    Mesalamine Rash     Visit Vitals  /63   Pulse 57   Ht 1.676 m (5' 6\")   Wt 67.6 kg (149 lb)   LMP 2024   BMI 24.05 kg/m²   OB Status Having periods   Smoking Status Former   BSA 1.77 m²       Objective   Neurological Exam  Mental Status  Awake, alert and oriented to " person, place and time. Recent and remote memory are intact. Speech is normal. Language is fluent with no aphasia. Attention and concentration are normal.    Cranial Nerves  CN II: Visual fields full to confrontation.  CN III, IV, VI: Extraocular movements intact bilaterally.   Right pupil: 2 mm.   Left pupil: 4 mm.  CN V: Facial sensation is normal.  CN VII: Full and symmetric facial movement.  CN VIII: Hearing is normal.  CN IX, X: Palate elevates symmetrically. Normal gag reflex.  CN XI: Shoulder shrug strength is normal.  CN XII: Tongue midline without atrophy or fasciculations.    Motor  Normal muscle bulk throughout. No abnormal involuntary movements.  4/5 BLE most pronounced distally.    Sensory  Right hemisensory level T8.    Reflexes                                            Right                      Left  Brachioradialis                    2+                         2+  Biceps                                 2+                         2+  Triceps                                2+                          Patellar                                3+                         3+  Achilles                                3+                         3+    Right pathological reflexes: Ankle clonus absent.  Left pathological reflexes: Ankle clonus absent.    Coordination  Right: Finger-to-nose normal. Rapid alternating movement normal.Left: Finger-to-nose normal. Rapid alternating movement normal.    Gait    High steppage gait wide-based gait. .    Physical Exam  Eyes:      Extraocular Movements: Extraocular movements intact.   Neurological:      Deep Tendon Reflexes:      Reflex Scores:       Tricep reflexes are 2+ on the right side.       Bicep reflexes are 2+ on the right side and 2+ on the left side.       Brachioradialis reflexes are 2+ on the right side and 2+ on the left side.       Patellar reflexes are 3+ on the right side and 3+ on the left side.       Achilles reflexes are 3+ on the right side and 3+ on  the left side.  Psychiatric:         Speech: Speech normal.         CLINICAL SCORES  Scales and Scores:      Assessment/Plan     This is a 43 y.o. year old right handed female  With a PMH of UC recently diagnosed in 1/2024 currently on adalimumab since late April of 2024, psoriasis since 2023 (previously on topical treatment only), lTP since November of 2023 (never required treatment), chronic menorrhagia, iron deficiency anemia, RLS since 2014, prior smoking (quit in 2017 smoked 1 ppd for 12 years before that), and hypothyroidism who presents with several sequential neurological symptoms since February of 2024 including paraesthesia of all limbs, trunk, and face, LLE then RLE weakness, transient bladder dysfunction, painful isometric tonic spasms in all limbs and truncal (below the right costal margin), wooziness, nausea, brain fogginess, headaches, and most recently bilateral visual blurring with right eye pain. Symptoms started as she was weaning down prednisone for UC. Symptoms improved only partially after cervical decompression for disc prolapse but then continued to worsen especially after she started adalimumab for UC.  The Neurological Exam showed unequal L>R pupils, mild BLE weakness distal > proximal, right hemisensory level T8, and high-steppage wide-based gait.   The MRI brain showed a few non-enhancing large demyelinating-like lesions in periventricular, subcortical, and juxtacortical locations stable in September compared to March 2024. MRI cervical spine without cord lesions in April and September 2024 but did show central disc prolapse with canal stenosis that was released. MRI thoracic spine in April did not show cord lesions either. EMG normal.   Cerebrospinal Fluid not done  OCT/VEP not done  MS Mimics ruled out including negative MOG, AQP4, HLAB51, and CT chest.   The overall picture is suspicious for a systemic autoimmune disease mimicking ITP, UC, psoriasis, and MS since all four conditions  occurred within nearly a one-year-period. She admits to painful mouth ulcers so Behchet's is on the top of the differential. Alternatively, she may have multiple coexisting autoimmune disorders. Her neurological presentation is highly suspicious for myelitis (acute onset while tapering down prednisone, MS juanito, tonic spasms, brain demyelinating-like lesion, worsening with humira, etc.) but the scans have been negative. Therefore, causes of MRI negative myelitis should be considered especially MOGAD but she tested negative for the antibody. She may also simply have coexisting MS or double seronegative NMOSD. An FND with incidental RIS is also possible.   Disease Modifying Therapies: she should stop adalimumab and consider switching to an agent that would work for both UC and MS like zeposia once mimickers are ruled out. Tysabri is relatively contraindicated given JCV IGG positivity.     10/15/2024: She tested negative for MOG, AQP4, and HLAB51. CT chest negative for sarcoidosis. MRI thoracic did not show any lesions. It did show central disc prolapse with canal stenosis but without cord changes. MRI orbit showed questionable small area of enhancement in the retrobulbar left optic nerve that was seen on coronal but not axial post-CHRISTINA images. I arranged for outpatient solumedrol for her to see if it will help. Her vision and headache improved only for one days after solumedrol and then she became even worse after completing the three days with more headache and more fatigue. Reports asymmetric smile and facial weakness but no evidence of facial weakness on exam. The diagnosis remains unclear. Her spine MRIs repeatedly failed to show a lesion that would be symptomatic and explain her symptoms. Her brain lesions are all in locations expected to be clinically silent. Her orbital findings are questionable and could be artifactual especially with the unusual steroid response. I will plan to repeat her brain MRI in March to  monitor for any new lesions and will also perform OCT at that time to see if she will develop left RNFL thinning in the post-acute phase. If the diagnosis remains unclear, we may need to do an LP for OCBs. In any case, in the presence of brain demyelination (whether symptomatic or not), it is best to avoid TNFAIs and start zeposia instead for her UC.     PLAN:  You tested negative for MOGAD, NMOSD, sarcoidosis, and markers of Behcet's disease.     Your MRI of the thoracic spine did not show any MS lesions in the spinal cord.     Your MRI of the orbit showed questionable inflammation in the left optic nerve but you only had short lived improvement with steroids. I plan to do an eye test on you the next visit to see if the optic disc on the left will become thinner over time as expected after optic neuritis attacks.     It remains unclear whether you have MS or not. I will repeat your brain MRI again in March of 2025, which together with the eye test can help in determining whether you have MS or not. If the picture remains unclear, we may need to schedule you for a spinal tap.     I recommend that you start zeposia for your UC, which can also help control MS.     Please continue the same dose of vitamin D for now.     Follow up in March after repeat brain MRI.     The impression and plan were discussed with the patient and family (if present) in details and all questions answered.      Paul Holt MD]

## 2024-10-15 NOTE — PATIENT INSTRUCTIONS
You tested negative for MOGAD, NMOSD, sarcoidosis, and markers of Behcet's disease.     Your MRI of the thoracic spine did not show any MS lesions in the spinal cord.     Your MRI of the orbit showed questionable inflammation in the left optic nerve but you only had short lived improvement with steroids. I plan to do an eye test on you the next visit to see if the optic disc on the left will become thinner over time as expected after optic neuritis attacks.     It remains unclear whether you have MS or not. I will repeat your brain MRI again in March of 2025, which together with the eye test can help in determining whether you have MS or not. If the picture remains unclear, we may need to schedule you for a spinal tap.     I recommend that you start zeposia for your UC, which can also help control MS.     Please continue the same dose of vitamin D for now.     Follow up in March after repeat brain MRI.     Thank you for visiting the clinic today    Paul Holt MD

## 2024-10-15 NOTE — TELEPHONE ENCOUNTER
Ally Carpio called. She is having LEFT sided facial weakness, left sided headache Started on Monday 10/14/24 ( new symptoms) and continued general fatigue & weakness. She finished her prednisone taper yesterday. Please advise. [phone] (home)

## 2024-10-16 ASSESSMENT — BALANCE ASSESSMENTS
STANDING UNSUPPORTED ONE FOOT IN FRONT: ABLE TO TAKE SMALL STEP INDEPENDENTLY AND HOLD 30 SECONDS
PICK UP OBJECT FROM THE FLOOR FROM A STANDING POSITION: UNABLE TO PICK UP BUT REACHES 2-5 CM (1-2 INCHES) FROM SLIPPER AND KEEPS BALANCE INDEPENDENTLY
PLACE ALTERNATE FOOT ON STEP OR STOOL WHILE STANDING UNSUPPORTED: LOOKS BEHIND ONE SIDE ONLY OTHER SIDE SHOWS LESS WEIGHT SHIFT
STANDING TO SITTING: ABLE TO STAND INDEPENDENTLY USING HANDS
STANDING TO SITTING: SITS SAFELY WITH MINIMAL USE OF HANDS
PLACE ALTERNATE FOOT ON STEP OR STOOL WHILE STANDING UNSUPPORTED: ABLE TO COMPLETE GREATER THAN 2 STEPS NEEDS MINIMAL ASSIST
REACHING FORWARD WITH OUTSTRETCHED ARM WHILE STANDING: CAN REACH FORWARD 5 CM (2 INCHES)
LONG VERSION TOTAL SCORE (MAX 56): 35
STANDING UNSUPPORTED WITH EYES CLOSED: ABLE TO STAND 3 SECONDS
SITTING WITH BACK UNSUPPORTED BUT FEET SUPPORTED ON FLOOR OR ON A STOOL: ABLE TO SIT SAFELY AND SECURELY FOR 2 MINUTES
TURN 360 DEGREES: ABLE TO TURN 360 DEGREES SAFELY BUT SLOWLY
STANDING UNSUPPORTED: ABLE TO STAND SAFELY FOR 2 MINUTES
STANDING UNSUPPORTED WITH FEET TOGETHER: ABLE TO PLACE FEET TOGETHER INDEPENDENTLY BUT UNABLE TO HOLD FOR 30 SECONDS
STANDING ON ONE LEG: TRIES TO LIFT LEG UNABLE TO HOLD 3 SECONDS BUT REMAINS STANDING INDEPENDENTLY
TRANSFERS: ABLE TO TRANSFER SAFELY DEFINITE NEED OF HANDS

## 2024-10-21 DIAGNOSIS — E61.1 IRON DEFICIENCY: Primary | ICD-10-CM

## 2024-10-23 DIAGNOSIS — N92.0 MENORRHAGIA WITH REGULAR CYCLE: Primary | ICD-10-CM

## 2024-10-24 ENCOUNTER — TREATMENT (OUTPATIENT)
Dept: PHYSICAL THERAPY | Facility: HOSPITAL | Age: 44
End: 2024-10-24
Payer: COMMERCIAL

## 2024-10-24 ENCOUNTER — APPOINTMENT (OUTPATIENT)
Dept: NEUROLOGY | Facility: CLINIC | Age: 44
End: 2024-10-24
Payer: COMMERCIAL

## 2024-10-24 DIAGNOSIS — G37.9 CNS DEMYELINATION (MULTI): Primary | ICD-10-CM

## 2024-10-24 DIAGNOSIS — Z01.818 PREOPERATIVE EXAM FOR GYNECOLOGIC SURGERY: Primary | ICD-10-CM

## 2024-10-24 PROCEDURE — 97110 THERAPEUTIC EXERCISES: CPT | Mod: GP,CQ

## 2024-10-24 ASSESSMENT — PAIN SCALES - GENERAL: PAINLEVEL_OUTOF10: 3

## 2024-10-24 ASSESSMENT — PAIN - FUNCTIONAL ASSESSMENT: PAIN_FUNCTIONAL_ASSESSMENT: 0-10

## 2024-10-24 NOTE — PROGRESS NOTES
"Physical Therapy    Physical Therapy Treatment    Patient Name: Ally Carpio  MRN: [id number]  : 1980  Today's Date: 10/24/2024  Time Calculation  Start Time: 20  Stop Time: 0800  Time Calculation (min): 40 min    PT Therapeutic Procedures Time Entry  Therapeutic Exercise Time Entry: 40          VISIT:# 2    Current Problem  Problem List Items Addressed This Visit    None  Visit Diagnoses         Codes    CNS demyelination (Multi)    -  Primary G37.9             Subjective   No falls reported since last visit. Patient is independent with current HEP.  Pt reports L leg is weaker.       Precautions  Precautions  STEADI Fall Risk Score (The score of 4 or more indicates an increased risk of falling): 9  Precautions Comment: none       Pain  Pain Assessment: 0-10  0-10 (Numeric) Pain Score: 3  Pain Type: Neuropathic pain  Pain Location:  (leg/head)       Objective       Started land based therapy         Treatments:                                         Date: 10/14 10/24/2024                                           VISIT# #1 #2   EXERCISES Eval         NuStep (w/ CP)  10' @L2        RB Calf Str  30\" x 3   Hs str  30\" x 3          Chuck LE   Hip Flexion standing  10x   Hip Extension  standing   10x   Hip Abduction  standing  10x   Hs curls standing   10x   Hip Adduction seated   10x   HR   10x                   NMES               HEP         Assessment:   Pt is fatigued after therapy.  Will need to be cautious of exercises. POC was discussed with PT Nanette       Plan: Cont to progress strength as pt is able to tolerate treatment.    OP PT Plan  Treatment/Interventions: Aquatic therapy, Education/ Instruction, Gait training, Neuromuscular re-education, Therapeutic activities, Therapeutic exercises  PT Plan: Skilled PT  PT Frequency: 2 times per week  Duration: 6-8 weeks  Rehab Potential: Good  Plan of Care Agreement: Patient    Goals:  Active       CNS Demyelination       1. Assess for and discuss bracing " options- AFO vs x-strap.  Request Rx for appropriate bracing       Start:  10/14/24    Expected End:  10/30/24            2. Educate pt on FES devices and contact information so she can set up trial if interested.       Start:  10/14/24    Expected End:  10/30/24            3.  Pt will demonstrate independence with HEP to reinforce gains made in treatment        Start:  10/14/24    Expected End:  11/13/24            4. Pt will have an increase in LE strength by 1/3 MMT grade to increase safety and mobility       Start:  10/14/24    Expected End:  11/27/24            5. Pt will have an increase in Merino Balance score to 40/56 for decreased risk for falls        Start:  10/14/24    Expected End:  11/27/24

## 2024-10-28 ENCOUNTER — APPOINTMENT (OUTPATIENT)
Dept: PHYSICAL THERAPY | Facility: HOSPITAL | Age: 44
End: 2024-10-28
Payer: COMMERCIAL

## 2024-10-29 ENCOUNTER — APPOINTMENT (OUTPATIENT)
Dept: NEUROLOGY | Facility: HOSPITAL | Age: 44
End: 2024-10-29
Payer: COMMERCIAL

## 2024-10-29 ENCOUNTER — TREATMENT (OUTPATIENT)
Dept: PHYSICAL THERAPY | Facility: HOSPITAL | Age: 44
End: 2024-10-29
Payer: COMMERCIAL

## 2024-10-29 DIAGNOSIS — G37.9 CNS DEMYELINATION (MULTI): Primary | ICD-10-CM

## 2024-10-29 PROCEDURE — 97110 THERAPEUTIC EXERCISES: CPT | Mod: GP,CQ

## 2024-10-29 ASSESSMENT — PAIN - FUNCTIONAL ASSESSMENT: PAIN_FUNCTIONAL_ASSESSMENT: 0-10

## 2024-10-29 ASSESSMENT — PAIN SCALES - GENERAL: PAINLEVEL_OUTOF10: 4

## 2024-10-30 ENCOUNTER — APPOINTMENT (OUTPATIENT)
Dept: PRIMARY CARE | Facility: CLINIC | Age: 44
End: 2024-10-30
Payer: COMMERCIAL

## 2024-10-30 VITALS
TEMPERATURE: 97.2 F | DIASTOLIC BLOOD PRESSURE: 70 MMHG | HEART RATE: 58 BPM | WEIGHT: 152 LBS | BODY MASS INDEX: 24.53 KG/M2 | OXYGEN SATURATION: 97 % | SYSTOLIC BLOOD PRESSURE: 110 MMHG

## 2024-10-30 DIAGNOSIS — G47.00 INSOMNIA, UNSPECIFIED TYPE: ICD-10-CM

## 2024-10-30 DIAGNOSIS — E03.9 HYPOTHYROIDISM, UNSPECIFIED TYPE: ICD-10-CM

## 2024-10-30 DIAGNOSIS — F33.1 DEPRESSION, MAJOR, RECURRENT, MODERATE: Primary | ICD-10-CM

## 2024-10-30 DIAGNOSIS — K51.819 OTHER ULCERATIVE COLITIS WITH COMPLICATION: ICD-10-CM

## 2024-10-30 PROCEDURE — 99214 OFFICE O/P EST MOD 30 MIN: CPT | Performed by: FAMILY MEDICINE

## 2024-10-30 PROCEDURE — 1036F TOBACCO NON-USER: CPT | Performed by: FAMILY MEDICINE

## 2024-10-30 RX ORDER — ESCITALOPRAM OXALATE 10 MG/1
10 TABLET ORAL DAILY
Qty: 30 TABLET | Refills: 2 | Status: SHIPPED | OUTPATIENT
Start: 2024-10-30

## 2024-10-30 RX ORDER — HYDROXYZINE HYDROCHLORIDE 10 MG/1
10-20 TABLET, FILM COATED ORAL NIGHTLY PRN
Qty: 180 TABLET | Refills: 1 | Status: SHIPPED | OUTPATIENT
Start: 2024-10-30

## 2024-10-30 ASSESSMENT — ENCOUNTER SYMPTOMS
NERVOUS/ANXIOUS: 1
SLEEP DISTURBANCE: 0
COUGH: 0
DYSPHORIC MOOD: 1
CHILLS: 0
FEVER: 0

## 2024-11-05 ENCOUNTER — APPOINTMENT (OUTPATIENT)
Dept: GASTROENTEROLOGY | Facility: HOSPITAL | Age: 44
End: 2024-11-05
Payer: COMMERCIAL

## 2024-11-05 ENCOUNTER — TREATMENT (OUTPATIENT)
Dept: PHYSICAL THERAPY | Facility: HOSPITAL | Age: 44
End: 2024-11-05
Payer: COMMERCIAL

## 2024-11-05 DIAGNOSIS — G37.9 CNS DEMYELINATION (MULTI): Primary | ICD-10-CM

## 2024-11-05 PROCEDURE — 97140 MANUAL THERAPY 1/> REGIONS: CPT | Mod: GP,CQ

## 2024-11-05 PROCEDURE — 97110 THERAPEUTIC EXERCISES: CPT | Mod: GP,CQ

## 2024-11-05 ASSESSMENT — PAIN - FUNCTIONAL ASSESSMENT: PAIN_FUNCTIONAL_ASSESSMENT: 0-10

## 2024-11-05 ASSESSMENT — PAIN SCALES - GENERAL: PAINLEVEL_OUTOF10: 4

## 2024-11-05 NOTE — PROGRESS NOTES
"Physical Therapy    Physical Therapy Treatment    Patient Name: Ally Carpio  MRN: [id number]  : 1980  Today's Date: 2024  Time Calculation  Start Time: 400  Stop Time: 445  Time Calculation (min): 45 min    PT Therapeutic Procedures Time Entry  Manual Therapy Time Entry: 15  Therapeutic Exercise Time Entry: 30          VISIT:# 4    Current Problem  Problem List Items Addressed This Visit    None  Visit Diagnoses         Codes    CNS demyelination (Multi)    -  Primary G37.9             Subjective   No falls reported since last visit. Pt ordered an x-strap     Precautions  Precautions  STEADI Fall Risk Score (The score of 4 or more indicates an increased risk of falling): 9  Precautions Comment: none       Pain  Pain Assessment: 0-10  0-10 (Numeric) Pain Score: 4  Pain Type: Neuropathic pain       Objective    Added manual stretches           Treatments:                                         Date: 10/14 10/24/2024  10/29/2024  2024                                           VISIT# #1 #2 #3 #4   EXERCISES Eval             NuStep (w/ CP)  10' @L2 10' @ L1 10' @L1          RB Calf Str  30\" x 3 30\" x 3 30\" x 3   Hs str  30\" x 3              Chuck LE Chuck seated Chuck seated    Hip Flexion standing  10x 10x 10x   Hip Extension  standing   10x     Hip Abduction  standing  10x 10x rtb    Hs curls standing   10x     Hip Adduction seated   10x 10x ball    HR   10x      LAQ    10x      Gait with X strap  2 laps //           NMES       Manual stretches    15'          HEP           Assessment:   Pt is very tight on her L LE.  She can't feel it.  Pt is walking with a cane more now.         Plan: Recheck next session.  Waiting on X-strap to arrive for additional training  OP PT Plan  Treatment/Interventions: Aquatic therapy, Education/ Instruction, Gait training, Neuromuscular re-education, Therapeutic activities, Therapeutic exercises  PT Plan: Skilled PT  PT Frequency: 2 times per week  Duration: 6-8 " weeks  Rehab Potential: Good  Plan of Care Agreement: Patient    Goals:  Active       CNS Demyelination       1. Assess for and discuss bracing options- AFO vs x-strap.  Request Rx for appropriate bracing       Start:  10/14/24    Expected End:  10/30/24            2. Educate pt on FES devices and contact information so she can set up trial if interested.       Start:  10/14/24    Expected End:  10/30/24            3.  Pt will demonstrate independence with HEP to reinforce gains made in treatment        Start:  10/14/24    Expected End:  11/13/24            4. Pt will have an increase in LE strength by 1/3 MMT grade to increase safety and mobility       Start:  10/14/24    Expected End:  11/27/24            5. Pt will have an increase in Merino Balance score to 40/56 for decreased risk for falls        Start:  10/14/24    Expected End:  11/27/24

## 2024-11-08 ENCOUNTER — HOSPITAL ENCOUNTER (OUTPATIENT)
Dept: RADIOLOGY | Facility: CLINIC | Age: 44
Discharge: HOME | End: 2024-11-08
Payer: COMMERCIAL

## 2024-11-08 DIAGNOSIS — K51.819 OTHER ULCERATIVE COLITIS WITH COMPLICATION: ICD-10-CM

## 2024-11-08 PROCEDURE — 77080 DXA BONE DENSITY AXIAL: CPT

## 2024-11-11 ENCOUNTER — TREATMENT (OUTPATIENT)
Dept: PHYSICAL THERAPY | Facility: HOSPITAL | Age: 44
End: 2024-11-11
Payer: COMMERCIAL

## 2024-11-11 DIAGNOSIS — G37.9 CNS DEMYELINATION (MULTI): ICD-10-CM

## 2024-11-11 PROCEDURE — 97110 THERAPEUTIC EXERCISES: CPT | Mod: GP | Performed by: PHYSICAL THERAPIST

## 2024-11-11 ASSESSMENT — BALANCE ASSESSMENTS
TURN 360 DEGREES: NEEDS CLOSE SUPERVISION OR VERBAL CUEING
LONG VERSION TOTAL SCORE (MAX 56): 28
PLACE ALTERNATE FOOT ON STEP OR STOOL WHILE STANDING UNSUPPORTED: LOOKS BEHIND ONE SIDE ONLY OTHER SIDE SHOWS LESS WEIGHT SHIFT
PLACE ALTERNATE FOOT ON STEP OR STOOL WHILE STANDING UNSUPPORTED: NEEDS ASSISTANCE TO KEEP FROM FALLING/UNABLE TO TRY
STANDING TO SITTING: SITS SAFELY WITH MINIMAL USE OF HANDS
REACHING FORWARD WITH OUTSTRETCHED ARM WHILE STANDING: REACHES FORWARD BUT NEEDS SUPERVISION
SITTING WITH BACK UNSUPPORTED BUT FEET SUPPORTED ON FLOOR OR ON A STOOL: ABLE TO SIT SAFELY AND SECURELY FOR 2 MINUTES
STANDING TO SITTING: ABLE TO STAND INDEPENDENTLY USING HANDS
TRANSFERS: ABLE TO TRANSFER SAFELY DEFINITE NEED OF HANDS
PICK UP OBJECT FROM THE FLOOR FROM A STANDING POSITION: UNABLE TO TRY/NEEDS ASSIST TO KEEP FROM LOSING BALANCE OR FALLING
STANDING UNSUPPORTED WITH FEET TOGETHER: ABLE TO PLACE FEET TOGETHER INDEPENDENTLY BUT UNABLE TO HOLD FOR 30 SECONDS
STANDING UNSUPPORTED: ABLE TO STAND SAFELY FOR 2 MINUTES
STANDING ON ONE LEG: TRIES TO LIFT LEG UNABLE TO HOLD 3 SECONDS BUT REMAINS STANDING INDEPENDENTLY
STANDING UNSUPPORTED ONE FOOT IN FRONT: LOSES BALANCE WHILE STEPPING OR STANDING
STANDING UNSUPPORTED WITH EYES CLOSED: ABLE TO STAND 3 SECONDS

## 2024-11-11 ASSESSMENT — PAIN SCALES - GENERAL: PAINLEVEL_OUTOF10: 5 - MODERATE PAIN

## 2024-11-11 ASSESSMENT — PAIN - FUNCTIONAL ASSESSMENT: PAIN_FUNCTIONAL_ASSESSMENT: 0-10

## 2024-11-11 NOTE — PROGRESS NOTES
Physical Therapy    Physical Therapy Treatment    Patient Name: Ally Carpio  MRN: [id number]  Today's Date: 2024  Time Calculation  Start Time: 1515  Stop Time: 1555  Time Calculation (min): 40 min1980     PT Therapeutic Procedures Time Entry  Therapeutic Exercise Time Entry: 40           Visit: # 5      Assessment:  Pt. Is with slight increases in LE strength, she is using cane to walk now and xstrap has helped her and she is looking into getting hers repaired. Pt. Is without increase in GRANT balance scale. She is cont. With LE weakness. She would benefit from cont. PT to progress gait with xstrap vs the need for AFO.          Plan:  Cont, skilled PT to increase gait and education with xstrap.     Current Problem  1. CNS demyelination (Multi)  Follow Up In Physical Therapy          Subjective   Pt. States she does not feel stronger, but stretching has helped. Pt. Did get xstrap but her dog ate it. Pt. States the xstrap did help. She will call the company.         Pain  Pain Assessment: 0-10  0-10 (Numeric) Pain Score: 5 - Moderate pain  Pain Type: Neuropathic pain    Objective     Sensation:   Decreased sensation to light touch in left L2 through L5      Strength:   Measured with seated MMT'ing, grossly WNL's with exception of those listed below      Right Left    Hip:      Flexion 2+/5 2/5 Tremors with all hip MMT   Abduction 4-/5 4-/5    Adduction 4/5 4/5          Knee:      Flexion 4/5 4/5    Extension 2/5 2/5          Ankle:      DF Unable to lift off floor  -once assisted into DF can hold and perform  controlled eccentric  movement Unable to lift  Able to assist with DF  , difficulty holding in DF and  decreased control with eccentric  movement     PF            Gait:   Pt ambulates with R Straight cane, decrease pace and step length, increased left hip circumduction, increased genu valgum CARMINE.      GRANT/56    Treatments:                                            Date: 10/14 10/24/2024   "10/29/2024  11/5/2024  11/11                                          VISIT# #1 #2 #3 #4 5   EXERCISES Eval               NuStep (w/ CP)  10' @L2 10' @ L1 10' @L1 10' L1           RB Calf Str  30\" x 3 30\" x 3 30\" x 3 30\"x3   Hs str  30\" x 3                Chuck LE Chuck seated Chuck seated     Hip Flexion standing  10x 10x 10x    Hip Extension  standing   10x      Hip Abduction  standing  10x 10x rtb     Hs curls standing   10x      Hip Adduction seated   10x 10x ball     HR   10x       LAQ    10x       Gait with X strap  2 laps //             NMES        Manual stretches    15'            HEP              OP EDUCATION:       Goals:  Active       CNS Demyelination       1. Assess for and discuss bracing options- AFO vs x-strap.  Request Rx for appropriate bracing (Progressing)       Start:  10/14/24    Expected End:  10/30/24            2. Educate pt on FES devices and contact information so she can set up trial if interested. (Progressing)       Start:  10/14/24    Expected End:  10/30/24            3.  Pt will demonstrate independence with HEP to reinforce gains made in treatment  (Met)       Start:  10/14/24    Expected End:  11/13/24    Resolved:  11/11/24         4. Pt will have an increase in LE strength by 1/3 MMT grade to increase safety and mobility       Start:  10/14/24    Expected End:  11/27/24            5. Pt will have an increase in Merino Balance score to 40/56 for decreased risk for falls        Start:  10/14/24    Expected End:  11/27/24               "

## 2024-11-19 ENCOUNTER — APPOINTMENT (OUTPATIENT)
Dept: GASTROENTEROLOGY | Facility: HOSPITAL | Age: 44
End: 2024-11-19
Payer: COMMERCIAL

## 2024-11-19 ENCOUNTER — LAB (OUTPATIENT)
Dept: LAB | Facility: LAB | Age: 44
End: 2024-11-19
Payer: COMMERCIAL

## 2024-11-19 VITALS
OXYGEN SATURATION: 98 % | HEART RATE: 60 BPM | TEMPERATURE: 98.1 F | BODY MASS INDEX: 23.5 KG/M2 | WEIGHT: 145.6 LBS | SYSTOLIC BLOOD PRESSURE: 111 MMHG | DIASTOLIC BLOOD PRESSURE: 48 MMHG

## 2024-11-19 DIAGNOSIS — K51.819 OTHER ULCERATIVE COLITIS WITH COMPLICATION: ICD-10-CM

## 2024-11-19 LAB
ALBUMIN SERPL BCP-MCNC: 3.8 G/DL (ref 3.4–5)
ALP SERPL-CCNC: 54 U/L (ref 33–110)
ALT SERPL W P-5'-P-CCNC: 8 U/L (ref 7–45)
ANION GAP SERPL CALC-SCNC: 11 MMOL/L (ref 10–20)
AST SERPL W P-5'-P-CCNC: 10 U/L (ref 9–39)
BILIRUB DIRECT SERPL-MCNC: 0.1 MG/DL (ref 0–0.3)
BILIRUB SERPL-MCNC: 0.4 MG/DL (ref 0–1.2)
BUN SERPL-MCNC: 17 MG/DL (ref 6–23)
CALCIUM SERPL-MCNC: 8.8 MG/DL (ref 8.6–10.6)
CHLORIDE SERPL-SCNC: 104 MMOL/L (ref 98–107)
CO2 SERPL-SCNC: 28 MMOL/L (ref 21–32)
CREAT SERPL-MCNC: 0.74 MG/DL (ref 0.5–1.05)
CRP SERPL-MCNC: 0.3 MG/DL
EGFRCR SERPLBLD CKD-EPI 2021: >90 ML/MIN/1.73M*2
ERYTHROCYTE [DISTWIDTH] IN BLOOD BY AUTOMATED COUNT: 13.6 % (ref 11.5–14.5)
GLUCOSE SERPL-MCNC: 90 MG/DL (ref 74–99)
HCT VFR BLD AUTO: 38.6 % (ref 36–46)
HGB BLD-MCNC: 12.6 G/DL (ref 12–16)
MCH RBC QN AUTO: 30.3 PG (ref 26–34)
MCHC RBC AUTO-ENTMCNC: 32.6 G/DL (ref 32–36)
MCV RBC AUTO: 93 FL (ref 80–100)
NRBC BLD-RTO: 0 /100 WBCS (ref 0–0)
PHOSPHATE SERPL-MCNC: 3.2 MG/DL (ref 2.5–4.9)
PLATELET # BLD AUTO: 229 X10*3/UL (ref 150–450)
POTASSIUM SERPL-SCNC: 4 MMOL/L (ref 3.5–5.3)
PROT SERPL-MCNC: 6.1 G/DL (ref 6.4–8.2)
RBC # BLD AUTO: 4.16 X10*6/UL (ref 4–5.2)
SODIUM SERPL-SCNC: 139 MMOL/L (ref 136–145)
WBC # BLD AUTO: 5.3 X10*3/UL (ref 4.4–11.3)

## 2024-11-19 PROCEDURE — 99215 OFFICE O/P EST HI 40 MIN: CPT | Performed by: STUDENT IN AN ORGANIZED HEALTH CARE EDUCATION/TRAINING PROGRAM

## 2024-11-19 PROCEDURE — 36415 COLL VENOUS BLD VENIPUNCTURE: CPT

## 2024-11-19 PROCEDURE — 99417 PROLNG OP E/M EACH 15 MIN: CPT | Performed by: STUDENT IN AN ORGANIZED HEALTH CARE EDUCATION/TRAINING PROGRAM

## 2024-11-19 PROCEDURE — 81335 TPMT GENE COM VARIANTS: CPT

## 2024-11-19 PROCEDURE — 1036F TOBACCO NON-USER: CPT | Performed by: STUDENT IN AN ORGANIZED HEALTH CARE EDUCATION/TRAINING PROGRAM

## 2024-11-19 PROCEDURE — 82248 BILIRUBIN DIRECT: CPT

## 2024-11-19 PROCEDURE — 86140 C-REACTIVE PROTEIN: CPT

## 2024-11-19 PROCEDURE — 84100 ASSAY OF PHOSPHORUS: CPT

## 2024-11-19 PROCEDURE — 80053 COMPREHEN METABOLIC PANEL: CPT

## 2024-11-19 PROCEDURE — 85027 COMPLETE CBC AUTOMATED: CPT

## 2024-11-19 RX ORDER — BUDESONIDE 9 MG/1
TABLET, FILM COATED, EXTENDED RELEASE ORAL
Qty: 42 TABLET | Refills: 0 | Status: SHIPPED | OUTPATIENT
Start: 2024-11-19 | End: 2025-01-14

## 2024-11-19 SDOH — ECONOMIC STABILITY: FOOD INSECURITY: WITHIN THE PAST 12 MONTHS, YOU WORRIED THAT YOUR FOOD WOULD RUN OUT BEFORE YOU GOT MONEY TO BUY MORE.: NEVER TRUE

## 2024-11-19 SDOH — ECONOMIC STABILITY: FOOD INSECURITY: WITHIN THE PAST 12 MONTHS, THE FOOD YOU BOUGHT JUST DIDN'T LAST AND YOU DIDN'T HAVE MONEY TO GET MORE.: NEVER TRUE

## 2024-11-19 ASSESSMENT — LIFESTYLE VARIABLES
HOW OFTEN DO YOU HAVE SIX OR MORE DRINKS ON ONE OCCASION: NEVER
HOW MANY STANDARD DRINKS CONTAINING ALCOHOL DO YOU HAVE ON A TYPICAL DAY: PATIENT DOES NOT DRINK
AUDIT-C TOTAL SCORE: 0
HOW OFTEN DO YOU HAVE A DRINK CONTAINING ALCOHOL: NEVER
SKIP TO QUESTIONS 9-10: 1

## 2024-11-19 ASSESSMENT — PATIENT HEALTH QUESTIONNAIRE - PHQ9
1. LITTLE INTEREST OR PLEASURE IN DOING THINGS: NOT AT ALL
2. FEELING DOWN, DEPRESSED OR HOPELESS: SEVERAL DAYS
SUM OF ALL RESPONSES TO PHQ9 QUESTIONS 1 & 2: 1
10. IF YOU CHECKED OFF ANY PROBLEMS, HOW DIFFICULT HAVE THESE PROBLEMS MADE IT FOR YOU TO DO YOUR WORK, TAKE CARE OF THINGS AT HOME, OR GET ALONG WITH OTHER PEOPLE: NOT DIFFICULT AT ALL

## 2024-11-19 ASSESSMENT — PAIN SCALES - GENERAL: PAINLEVEL_OUTOF10: 5

## 2024-11-19 NOTE — PROGRESS NOTES
This is a 44yo F w/ PMH of psoriasis since 2023 (previously on topical treatment only), C6-7 cervical spine arthroplasty, lTP since November of 2023 (never required treatment), chronic menorrhagia, iron deficiency anemia, RLS since 2014, prior smoking (quit in 2017 smoked 1 ppd for 12 years before that), depression, hypothyroidism, insomnia, ?concern for MS (seeing neuro) who has been sent for UC.    Past hx: she stopped adalimumab biosimilar in Sept 2024 due to c/f MS (was on it from April to Sept 2024, prior to that was on balsalazide- had mucositis, mesalamine- stopped due to skin rash, dxed Jan 2024). Denies abd sugery, no other immunomodulators, small molecules, biologics. She has been on prednisone in the past with mild mood changes.    She previously saw Estevan bernstein at [facility] and while neuro had rec zeposia (given c/f MS), her HR was noted to low in the 40s which made zeposia a less than ideal choice.    C-scope Jan 2024 for hematochezia and diarrhea:    The terminal ileum appeared normal. Performed random biopsy using biopsy forceps.  The cecum, ascending colon and hepatic flexure appeared normal. Performed random biopsy using biopsy forceps, labeled right colon.  Subcentimeter polyp in the transverse colon was removed with cold snare (bx: inflammatory polyp).  The descending colon appeared normal. Performed random biopsy using biopsy forceps, labeled left colon.  Moderate abnormal mucosa in the rectosigmoid, with edema, erythema and aphthous ulcerations in contiguous distribution.  This is most suggestive of ulcerative proctitis; performed cold forceps biopsy (bx: chronic active colitis).    Interval hx: Per pt, over last 6 weeks, she has had worsening of sx- after stopping adalimumab biosimilar in Sept 2024, 7-10 loose brown Bms with intermittent scant BRBPR. She has been off all meds since Sept 2024. Endorses intermittent lower abd pain- dull, achy, 3-5/10.  Endorses chronic fatigue.    Denies  dysphagia, odynophagia, melena, f/c, n/v, wt loss, heartburn, 12 point ROS done and neg unless otherwise stated.    EIM: denies new mucositis, skin rash, joint pains, eye redness or pain.    PMH/PSH: As above and C-sections    SH: denies smoking, alcohol, IVDU; she is a former massage therapist    FH: Mother had CRC at age 60s, denies FH of IBD    PE:    Gen: AXOX3. NAD     HEENT: NC/AT.      Eyes: anicteric sclerae     CV: RRR    Pulm: non labored breathing     Abd: NTND. Tympanitic. no rebound     Ext: no edema    Skin: Non jaundiced     A/P:  This is a 44yo F w/ PMH of psoriasis since 2023 (previously on topical treatment only), C6-7 cervical spine arthroplasty, lTP since November of 2023 (never required treatment), chronic menorrhagia, iron deficiency anemia, RLS since 2014, prior smoking (quit in 2017 smoked 1 ppd for 12 years before that), depression, hypothyroidism, insomnia, ?concern for MS (seeing neuro) who has been referred for ulcerative proctitis.    She stopped adalimumab biosimilar in Sept 2024 due to c/f MS (was on it from April to Sept 2024, prior to that was on balsalazide- had mucositis, mesalamine- stopped due to skin rash, dxed Jan 2024).     She previously saw Estvean bernstein at [facility] and while neuro had rec zeposia (given c/f MS), her HR was noted to low in the 40s which made zeposia a less than ideal choice. Anti TNFs contraindicated relatively due to c/f MS, JCV Ab is positive and pt would like to avoid entyvio for that reason.    Currently with diarrhea and intermittent scant BRBPR c/f UC flare off all therapy. We will obtain labs, infectious work up, inflammatory markers including fecal calpro and determine need and optimal choice of advanced therapy based on overall clinical picture in conjunction with neurology- options would include immuno-modulators, anti-IL12/23, anti-Il23s, among others.    Plan:    -We will obtain blood work and markers of inflammation today including CRP and fecal  calpro  -we will start uceris tapered regimen for 2 months  -Based on lab work, we can then determine need and choice for advance therapy in liaison with neurologist    Health maintenance in IBD: reference for PCP    -Vaccinations: pt was counseled that it is recommended to get inactivated flu, pneumococcal, COVID-19 vaccine. No LIVE vaccines while on immunosuppressant therapy---->defer to PCP     -All women w/ IBD on systemic immunosuppression defined as [(pred >20 mg/d for >14 days), AZA, 6-MP, MTX, cyclosporine, tacrolimus, anti-TNFs, USK, TOFA] should undergo cervical cancer screening by cytology annually (if cytology alone) or every 2 years (if HPV negative)     -annual latest TB exposure risk assessment and latent TB screening at Baseline---> (TB spot was negative in 2024)    -Osteoporosis screening by DEXA scan in patients with IBD if any risk factors for osteoporosis (low BMI, >3 mths cumulative steroid exposure, post-menopausal,hypogonadism). Repeat in 5 yrs if inital screen is normal--->>normal in 2024    -screen all patients with IBD for depression (PHQ9) and anxiety (GAD7) at baseline and annually---->>pt denies s/s of depression    -screen all patients for smoking status and refer current smokers to smoking cessation therapy---->>does not apply to this patient currently    RTC in 3 mths    [I will follow this patient for management of her chronic ulcerative colitis]

## 2024-11-19 NOTE — PATIENT INSTRUCTIONS
Thank you for coming to GI clinic. We will:    Plan:  -We will obtain blood work and markers of inflammation today including CRP and fecal calpro  -we will start uceris tapered regimen for 2 months  -Based on lab work, we can then determine need and choice for advance therapy in liaison with neurologist    Health maintenance in IBD: reference for PCP    -Vaccinations: pt was counseled that it is recommended to get inactivated flu, pneumococcal, COVID-19 vaccine. No LIVE vaccines while on immunosuppressant therapy---->defer to PCP     -All women w/ IBD on systemic immunosuppression defined as [(pred >20 mg/d for >14 days), AZA, 6-MP, MTX, cyclosporine, tacrolimus, anti-TNFs, USK, TOFA] should undergo cervical cancer screening by cytology annually (if cytology alone) or every 2 years (if HPV negative)     -annual latest TB exposure risk assessment and latent TB screening at Baseline---> (TB spot was negative in 2024)    -Osteoporosis screening by DEXA scan in patients with IBD if any risk factors for osteoporosis (low BMI, >3 mths cumulative steroid exposure, post-menopausal,hypogonadism). Repeat in 5 yrs if inital screen is normal--->>normal in 2024    -screen all patients with IBD for depression (PHQ9) and anxiety (GAD7) at baseline and annually---->>pt denies s/s of depression    -screen all patients for smoking status and refer current smokers to smoking cessation therapy---->>does not apply to this patient currently

## 2024-11-20 ENCOUNTER — LAB (OUTPATIENT)
Dept: LAB | Facility: LAB | Age: 44
End: 2024-11-20
Payer: COMMERCIAL

## 2024-11-20 DIAGNOSIS — K51.819 OTHER ULCERATIVE COLITIS WITH COMPLICATION: ICD-10-CM

## 2024-11-20 PROCEDURE — 83993 ASSAY FOR CALPROTECTIN FECAL: CPT

## 2024-11-22 ENCOUNTER — TREATMENT (OUTPATIENT)
Dept: PHYSICAL THERAPY | Facility: HOSPITAL | Age: 44
End: 2024-11-22
Payer: COMMERCIAL

## 2024-11-22 DIAGNOSIS — G37.9 CNS DEMYELINATION (MULTI): ICD-10-CM

## 2024-11-22 PROCEDURE — 97140 MANUAL THERAPY 1/> REGIONS: CPT | Mod: GP,CQ

## 2024-11-22 PROCEDURE — 97110 THERAPEUTIC EXERCISES: CPT | Mod: GP,CQ

## 2024-11-22 PROCEDURE — 97116 GAIT TRAINING THERAPY: CPT | Mod: GP,CQ

## 2024-11-22 ASSESSMENT — PAIN SCALES - GENERAL: PAINLEVEL_OUTOF10: 5 - MODERATE PAIN

## 2024-11-22 ASSESSMENT — PAIN - FUNCTIONAL ASSESSMENT: PAIN_FUNCTIONAL_ASSESSMENT: 0-10

## 2024-11-22 NOTE — PROGRESS NOTES
"Physical Therapy    Physical Therapy Treatment    Patient Name: Ally Carpio  MRN: [id number]  : 1980  Today's Date: 2024  Time Calculation  Start Time: 1100  Stop Time: 1145  Time Calculation (min): 45 min    PT Therapeutic Procedures Time Entry  Manual Therapy Time Entry: 15  Therapeutic Exercise Time Entry: 10  Gait Training Time Entry: 20          VISIT:# 6    Current Problem  Problem List Items Addressed This Visit             ICD-10-CM    CNS demyelination (Multi) G37.9        Subjective   No falls reported since last visit. Pt arrives with her X strap      Precautions  Precautions  STEADI Fall Risk Score (The score of 4 or more indicates an increased risk of falling): 9  Precautions Comment: none       Pain  Pain Assessment: 0-10  0-10 (Numeric) Pain Score: 5 - Moderate pain  Pain Type: Neuropathic pain       Objective     Pt was fit for her X strap and we made adjustments            Treatments:                                         Date: 10/14 10/24/2024  10/29/2024  2024  11/11 2024                                           VISIT# #1 #2 #3 #4 5 6   EXERCISES Eval                 NuStep (w/ CP)  10' @L2 10' @ L1 10' @L1 10' L1 5' @L1            RB Calf Str  30\" x 3 30\" x 3 30\" x 3 30\"x3 30\" x 3   Hs str  30\" x 3                  Chuck LE Chuck seated Chuck seated      Hip Flexion standing  10x 10x 10x     Hip Extension  standing   10x       Hip Abduction  standing  10x 10x rtb      Hs curls standing   10x       Hip Adduction seated   10x 10x ball      HR   10x        LAQ    10x        Gait with X strap  2 laps //   X strap              NMES         Manual stretches    15'  15'            HEP             Assessment:   Pt has slower but improved motor function with walking while using her x strap.  Pt was educated on how to apply the straps at home.       Plan:  Cont to improve gait with X strap   OP PT Plan  Treatment/Interventions: Aquatic therapy, Education/ Instruction, Gait " training, Neuromuscular re-education, Therapeutic activities, Therapeutic exercises  PT Plan: Skilled PT  PT Frequency: 2 times per week  Duration: 6-8 weeks  Rehab Potential: Good  Plan of Care Agreement: Patient    Goals:  Active       CNS Demyelination       1. Assess for and discuss bracing options- AFO vs x-strap.  Request Rx for appropriate bracing (Progressing)       Start:  10/14/24    Expected End:  10/30/24            2. Educate pt on FES devices and contact information so she can set up trial if interested. (Progressing)       Start:  10/14/24    Expected End:  10/30/24            3.  Pt will demonstrate independence with HEP to reinforce gains made in treatment  (Met)       Start:  10/14/24    Expected End:  11/13/24    Resolved:  11/11/24         4. Pt will have an increase in LE strength by 1/3 MMT grade to increase safety and mobility       Start:  10/14/24    Expected End:  11/27/24            5. Pt will have an increase in Merino Balance score to 40/56 for decreased risk for falls        Start:  10/14/24    Expected End:  11/27/24

## 2024-11-23 LAB — CALPROTECTIN STL-MCNT: 2880 UG/G

## 2024-11-26 ENCOUNTER — TELEPHONE (OUTPATIENT)
Dept: GASTROENTEROLOGY | Facility: HOSPITAL | Age: 44
End: 2024-11-26

## 2024-11-26 ENCOUNTER — TREATMENT (OUTPATIENT)
Dept: PHYSICAL THERAPY | Facility: HOSPITAL | Age: 44
End: 2024-11-26
Payer: COMMERCIAL

## 2024-11-26 DIAGNOSIS — K51.311 ULCERATIVE RECTOSIGMOIDITIS WITH RECTAL BLEEDING (MULTI): Primary | ICD-10-CM

## 2024-11-26 DIAGNOSIS — G37.9 CNS DEMYELINATION (MULTI): ICD-10-CM

## 2024-11-26 DIAGNOSIS — K51.819 OTHER ULCERATIVE COLITIS WITH COMPLICATION: Primary | ICD-10-CM

## 2024-11-26 LAB — SCAN RESULT: NORMAL

## 2024-11-26 PROCEDURE — 97112 NEUROMUSCULAR REEDUCATION: CPT | Mod: GP,CQ

## 2024-11-26 PROCEDURE — 97110 THERAPEUTIC EXERCISES: CPT | Mod: GP,CQ

## 2024-11-26 RX ORDER — ALBUTEROL SULFATE 0.83 MG/ML
3 SOLUTION RESPIRATORY (INHALATION) AS NEEDED
OUTPATIENT
Start: 2024-11-26

## 2024-11-26 RX ORDER — EPINEPHRINE 1 MG/ML
0.3 INJECTION, SOLUTION, CONCENTRATE INTRAVENOUS EVERY 5 MIN PRN
OUTPATIENT
Start: 2024-11-26

## 2024-11-26 RX ORDER — DIPHENHYDRAMINE HYDROCHLORIDE 50 MG/ML
50 INJECTION INTRAMUSCULAR; INTRAVENOUS AS NEEDED
OUTPATIENT
Start: 2024-11-26

## 2024-11-26 RX ORDER — FAMOTIDINE 10 MG/ML
20 INJECTION INTRAVENOUS ONCE AS NEEDED
OUTPATIENT
Start: 2024-11-26

## 2024-11-26 RX ORDER — MIRIKIZUMAB-MRKZ 20 MG/ML
300 INJECTION, SOLUTION INTRAVENOUS
Qty: 15 ML | Refills: 2 | Status: SHIPPED
Start: 2024-11-26

## 2024-11-26 ASSESSMENT — PAIN - FUNCTIONAL ASSESSMENT: PAIN_FUNCTIONAL_ASSESSMENT: 0-10

## 2024-11-26 ASSESSMENT — PAIN SCALES - GENERAL: PAINLEVEL_OUTOF10: 4

## 2024-11-26 NOTE — TELEPHONE ENCOUNTER
I called pt and informed her that fecal calpro is elevated to 2800- she continues to have 8-10 loose Bms with intermittent BRBPR- I will order 2 mths of tapered uceris.    We extensively discussed advanced therapy- recall she stopped adalimumab biosimilar in Sept 2024 due to c/f MS (was on it from April to Sept 2024, prior to that was on balsalazide- had mucositis, mesalamine- stopped due to skin rash, dxed Jan 2024).      She previously saw Estevan Hanley over at [facility] and while neuro had rec zeposia (given c/f MS), her HR was noted to low in the 40s which made zeposia a less than ideal choice (also discussed Adverse events of S1P receptor modulators and pt would like to avoid which is reasonable). Anti TNFs contraindicated relatively due to c/f MS, JCV Ab is positive and pt would like to avoid entyvio for that reason.    I had an extensive discussion regarding the anti-IL12/23, anti-Il23s class and given elevated fecal calpro to 2800 and bloody diarrhea and occasional urgency, she agrees to proceed w/ omvoh which is reasonable- I reached out to neurologist Dr. Holt and he agrees with omvoh having no contraindication in suspected MS.

## 2024-11-26 NOTE — PROGRESS NOTES
"Physical Therapy    Physical Therapy Treatment    Patient Name: Ally Carpio  MRN: [id number]  : 1980  Today's Date: 2024  Time Calculation  Start Time: 1000  Stop Time: 1040  Time Calculation (min): 40 min    PT Therapeutic Procedures Time Entry  Neuromuscular Re-Education Time Entry: 30  Therapeutic Exercise Time Entry: 10          VISIT:# 7    Current Problem  Problem List Items Addressed This Visit             ICD-10-CM    CNS demyelination (Multi) G37.9        Subjective   No falls reported since last visit.      Precautions  Precautions  STEADI Fall Risk Score (The score of 4 or more indicates an increased risk of falling): 9  Precautions Comment: none       Pain  Pain Assessment: 0-10  0-10 (Numeric) Pain Score: 4       Objective    X strap adjustments  Can height was adjusted last session  Trial of NMES for re-ed           Treatments:                                         Date: 2024                                           VISIT# 5 6 7   EXERCISES            NuStep (w/ CP) 10' L1 5' @L1 10' @L2         RB Calf Str 30\"x3 30\" x 3    Hs str                  Hip Flexion standing      Hip Extension  standing       Hip Abduction  standing      Hs curls standing       Hip Adduction seated       HR       LAQ        X strap   X strap adjusted      Gait with cane   NMES   Trial today    Manual stretches  15'          HEP          Assessment:   NMRE stim was tried to increase DF on L LE.  Unable to get a contraction to increase DF on this date.  You can see the calf muscle twitch.  At this time the pt will continue to use her X strap, she may benefit from an AFO.         Plan: Check goals next session   OP PT Plan  Treatment/Interventions: Aquatic therapy, Education/ Instruction, Gait training, Neuromuscular re-education, Therapeutic activities, Therapeutic exercises  PT Plan: Skilled PT  PT Frequency: 2 times per week  Duration: 6-8 weeks  Rehab Potential: Good  Plan of " Care Agreement: Patient    Goals:  Active       CNS Demyelination       1. Assess for and discuss bracing options- AFO vs x-strap.  Request Rx for appropriate bracing (Progressing)       Start:  10/14/24    Expected End:  10/30/24            2. Educate pt on FES devices and contact information so she can set up trial if interested. (Progressing)       Start:  10/14/24    Expected End:  10/30/24            3.  Pt will demonstrate independence with HEP to reinforce gains made in treatment  (Met)       Start:  10/14/24    Expected End:  11/13/24    Resolved:  11/11/24         4. Pt will have an increase in LE strength by 1/3 MMT grade to increase safety and mobility       Start:  10/14/24    Expected End:  11/27/24            5. Pt will have an increase in Merino Balance score to 40/56 for decreased risk for falls        Start:  10/14/24    Expected End:  11/27/24

## 2024-11-27 DIAGNOSIS — G37.9 CNS DEMYELINATION (MULTI): Primary | ICD-10-CM

## 2024-11-27 RX ORDER — TIZANIDINE 4 MG/1
TABLET ORAL
Qty: 90 TABLET | Refills: 0 | Status: SHIPPED | OUTPATIENT
Start: 2024-11-27

## 2024-12-01 DIAGNOSIS — D50.0 IRON DEFICIENCY ANEMIA DUE TO CHRONIC BLOOD LOSS: Primary | ICD-10-CM

## 2024-12-01 DIAGNOSIS — E61.1 IRON DEFICIENCY: ICD-10-CM

## 2024-12-03 ENCOUNTER — TELEPHONE (OUTPATIENT)
Dept: PRIMARY CARE | Facility: CLINIC | Age: 44
End: 2024-12-03

## 2024-12-03 ENCOUNTER — TREATMENT (OUTPATIENT)
Dept: PHYSICAL THERAPY | Facility: HOSPITAL | Age: 44
End: 2024-12-03
Payer: COMMERCIAL

## 2024-12-03 DIAGNOSIS — G37.9 CNS DEMYELINATION (MULTI): ICD-10-CM

## 2024-12-03 DIAGNOSIS — G37.9 CNS DEMYELINATION (MULTI): Primary | ICD-10-CM

## 2024-12-03 PROCEDURE — 97116 GAIT TRAINING THERAPY: CPT | Mod: GP,CQ

## 2024-12-03 PROCEDURE — 97110 THERAPEUTIC EXERCISES: CPT | Mod: GP,CQ

## 2024-12-03 ASSESSMENT — PAIN - FUNCTIONAL ASSESSMENT: PAIN_FUNCTIONAL_ASSESSMENT: 0-10

## 2024-12-03 ASSESSMENT — PAIN SCALES - GENERAL: PAINLEVEL_OUTOF10: 4

## 2024-12-03 NOTE — PROGRESS NOTES
"Physical Therapy    Physical Therapy Treatment    Patient Name: Ally Carpio  MRN: [id number]  : 1980  Today's Date: 12/3/2024  Time Calculation  Start Time: 315  Stop Time: 353  Time Calculation (min): 38 min    PT Therapeutic Procedures Time Entry  Therapeutic Exercise Time Entry: 10  Gait Training Time Entry: 28          VISIT:# 8    Current Problem  Problem List Items Addressed This Visit             ICD-10-CM    CNS demyelination (Multi) G37.9        Subjective   No falls reported since last visit.  Pt reports she is getting weaker.       Precautions  Precautions  STEADI Fall Risk Score (The score of 4 or more indicates an increased risk of falling): 9  Precautions Comment: none       Pain  Pain Assessment: 0-10  0-10 (Numeric) Pain Score: 4       Objective    Tried AFO on the R foot w/ X strap on the L           Treatments:                                         Date: 11/11 2024  2024  12/3/2024                                           VISIT# 5 6 7 8   EXERCISES              NuStep (w/ CP) 10' L1 5' @L1 10' @L2 10' @L2          RB Calf Str 30\"x3 30\" x 3     Hs str                     Hip Flexion standing       Hip Extension  standing        Hip Abduction  standing       Hs curls standing        Hip Adduction seated        HR        LAQ         X strap   X strap adjusted X strap  Afo trial       Gait with cane Gait with cane/FWW   NMES   Trial today     Manual stretches  15'            HEP           Assessment:   Pt walked in with her xstrap and cane today.  We tried an AFO and using a FWW.  Messaged the doctor and we got an order for Chuck AFO's.  Pt will see Emily on 24 at 10:15 am.  Pt could could benefit for the use of CHUCK AFO's for improved gait, toe clearance and decreased fatigue.         Plan: Hold until after pt is fit for AFO's.    OP PT Plan  Treatment/Interventions: Aquatic therapy, Education/ Instruction, Gait training, Neuromuscular re-education, Therapeutic " activities, Therapeutic exercises  PT Plan: Skilled PT  PT Frequency: 2 times per week  Duration: 6-8 weeks  Rehab Potential: Good  Plan of Care Agreement: Patient    Goals:  Active       CNS Demyelination       1. Assess for and discuss bracing options- AFO vs x-strap.  Request Rx for appropriate bracing (Progressing)       Start:  10/14/24    Expected End:  10/30/24            2. Educate pt on FES devices and contact information so she can set up trial if interested. (Progressing)       Start:  10/14/24    Expected End:  10/30/24            3.  Pt will demonstrate independence with HEP to reinforce gains made in treatment  (Met)       Start:  10/14/24    Expected End:  11/13/24    Resolved:  11/11/24         4. Pt will have an increase in LE strength by 1/3 MMT grade to increase safety and mobility       Start:  10/14/24    Expected End:  11/27/24            5. Pt will have an increase in Merino Balance score to 40/56 for decreased risk for falls        Start:  10/14/24    Expected End:  11/27/24

## 2024-12-03 NOTE — TELEPHONE ENCOUNTER
PT reached out that patient needs bilateral AFO brace. I printed order. Please reach out to patient to confirm that she would like to have order sent to [employer] in [place].

## 2024-12-04 NOTE — TELEPHONE ENCOUNTER
Signed order received from [facility].  Called pt and pt informed of provider message.  Pt would like to use "[employer].  [employer] faxed.

## 2024-12-05 ENCOUNTER — LAB (OUTPATIENT)
Dept: LAB | Facility: LAB | Age: 44
End: 2024-12-05
Payer: COMMERCIAL

## 2024-12-05 ENCOUNTER — APPOINTMENT (OUTPATIENT)
Dept: CARDIOLOGY | Facility: HOSPITAL | Age: 44
End: 2024-12-05
Payer: COMMERCIAL

## 2024-12-05 ENCOUNTER — APPOINTMENT (OUTPATIENT)
Dept: GASTROENTEROLOGY | Facility: CLINIC | Age: 44
End: 2024-12-05
Payer: COMMERCIAL

## 2024-12-05 ENCOUNTER — APPOINTMENT (OUTPATIENT)
Dept: NEUROLOGY | Facility: HOSPITAL | Age: 44
End: 2024-12-05
Payer: COMMERCIAL

## 2024-12-05 DIAGNOSIS — R73.01 IFG (IMPAIRED FASTING GLUCOSE): ICD-10-CM

## 2024-12-05 DIAGNOSIS — E03.9 HYPOTHYROIDISM, UNSPECIFIED TYPE: ICD-10-CM

## 2024-12-05 DIAGNOSIS — E78.2 MIXED HYPERLIPIDEMIA: ICD-10-CM

## 2024-12-05 DIAGNOSIS — K51.819 OTHER ULCERATIVE COLITIS WITH COMPLICATION: ICD-10-CM

## 2024-12-05 LAB
ALBUMIN SERPL BCP-MCNC: 3.5 G/DL (ref 3.4–5)
ALP SERPL-CCNC: 43 U/L (ref 33–110)
ALT SERPL W P-5'-P-CCNC: 7 U/L (ref 7–45)
ANION GAP SERPL CALC-SCNC: 8 MMOL/L (ref 10–20)
AST SERPL W P-5'-P-CCNC: 8 U/L (ref 9–39)
BASOPHILS # BLD AUTO: 0.07 X10*3/UL (ref 0–0.1)
BASOPHILS NFR BLD AUTO: 1.3 %
BILIRUB SERPL-MCNC: 0.4 MG/DL (ref 0–1.2)
BUN SERPL-MCNC: 16 MG/DL (ref 6–23)
CALCIUM SERPL-MCNC: 8.5 MG/DL (ref 8.6–10.3)
CHLORIDE SERPL-SCNC: 105 MMOL/L (ref 98–107)
CHOLEST SERPL-MCNC: 196 MG/DL (ref 0–199)
CHOLESTEROL/HDL RATIO: 4.3
CO2 SERPL-SCNC: 30 MMOL/L (ref 21–32)
CREAT SERPL-MCNC: 0.75 MG/DL (ref 0.5–1.05)
EGFRCR SERPLBLD CKD-EPI 2021: >90 ML/MIN/1.73M*2
EOSINOPHIL # BLD AUTO: 0.66 X10*3/UL (ref 0–0.7)
EOSINOPHIL NFR BLD AUTO: 12.5 %
ERYTHROCYTE [DISTWIDTH] IN BLOOD BY AUTOMATED COUNT: 13.9 % (ref 11.5–14.5)
GLUCOSE SERPL-MCNC: 85 MG/DL (ref 74–99)
HCT VFR BLD AUTO: 36.9 % (ref 36–46)
HDLC SERPL-MCNC: 45.2 MG/DL
HGB BLD-MCNC: 12.1 G/DL (ref 12–16)
IMM GRANULOCYTES # BLD AUTO: 0.02 X10*3/UL (ref 0–0.7)
IMM GRANULOCYTES NFR BLD AUTO: 0.4 % (ref 0–0.9)
LDLC SERPL CALC-MCNC: 121 MG/DL
LYMPHOCYTES # BLD AUTO: 0.88 X10*3/UL (ref 1.2–4.8)
LYMPHOCYTES NFR BLD AUTO: 16.7 %
MCH RBC QN AUTO: 30.7 PG (ref 26–34)
MCHC RBC AUTO-ENTMCNC: 32.8 G/DL (ref 32–36)
MCV RBC AUTO: 94 FL (ref 80–100)
MONOCYTES # BLD AUTO: 0.48 X10*3/UL (ref 0.1–1)
MONOCYTES NFR BLD AUTO: 9.1 %
NEUTROPHILS # BLD AUTO: 3.17 X10*3/UL (ref 1.2–7.7)
NEUTROPHILS NFR BLD AUTO: 60 %
NON HDL CHOLESTEROL: 151 MG/DL (ref 0–149)
NRBC BLD-RTO: 0 /100 WBCS (ref 0–0)
PLATELET # BLD AUTO: 192 X10*3/UL (ref 150–450)
POTASSIUM SERPL-SCNC: 4.2 MMOL/L (ref 3.5–5.3)
PROT SERPL-MCNC: 5.7 G/DL (ref 6.4–8.2)
RBC # BLD AUTO: 3.94 X10*6/UL (ref 4–5.2)
SODIUM SERPL-SCNC: 139 MMOL/L (ref 136–145)
TRIGL SERPL-MCNC: 147 MG/DL (ref 0–149)
TSH SERPL-ACNC: 2.18 MIU/L (ref 0.44–3.98)
VLDL: 29 MG/DL (ref 0–40)
WBC # BLD AUTO: 5.3 X10*3/UL (ref 4.4–11.3)

## 2024-12-05 PROCEDURE — 80053 COMPREHEN METABOLIC PANEL: CPT

## 2024-12-05 PROCEDURE — 84443 ASSAY THYROID STIM HORMONE: CPT

## 2024-12-05 PROCEDURE — 80061 LIPID PANEL: CPT

## 2024-12-05 PROCEDURE — 36415 COLL VENOUS BLD VENIPUNCTURE: CPT

## 2024-12-05 PROCEDURE — 83036 HEMOGLOBIN GLYCOSYLATED A1C: CPT

## 2024-12-05 PROCEDURE — 85025 COMPLETE CBC W/AUTO DIFF WBC: CPT

## 2024-12-06 LAB
EST. AVERAGE GLUCOSE BLD GHB EST-MCNC: 94 MG/DL
HBA1C MFR BLD: 4.9 %

## 2024-12-10 ENCOUNTER — APPOINTMENT (OUTPATIENT)
Dept: PRIMARY CARE | Facility: CLINIC | Age: 44
End: 2024-12-10
Payer: COMMERCIAL

## 2024-12-10 VITALS
OXYGEN SATURATION: 97 % | BODY MASS INDEX: 24.05 KG/M2 | TEMPERATURE: 97.4 F | WEIGHT: 149 LBS | SYSTOLIC BLOOD PRESSURE: 124 MMHG | HEART RATE: 53 BPM | DIASTOLIC BLOOD PRESSURE: 80 MMHG

## 2024-12-10 DIAGNOSIS — M62.838 MUSCLE SPASM: ICD-10-CM

## 2024-12-10 DIAGNOSIS — F33.1 DEPRESSION, MAJOR, RECURRENT, MODERATE: ICD-10-CM

## 2024-12-10 DIAGNOSIS — J30.0 VASOMOTOR RHINITIS: ICD-10-CM

## 2024-12-10 DIAGNOSIS — E83.51 HYPOCALCEMIA: ICD-10-CM

## 2024-12-10 DIAGNOSIS — K21.9 GASTROESOPHAGEAL REFLUX DISEASE, UNSPECIFIED WHETHER ESOPHAGITIS PRESENT: ICD-10-CM

## 2024-12-10 DIAGNOSIS — G47.00 INSOMNIA, UNSPECIFIED TYPE: ICD-10-CM

## 2024-12-10 DIAGNOSIS — M79.7 FIBROMYALGIA: ICD-10-CM

## 2024-12-10 DIAGNOSIS — E03.9 HYPOTHYROIDISM, UNSPECIFIED TYPE: Primary | ICD-10-CM

## 2024-12-10 PROCEDURE — 99214 OFFICE O/P EST MOD 30 MIN: CPT | Performed by: FAMILY MEDICINE

## 2024-12-10 PROCEDURE — 1036F TOBACCO NON-USER: CPT | Performed by: FAMILY MEDICINE

## 2024-12-10 RX ORDER — LEVOTHYROXINE SODIUM 25 UG/1
12.5 TABLET ORAL DAILY
Qty: 45 TABLET | Refills: 1 | Status: SHIPPED | OUTPATIENT
Start: 2024-12-10 | End: 2025-06-08

## 2024-12-10 RX ORDER — IPRATROPIUM BROMIDE 21 UG/1
2 SPRAY, METERED NASAL EVERY 12 HOURS
Qty: 30 ML | Refills: 5 | Status: SHIPPED | OUTPATIENT
Start: 2024-12-10 | End: 2025-12-10

## 2024-12-10 RX ORDER — MAGNESIUM GLUCONATE 27.5 (500)
27.5 TABLET ORAL 2 TIMES DAILY
Qty: 90 TABLET | Refills: 1 | Status: SHIPPED | OUTPATIENT
Start: 2024-12-10 | End: 2024-12-13 | Stop reason: SDUPTHER

## 2024-12-10 RX ORDER — PREGABALIN 150 MG/1
150 CAPSULE ORAL 2 TIMES DAILY
Qty: 180 CAPSULE | Refills: 1 | Status: SHIPPED | OUTPATIENT
Start: 2024-12-10 | End: 2025-03-10

## 2024-12-10 RX ORDER — ESCITALOPRAM OXALATE 10 MG/1
10 TABLET ORAL DAILY
Qty: 30 TABLET | Refills: 2 | Status: CANCELLED | OUTPATIENT
Start: 2024-12-10

## 2024-12-10 RX ORDER — ESCITALOPRAM OXALATE 20 MG/1
20 TABLET ORAL DAILY
Qty: 90 TABLET | Refills: 1 | Status: SHIPPED | OUTPATIENT
Start: 2024-12-10 | End: 2025-06-08

## 2024-12-10 RX ORDER — HYDROXYZINE HYDROCHLORIDE 10 MG/1
10-20 TABLET, FILM COATED ORAL NIGHTLY PRN
Qty: 180 TABLET | Refills: 1 | Status: SHIPPED | OUTPATIENT
Start: 2024-12-10

## 2024-12-10 RX ORDER — SODIUM FLUORIDE/POTASSIUM NIT 1.1 %-5 %
PASTE (ML) DENTAL
COMMUNITY
Start: 2024-12-03

## 2024-12-10 RX ORDER — PANTOPRAZOLE SODIUM 40 MG/1
40 TABLET, DELAYED RELEASE ORAL DAILY
Qty: 90 TABLET | Refills: 1 | Status: SHIPPED | OUTPATIENT
Start: 2024-12-10 | End: 2025-06-08

## 2024-12-10 ASSESSMENT — ENCOUNTER SYMPTOMS
ABDOMINAL PAIN: 0
COUGH: 0
CHILLS: 0
SINUS PRESSURE: 0
DIARRHEA: 0
VOMITING: 0
DYSPHORIC MOOD: 1
RHINORRHEA: 1
FEVER: 0
NAUSEA: 0
SHORTNESS OF BREATH: 0

## 2024-12-10 NOTE — PROGRESS NOTES
Subjective   Patient ID: Ally Carpio is a 43 y.o. female who presents for Depression, Hyperlipidemia, and Hypothyroidism (Review BW).    Ally has noticed improvement in her depression symptoms since starting escitalopram.  She does feel that the medication is working well, but she would like to try a higher dose.  She has not experienced any side effects from the medication.  She is sleeping well by using the hydroxyzine as needed.    She has noticed that she often gets a runny nose when eating.  It does not matter which type of food she eats, she has clear nasal drainage immediately on eating.  She has not had any congestion or sinus pressure.         Review of Systems   Constitutional:  Negative for chills and fever.   HENT:  Positive for rhinorrhea. Negative for congestion, ear discharge, ear pain and sinus pressure.    Respiratory:  Negative for cough and shortness of breath.    Cardiovascular:  Negative for chest pain.   Gastrointestinal:  Negative for abdominal pain, diarrhea, nausea and vomiting.   Psychiatric/Behavioral:  Positive for dysphoric mood.        Objective   /80   Pulse 53   Temp 36.3 °C (97.4 °F)   Wt 67.6 kg (149 lb)   SpO2 97%   BMI 24.05 kg/m²     Physical Exam  Constitutional:       General: She is not in acute distress.     Appearance: Normal appearance.   HENT:      Head: Normocephalic.      Mouth/Throat:      Mouth: Mucous membranes are moist.   Eyes:      Extraocular Movements: Extraocular movements intact.      Conjunctiva/sclera: Conjunctivae normal.   Cardiovascular:      Rate and Rhythm: Normal rate and regular rhythm.      Heart sounds: No murmur heard.  Pulmonary:      Breath sounds: No wheezing or rhonchi.   Musculoskeletal:      Cervical back: Neck supple.   Skin:     General: Skin is warm and dry.   Neurological:      Mental Status: She is alert.   Psychiatric:         Mood and Affect: Mood normal.         Behavior: Behavior normal.         Assessment/Plan    Problem List Items Addressed This Visit             ICD-10-CM    Hypothyroidism - Primary E03.9     TSH stable.  Continue current dose of levothyroxine.         Relevant Medications    levothyroxine (Synthroid, Levoxyl) 25 mcg tablet    Other Relevant Orders    Thyroid Stimulating Hormone    Depression, major, recurrent, moderate F33.1    Relevant Medications    escitalopram (Lexapro) 20 mg tablet    Insomnia G47.00    Relevant Medications    hydrOXYzine HCL (Atarax) 10 mg tablet    Fibromyalgia M79.7     Patient previously prescribed 2 capsules and 75 mg twice per day by neurology.  Switch to 150 mg capsule.  Continue to take twice per day.  CSA signed.         Relevant Medications    pregabalin (Lyrica) 150 mg capsule    Vasomotor rhinitis J30.0     Start ipratropium nasal spray twice daily.         Relevant Medications    ipratropium (Atrovent) 21 mcg (0.03 %) nasal spray     Other Visit Diagnoses         Codes    Muscle spasm     M62.838    Relevant Medications    magnesium gluconate (Magonate) 27.5 mg magne- sium (500 mg) tablet    Gastroesophageal reflux disease, unspecified whether esophagitis present     K21.9    Relevant Medications    pantoprazole (ProtoNix) 40 mg EC tablet    Hypocalcemia     E83.51    Relevant Orders    Comprehensive Metabolic Panel

## 2024-12-10 NOTE — ASSESSMENT & PLAN NOTE
Patient previously prescribed 2 capsules and 75 mg twice per day by neurology.  Switch to 150 mg capsule.  Continue to take twice per day.  CSA signed.

## 2024-12-12 ENCOUNTER — LAB (OUTPATIENT)
Dept: LAB | Facility: LAB | Age: 44
End: 2024-12-12
Payer: COMMERCIAL

## 2024-12-12 ENCOUNTER — PATIENT MESSAGE (OUTPATIENT)
Dept: PRIMARY CARE | Facility: CLINIC | Age: 44
End: 2024-12-12

## 2024-12-12 DIAGNOSIS — M62.838 MUSCLE SPASM: ICD-10-CM

## 2024-12-12 DIAGNOSIS — E61.1 IRON DEFICIENCY: ICD-10-CM

## 2024-12-12 DIAGNOSIS — D50.0 IRON DEFICIENCY ANEMIA DUE TO CHRONIC BLOOD LOSS: ICD-10-CM

## 2024-12-12 LAB
ERYTHROCYTE [DISTWIDTH] IN BLOOD BY AUTOMATED COUNT: 13.4 % (ref 11.5–14.5)
FERRITIN SERPL-MCNC: 41 NG/ML (ref 8–150)
HCT VFR BLD AUTO: 37.7 % (ref 36–46)
HGB BLD-MCNC: 12.2 G/DL (ref 12–16)
IRON SATN MFR SERPL: 16 % (ref 25–45)
IRON SERPL-MCNC: 44 UG/DL (ref 35–150)
MCH RBC QN AUTO: 29.9 PG (ref 26–34)
MCHC RBC AUTO-ENTMCNC: 32.4 G/DL (ref 32–36)
MCV RBC AUTO: 92 FL (ref 80–100)
NRBC BLD-RTO: 0 /100 WBCS (ref 0–0)
PLATELET # BLD AUTO: 211 X10*3/UL (ref 150–450)
RBC # BLD AUTO: 4.08 X10*6/UL (ref 4–5.2)
TIBC SERPL-MCNC: 273 UG/DL (ref 240–445)
UIBC SERPL-MCNC: 229 UG/DL (ref 110–370)
VIT B12 SERPL-MCNC: 511 PG/ML (ref 211–911)
WBC # BLD AUTO: 6.1 X10*3/UL (ref 4.4–11.3)

## 2024-12-12 PROCEDURE — 82728 ASSAY OF FERRITIN: CPT

## 2024-12-12 PROCEDURE — 83550 IRON BINDING TEST: CPT

## 2024-12-12 PROCEDURE — 83540 ASSAY OF IRON: CPT

## 2024-12-12 PROCEDURE — 82607 VITAMIN B-12: CPT

## 2024-12-12 PROCEDURE — 36415 COLL VENOUS BLD VENIPUNCTURE: CPT

## 2024-12-12 PROCEDURE — 85027 COMPLETE CBC AUTOMATED: CPT

## 2024-12-13 ENCOUNTER — APPOINTMENT (OUTPATIENT)
Dept: INTEGRATIVE MEDICINE | Facility: CLINIC | Age: 44
End: 2024-12-13
Payer: COMMERCIAL

## 2024-12-13 RX ORDER — MAGNESIUM GLUCONATE 27.5 (500)
27.5 TABLET ORAL 2 TIMES DAILY
Qty: 90 TABLET | Refills: 2 | Status: SHIPPED | OUTPATIENT
Start: 2024-12-13

## 2024-12-16 ENCOUNTER — INFUSION (OUTPATIENT)
Dept: HEMATOLOGY/ONCOLOGY | Facility: CLINIC | Age: 44
End: 2024-12-16
Payer: COMMERCIAL

## 2024-12-16 ENCOUNTER — OFFICE VISIT (OUTPATIENT)
Dept: HEMATOLOGY/ONCOLOGY | Facility: CLINIC | Age: 44
End: 2024-12-16
Payer: COMMERCIAL

## 2024-12-16 VITALS
DIASTOLIC BLOOD PRESSURE: 69 MMHG | RESPIRATION RATE: 17 BRPM | WEIGHT: 148.26 LBS | HEIGHT: 66 IN | HEART RATE: 54 BPM | SYSTOLIC BLOOD PRESSURE: 106 MMHG | TEMPERATURE: 97.5 F | BODY MASS INDEX: 23.83 KG/M2 | OXYGEN SATURATION: 99 %

## 2024-12-16 VITALS — RESPIRATION RATE: 18 BRPM | HEART RATE: 54 BPM | DIASTOLIC BLOOD PRESSURE: 65 MMHG | SYSTOLIC BLOOD PRESSURE: 99 MMHG

## 2024-12-16 DIAGNOSIS — D50.0 IRON DEFICIENCY ANEMIA DUE TO CHRONIC BLOOD LOSS: Primary | ICD-10-CM

## 2024-12-16 DIAGNOSIS — D50.0 IRON DEFICIENCY ANEMIA DUE TO CHRONIC BLOOD LOSS: ICD-10-CM

## 2024-12-16 PROCEDURE — 96374 THER/PROPH/DIAG INJ IV PUSH: CPT | Mod: INF

## 2024-12-16 PROCEDURE — 99214 OFFICE O/P EST MOD 30 MIN: CPT | Performed by: PHYSICIAN ASSISTANT

## 2024-12-16 PROCEDURE — 3008F BODY MASS INDEX DOCD: CPT | Performed by: PHYSICIAN ASSISTANT

## 2024-12-16 PROCEDURE — 2500000004 HC RX 250 GENERAL PHARMACY W/ HCPCS (ALT 636 FOR OP/ED): Performed by: PHYSICIAN ASSISTANT

## 2024-12-16 RX ORDER — ALBUTEROL SULFATE 0.83 MG/ML
3 SOLUTION RESPIRATORY (INHALATION) AS NEEDED
Status: DISCONTINUED | OUTPATIENT
Start: 2024-12-16 | End: 2024-12-16 | Stop reason: HOSPADM

## 2024-12-16 RX ORDER — HEPARIN 100 UNIT/ML
500 SYRINGE INTRAVENOUS AS NEEDED
OUTPATIENT
Start: 2024-12-16

## 2024-12-16 RX ORDER — FAMOTIDINE 10 MG/ML
20 INJECTION INTRAVENOUS ONCE AS NEEDED
OUTPATIENT
Start: 2025-06-14

## 2024-12-16 RX ORDER — DIPHENHYDRAMINE HYDROCHLORIDE 50 MG/ML
50 INJECTION INTRAMUSCULAR; INTRAVENOUS AS NEEDED
Status: DISCONTINUED | OUTPATIENT
Start: 2024-12-16 | End: 2024-12-16 | Stop reason: HOSPADM

## 2024-12-16 RX ORDER — EPINEPHRINE 0.3 MG/.3ML
0.3 INJECTION SUBCUTANEOUS EVERY 5 MIN PRN
Status: DISCONTINUED | OUTPATIENT
Start: 2024-12-16 | End: 2024-12-16 | Stop reason: HOSPADM

## 2024-12-16 RX ORDER — DIPHENHYDRAMINE HYDROCHLORIDE 50 MG/ML
50 INJECTION INTRAMUSCULAR; INTRAVENOUS AS NEEDED
OUTPATIENT
Start: 2025-06-14

## 2024-12-16 RX ORDER — HEPARIN SODIUM,PORCINE/PF 10 UNIT/ML
50 SYRINGE (ML) INTRAVENOUS AS NEEDED
OUTPATIENT
Start: 2024-12-16

## 2024-12-16 RX ORDER — HEPARIN 100 UNIT/ML
500 SYRINGE INTRAVENOUS AS NEEDED
Status: DISCONTINUED | OUTPATIENT
Start: 2024-12-16 | End: 2024-12-16 | Stop reason: HOSPADM

## 2024-12-16 RX ORDER — HEPARIN SODIUM,PORCINE/PF 10 UNIT/ML
50 SYRINGE (ML) INTRAVENOUS AS NEEDED
Status: DISCONTINUED | OUTPATIENT
Start: 2024-12-16 | End: 2024-12-16 | Stop reason: HOSPADM

## 2024-12-16 RX ORDER — ALBUTEROL SULFATE 0.83 MG/ML
3 SOLUTION RESPIRATORY (INHALATION) AS NEEDED
OUTPATIENT
Start: 2025-06-14

## 2024-12-16 RX ORDER — EPINEPHRINE 0.3 MG/.3ML
0.3 INJECTION SUBCUTANEOUS EVERY 5 MIN PRN
OUTPATIENT
Start: 2025-06-14

## 2024-12-16 RX ORDER — FAMOTIDINE 10 MG/ML
20 INJECTION INTRAVENOUS ONCE AS NEEDED
Status: DISCONTINUED | OUTPATIENT
Start: 2024-12-16 | End: 2024-12-16 | Stop reason: HOSPADM

## 2024-12-16 SDOH — ECONOMIC STABILITY: FOOD INSECURITY: WITHIN THE PAST 12 MONTHS, THE FOOD YOU BOUGHT JUST DIDN'T LAST AND YOU DIDN'T HAVE MONEY TO GET MORE.: NEVER TRUE

## 2024-12-16 SDOH — ECONOMIC STABILITY: FOOD INSECURITY: WITHIN THE PAST 12 MONTHS, YOU WORRIED THAT YOUR FOOD WOULD RUN OUT BEFORE YOU GOT THE MONEY TO BUY MORE.: NEVER TRUE

## 2024-12-16 ASSESSMENT — COLUMBIA-SUICIDE SEVERITY RATING SCALE - C-SSRS
6. HAVE YOU EVER DONE ANYTHING, STARTED TO DO ANYTHING, OR PREPARED TO DO ANYTHING TO END YOUR LIFE?: NO
1. IN THE PAST MONTH, HAVE YOU WISHED YOU WERE DEAD OR WISHED YOU COULD GO TO SLEEP AND NOT WAKE UP?: NO
2. HAVE YOU ACTUALLY HAD ANY THOUGHTS OF KILLING YOURSELF?: NO

## 2024-12-16 ASSESSMENT — PAIN SCALES - GENERAL: PAINLEVEL_OUTOF10: 4

## 2024-12-16 NOTE — PROGRESS NOTES
Patient Visit Information:   Visit Type: Benign Heme Follow-up     Cancer History:   Treatment Synopsis:    42 female referred by Dr. Cooney for CAMDEN and IV iron.    Upon review of labs noted to be anemic with iron deficiency ~1 year ago. Started on oral iron but can't tolerate due to GI upset so stopped taking it. Also of note is thrombocytopenia, also new, last cbc with normal plt count was in  and no labs done between  till now.    On assessment, reports heavy menses and has been experiencing PIERRE and heart palpitations. Notes that she was recently diagnosed with psoriasis but might also have psoriatic arthritis, work up being done now. Otherwise doing well. Denies F/C/night sweats, unintentional weight loss, anorexia, fatigue, HA, change in vision/hearing, palpitations, CP, SOB, n/v/d/c/abd pain, bleeding, melena, LAD, peripheral neuropathy, rash.     S/P 3 doses of Venofer, last given 2023. Had EGD and VCE on 2023 c/w gastritis. No c-scope performed.    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    History of Present Illness:   Patient presents for follow up. In 2024, patient had high dose steroids for b/l foot drop, which didn't improve her symptoms but did improve her platelet count. She has planned hysterectomy in January. Starting medication for her UC next week. Patient reports ongoing fatigue otherwise doing well.     Review of Systems:    12 pt ROS was performed, pertinent positive and negative findings as per HPI above, remainder of systems reviewed and are negative.    Allergies and Intolerances:   Allergies   Allergen Reactions    Balsalazide Other     Mouth ulcers    Mesalamine Rash     Outpatient Medication Profile:   Current Outpatient Medications on File Prior to Visit   Medication Sig Dispense Refill  "   budesonide ER (Uceris) 9 mg tablet Take 1 tablet (9 mg) by mouth once daily for 30 days, THEN 1 tablet (9 mg) every other day for 15 days, THEN 1 tablet (9 mg) every 3 days for 11 days. 42 tablet 0    cyanocobalamin (Vitamin B-12) 1,000 mcg/mL injection Inject 1 mL (1,000 mcg) into the muscle see administration instructions. Please inject daily x 7, followed by Weekly x 4 and then Monthly 30 mL 3    ergocalciferol (Vitamin D-2) 1.25 MG (49799 UT) capsule Take 1 capsule (1,250 mcg) by mouth 1 (one) time per week. 4 capsule 11    escitalopram (Lexapro) 20 mg tablet Take 1 tablet (20 mg) by mouth once daily. 90 tablet 1    hydrOXYzine HCL (Atarax) 10 mg tablet Take 1-2 tablets (10-20 mg) by mouth as needed at bedtime (insomnia). 180 tablet 1    ipratropium (Atrovent) 21 mcg (0.03 %) nasal spray Administer 2 sprays into each nostril every 12 hours. 30 mL 5    levothyroxine (Synthroid, Levoxyl) 25 mcg tablet Take 0.5 tablets (12.5 mcg) by mouth once daily. 45 tablet 1    magnesium gluconate (Magonate) 27.5 mg magne- sium (500 mg) tablet Take 1 tablet (27.5 mg) by mouth 2 times a day. 90 tablet 2    mirikizumab-mrkz (Omvoh) 300 mg/15 mL (20 mg/mL) solution injection Infuse 15 mL (300 mg) over 30 minutes into a venous catheter every 28 (twenty-eight) days. 15 mL 2    miscellaneous medical supply misc Bilateral AFO. Apply to bilateral lower legs daily. 1 each 0    multivitamin tablet Take 1 tablet by mouth once daily.      norethindrone (Aygestin) 5 mg tablet Take 1 tablet (5 mg) by mouth once daily. 30 tablet 0    pantoprazole (ProtoNix) 40 mg EC tablet Take 1 tablet (40 mg) by mouth once daily. Do not crush, chew, or split. 90 tablet 1    pregabalin (Lyrica) 150 mg capsule Take 1 capsule (150 mg) by mouth 2 times a day. 180 capsule 1    PreviDent 5000 Sensitive 1.1-5 % paste use as directed      syringe with needle (Syringe 3cc/22Gx1\") 3 mL 22 gauge x 1\" syringe 1 Application see administration instructions. 30 each 11 " "   tacrolimus (Protopic) 0.1 % ointment if needed.      tiZANidine (Zanaflex) 4 mg tablet Take 1 tab a day. 90 tablet 0    UltiCare Safety Syringe 3 mL 22 gauge x 1\" syringe       Vtama 1 % cream if needed.      [DISCONTINUED] escitalopram (Lexapro) 10 mg tablet Take 1 tablet (10 mg) by mouth once daily. 30 tablet 2    [DISCONTINUED] hydrOXYzine HCL (Atarax) 10 mg tablet Take 1-2 tablets (10-20 mg) by mouth as needed at bedtime (insomnia). 180 tablet 1    [DISCONTINUED] levothyroxine (Synthroid, Levoxyl) 25 mcg tablet Take 0.5 tablets (12.5 mcg) by mouth once daily. 45 tablet 1    [DISCONTINUED] magnesium gluconate (Magonate) 27.5 mg magne- sium (500 mg) tablet Take 1 tablet (27.5 mg) by mouth 2 times a day. 90 tablet 1    [DISCONTINUED] magnesium gluconate (Magonate) 27.5 mg magne- sium (500 mg) tablet Take 1 tablet (27.5 mg) by mouth 2 times a day. 90 tablet 1    [DISCONTINUED] pantoprazole (ProtoNix) 40 mg EC tablet Take 1 tablet (40 mg) by mouth once daily. Do not crush, chew, or split. 90 tablet 1    [DISCONTINUED] pregabalin (Lyrica) 75 mg capsule Take 2 capsules (150 mg) by mouth 2 times a day. 120 capsule 5     No current facility-administered medications on file prior to visit.     Vitals:      10/8/2024     2:19 PM 10/9/2024     9:32 AM 10/15/2024     1:56 PM 10/30/2024    11:44 AM 11/19/2024     8:52 AM 12/10/2024    10:17 AM 12/16/2024     9:22 AM   Vitals   Systolic 102 103 117 110 111 124 106   Diastolic 60 66 63 70 48 80 69   BP Location Left arm Right arm     Left arm   Heart Rate 50 51 57 58 60 53 54   Temp 36 °C (96.8 °F) 36 °C (96.8 °F)  36.2 °C (97.2 °F) 36.7 °C (98.1 °F) 36.3 °C (97.4 °F) 36.4 °C (97.5 °F)   Resp 16 16     17   Height   1.676 m (5' 6\")    1.664 m (5' 5.51\")   Weight (lb) 149.03 149.6 149 152 145.6 149 148.26   BMI 24.05 kg/m2 24.15 kg/m2 24.05 kg/m2 24.53 kg/m2 23.5 kg/m2 24.05 kg/m2 24.29 kg/m2   BSA (m2) 1.77 m2 1.78 m2 1.77 m2 1.79 m2 1.75 m2 1.77 m2 1.76 m2   Visit Report   " Report Report Report Report Report       Physical Exam:   Constitutional: alert, awake and oriented, not in acute distress   HEENT: moist mucous membranes, normal nose   Neck: supple, no lymphadenopathy   EYES: PERRL, EOM intact, conjunctiva normal  Skin: no jaundice, rash or erythema  Neurological: AAOx3, no gross focal deficit   Psychiatric: normal mood and behavior     Labs:  Lab Results   Component Value Date    WBC 6.1 12/12/2024    NEUTROABS 3.17 12/05/2024    IGABSOL 0.02 12/05/2024    LYMPHSABS 0.88 (L) 12/05/2024    MONOSABS 0.48 12/05/2024    EOSABS 0.66 12/05/2024    BASOSABS 0.07 12/05/2024    RBC 4.08 12/12/2024    MCV 92 12/12/2024    MCHC 32.4 12/12/2024    HGB 12.2 12/12/2024    HCT 37.7 12/12/2024     12/12/2024     Lab Results   Component Value Date    RETICCTPCT 2.0 08/04/2023      Lab Results   Component Value Date    CREATININE 0.75 12/05/2024    BUN 16 12/05/2024    EGFR >90 12/05/2024     12/05/2024    K 4.2 12/05/2024     12/05/2024    CO2 30 12/05/2024      Lab Results   Component Value Date    ALT 7 12/05/2024    AST 8 (L) 12/05/2024    ALKPHOS 43 12/05/2024    BILITOT 0.4 12/05/2024      Lab Results   Component Value Date    TSH 2.18 12/05/2024     Lab Results   Component Value Date    TSH 2.18 12/05/2024     Lab Results   Component Value Date    IRON 44 12/12/2024    TIBC 273 12/12/2024    FERRITIN 41 12/12/2024      Lab Results   Component Value Date    FWHBTHZQ89 511 12/12/2024      Lab Results   Component Value Date    FOLATE 11.6 09/30/2023     Lab Results   Component Value Date    ALEXEY POSITIVE (A) 08/01/2023    RF <10 10/25/2023    SEDRATE 37 (H) 10/25/2023      Lab Results   Component Value Date    CRP 0.30 11/19/2024      Lab Results   Component Value Date    HAPTOGLOBIN 190 08/04/2023     Lab Results   Component Value Date    SPEP NORMAL 08/01/2023     Lab Results   Component Value Date     09/24/2024     09/24/2024     09/24/2024         Assessment:    42 female referred for CAMDEN and IV iron due to heavy menses. Can't tolerate oral iron.     S/P 3 doses of Venofer, last given 6/19/2023. Had EGD and VCE on 6/30/2023 c/w gastritis. No c-scope performed.    Thrombocytopenia w/u including abd US (r/o liver etiologies) unremarkble. Likely ITP    Plan:    Reviewed and discussed lab, imaging, and pathology results with patient in detail as well as diagnosis, prognosis, and treatment options.    Continue Venofer 200 mg x 2 doses today and continue to monitor every 6 months.    Platelet improve    Continue B12 injections    BMBx w low level of  but otherwise no evidence of malignancy.    F/U w/GYN, GI, neuro    F/U w/PCP    RTC in 6 month    Patient verbalized understanding, and all her questions were answered to her satisfaction.     30 min spent with patient greater than 50 % of which was spent in consultation, counselling and coordination of care.

## 2024-12-17 ENCOUNTER — DOCUMENTATION (OUTPATIENT)
Dept: INFUSION THERAPY | Facility: CLINIC | Age: 44
End: 2024-12-17
Payer: COMMERCIAL

## 2024-12-17 ENCOUNTER — APPOINTMENT (OUTPATIENT)
Dept: CARDIOLOGY | Facility: HOSPITAL | Age: 44
End: 2024-12-17
Payer: COMMERCIAL

## 2024-12-17 NOTE — PROGRESS NOTES
CLINICAL CLEARANCE FOR OUTPATIENT INFUSION      Patient to be scheduled for New Start of Omvoh Infusions     For Diagnosis: Ulcerative Colitis     Labs Required Prior to First Treatment (place lab order if not already ordered or completed):  T Spot Drawn/Results:   Lab Results   Component Value Date    TBSIN Negative 09/24/2024        ALT:   Lab Results   Component Value Date    ALT 7 12/05/2024      (Hold/contact prescribing provider if: ALT >3X ULN)    AST:   Lab Results   Component Value Date    ALT 7 12/05/2024    AST 8 (L) 12/05/2024    ALKPHOS 43 12/05/2024    BILITOT 0.4 12/05/2024        Chemistry    Lab Results   Component Value Date/Time     12/05/2024 0758    K 4.2 12/05/2024 0758     12/05/2024 0758    CO2 30 12/05/2024 0758    BUN 16 12/05/2024 0758    CREATININE 0.75 12/05/2024 0758    Lab Results   Component Value Date/Time    CALCIUM 8.5 (L) 12/05/2024 0758    ALKPHOS 43 12/05/2024 0758    AST 8 (L) 12/05/2024 0758    ALT 7 12/05/2024 0758    BILITOT 0.4 12/05/2024 0758           (Hold/contact prescribing provider if : AST >3X ULN)    History of liver disease / cirrhosis? No  (If yes clarify that prescribing provider aware and okay to proceed)    Patient Active Problem List   Diagnosis    Iron deficiency anemia due to chronic blood loss    Mixed hyperlipidemia    IFG (impaired fasting glucose)    Hypothyroidism    Gastroesophageal reflux disease without esophagitis    Psoriasis (a type of skin inflammation)    Ulcerative colitis    Chronic ITP (idiopathic thrombocytopenic purpura) (Multi)    Menorrhagia with regular cycle    Left hemiparesis (Multi)    Restless leg syndrome    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    Radiculopathy, cervical    Radiculopathy, lumbar region    Chronic pain syndrome    Weakness of both lower extremities    Depression, major, recurrent, moderate    Menorrhagia with irregular cycle    Optic neuritis    Insomnia    CNS  demyelination (Multi)    Fibromyalgia    Vasomotor rhinitis     Past Medical History:   Diagnosis Date    ALEXEY positive     Anemia     Anxiety     pt denies    Chronic pain syndrome     GERD (gastroesophageal reflux disease)     Heart murmur     Hx of idiopathic thrombocytopenic purpura     follows with heme, 6.13.24: plt 111    Hypothyroidism     CAMDEN (iron deficiency anemia)     6.13.24: H/H 11.7/34.8, received IV venofer x 3 doses 6/2024    Inflammatory bowel disease 12/2023    Murmur     ECHO 9/26/23 unremarkable    Neuropathy     Psoriasis     Radiculopathy, cervical     Radiculopathy, lumbar region     RLS (restless legs syndrome)     Ulcerative colitis        Last treatment received: na (if continuation)   Due: anytime    Okay to schedule for treatment as ordered by prescribing provider

## 2024-12-18 ENCOUNTER — TELEPHONE (OUTPATIENT)
Dept: PREADMISSION TESTING | Facility: HOSPITAL | Age: 44
End: 2024-12-18

## 2024-12-18 ENCOUNTER — APPOINTMENT (OUTPATIENT)
Dept: INFUSION THERAPY | Facility: CLINIC | Age: 44
End: 2024-12-18
Payer: COMMERCIAL

## 2024-12-18 VITALS
HEART RATE: 47 BPM | OXYGEN SATURATION: 99 % | BODY MASS INDEX: 24.78 KG/M2 | RESPIRATION RATE: 18 BRPM | DIASTOLIC BLOOD PRESSURE: 65 MMHG | WEIGHT: 151.24 LBS | TEMPERATURE: 98.4 F | SYSTOLIC BLOOD PRESSURE: 90 MMHG

## 2024-12-18 DIAGNOSIS — K51.819 OTHER ULCERATIVE COLITIS WITH COMPLICATION: ICD-10-CM

## 2024-12-18 PROCEDURE — 96365 THER/PROPH/DIAG IV INF INIT: CPT | Performed by: NURSE PRACTITIONER

## 2024-12-18 RX ORDER — FAMOTIDINE 10 MG/ML
20 INJECTION INTRAVENOUS ONCE AS NEEDED
OUTPATIENT
Start: 2025-01-15

## 2024-12-18 RX ORDER — EPINEPHRINE 0.3 MG/.3ML
0.3 INJECTION SUBCUTANEOUS EVERY 5 MIN PRN
OUTPATIENT
Start: 2025-01-15

## 2024-12-18 RX ORDER — ALBUTEROL SULFATE 0.83 MG/ML
3 SOLUTION RESPIRATORY (INHALATION) AS NEEDED
OUTPATIENT
Start: 2025-01-15

## 2024-12-18 RX ORDER — DIPHENHYDRAMINE HYDROCHLORIDE 50 MG/ML
50 INJECTION INTRAMUSCULAR; INTRAVENOUS AS NEEDED
OUTPATIENT
Start: 2025-01-15

## 2024-12-18 ASSESSMENT — ENCOUNTER SYMPTOMS
WHEEZING: 0
NAUSEA: 0
HEADACHES: 0
NERVOUS/ANXIOUS: 1
HEMATURIA: 0
EYE PROBLEMS: 0
CHILLS: 0
DEPRESSION: 1
SORE THROAT: 0
FEVER: 0
DIZZINESS: 0
NUMBNESS: 0
ABDOMINAL PAIN: 0
APPETITE CHANGE: 0
DYSURIA: 0
TROUBLE SWALLOWING: 0
COUGH: 0
LIGHT-HEADEDNESS: 0
WOUND: 0
VOICE CHANGE: 0
SHORTNESS OF BREATH: 0
VOMITING: 0
BLOOD IN STOOL: 1
FATIGUE: 0
LEG SWELLING: 0
PALPITATIONS: 0
FREQUENCY: 0
MYALGIAS: 0
UNEXPECTED WEIGHT CHANGE: 0
CONSTIPATION: 0
ARTHRALGIAS: 0
DIARRHEA: 1
EXTREMITY WEAKNESS: 0

## 2024-12-18 NOTE — PROGRESS NOTES
"[facility]   Infusion Clinic Note   Date: 2024   Name: Ally Carpio  : 1980   MRN: [id number]          Reason for Visit: New Patient Visit and OP Infusion (PT HERE FOR OMVOH 300MG  FOR WEEK 0/NEXT APT: 28 DAYS )         Today: We administered mirikizumab-mrkz.       Visit Type: INFUSION       Ordered By: WALKER       Accompanied by:       Diagnosis: Other ulcerative colitis with complication        Allergies:   Allergies as of 2024 - Reviewed 2024   Allergen Reaction Noted    Balsalazide Other 2024    Iron GI Upset 2024    Mesalamine Rash 2024          Current Medications has a current medication list which includes the following prescription(s): budesonide er, cyanocobalamin, ergocalciferol, escitalopram, ipratropium, levothyroxine, magnesium gluconate, omvoh, miscellaneous medical supply, multivitamin, pantoprazole, pregabalin, prevident 5000 sensitive, syringe 3cc/22gx1\", tizanidine, ulticare safety syringe, hydroxyzine hcl, norethindrone, tacrolimus, and vtama.       Vitals:   Vitals:    24 1400 24 1502 24 1509   BP: 90/58  Comment: nurse notified (!) 82/40 90/65   Pulse: (!) 46  Comment: nurse notified (!) 47  Comment: NURSE NOTIFIED    Resp: 18     Temp: 36.3 °C (97.3 °F) 36.9 °C (98.4 °F)    SpO2: 98% 99%    Weight: 68.6 kg (151 lb 3.8 oz)               Infusion Pre-procedure Checklist:   - Allergies reviewed: yes   - Medications reviewed: yes       - Previous reaction to current treatment: n/a FIRST DOSE       Assess patient for the concerns below. Document provider notification as appropriate.  - Active or recent infection with/without current antibiotic use: no  - Recent or planned invasive dental work: no  - Recent or planned surgeries: no  - Recently received or plans to receive vaccinations: no  - Has treatment related toxicities: no  - Is pregnant:  n/a      Pain: 4   - Is the pain different from " normal: n/a   - Is your Doctor aware:  yes       Labs: N/A          Fall Risk Screening: Harp Fall Risk  History of Falling, Immediate or Within 3 Months: Yes  Secondary Diagnosis: Yes  Ambulatory Aid: Crutches/cane/walker  Intravenous Therapy/Heparin Lock: No  Gait/Transferring: Weak  Mental Status: Oriented to own ability  Harp Fall Risk Score: 65       Review Of Systems:  Review of Systems   Constitutional:  Negative for appetite change, chills, fatigue, fever and unexpected weight change.   HENT:   Negative for hearing loss, mouth sores, sore throat, tinnitus, trouble swallowing and voice change.    Eyes:  Negative for eye problems.   Respiratory:  Negative for cough, shortness of breath and wheezing.    Cardiovascular:  Negative for chest pain, leg swelling and palpitations.   Gastrointestinal:  Positive for blood in stool and diarrhea. Negative for abdominal pain, constipation, nausea and vomiting.        PT REPORTS 10+ BMS PER DAY  PT REPORTS BLOOD AND MUSOUS WITH NO   Genitourinary:  Negative for dysuria, frequency and hematuria.    Musculoskeletal:  Negative for arthralgias and myalgias.   Skin:  Negative for itching, rash and wound.   Neurological:  Negative for dizziness, extremity weakness, headaches, light-headedness and numbness.   Psychiatric/Behavioral:  Positive for depression. The patient is nervous/anxious.         ON MEDS          ROS completed? Yes      Infusion Readiness:  - Assessment Concerns Related to Infusion: No  - Provider notified: n/a      Document Below Only If Indicated:   New Patient Education:    NEW PATIENT MEDICATION EDUCATION PT PROVIDED WITH WRITTEN ([facility] PT EDUCATION SHEET) AND VERBAL EDUCATION REGARDING MEDICATION GIVEN. VERIFIED MEDICATION NAME WITH PATIENT AND DISCUSSED REASON FOR USE. BRIEFLY DISCUSSED HOW MEDICATION WORKS AND EDUCATED ON GOAL OF TREATMENT, FREQUENCY OF TREATMENT, ADVERSE RXN'S AND COMMON SIDE EFFECTS TO MONITOR FOR. INSTRUCTED PT TO ASSURE THAT ALL  "PROVIDERS INCLUDING DENTISTS ARE AWARE OF MEDICATION RECEIVED. DISCUSSED FLOW OF VISIT AND ORIENTED TO INFUSION CENTER. PT VERBALIZES UNDERSTANDING. CALL LIGHT PROVIDED AND PT AWARE TO ALERT STAFF OF ANY CONCERNS DURING TREATMENT.        Treatment Conditions & Drug Specific Questions:    Mirikizumab (OMVOH)    (Unless otherwise specified on patient specific therapy plan):     TREATMENT CONDITIONS:  Unless otherwise specified on patient specific therapy plan HOLD and notify provider prior to proceeding with treatment if:   o ALT GREATER than 3x ULN and/or AST GREATER than 3x ULN  o Positive T-spot      Chemistry    Lab Results   Component Value Date/Time     12/05/2024 0758    K 4.2 12/05/2024 0758     12/05/2024 0758    CO2 30 12/05/2024 0758    BUN 16 12/05/2024 0758    CREATININE 0.75 12/05/2024 0758    Lab Results   Component Value Date/Time    CALCIUM 8.5 (L) 12/05/2024 0758    ALKPHOS 43 12/05/2024 0758    AST 8 (L) 12/05/2024 0758    ALT 7 12/05/2024 0758    BILITOT 0.4 12/05/2024 0758        ,  Lab Results   Component Value Date    ALT 7 12/05/2024    AST 8 (L) 12/05/2024    ALKPHOS 43 12/05/2024    BILITOT 0.4 12/05/2024      Lab Results   Component Value Date    TBSIN Negative 09/24/2024    No results found for: \"NONUHFIRE\", \"NONUHSWGH\", \"NONUHFISH\", \"EXTHEPBSAG\"    Labs reviewed and patient meets treatment conditions? Yes    Recommended Vitals/Observation:  Vitals: Monitor patient's vital signs prior to infusion start, at end of infusion and as needed.  Observation: No observation.          Weight Based Drug Calculations:    WEIGHT BASED DRUGS: NOT APPLICABLE / FLAT DOSE          Initiated By: Lucian Interiano RN        "

## 2024-12-18 NOTE — PATIENT INSTRUCTIONS
Today :We administered mirikizumab-mrkz.     For:   1. Other ulcerative colitis with complication         Your next appointment is due in:  28 DAYS         Please read the  Medication Guide that was given to you and reviewed during todays visit.     (Tell all doctors including dentists that you are taking this medication)     Go to the emergency room or call 911 if:  -You have signs of allergic reaction:   -Rash, hives, itching.   -Swollen, blistered, peeling skin.   -Swelling of face, lips, mouth, tongue or throat.   -Tightness of chest, trouble breathing, swallowing or talking     Call your doctor:  - If IV / injection site gets red, warm, swollen, itchy or leaks fluid or pus.     (Leave dressing on your IV site for at least 2 hours and keep area clean and dry  - If you get sick or have symptoms of infection or are not feeling well for any reason.    (Wash your hands often, stay away from people who are sick)  - If you have side effects from your medication that do not go away or are bothersome.     (Refer to the teaching your nurse gave you for side effects to call your doctor about)    - Common side effects may include:  stuffy nose, headache, feeling tired, muscle aches, upset stomach  - Before receiving any vaccines     - Call [facility] at   If:  - You get sick, are on antibiotics, have had a recent vaccine, have surgery or dental work and your doctor wants your visit rescheduled.  - You need to cancel and reschedule your visit for any reason. Call at least 2 days before your visit if you need to cancel.   - Your insurance changes before your next visit.    (We will need to get approval from your new insurance. This can take up to two weeks.)     The [facility] is opened Monday thru Friday. We are closed on weekends and holidays.   Voice mail will take your call if the center is closed. If you leave a message please allow 24 hours for a call back during weekdays. If you  leave a message on a weekend/holiday, we will call you back the next business day.    A pharmacist is available Monday - Friday from 8:30AM to 3:30PM to help answer any questions you may have about your prescriptions(s). Please call pharmacy at:    [facility]: [phone]  [facility]: [phone]  [facility]: [phone]

## 2024-12-19 ENCOUNTER — APPOINTMENT (OUTPATIENT)
Dept: CARDIOLOGY | Facility: CLINIC | Age: 44
End: 2024-12-19
Payer: COMMERCIAL

## 2024-12-19 ENCOUNTER — TREATMENT (OUTPATIENT)
Dept: PHYSICAL THERAPY | Facility: HOSPITAL | Age: 44
End: 2024-12-19
Payer: COMMERCIAL

## 2024-12-19 DIAGNOSIS — G37.9 CNS DEMYELINATION (MULTI): ICD-10-CM

## 2024-12-19 PROCEDURE — 97110 THERAPEUTIC EXERCISES: CPT | Mod: GP | Performed by: PHYSICAL THERAPIST

## 2024-12-19 ASSESSMENT — BALANCE ASSESSMENTS
REACHING FORWARD WITH OUTSTRETCHED ARM WHILE STANDING: REACHES FORWARD BUT NEEDS SUPERVISION
LONG VERSION TOTAL SCORE (MAX 56): 26
STANDING UNSUPPORTED: ABLE TO STAND 2 MINUTES WITH SUPERVISION
PLACE ALTERNATE FOOT ON STEP OR STOOL WHILE STANDING UNSUPPORTED: NEEDS ASSISTANCE TO KEEP FROM FALLING/UNABLE TO TRY
SITTING WITH BACK UNSUPPORTED BUT FEET SUPPORTED ON FLOOR OR ON A STOOL: ABLE TO SIT SAFELY AND SECURELY FOR 2 MINUTES
TURN 360 DEGREES: NEEDS ASSISTANCE WHILE TURNING
PICK UP OBJECT FROM THE FLOOR FROM A STANDING POSITION: ABLE TO PICK UP SLIPPER BUT NEEDS SUPERVISION
TRANSFERS: ABLE TO TRANSFER SAFELY WITH MINOR USE OF HANDS
STANDING UNSUPPORTED WITH FEET TOGETHER: NEEDS HELP TO ATTAIN POSITION AND UNABLE TO HOLD FOR 15 SECONDS
PLACE ALTERNATE FOOT ON STEP OR STOOL WHILE STANDING UNSUPPORTED: LOOKS BEHIND ONE SIDE ONLY OTHER SIDE SHOWS LESS WEIGHT SHIFT
STANDING UNSUPPORTED WITH EYES CLOSED: UNABLE TO KEEP EYES CLOSED 3 SECONDS BUT STAYS SAFELY
STANDING TO SITTING: ABLE TO STAND INDEPENDENTLY USING HANDS
STANDING UNSUPPORTED ONE FOOT IN FRONT: LOSES BALANCE WHILE STEPPING OR STANDING
STANDING TO SITTING: SITS SAFELY WITH MINIMAL USE OF HANDS
STANDING ON ONE LEG: UNABLE TO TRY NEEDS ASSIST TO PREVENT FALL

## 2024-12-19 ASSESSMENT — PAIN - FUNCTIONAL ASSESSMENT: PAIN_FUNCTIONAL_ASSESSMENT: 0-10

## 2024-12-19 ASSESSMENT — PAIN SCALES - GENERAL: PAINLEVEL_OUTOF10: 7

## 2024-12-19 NOTE — PROGRESS NOTES
Physical Therapy    Physical Therapy Treatment / recheck/ discharge    Patient Name: Ally Carpio  MRN: [id number]  Today's Date: 12/19/2024  Time Calculation  Start Time: 1430  Stop Time: 1500  Time Calculation (min): 30 min    [id number]     PT Therapeutic Procedures Time Entry  Therapeutic Exercise Time Entry: 30           Visit: # 9      Assessment:  Pt. Is with consistent CARMINE LE decreasing strength and GRANT balance scores. Pt. Has been fit for CARMINE AFO's and will get these soon for CARMINE foot drop. Pt. Is with overall fatigue and decreasing mobility.  Was sent message. Pt. Will use CARMINE AFOs and cont. Indep HEP         Plan:  Discharge PT at this time as pt. With decreasing strength, she may benefit from further testing.      Current Problem  1. CNS demyelination (Multi)  Follow Up In Physical Therapy          Subjective   Pt. Will get CARMINE AFO's soon and is concerned about using those. Pt. Began new medicine for ulcertive colitis recently via infusion x first 3. Pt. States she is with also an iron infusion Monday.         Pain  Pain Assessment: 0-10  0-10 (Numeric) Pain Score: 7  Pain Location: Leg  Pain Orientation: Right, Left  Pain Radiating Towards: cramping legs below the knees in front.    Objective     Sensation:   Decreased sensation to light touch in left L2 through L5      Strength:   Measured with seated MMT'ing, grossly WNL's with exception of those listed below      Right Left    Hip:      Flexion 2+/5 2/5 Tremors with all hip MMT   Abduction 4-/5 4-/5    Adduction 4/5 4/5          Knee:      Flexion 3+/5 3+/5    Extension 2/5 2/5          Ankle:      DF Unable to lift off floor  -once assisted into DF can hold and perform  controlled eccentric  movement Unable to lift  Able to assist with DF  , difficulty holding in DF and  decreased control with eccentric  movement     PF            Gait:   Pt ambulates with R Straight cane, decrease pace and step length, increased left hip circumduction,  "increased genu valgum CARMINE.  Using x strap CARMINE able to clear her toes, unable without x strap.    GRANT/56       Treatments:                                            Date: 11/11 2024  2024  12/3/2024  12/19/24                                          VISIT# 5 6 7 8 9   EXERCISES     Recheck / discharge           NuStep (w/ CP) 10' L1 5' @L1 10' @L2 10' @L2 5' L1           RB Calf Str 30\"x3 30\" x 3      Hs str                        Hip Flexion standing        Hip Extension  standing         Hip Abduction  standing        Hs curls standing         Hip Adduction seated         HR         LAQ          X strap   X strap adjusted X strap  Afo trial        Gait with cane Gait with cane/FWW    NMES   Trial today      Manual stretches  15'              HEP              OP EDUCATION:       Goals:  Active       CNS Demyelination       1. Assess for and discuss bracing options- AFO vs x-strap.  Request Rx for appropriate bracing (Met)       Start:  10/14/24    Expected End:  10/30/24    Resolved:  24         2. Educate pt on FES devices and contact information so she can set up trial if interested. (Progressing)       Start:  10/14/24    Expected End:  10/30/24            3.  Pt will demonstrate independence with HEP to reinforce gains made in treatment  (Met)       Start:  10/14/24    Expected End:  24    Resolved:  24         4. Pt will have an increase in LE strength by 1/3 MMT grade to increase safety and mobility (Not Progressing)       Start:  10/14/24    Expected End:  24            5. Pt will have an increase in Grant Balance score to 40/56 for decreased risk for falls  (Not Progressing)       Start:  10/14/24    Expected End:  24               "

## 2024-12-23 ENCOUNTER — LAB (OUTPATIENT)
Dept: LAB | Facility: LAB | Age: 44
End: 2024-12-23
Payer: COMMERCIAL

## 2024-12-23 DIAGNOSIS — N92.1 MENORRHAGIA WITH IRREGULAR CYCLE: ICD-10-CM

## 2024-12-23 DIAGNOSIS — Z01.818 PREOPERATIVE EXAM FOR GYNECOLOGIC SURGERY: ICD-10-CM

## 2024-12-23 LAB
BASOPHILS # BLD AUTO: 0.07 X10*3/UL (ref 0–0.1)
BASOPHILS NFR BLD AUTO: 1.2 %
EOSINOPHIL # BLD AUTO: 0.4 X10*3/UL (ref 0–0.7)
EOSINOPHIL NFR BLD AUTO: 7 %
ERYTHROCYTE [DISTWIDTH] IN BLOOD BY AUTOMATED COUNT: 13 % (ref 11.5–14.5)
HCT VFR BLD AUTO: 35.9 % (ref 36–46)
HGB BLD-MCNC: 11.5 G/DL (ref 12–16)
IMM GRANULOCYTES # BLD AUTO: 0.02 X10*3/UL (ref 0–0.7)
IMM GRANULOCYTES NFR BLD AUTO: 0.4 % (ref 0–0.9)
LYMPHOCYTES # BLD AUTO: 1.3 X10*3/UL (ref 1.2–4.8)
LYMPHOCYTES NFR BLD AUTO: 22.9 %
MCH RBC QN AUTO: 29.6 PG (ref 26–34)
MCHC RBC AUTO-ENTMCNC: 32 G/DL (ref 32–36)
MCV RBC AUTO: 92 FL (ref 80–100)
MONOCYTES # BLD AUTO: 0.35 X10*3/UL (ref 0.1–1)
MONOCYTES NFR BLD AUTO: 6.2 %
NEUTROPHILS # BLD AUTO: 3.54 X10*3/UL (ref 1.2–7.7)
NEUTROPHILS NFR BLD AUTO: 62.3 %
NRBC BLD-RTO: 0 /100 WBCS (ref 0–0)
PLATELET # BLD AUTO: 194 X10*3/UL (ref 150–450)
RBC # BLD AUTO: 3.89 X10*6/UL (ref 4–5.2)
WBC # BLD AUTO: 5.7 X10*3/UL (ref 4.4–11.3)

## 2024-12-23 PROCEDURE — 36415 COLL VENOUS BLD VENIPUNCTURE: CPT

## 2024-12-23 PROCEDURE — 86901 BLOOD TYPING SEROLOGIC RH(D): CPT

## 2024-12-23 PROCEDURE — 86850 RBC ANTIBODY SCREEN: CPT

## 2024-12-23 PROCEDURE — 85025 COMPLETE CBC W/AUTO DIFF WBC: CPT

## 2024-12-23 PROCEDURE — 80048 BASIC METABOLIC PNL TOTAL CA: CPT

## 2024-12-23 PROCEDURE — 86900 BLOOD TYPING SEROLOGIC ABO: CPT

## 2024-12-24 LAB
ANION GAP SERPL CALC-SCNC: 11 MMOL/L (ref 10–20)
BUN SERPL-MCNC: 13 MG/DL (ref 6–23)
CALCIUM SERPL-MCNC: 8.5 MG/DL (ref 8.6–10.6)
CHLORIDE SERPL-SCNC: 104 MMOL/L (ref 98–107)
CO2 SERPL-SCNC: 27 MMOL/L (ref 21–32)
CREAT SERPL-MCNC: 0.89 MG/DL (ref 0.5–1.05)
EGFRCR SERPLBLD CKD-EPI 2021: 84 ML/MIN/1.73M*2
GLUCOSE SERPL-MCNC: 104 MG/DL (ref 74–99)
POTASSIUM SERPL-SCNC: 4.1 MMOL/L (ref 3.5–5.3)
SODIUM SERPL-SCNC: 138 MMOL/L (ref 136–145)

## 2024-12-26 ENCOUNTER — LAB REQUISITION (OUTPATIENT)
Dept: LAB | Facility: HOSPITAL | Age: 44
End: 2024-12-26
Payer: COMMERCIAL

## 2024-12-26 ENCOUNTER — PRE-ADMISSION TESTING (OUTPATIENT)
Dept: PREADMISSION TESTING | Facility: HOSPITAL | Age: 44
End: 2024-12-26
Payer: COMMERCIAL

## 2024-12-26 VITALS
TEMPERATURE: 97.3 F | WEIGHT: 145.5 LBS | BODY MASS INDEX: 22.84 KG/M2 | HEIGHT: 67 IN | RESPIRATION RATE: 18 BRPM | SYSTOLIC BLOOD PRESSURE: 82 MMHG | OXYGEN SATURATION: 98 % | HEART RATE: 50 BPM | DIASTOLIC BLOOD PRESSURE: 48 MMHG

## 2024-12-26 LAB
ABO GROUP (TYPE) IN BLOOD: NORMAL
ANTIBODY SCREEN: NORMAL
RH FACTOR (ANTIGEN D): NORMAL

## 2024-12-26 PROCEDURE — 99204 OFFICE O/P NEW MOD 45 MIN: CPT | Performed by: PHYSICIAN ASSISTANT

## 2024-12-26 ASSESSMENT — ENCOUNTER SYMPTOMS
EXCESSIVE BLEEDING: 0
CONSTIPATION: 0
WEAKNESS: 0
FEVER: 0
SHORTNESS OF BREATH: 0
WHEEZING: 0
NECK STIFFNESS: 0
WOUND: 0
NUMBNESS: 0
NECK PAIN: 0
ABDOMINAL DISTENTION: 0
CONFUSION: 0
DYSPNEA AT REST: 0
COUGH: 0
BRUISES/BLEEDS EASILY: 0
BLOOD IN STOOL: 0
HEMOPTYSIS: 0
ABDOMINAL PAIN: 0
EYE DISCHARGE: 0
VISUAL CHANGE: 0
LIMITED RANGE OF MOTION: 0
CHILLS: 0
DYSPNEA WITH EXERTION: 0
SKIN CHANGES: 0
ARTHRALGIAS: 1
EYE PAIN: 0
VOMITING: 0
MYALGIAS: 1
UNEXPECTED WEIGHT CHANGE: 0
SINUS CONGESTION: 0
TROUBLE SWALLOWING: 0
DIFFICULTY URINATING: 0
DYSURIA: 0
LIGHT-HEADEDNESS: 0
DIARRHEA: 0
PALPITATIONS: 0
NAUSEA: 0
RHINORRHEA: 0
DOUBLE VISION: 0

## 2024-12-26 NOTE — H&P (VIEW-ONLY)
[facility] Evaluation       Name: Ally Carpio (Ally Carpio)  /Age: 1980/43 y.o.       Date of Consult: 24    Referring Provider: Dr. Schaefr    Surgery, Date, and Length: Robotic Laparoscopic Total Hysterectomy; Possible Tubes vs Ovaries Removal; Cystoscopy - Bilateral , 25, 180MIN    Ally Carpio is a 43 year-old female who presents to the [facility] for perioperative risk assessment prior to surgery.    Patient presents with a primary diagnosis of irregular menses. Pt refers cycles can last as long as 30 days consecutive.  Menses are very heavy.  Pt does have h/o anemia and ITP.  Platelet level has been stable recently.  LMP = 24 (which lasted until 24).  Pt denies any current pelvic pain.  No f/c/n/v.      This note was created in part upon personal review of patient's medical records.      Patient is scheduled to have Robotic Laparoscopic Total Hysterectomy; Possible Tubes vs Ovaries Removal; Cystoscopy - Bilateral       Pt denies any past history of anesthetic complications such as PONV, awareness, prolonged sedation, dental damage, aspiration, cardiac arrest, difficult intubation, difficult I.V. access or unexpected hospital admissions.  NO malignant hyperthermia and or pseudocholinesterase deficiency.  No history of blood transfusions     The patient is not a [employer] and will accept blood and blood products if medically indicated.   Type and screen sent.     Past Medical History:   Diagnosis Date    ALEXEY positive     Cardiology follow-up encounter 2024    evaluation of bradycardia and fatigue Stress test recommended; however, patient is unable to perform s/t neurological dysfunction    Chronic pain syndrome     Depression     PIERRE (dyspnea on exertion)     Encounter for hematology follow-up 2024    Ursula Salinas PA-C    Fibromyalgia     GERD (gastroesophageal reflux disease)     H/O echocardiogram 2023    CONCLUSIONS:   1. Left ventricular  "systolic function is normal with a 65% estimated ejection fraction.    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     s/p C6-C7 Cervical Spine Arthroplasty ~ Anterior Approach 24    History of ITP     s/p D & C Hysteroscopy 24    Hx of idiopathic thrombocytopenic purpura     follows with heme, 6.24: plt 111    Hypothyroidism     [place] (iron deficiency anemia)     24 H&H WNL- IV iron   Started on oral iron but can't tolerate due to GI upset   Oncology Ursula Salinas PA-C 2024    Inflammatory bowel disease     Insomnia     Menorrhagia with regular cycle     Plan: Robot Assisted Laparoscopic Total Hysterectomy; Possible Removal of Tubes & or  Ovaries; Cystoscopy 25    Neurology follow-up encounter 10/15/2024    Paul Holt MD \"It remains unclear whether you have MS or not. I will repeat your brain MRI again in 2025, which together with the eye test can help in determining whether you have MS or not. If the picture remains unclear, we may need to schedule you for a spinal tap.\"    Neuropathy     Palpitation     Psoriasis     Radiculopathy, cervical     Radiculopathy, lumbar region     RLS (restless legs syndrome)     Sleep apnea     Ulcerative colitis        Past Surgical History:   Procedure Laterality Date    CERVICAL SPINE SURGERY  2024    C6-C7 Cervical Spine Arthroplasty ~ Anterior Approach     SECTION, LOW TRANSVERSE      x 2, .     COLONOSCOPY      DILATION AND CURETTAGE OF UTERUS      HYSTEROSCOPY  2024    D & C Hysteroscopy    UPPER GASTROINTESTINAL ENDOSCOPY         Patient  reports being sexually active and has had partner(s) who are male. She reports using the following method of birth control/protection: Male Sterilization.    Family History   Problem Relation Name Age of Onset    Colon cancer Mother Shari     COPD Mother Shari     Hyperlipidemia Father Archuleta     Prostate cancer Father Archuleta     Hyperlipidemia Sister   "    Hypothyroidism Sister      Diverticulitis Sister      No Known Problems Sister      No Known Problems Brother      Colon cancer Mother's Brother      Colon cancer Mother's Brother Gary     Pancreatic cancer Mother's Brother Gary     Breast cancer Father's Sister Na      Social History     Socioeconomic History    Marital status:      Spouse name: Not on file    Number of children: Not on file    Years of education: Not on file    Highest education level: Not on file   Occupational History    Not on file   Tobacco Use    Smoking status: Former     Current packs/day: 0.00     Average packs/day: 1 pack/day for 12.5 years (12.5 ttl pk-yrs)     Types: Cigarettes     Start date: 2004     Quit date:      Years since quittin.9    Smokeless tobacco: Never   Vaping Use    Vaping status: Never Used   Substance and Sexual Activity    Alcohol use: Not Currently    Drug use: Never    Sexual activity: Yes     Partners: Male     Birth control/protection: Male Sterilization     Comment: Pt stopped her Norethindrone about one week ago- does not desire to be on birth control pills any longer   Other Topics Concern    Not on file   Social History Narrative    Not on file     Social Drivers of Health     Financial Resource Strain: Not on file   Food Insecurity: No Food Insecurity (2024)    Hunger Vital Sign     Worried About Running Out of Food in the Last Year: Never true     Ran Out of Food in the Last Year: Never true   Transportation Needs: Not on file   Physical Activity: Not on file   Stress: Not on file   Social Connections: Not on file   Intimate Partner Violence: Not on file   Housing Stability: Not on file        Allergies   Allergen Reactions    Balsalazide Other     Mouth ulcers    Iron GI Upset    Mesalamine Rash       Current Outpatient Medications   Medication Instructions    budesonide ER (Uceris) 9 mg tablet Take 1 tablet (9 mg) by mouth once daily for 30 days, THEN 1 tablet (9 mg) every other  "day for 15 days, THEN 1 tablet (9 mg) every 3 days for 11 days.    cyanocobalamin (VITAMIN B-12) 1,000 mcg, intramuscular, See admin instructions, Please inject daily x 7, followed by Weekly x 4 and then Monthly    ergocalciferol (VITAMIN D-2) 1,250 mcg, oral, Once Weekly    escitalopram (LEXAPRO) 20 mg, oral, Daily    hydrOXYzine HCL (ATARAX) 10-20 mg, oral, Nightly PRN    ipratropium (Atrovent) 21 mcg (0.03 %) nasal spray 2 sprays, Each Nostril, Every 12 hours    levothyroxine (SYNTHROID, LEVOXYL) 12.5 mcg, oral, Daily    magnesium gluconate (Magonate) 27.5 mg magne- sium (500 mg) tablet 27.5 mg, oral, 2 times daily    miscellaneous medical supply misc Bilateral AFO. Apply to bilateral lower legs daily.    multivitamin tablet 1 tablet, Daily    norethindrone (AYGESTIN) 5 mg, oral, Daily    Omvoh 300 mg, intravenous, Every 28 days    pantoprazole (PROTONIX) 40 mg, oral, Daily, Do not crush, chew, or split.    pregabalin (LYRICA) 150 mg, oral, 2 times daily    PreviDent 5000 Sensitive 1.1-5 % paste use as directed    syringe with needle (Syringe 3cc/22Gx1\") 3 mL 22 gauge x 1\" syringe 1 Application, miscellaneous, See admin instructions    tacrolimus (Protopic) 0.1 % ointment if needed.    tiZANidine (Zanaflex) 4 mg tablet Take 1 tab a day.    UltiCare Safety Syringe 3 mL 22 gauge x 1\" syringe     Vtama 1 % cream if needed.            PAT ROS:   Constitutional:    no fever   no chills   no unexpected weight change  Neuro/Psych:    no numbness   no weakness   no light-headedness   no confusion  Eyes:    no discharge   no pain   no vision loss   no diplopia   no visual disturbance   use of corrective lenses  Ears:    no ear pain   no hearing loss   no tinnitus  Nose:    no nasal discharge   no sinus congestion   no epistaxis  Mouth:    no dental issues   no mouth pain   no oral bleeding   no mouth lesions  Throat:    no throat pain   no dysphagia  Neck:    no neck pain   no neck stiffness  Cardio:    Functional 4 Mets. " Patient denies SOB walking up 2 flights of stairs   Cooking, cleaning, grocery shopping    no chest pain   no palpitations   no peripheral edema   no dyspnea   no PIERRE  Respiratory:    no cough   no wheezing   no hemoptysis   no shortness of breath  Endocrine:    no cold intolerance   no heat intolerance  GI:    no abdominal distention   no abdominal pain   no constipation   no diarrhea   no nausea   no vomiting   no blood in stool  :    no difficulty urinating   no dysuria   no oliguria  Musculoskeletal:    arthralgias (b/l hips, b/l knees)   myalgias (BLE)   no decreased ROM  Hematologic:    does not bruise/bleed easily   no excessive bleeding   no history of blood transfusion   no blood clots  Skin:   no skin changes   no sores/wound   no rash      Physical Exam  Constitutional:       General: She is not in acute distress.     Appearance: Normal appearance. She is not ill-appearing, toxic-appearing or diaphoretic.   HENT:      Head: Normocephalic and atraumatic.      Nose: Nose normal. No congestion or rhinorrhea.      Mouth/Throat:      Mouth: Mucous membranes are moist.      Pharynx: No posterior oropharyngeal erythema.   Eyes:      Extraocular Movements: Extraocular movements intact.      Conjunctiva/sclera: Conjunctivae normal.   Cardiovascular:      Rate and Rhythm: Normal rate and regular rhythm.      Pulses: Normal pulses.      Heart sounds: Normal heart sounds. No murmur heard.     No friction rub. No gallop.   Pulmonary:      Effort: Pulmonary effort is normal. No respiratory distress.      Breath sounds: Normal breath sounds. No stridor. No wheezing, rhonchi or rales.   Abdominal:      General: Bowel sounds are normal. There is no distension.      Palpations: Abdomen is soft. There is no mass.      Tenderness: There is no abdominal tenderness. There is no guarding or rebound.      Hernia: No hernia is present.   Musculoskeletal:         General: No swelling, tenderness, deformity or signs of injury.  "Normal range of motion.      Cervical back: Normal range of motion and neck supple. No rigidity or tenderness.   Skin:     General: Skin is warm and dry.      Coloration: Skin is not jaundiced or pale.      Findings: No bruising, erythema or rash.   Neurological:      General: No focal deficit present.      Mental Status: She is alert and oriented to person, place, and time.      Cranial Nerves: No cranial nerve deficit.      Sensory: No sensory deficit.      Motor: Weakness (b/l drop foot with braces in place) present.      Coordination: Coordination normal.   Psychiatric:         Mood and Affect: Mood normal.         Behavior: Behavior normal.          PAT AIRWAY:   Airway:     Mallampati::  I    Neck ROM::  Full   Few missing teeth; no loose teeth      Visit Vitals  BP 85/60   Pulse 50   Temp 36.3 °C (97.3 °F)   Resp 18   Ht 1.689 m (5' 6.5\")   Wt 66 kg (145 lb 8.1 oz)   SpO2 98%   BMI 23.13 kg/m²   OB Status Having periods   Smoking Status Former   BSA 1.76 m²            LABS:  Lab Results   Component Value Date    WBC 5.7 12/23/2024    HGB 11.5 (L) 12/23/2024    HCT 35.9 (L) 12/23/2024    MCV 92 12/23/2024     12/23/2024      Lab Results   Component Value Date    GLUCOSE 104 (H) 12/23/2024    CALCIUM 8.5 (L) 12/23/2024     12/23/2024    K 4.1 12/23/2024    CO2 27 12/23/2024     12/23/2024    BUN 13 12/23/2024    CREATININE 0.89 12/23/2024        ECHO 9/26/23  CONCLUSIONS:  1. Left ventricular systolic function is normal with a 65% estimated ejection fraction.      Assessment and Plan:     43 y.o.  female  scheduled for Robotic Laparoscopic Total Hysterectomy; Possible Tubes vs Ovaries Removal; Cystoscopy - Bilateral  on 1/8/25 with Dr. Schafer for  irregular menses. Presents to [facility] today for perioperative risk stratification and optimization      Cardiovascular:  Patient has no active cardiac symptoms.   Patient denies any chest pain, tightness, heaviness, pressure, radiating pain, " palpitations, irregular heartbeats, lightheadedness, cough, congestion, shortness of breath, PIERRE, PND, near syncope, weight loss or gain.    METS: 4+  RCRI: 0 points, 3.9%  risk for postoperative MACE     HLD - pt not currently on any lipid lowering meds     Hypotension - pt denies any current symptoms, no dizziness or fainting.  This is her baseline.  85/60 today. Pt being worked up for POTS given autoimmune issues      Pulmonary:  Pt with the following risk factors for post op pulm complications:  duration of surgery > 2 hours  Stop Bang +PHAN  ARISCAT: 26-44 points, 13.3% risk of in-hospital postoperative pulmonary complication  PRODIGY: Low risk for opioid induced respiratory depression  **pt provided with preop deep breathing exercises**    PHAN - Known PHAN (mild per sleep study 6/2024)- No CPAP. Recommend prioritizing  nonopioid analgesic techniques (regional and local anesthesia, nonsteroidal medications, etc) before the administration of opioids and close monitoring for hypoventilation after surgery due to PHAN. Increased vigilance is recommended with the use of narcotics due to an increased risk for opioid induced respiratory depression     Endocrine:  Hypothyroidism - cont levothyroxine on dos     GI:  UC - hold Omvoh 7 days prior to surgery (last dose 12/19/24 and pt not due until mid January 2025)    Neuro:  Demyelinating disease - b/l drop foot    Hematology:  Anemia - next iron infusion 1/3/25  12/23/24 H/H 11.5/35.9%    Patient instructed to ambulate as soon as possible postoperatively to decrease thromboembolic risk.   Initiate mechanical DVT prophylaxis as soon as possible and initiate chemical prophylaxis when deemed safe from a bleeding standpoint post surgery.     LABS: CBC, BMP and T&S completed 12/23/24; no need to repeat ; EKG reviewed from 9/17/24    Caprini: 3    Risk assessment complete.  Patient is scheduled for a intermediate surgical risk procedure.        Preoperative medication  instructions were provided and reviewed with the patient.  Any additional testing or evaluation was explained to the patient.  Nothing by mouth instructions were discussed and patient's questions were answered prior to conclusion to this encounter.  Patient verbalized understanding of preoperative instructions given in preadmission testing; discharge instructions available in EMR.    This note was dictated by a speech recognition.  Minor errors may have been detected in a speech recognition.

## 2024-12-26 NOTE — PREPROCEDURE INSTRUCTIONS
Medication List            Accurate as of December 26, 2024 10:18 AM. Always use your most recent med list.                budesonide ER 9 mg tablet  Commonly known as: Uceris  Take 1 tablet (9 mg) by mouth once daily for 30 days, THEN 1 tablet (9 mg) every other day for 15 days, THEN 1 tablet (9 mg) every 3 days for 11 days.  Start taking on: November 19, 2024  Medication Adjustments for Surgery: Take/Use as prescribed     cyanocobalamin 1,000 mcg/mL injection  Commonly known as: Vitamin B-12  Inject 1 mL (1,000 mcg) into the muscle see administration instructions. Please inject daily x 7, followed by Weekly x 4 and then Monthly  Medication Adjustments for Surgery: Do Not take on the morning of surgery     ergocalciferol 1.25 MG (02306 UT) capsule  Commonly known as: Vitamin D-2  Take 1 capsule (1,250 mcg) by mouth 1 (one) time per week.  Medication Adjustments for Surgery: Do Not take on the morning of surgery     escitalopram 20 mg tablet  Commonly known as: Lexapro  Take 1 tablet (20 mg) by mouth once daily.  Medication Adjustments for Surgery: Take on the morning of surgery     hydrOXYzine HCL 10 mg tablet  Commonly known as: Atarax  Take 1-2 tablets (10-20 mg) by mouth as needed at bedtime (insomnia).  Medication Adjustments for Surgery: Take/Use as prescribed     ipratropium 21 mcg (0.03 %) nasal spray  Commonly known as: Atrovent  Administer 2 sprays into each nostril every 12 hours.  Medication Adjustments for Surgery: Take/Use as prescribed     levothyroxine 25 mcg tablet  Commonly known as: Synthroid, Levoxyl  Take 0.5 tablets (12.5 mcg) by mouth once daily.  Medication Adjustments for Surgery: Take on the morning of surgery     magnesium gluconate 27.5 mg magne- sium (500 mg) tablet  Commonly known as: Magonate  Take 1 tablet (27.5 mg) by mouth 2 times a day.  Medication Adjustments for Surgery: Do Not take on the morning of surgery     miscellaneous medical supply misc  Bilateral AFO. Apply to  "bilateral lower legs daily.     multivitamin tablet  Additional Medication Adjustments for Surgery: Take last dose 7 days before surgery     norethindrone 5 mg tablet  Commonly known as: Aygestin  Take 1 tablet (5 mg) by mouth once daily.  Medication Adjustments for Surgery: Take on the morning of surgery     Omvoh 300 mg/15 mL (20 mg/mL) solution injection  Generic drug: mirikizumab-mrkz  Infuse 15 mL (300 mg) over 30 minutes into a venous catheter every 28 (twenty-eight) days.  Additional Medication Adjustments for Surgery: Take last dose 7 days before surgery  Notes to patient: Last dose 12/19/24 with next dose not due until 1/16/25 (ok to keep on schedule)     pantoprazole 40 mg EC tablet  Commonly known as: ProtoNix  Take 1 tablet (40 mg) by mouth once daily. Do not crush, chew, or split.  Medication Adjustments for Surgery: Take on the morning of surgery     pregabalin 150 mg capsule  Commonly known as: Lyrica  Take 1 capsule (150 mg) by mouth 2 times a day.  Medication Adjustments for Surgery: Take on the morning of surgery     PreviDent 5000 Sensitive 1.1-5 % paste  Generic drug: sodium fluoride-pot nitrate  Medication Adjustments for Surgery: Do Not take on the morning of surgery     Syringe 3cc/22Gx1\" 3 mL 22 gauge x 1\" syringe  Generic drug: syringe with needle  1 Application see administration instructions.     tacrolimus 0.1 % ointment  Commonly known as: Protopic  Medication Adjustments for Surgery: Do Not take on the morning of surgery     tiZANidine 4 mg tablet  Commonly known as: Zanaflex  Take 1 tab a day.  Medication Adjustments for Surgery: Take on the morning of surgery  Notes to patient: If needed     UltiCare Safety Syringe 3 mL 22 gauge x 1\" syringe  Generic drug: syringe with needle, safety     Vtama 1 % cream  Generic drug: tapinarof  Medication Adjustments for Surgery: Do Not take on the morning of surgery                            **Concerning above medication instructions, if medication " is normally taken at night, continue normal schedule.**  **DO NOT TAKE NIGHT PRIOR AND MORNING OF SURGERY**    CONTACT SURGEON'S OFFICE IF YOU DEVELOP:  * Fever = 100.4 F   * New respiratory symptoms (e.g. cough, shortness of breath, respiratory distress, sore throat)  * Recent loss of taste or smell  *Flu like symptoms such as headache, fatigue or gastrointestinal symptoms  * You develop any open sores, shingles, burning or painful urination   AND/OR:  * You no longer wish to have the surgery.  * Any other personal circumstances change that may lead to the need to cancel or defer this surgery.  *You were admitted to any hospital within one week of your planned procedure.    SMOKING:  *Quitting smoking can make a huge difference to your health and recovery from surgery.    *If you need help with quitting, call 9-889-QUIT-NOW.    THE DAY OF SURGERY:  *Do not eat any food after midnight the night before surgery.   *You must drink 13.5 ounces of clear liquids (i.e. water, black coffee (no milk or cream), tea, apple juice or electrolyte drinks (gatorade)) 2 hours before your arrival time.  *You may chew gum until 2 hours before your surgery    SURGICAL TIME  *You will be contacted between 2 p.m. and 6 p.m. the business day before your surgery with your arrival time.  *If you haven't received a call by 6pm, call [phone].  *Scheduled surgery times may change and you will be notified if this occurs-check your personal voicemail for any updates.    ON THE MORNING OF SURGERY:  *Wear comfortable, loose fitting clothing.   *Do not use moisturizers, creams, lotions or perfume.  *All jewelry and valuables should be left at home.  *Prosthetic devices such as contact lenses, hearing aids, dentures, eyelash extensions, hairpins and body piercing must be removed before surgery.    BRING WITH YOU:  *Photo ID and insurance card  *Current list of medicines and allergies  *Pacemaker/Defibrillator/Heart stent cards  *CPAP machine  and mask  *Slings/splints/crutches  *Copy of your complete Advanced Directive/DHPOA-if applicable  *Neurostimulator implant remote    PARKING AND ARRIVAL:  *Check in at the Main Entrance desk and let them know you are here for surgery.  *You will be directed to the 2nd floor surgical waiting area.    AFTER OUTPATIENT SURGERY:  *A responsible adult MUST accompany you at the time of discharge and stay with you for 24 hours after your surgery.  *You may NOT drive yourself home after surgery.  *You may use a taxi or ride sharing service ([employer], Uber) to return home ONLY if you are accompanied by a friend or family member.  *Instructions for resuming your medications will be provided by your surgeon.        Preoperative Deep Breathing Exercises  Why it is important to do deep breathing exercises before my surgery?  Deep breathing exercises strengthen your breathing muscles.  This helps you to recover after your surgery and decreases the chance of breathing complications.  How are the deep breathing exercises done?  Sit straight with your back supported.  Breathe in deeply and slowly through your nose. Your lower rib cage should expand and your abdomen may move forward.  Hold that breath for 3 to 5 seconds.  Breathe out through pursed lips, slowly and completely.  Rest and repeat 10 times every hour while awake.  Rest longer if you become dizzy or lightheaded.

## 2024-12-26 NOTE — CPM/PAT H&P
[facility] Evaluation       Name: Ally Carpio (Ally Carpio)  /Age: 1980/43 y.o.       Date of Consult: 24    Referring Provider: Dr. Schafer    Surgery, Date, and Length: Robotic Laparoscopic Total Hysterectomy; Possible Tubes vs Ovaries Removal; Cystoscopy - Bilateral , 25, 180MIN    Ally Carpio is a 43 year-old female who presents to the [facility] for perioperative risk assessment prior to surgery.    Patient presents with a primary diagnosis of irregular menses. Pt refers cycles can last as long as 30 days consecutive.  Menses are very heavy.  Pt does have h/o anemia and ITP.  Platelet level has been stable recently.  LMP = 24 (which lasted until 24).  Pt denies any current pelvic pain.  No f/c/n/v.      This note was created in part upon personal review of patient's medical records.      Patient is scheduled to have Robotic Laparoscopic Total Hysterectomy; Possible Tubes vs Ovaries Removal; Cystoscopy - Bilateral       Pt denies any past history of anesthetic complications such as PONV, awareness, prolonged sedation, dental damage, aspiration, cardiac arrest, difficult intubation, difficult I.V. access or unexpected hospital admissions.  NO malignant hyperthermia and or pseudocholinesterase deficiency.  No history of blood transfusions     The patient is not a Muslim and will accept blood and blood products if medically indicated.   Type and screen sent.     Past Medical History:   Diagnosis Date    ALEXEY positive     Cardiology follow-up encounter 2024    evaluation of bradycardia and fatigue Stress test recommended; however, patient is unable to perform s/t neurological dysfunction    Chronic pain syndrome     Depression     PIERRE (dyspnea on exertion)     Encounter for hematology follow-up 2024    Ursula Salinas PA-C    Fibromyalgia     GERD (gastroesophageal reflux disease)     H/O echocardiogram 2023    CONCLUSIONS:   1. Left ventricular  "systolic function is normal with a 65% estimated ejection fraction.    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     s/p C6-C7 Cervical Spine Arthroplasty ~ Anterior Approach 24    History of ITP     s/p D & C Hysteroscopy 24    Hx of idiopathic thrombocytopenic purpura     follows with heme, 6.24: plt 111    Hypothyroidism     [place] (iron deficiency anemia)     24 H&H WNL- IV iron   Started on oral iron but can't tolerate due to GI upset   Oncology Ursula Salinas PA-C 2024    Inflammatory bowel disease     Insomnia     Menorrhagia with regular cycle     Plan: Robot Assisted Laparoscopic Total Hysterectomy; Possible Removal of Tubes & or  Ovaries; Cystoscopy 25    Neurology follow-up encounter 10/15/2024    Paul Holt MD \"It remains unclear whether you have MS or not. I will repeat your brain MRI again in 2025, which together with the eye test can help in determining whether you have MS or not. If the picture remains unclear, we may need to schedule you for a spinal tap.\"    Neuropathy     Palpitation     Psoriasis     Radiculopathy, cervical     Radiculopathy, lumbar region     RLS (restless legs syndrome)     Sleep apnea     Ulcerative colitis        Past Surgical History:   Procedure Laterality Date    CERVICAL SPINE SURGERY  2024    C6-C7 Cervical Spine Arthroplasty ~ Anterior Approach     SECTION, LOW TRANSVERSE      x 2, .     COLONOSCOPY      DILATION AND CURETTAGE OF UTERUS      HYSTEROSCOPY  2024    D & C Hysteroscopy    UPPER GASTROINTESTINAL ENDOSCOPY         Patient  reports being sexually active and has had partner(s) who are male. She reports using the following method of birth control/protection: Male Sterilization.    Family History   Problem Relation Name Age of Onset    Colon cancer Mother Shari     COPD Mother Shari     Hyperlipidemia Father Archuleta     Prostate cancer Father Archuleta     Hyperlipidemia Sister   "    Hypothyroidism Sister      Diverticulitis Sister      No Known Problems Sister      No Known Problems Brother      Colon cancer Mother's Brother      Colon cancer Mother's Brother Gary     Pancreatic cancer Mother's Brother Gary     Breast cancer Father's Sister Na      Social History     Socioeconomic History    Marital status:      Spouse name: Not on file    Number of children: Not on file    Years of education: Not on file    Highest education level: Not on file   Occupational History    Not on file   Tobacco Use    Smoking status: Former     Current packs/day: 0.00     Average packs/day: 1 pack/day for 12.5 years (12.5 ttl pk-yrs)     Types: Cigarettes     Start date: 2004     Quit date:      Years since quittin.9    Smokeless tobacco: Never   Vaping Use    Vaping status: Never Used   Substance and Sexual Activity    Alcohol use: Not Currently    Drug use: Never    Sexual activity: Yes     Partners: Male     Birth control/protection: Male Sterilization     Comment: Pt stopped her Norethindrone about one week ago- does not desire to be on birth control pills any longer   Other Topics Concern    Not on file   Social History Narrative    Not on file     Social Drivers of Health     Financial Resource Strain: Not on file   Food Insecurity: No Food Insecurity (2024)    Hunger Vital Sign     Worried About Running Out of Food in the Last Year: Never true     Ran Out of Food in the Last Year: Never true   Transportation Needs: Not on file   Physical Activity: Not on file   Stress: Not on file   Social Connections: Not on file   Intimate Partner Violence: Not on file   Housing Stability: Not on file        Allergies   Allergen Reactions    Balsalazide Other     Mouth ulcers    Iron GI Upset    Mesalamine Rash       Current Outpatient Medications   Medication Instructions    budesonide ER (Uceris) 9 mg tablet Take 1 tablet (9 mg) by mouth once daily for 30 days, THEN 1 tablet (9 mg) every other  "day for 15 days, THEN 1 tablet (9 mg) every 3 days for 11 days.    cyanocobalamin (VITAMIN B-12) 1,000 mcg, intramuscular, See admin instructions, Please inject daily x 7, followed by Weekly x 4 and then Monthly    ergocalciferol (VITAMIN D-2) 1,250 mcg, oral, Once Weekly    escitalopram (LEXAPRO) 20 mg, oral, Daily    hydrOXYzine HCL (ATARAX) 10-20 mg, oral, Nightly PRN    ipratropium (Atrovent) 21 mcg (0.03 %) nasal spray 2 sprays, Each Nostril, Every 12 hours    levothyroxine (SYNTHROID, LEVOXYL) 12.5 mcg, oral, Daily    magnesium gluconate (Magonate) 27.5 mg magne- sium (500 mg) tablet 27.5 mg, oral, 2 times daily    miscellaneous medical supply misc Bilateral AFO. Apply to bilateral lower legs daily.    multivitamin tablet 1 tablet, Daily    norethindrone (AYGESTIN) 5 mg, oral, Daily    Omvoh 300 mg, intravenous, Every 28 days    pantoprazole (PROTONIX) 40 mg, oral, Daily, Do not crush, chew, or split.    pregabalin (LYRICA) 150 mg, oral, 2 times daily    PreviDent 5000 Sensitive 1.1-5 % paste use as directed    syringe with needle (Syringe 3cc/22Gx1\") 3 mL 22 gauge x 1\" syringe 1 Application, miscellaneous, See admin instructions    tacrolimus (Protopic) 0.1 % ointment if needed.    tiZANidine (Zanaflex) 4 mg tablet Take 1 tab a day.    UltiCare Safety Syringe 3 mL 22 gauge x 1\" syringe     Vtama 1 % cream if needed.            PAT ROS:   Constitutional:    no fever   no chills   no unexpected weight change  Neuro/Psych:    no numbness   no weakness   no light-headedness   no confusion  Eyes:    no discharge   no pain   no vision loss   no diplopia   no visual disturbance   use of corrective lenses  Ears:    no ear pain   no hearing loss   no tinnitus  Nose:    no nasal discharge   no sinus congestion   no epistaxis  Mouth:    no dental issues   no mouth pain   no oral bleeding   no mouth lesions  Throat:    no throat pain   no dysphagia  Neck:    no neck pain   no neck stiffness  Cardio:    Functional 4 Mets. " Patient denies SOB walking up 2 flights of stairs   Cooking, cleaning, grocery shopping    no chest pain   no palpitations   no peripheral edema   no dyspnea   no PIERRE  Respiratory:    no cough   no wheezing   no hemoptysis   no shortness of breath  Endocrine:    no cold intolerance   no heat intolerance  GI:    no abdominal distention   no abdominal pain   no constipation   no diarrhea   no nausea   no vomiting   no blood in stool  :    no difficulty urinating   no dysuria   no oliguria  Musculoskeletal:    arthralgias (b/l hips, b/l knees)   myalgias (BLE)   no decreased ROM  Hematologic:    does not bruise/bleed easily   no excessive bleeding   no history of blood transfusion   no blood clots  Skin:   no skin changes   no sores/wound   no rash      Physical Exam  Constitutional:       General: She is not in acute distress.     Appearance: Normal appearance. She is not ill-appearing, toxic-appearing or diaphoretic.   HENT:      Head: Normocephalic and atraumatic.      Nose: Nose normal. No congestion or rhinorrhea.      Mouth/Throat:      Mouth: Mucous membranes are moist.      Pharynx: No posterior oropharyngeal erythema.   Eyes:      Extraocular Movements: Extraocular movements intact.      Conjunctiva/sclera: Conjunctivae normal.   Cardiovascular:      Rate and Rhythm: Normal rate and regular rhythm.      Pulses: Normal pulses.      Heart sounds: Normal heart sounds. No murmur heard.     No friction rub. No gallop.   Pulmonary:      Effort: Pulmonary effort is normal. No respiratory distress.      Breath sounds: Normal breath sounds. No stridor. No wheezing, rhonchi or rales.   Abdominal:      General: Bowel sounds are normal. There is no distension.      Palpations: Abdomen is soft. There is no mass.      Tenderness: There is no abdominal tenderness. There is no guarding or rebound.      Hernia: No hernia is present.   Musculoskeletal:         General: No swelling, tenderness, deformity or signs of injury.  "Normal range of motion.      Cervical back: Normal range of motion and neck supple. No rigidity or tenderness.   Skin:     General: Skin is warm and dry.      Coloration: Skin is not jaundiced or pale.      Findings: No bruising, erythema or rash.   Neurological:      General: No focal deficit present.      Mental Status: She is alert and oriented to person, place, and time.      Cranial Nerves: No cranial nerve deficit.      Sensory: No sensory deficit.      Motor: Weakness (b/l drop foot with braces in place) present.      Coordination: Coordination normal.   Psychiatric:         Mood and Affect: Mood normal.         Behavior: Behavior normal.          PAT AIRWAY:   Airway:     Mallampati::  I    Neck ROM::  Full   Few missing teeth; no loose teeth      Visit Vitals  BP 85/60   Pulse 50   Temp 36.3 °C (97.3 °F)   Resp 18   Ht 1.689 m (5' 6.5\")   Wt 66 kg (145 lb 8.1 oz)   SpO2 98%   BMI 23.13 kg/m²   OB Status Having periods   Smoking Status Former   BSA 1.76 m²            LABS:  Lab Results   Component Value Date    WBC 5.7 12/23/2024    HGB 11.5 (L) 12/23/2024    HCT 35.9 (L) 12/23/2024    MCV 92 12/23/2024     12/23/2024      Lab Results   Component Value Date    GLUCOSE 104 (H) 12/23/2024    CALCIUM 8.5 (L) 12/23/2024     12/23/2024    K 4.1 12/23/2024    CO2 27 12/23/2024     12/23/2024    BUN 13 12/23/2024    CREATININE 0.89 12/23/2024        ECHO 9/26/23  CONCLUSIONS:  1. Left ventricular systolic function is normal with a 65% estimated ejection fraction.      Assessment and Plan:     43 y.o.  female  scheduled for Robotic Laparoscopic Total Hysterectomy; Possible Tubes vs Ovaries Removal; Cystoscopy - Bilateral  on 1/8/25 with Dr. Schafer for  irregular menses. Presents to [facility] today for perioperative risk stratification and optimization      Cardiovascular:  Patient has no active cardiac symptoms.   Patient denies any chest pain, tightness, heaviness, pressure, radiating pain, " palpitations, irregular heartbeats, lightheadedness, cough, congestion, shortness of breath, PIERRE, PND, near syncope, weight loss or gain.    METS: 4+  RCRI: 0 points, 3.9%  risk for postoperative MACE     HLD - pt not currently on any lipid lowering meds     Hypotension - pt denies any current symptoms, no dizziness or fainting.  This is her baseline.  85/60 today. Pt being worked up for POTS given autoimmune issues      Pulmonary:  Pt with the following risk factors for post op pulm complications:  duration of surgery > 2 hours  Stop Bang +PHAN  ARISCAT: 26-44 points, 13.3% risk of in-hospital postoperative pulmonary complication  PRODIGY: Low risk for opioid induced respiratory depression  **pt provided with preop deep breathing exercises**    PHAN - Known PHAN (mild per sleep study 6/2024)- No CPAP. Recommend prioritizing  nonopioid analgesic techniques (regional and local anesthesia, nonsteroidal medications, etc) before the administration of opioids and close monitoring for hypoventilation after surgery due to PHAN. Increased vigilance is recommended with the use of narcotics due to an increased risk for opioid induced respiratory depression     Endocrine:  Hypothyroidism - cont levothyroxine on dos     GI:  UC - hold Omvoh 7 days prior to surgery (last dose 12/19/24 and pt not due until mid January 2025)    Neuro:  Demyelinating disease - b/l drop foot    Hematology:  Anemia - next iron infusion 1/3/25  12/23/24 H/H 11.5/35.9%    Patient instructed to ambulate as soon as possible postoperatively to decrease thromboembolic risk.   Initiate mechanical DVT prophylaxis as soon as possible and initiate chemical prophylaxis when deemed safe from a bleeding standpoint post surgery.     LABS: CBC, BMP and T&S completed 12/23/24; no need to repeat ; EKG reviewed from 9/17/24    Caprini: 3    Risk assessment complete.  Patient is scheduled for a intermediate surgical risk procedure.        Preoperative medication  instructions were provided and reviewed with the patient.  Any additional testing or evaluation was explained to the patient.  Nothing by mouth instructions were discussed and patient's questions were answered prior to conclusion to this encounter.  Patient verbalized understanding of preoperative instructions given in preadmission testing; discharge instructions available in EMR.    This note was dictated by a speech recognition.  Minor errors may have been detected in a speech recognition.

## 2024-12-27 ENCOUNTER — TELEPHONE (OUTPATIENT)
Dept: PRIMARY CARE | Facility: CLINIC | Age: 44
End: 2024-12-27
Payer: COMMERCIAL

## 2024-12-27 DIAGNOSIS — E03.9 HYPOTHYROIDISM, UNSPECIFIED TYPE: ICD-10-CM

## 2024-12-27 RX ORDER — LEVOTHYROXINE SODIUM 25 UG/1
12.5 TABLET ORAL DAILY
Qty: 15 TABLET | Refills: 0 | Status: SHIPPED | OUTPATIENT
Start: 2024-12-27 | End: 2025-01-26

## 2024-12-27 RX ORDER — LEVOTHYROXINE SODIUM 25 UG/1
12.5 TABLET ORAL DAILY
Qty: 45 TABLET | Refills: 1 | Status: SHIPPED | OUTPATIENT
Start: 2024-12-27 | End: 2025-06-25

## 2024-12-27 NOTE — TELEPHONE ENCOUNTER
PT is supposed to get AFO's from "[employer] however, authorization/CMN for this was not sent over and needs recent with all other paperwork that was previously sent by 12/30 or pt cannot get AFO's.       Pt also requesting RF, pt has one tablet left.  Pt also ask if she can have normal supply of this rx sent to [facility] as well, she forgot to call in sooner and would not get rx in time if sent to mail order.     [employer] and [facility]

## 2024-12-27 NOTE — TELEPHONE ENCOUNTER
Called and spoke with pt- informed about AFO Brace form, CMN and office visit notes. Also informed pt that Synthroid RX was just sent on 12/10 to [facility]. Pt states [facility] informed her that med had been cancelled and she cannot get it filled. Pt needs short supply to [employer] since out and [place] [email or web address]. Thanks, CG

## 2024-12-27 NOTE — TELEPHONE ENCOUNTER
Form and OV notes faxed to [employer] on 12/19 and confirmation received but CMN and OV notes refaxed today

## 2025-01-02 ENCOUNTER — OFFICE VISIT (OUTPATIENT)
Dept: CARDIOLOGY | Facility: HOSPITAL | Age: 45
End: 2025-01-02
Payer: COMMERCIAL

## 2025-01-02 VITALS
HEART RATE: 48 BPM | HEIGHT: 66 IN | SYSTOLIC BLOOD PRESSURE: 98 MMHG | BODY MASS INDEX: 24.75 KG/M2 | WEIGHT: 154 LBS | OXYGEN SATURATION: 99 % | DIASTOLIC BLOOD PRESSURE: 68 MMHG

## 2025-01-02 DIAGNOSIS — R42 DIZZINESS: Primary | ICD-10-CM

## 2025-01-02 DIAGNOSIS — R00.1 BRADYCARDIA: ICD-10-CM

## 2025-01-02 PROBLEM — N90.4 LICHEN SCLEROSUS OF FEMALE GENITALIA: Status: ACTIVE | Noted: 2024-11-21

## 2025-01-02 PROCEDURE — 3008F BODY MASS INDEX DOCD: CPT | Performed by: INTERNAL MEDICINE

## 2025-01-02 PROCEDURE — 99213 OFFICE O/P EST LOW 20 MIN: CPT | Performed by: INTERNAL MEDICINE

## 2025-01-02 RX ORDER — MAGNESIUM GLUCONATE 27 MG(500)
1 TABLET ORAL
COMMUNITY
Start: 2024-12-13

## 2025-01-02 ASSESSMENT — ENCOUNTER SYMPTOMS: DIZZINESS: 1

## 2025-01-02 NOTE — LETTER
"2025     Paul Holt MD  [id number]  Department Of Neurology  [facility] [address]    Patient: Ally Carpio   YOB: 1980   Date of Visit: 2025       Dear Dr. Paul Holt MD:    Thank you for referring Ally Carpio to me for evaluation. Below are my notes for this consultation.  If you have questions, please do not hesitate to call me. I look forward to following your patient along with you.       Sincerely,     Gerson Barksdale MD      CC: Deena Thibodeaux, DO  ______________________________________________________________________________________    Counseling:  The patient was counseled regarding diagnostic results, instructions for management, risk factor reductions, prognosis, patient and family education, impressions, risks and benefits of treatment options and importance of compliance with treatment.      Chief Complaint:   The patient presents today for 3-month followup of evaluation of bradycardia and hypotension.      History Of Present Illness:    ALLY CARPIO is a 44 year old female patient who presents today for 3-month followup of evaluation of bradycardia and hypotension. Her PMH is significant for GERD and PHAN. Today, the patient reports persistent fatigue and postural dizziness. She states that her symptoms did not improve with coming off Tizanidine, so she has since restarted this medication.      Last Recorded Vitals:  Vitals:    25 1333   BP: 98/68   BP Location: Left arm   Pulse: (!) 48   SpO2: 99%   Weight: 69.9 kg (154 lb)   Height: 1.676 m (5' 6\")       Past Surgical History:  She has a past surgical history that includes  section, low transverse; Colonoscopy; Upper gastrointestinal endoscopy; Dilation and curettage of uterus; Cervical spine surgery (2024); and Hysteroscopy (2024).      Social History:  She reports that she quit smoking about 8 years ago. Her smoking use included cigarettes. She started smoking " about 20 years ago. She has a 12.5 pack-year smoking history. She has never used smokeless tobacco. She reports that she does not currently use alcohol. She reports that she does not use drugs.    Family History:  Family History   Problem Relation Name Age of Onset   • Colon cancer Mother Shari    • COPD Mother Shari    • Hyperlipidemia Father Geremias    • Prostate cancer Father Archuleta    • Hyperlipidemia Sister     • Hypothyroidism Sister     • Diverticulitis Sister     • No Known Problems Sister     • No Known Problems Brother     • Colon cancer Mother's Brother     • Colon cancer Mother's Brother Gary    • Pancreatic cancer Mother's Brother Gary    • Breast cancer Father's Sister Na       Allergies:  Balsalazide, Iron, and Mesalamine    Outpatient Medications:  Current Outpatient Medications   Medication Instructions   • budesonide ER (Uceris) 9 mg tablet Take 1 tablet (9 mg) by mouth once daily for 30 days, THEN 1 tablet (9 mg) every other day for 15 days, THEN 1 tablet (9 mg) every 3 days for 11 days.   • cyanocobalamin (VITAMIN B-12) 1,000 mcg, intramuscular, See admin instructions, Please inject daily x 7, followed by Weekly x 4 and then Monthly   • ergocalciferol (VITAMIN D-2) 1,250 mcg, oral, Once Weekly   • escitalopram (LEXAPRO) 20 mg, oral, Daily   • hydrOXYzine HCL (ATARAX) 10-20 mg, oral, Nightly PRN   • ipratropium (Atrovent) 21 mcg (0.03 %) nasal spray 2 sprays, Each Nostril, Every 12 hours   • levothyroxine (SYNTHROID, LEVOXYL) 12.5 mcg, oral, Daily   • levothyroxine (SYNTHROID, LEVOXYL) 12.5 mcg, oral, Daily, Take on an empty stomach at the same time each day, either 30 to 60 minutes prior to breakfast   • Mag-G 27 mg magnesium (500 mg) tablet 1 tablet, Every 12 hours scheduled (0630,1830)   • magnesium gluconate (Magonate) 27.5 mg magne- sium (500 mg) tablet 27.5 mg, oral, 2 times daily   • miscellaneous medical supply misc Bilateral AFO. Apply to bilateral lower legs daily.   • multivitamin tablet 1  "tablet, Daily   • norethindrone (AYGESTIN) 5 mg, oral, Daily   • Omvoh 300 mg, intravenous, Every 28 days   • pantoprazole (PROTONIX) 40 mg, oral, Daily, Do not crush, chew, or split.   • pregabalin (LYRICA) 150 mg, oral, 2 times daily   • PreviDent 5000 Sensitive 1.1-5 % paste use as directed   • syringe with needle (Syringe 3cc/22Gx1\") 3 mL 22 gauge x 1\" syringe 1 Application, miscellaneous, See admin instructions   • tacrolimus (Protopic) 0.1 % ointment if needed.   • tiZANidine (Zanaflex) 4 mg tablet Take 1 tab a day.   • UltiCare Safety Syringe 3 mL 22 gauge x 1\" syringe    • Vtama 1 % cream if needed.     Review of Systems   Constitutional: Positive for malaise/fatigue.   Neurological:  Positive for dizziness.   All other systems reviewed and are negative.     Physical Exam:  Constitutional:       Appearance: Healthy appearance. Not in distress.   Neck:      Vascular: No JVR. JVD normal.   Pulmonary:      Effort: Pulmonary effort is normal.      Breath sounds: Normal breath sounds. No wheezing. No rhonchi. No rales.   Chest:      Chest wall: Not tender to palpatation.   Cardiovascular:      PMI at left midclavicular line. Normal rate. Regular rhythm. Normal S1. Normal S2.       Murmurs: There is no murmur.      No gallop.  No click. No rub.   Pulses:     Intact distal pulses.   Edema:     Peripheral edema absent.   Abdominal:      General: Bowel sounds are normal.      Palpations: Abdomen is soft.      Tenderness: There is no abdominal tenderness.   Musculoskeletal: Normal range of motion.         General: No tenderness. Skin:     General: Skin is warm and dry.   Neurological:      General: No focal deficit present.      Mental Status: Alert and oriented to person, place and time.     Last Labs:  CBC -  Lab Results   Component Value Date    WBC 5.7 12/23/2024    HGB 11.5 (L) 12/23/2024    HCT 35.9 (L) 12/23/2024    MCV 92 12/23/2024     12/23/2024       CMP -  Lab Results   Component Value Date    " CALCIUM 8.5 (L) 12/23/2024    PHOS 3.2 11/19/2024    PROT 5.7 (L) 12/05/2024    ALBUMIN 3.5 12/05/2024    AST 8 (L) 12/05/2024    ALT 7 12/05/2024    ALKPHOS 43 12/05/2024    BILITOT 0.4 12/05/2024       LIPID PANEL -   Lab Results   Component Value Date    CHOL 196 12/05/2024    HDL 45.2 12/05/2024    CHHDL 4.3 12/05/2024    VLDL 29 12/05/2024    TRIG 147 12/05/2024    NHDL 151 (H) 12/05/2024       RENAL FUNCTION PANEL -   Lab Results   Component Value Date    K 4.1 12/23/2024    PHOS 3.2 11/19/2024       Lab Results   Component Value Date    HGBA1C 4.9 12/05/2024       Last Cardiology Tests:  09/26/2023 - TTE  Left ventricular systolic function is normal with a 65% estimated ejection fraction.     Lab review: I have personally reviewed the laboratory result(s).     Assessment/Plan  1) Cardiovascular Risk Stratification Prior to Bariatric Surgery  H/O palpitations s/t anemia requiring 6 iron infusions  Denies CP, chest discomfort or SOB  No past h/o h/o HTN or diabetes  Sleep study 08/01/2023 with mild sleep apnea with a SpO2 karson of 86.0% - will be starting CPAP therapy   ECG with ? old anteroseptal infarct  TTE 09/26/2023 with LVEF 65%  Low risk for planned surgery    2) Bradycardia, Hypotension, Fatigue  Several month h/o symptomatic bradycardia and hypotension  TSH 03/12/2024 WNL at 2.78  Currently on tizanidine   Advised to followup with PCP re: alternative to tizanidine as this is the likely culprit to her symptoms   Stress test recommended; however, patient is unable to perform s/t neurological dysfunction    Reports persistent fatigue and postural dizziness  Remains on Tizanidine as symptoms persisted despite coming off  Recommend optimization of medications as tizanidine, Atarax and Lyrica can all cause dizziness  Check 14-day Holter  F/U after testing       Scribe Attestation  By signing my name below, I, Rene Rose   attest that this documentation has been prepared under the direction and  in the presence of Gerson Barksdale MD.

## 2025-01-02 NOTE — PATIENT INSTRUCTIONS
Dr. Barksdale has ordered a heart monitor to assess your heart rhythm.  Followup with Dr. Barksdale after the above test.    If you have any questions or cardiac concerns, please call our office at [phone].

## 2025-01-02 NOTE — PROGRESS NOTES
"Counseling:  The patient was counseled regarding diagnostic results, instructions for management, risk factor reductions, prognosis, patient and family education, impressions, risks and benefits of treatment options and importance of compliance with treatment.      Chief Complaint:   The patient presents today for 3-month followup of evaluation of bradycardia and hypotension.      History Of Present Illness:    ANT MESA is a 44 year old female patient who presents today for 3-month followup of evaluation of bradycardia and hypotension. Her PMH is significant for GERD and PHAN. Today, the patient reports persistent fatigue and postural dizziness. She states that her symptoms did not improve with coming off Tizanidine, so she has since restarted this medication.      Last Recorded Vitals:  Vitals:    25 1333   BP: 98/68   BP Location: Left arm   Pulse: (!) 48   SpO2: 99%   Weight: 69.9 kg (154 lb)   Height: 1.676 m (5' 6\")       Past Surgical History:  She has a past surgical history that includes  section, low transverse; Colonoscopy; Upper gastrointestinal endoscopy; Dilation and curettage of uterus; Cervical spine surgery (2024); and Hysteroscopy (2024).      Social History:  She reports that she quit smoking about 8 years ago. Her smoking use included cigarettes. She started smoking about 20 years ago. She has a 12.5 pack-year smoking history. She has never used smokeless tobacco. She reports that she does not currently use alcohol. She reports that she does not use drugs.    Family History:  Family History   Problem Relation Name Age of Onset    Colon cancer Mother Shari     COPD Mother Shari     Hyperlipidemia Father Archuleta     Prostate cancer Father Archuleta     Hyperlipidemia Sister      Hypothyroidism Sister      Diverticulitis Sister      No Known Problems Sister      No Known Problems Brother      Colon cancer Mother's Brother      Colon cancer Mother's Brother Gary     Pancreatic " "cancer Mother's Brother Gary     Breast cancer Father's Sister Na       Allergies:  Balsalazide, Iron, and Mesalamine    Outpatient Medications:  Current Outpatient Medications   Medication Instructions    budesonide ER (Uceris) 9 mg tablet Take 1 tablet (9 mg) by mouth once daily for 30 days, THEN 1 tablet (9 mg) every other day for 15 days, THEN 1 tablet (9 mg) every 3 days for 11 days.    cyanocobalamin (VITAMIN B-12) 1,000 mcg, intramuscular, See admin instructions, Please inject daily x 7, followed by Weekly x 4 and then Monthly    ergocalciferol (VITAMIN D-2) 1,250 mcg, oral, Once Weekly    escitalopram (LEXAPRO) 20 mg, oral, Daily    hydrOXYzine HCL (ATARAX) 10-20 mg, oral, Nightly PRN    ipratropium (Atrovent) 21 mcg (0.03 %) nasal spray 2 sprays, Each Nostril, Every 12 hours    levothyroxine (SYNTHROID, LEVOXYL) 12.5 mcg, oral, Daily    levothyroxine (SYNTHROID, LEVOXYL) 12.5 mcg, oral, Daily, Take on an empty stomach at the same time each day, either 30 to 60 minutes prior to breakfast    Mag-G 27 mg magnesium (500 mg) tablet 1 tablet, Every 12 hours scheduled (0630,1830)    magnesium gluconate (Magonate) 27.5 mg magne- sium (500 mg) tablet 27.5 mg, oral, 2 times daily    miscellaneous medical supply misc Bilateral AFO. Apply to bilateral lower legs daily.    multivitamin tablet 1 tablet, Daily    norethindrone (AYGESTIN) 5 mg, oral, Daily    Omvoh 300 mg, intravenous, Every 28 days    pantoprazole (PROTONIX) 40 mg, oral, Daily, Do not crush, chew, or split.    pregabalin (LYRICA) 150 mg, oral, 2 times daily    PreviDent 5000 Sensitive 1.1-5 % paste use as directed    syringe with needle (Syringe 3cc/22Gx1\") 3 mL 22 gauge x 1\" syringe 1 Application, miscellaneous, See admin instructions    tacrolimus (Protopic) 0.1 % ointment if needed.    tiZANidine (Zanaflex) 4 mg tablet Take 1 tab a day.    UltiCare Safety Syringe 3 mL 22 gauge x 1\" syringe     Vtama 1 % cream if needed.     Review of Systems "   Constitutional: Positive for malaise/fatigue.   Neurological:  Positive for dizziness.   All other systems reviewed and are negative.     Physical Exam:  Constitutional:       Appearance: Healthy appearance. Not in distress.   Neck:      Vascular: No JVR. JVD normal.   Pulmonary:      Effort: Pulmonary effort is normal.      Breath sounds: Normal breath sounds. No wheezing. No rhonchi. No rales.   Chest:      Chest wall: Not tender to palpatation.   Cardiovascular:      PMI at left midclavicular line. Normal rate. Regular rhythm. Normal S1. Normal S2.       Murmurs: There is no murmur.      No gallop.  No click. No rub.   Pulses:     Intact distal pulses.   Edema:     Peripheral edema absent.   Abdominal:      General: Bowel sounds are normal.      Palpations: Abdomen is soft.      Tenderness: There is no abdominal tenderness.   Musculoskeletal: Normal range of motion.         General: No tenderness. Skin:     General: Skin is warm and dry.   Neurological:      General: No focal deficit present.      Mental Status: Alert and oriented to person, place and time.     Last Labs:  CBC -  Lab Results   Component Value Date    WBC 5.7 12/23/2024    HGB 11.5 (L) 12/23/2024    HCT 35.9 (L) 12/23/2024    MCV 92 12/23/2024     12/23/2024       CMP -  Lab Results   Component Value Date    CALCIUM 8.5 (L) 12/23/2024    PHOS 3.2 11/19/2024    PROT 5.7 (L) 12/05/2024    ALBUMIN 3.5 12/05/2024    AST 8 (L) 12/05/2024    ALT 7 12/05/2024    ALKPHOS 43 12/05/2024    BILITOT 0.4 12/05/2024       LIPID PANEL -   Lab Results   Component Value Date    CHOL 196 12/05/2024    HDL 45.2 12/05/2024    CHHDL 4.3 12/05/2024    VLDL 29 12/05/2024    TRIG 147 12/05/2024    NHDL 151 (H) 12/05/2024       RENAL FUNCTION PANEL -   Lab Results   Component Value Date    K 4.1 12/23/2024    PHOS 3.2 11/19/2024       Lab Results   Component Value Date    HGBA1C 4.9 12/05/2024       Last Cardiology Tests:  09/26/2023 - TTE  Left ventricular  systolic function is normal with a 65% estimated ejection fraction.     Lab review: I have personally reviewed the laboratory result(s).     Assessment/Plan   1) Cardiovascular Risk Stratification Prior to Bariatric Surgery  H/O palpitations s/t anemia requiring 6 iron infusions  Denies CP, chest discomfort or SOB  No past h/o h/o HTN or diabetes  Sleep study 08/01/2023 with mild sleep apnea with a SpO2 karson of 86.0% - will be starting CPAP therapy   ECG with ? old anteroseptal infarct  TTE 09/26/2023 with LVEF 65%  Low risk for planned surgery    2) Bradycardia, Hypotension, Fatigue  Several month h/o symptomatic bradycardia and hypotension  TSH 03/12/2024 WNL at 2.78  Currently on tizanidine   Advised to followup with PCP re: alternative to tizanidine as this is the likely culprit to her symptoms   Stress test recommended; however, patient is unable to perform s/t neurological dysfunction    Reports persistent fatigue and postural dizziness  Remains on Tizanidine as symptoms persisted despite coming off  Recommend optimization of medications as tizanidine, Atarax and Lyrica can all cause dizziness  Check 14-day Holter  F/U after testing       Scribe Attestation  By signing my name below, I, Rogelio Roseibe   attest that this documentation has been prepared under the direction and in the presence of Gerson Barksdale MD.

## 2025-01-03 ENCOUNTER — INFUSION (OUTPATIENT)
Dept: HEMATOLOGY/ONCOLOGY | Facility: CLINIC | Age: 45
End: 2025-01-03
Payer: COMMERCIAL

## 2025-01-03 VITALS
TEMPERATURE: 99.7 F | BODY MASS INDEX: 24.62 KG/M2 | OXYGEN SATURATION: 98 % | DIASTOLIC BLOOD PRESSURE: 57 MMHG | WEIGHT: 152.56 LBS | HEART RATE: 47 BPM | SYSTOLIC BLOOD PRESSURE: 95 MMHG | RESPIRATION RATE: 16 BRPM

## 2025-01-03 DIAGNOSIS — D50.0 IRON DEFICIENCY ANEMIA DUE TO CHRONIC BLOOD LOSS: ICD-10-CM

## 2025-01-03 PROCEDURE — 96374 THER/PROPH/DIAG INJ IV PUSH: CPT | Mod: INF

## 2025-01-03 PROCEDURE — 2500000004 HC RX 250 GENERAL PHARMACY W/ HCPCS (ALT 636 FOR OP/ED): Performed by: PHYSICIAN ASSISTANT

## 2025-01-03 RX ORDER — ALBUTEROL SULFATE 0.83 MG/ML
3 SOLUTION RESPIRATORY (INHALATION) AS NEEDED
OUTPATIENT
Start: 2025-06-14

## 2025-01-03 RX ORDER — HEPARIN 100 UNIT/ML
500 SYRINGE INTRAVENOUS AS NEEDED
OUTPATIENT
Start: 2025-01-03

## 2025-01-03 RX ORDER — DIPHENHYDRAMINE HYDROCHLORIDE 50 MG/ML
50 INJECTION INTRAMUSCULAR; INTRAVENOUS AS NEEDED
OUTPATIENT
Start: 2025-06-14

## 2025-01-03 RX ORDER — EPINEPHRINE 0.3 MG/.3ML
0.3 INJECTION SUBCUTANEOUS EVERY 5 MIN PRN
OUTPATIENT
Start: 2025-06-14

## 2025-01-03 RX ORDER — FAMOTIDINE 10 MG/ML
20 INJECTION INTRAVENOUS ONCE AS NEEDED
OUTPATIENT
Start: 2025-06-14

## 2025-01-03 RX ORDER — HEPARIN SODIUM,PORCINE/PF 10 UNIT/ML
50 SYRINGE (ML) INTRAVENOUS AS NEEDED
OUTPATIENT
Start: 2025-01-03

## 2025-01-03 RX ADMIN — IRON SUCROSE 200 MG: 20 INJECTION, SOLUTION INTRAVENOUS at 12:20

## 2025-01-03 ASSESSMENT — PAIN SCALES - GENERAL: PAINLEVEL_OUTOF10: 4

## 2025-01-06 ENCOUNTER — APPOINTMENT (OUTPATIENT)
Dept: CARDIOLOGY | Facility: HOSPITAL | Age: 45
End: 2025-01-06
Payer: COMMERCIAL

## 2025-01-07 ENCOUNTER — ANESTHESIA EVENT (OUTPATIENT)
Dept: OPERATING ROOM | Facility: HOSPITAL | Age: 45
End: 2025-01-07
Payer: COMMERCIAL

## 2025-01-07 ENCOUNTER — APPOINTMENT (OUTPATIENT)
Dept: INTEGRATIVE MEDICINE | Facility: CLINIC | Age: 45
End: 2025-01-07
Payer: COMMERCIAL

## 2025-01-07 ENCOUNTER — TELEPHONE (OUTPATIENT)
Dept: OBSTETRICS AND GYNECOLOGY | Facility: CLINIC | Age: 45
End: 2025-01-07

## 2025-01-07 NOTE — ANESTHESIA PREPROCEDURE EVALUATION
"Patient: Ally Carpio    Procedure Information       Date/Time: 01/08/25 0730    Procedure: Robot Assist Laparoscopic Total Hysterectomy; Bilateral Salpingectomy; Cystoscopy (Bilateral)    Location: [facility] OR 08 / Virtual [facility] OR    Surgeons: Kwaku Schafer MD                                                         Pre- Anesthesia Evaluation                                            Ally Carpio is a 44 y.o. female who presents for the above mentioned procedure due to Menorrhagia with irregular cycle [N92.1]    Past Medical History:   Diagnosis Date    ALEXEY positive     Cardiology follow-up encounter 09/17/2024    evaluation of bradycardia and fatigue Stress test recommended; however, patient is unable to perform s/t neurological dysfunction    Chronic pain syndrome     Depression     PIERRE (dyspnea on exertion)     Encounter for hematology follow-up 12/16/2024    Ursula Salinas PA-C    Fibromyalgia     GERD (gastroesophageal reflux disease)     H/O echocardiogram 09/26/2023    CONCLUSIONS:   1. Left ventricular systolic function is normal with a 65% estimated ejection fraction.    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     s/p C6-C7 Cervical Spine Arthroplasty ~ Anterior Approach 7/19/24    History of ITP     s/p D & C Hysteroscopy 5/22/24    Hx of idiopathic thrombocytopenic purpura     follows with heme, 6.13.24: plt 111    Hypothyroidism     CAMDEN (iron deficiency anemia)     12/12/24 H&H WNL- IV iron   Started on oral iron but can't tolerate due to GI upset   Oncology Ursula Salinas PA-C 12/16/2024    Inflammatory bowel disease     Insomnia     Menorrhagia with regular cycle     Plan: Robot Assisted Laparoscopic Total Hysterectomy; Possible Removal of Tubes & or  Ovaries; Cystoscopy 1/8/25    Neurology follow-up encounter 10/15/2024    Paul Holt MD \"It remains unclear whether you have MS or not. I will repeat your brain MRI again in March of 2025, which together with the " "eye test can help in determining whether you have MS or not. If the picture remains unclear, we may need to schedule you for a spinal tap.\"    Neuropathy     Palpitation     Psoriasis     Radiculopathy, cervical     Radiculopathy, lumbar region     RLS (restless legs syndrome)     Sleep apnea     Ulcerative colitis      Past Surgical History:   Procedure Laterality Date    CERVICAL SPINE SURGERY  2024    C6-C7 Cervical Spine Arthroplasty ~ Anterior Approach     SECTION, LOW TRANSVERSE      x 2, .     COLONOSCOPY      DILATION AND CURETTAGE OF UTERUS      HYSTEROSCOPY  2024    D & C Hysteroscopy    UPPER GASTROINTESTINAL ENDOSCOPY       Social History   She reports that she quit smoking about 8 years ago. Her smoking use included cigarettes. She started smoking about 20 years ago. She has a 12.5 pack-year smoking history. She has never used smokeless tobacco. She reports that she does not currently use alcohol. She reports that she does not use drugs.    Allergies and Medications   Allergies   Allergen Reactions    Balsalazide Other     Mouth ulcers    Iron GI Upset    Mesalamine Rash          Current Outpatient Medications   Medication Instructions    budesonide ER (Uceris) 9 mg tablet Take 1 tablet (9 mg) by mouth once daily for 30 days, THEN 1 tablet (9 mg) every other day for 15 days, THEN 1 tablet (9 mg) every 3 days for 11 days.    cyanocobalamin (VITAMIN B-12) 1,000 mcg, intramuscular, See admin instructions, Please inject daily x 7, followed by Weekly x 4 and then Monthly    ergocalciferol (VITAMIN D-2) 1,250 mcg, oral, Once Weekly    escitalopram (LEXAPRO) 20 mg, oral, Daily    hydrOXYzine HCL (ATARAX) 10-20 mg, oral, Nightly PRN    ipratropium (Atrovent) 21 mcg (0.03 %) nasal spray 2 sprays, Each Nostril, Every 12 hours    levothyroxine (SYNTHROID, LEVOXYL) 12.5 mcg, oral, Daily    levothyroxine (SYNTHROID, LEVOXYL) 12.5 mcg, oral, Daily, Take on an empty stomach at the same time " "each day, either 30 to 60 minutes prior to breakfast    Mag-G 27 mg magnesium (500 mg) tablet 1 tablet, Every 12 hours scheduled (0630,1830)    magnesium gluconate (Magonate) 27.5 mg magne- sium (500 mg) tablet 27.5 mg, oral, 2 times daily    miscellaneous medical supply misc Bilateral AFO. Apply to bilateral lower legs daily.    multivitamin tablet 1 tablet, Daily    norethindrone (AYGESTIN) 5 mg, oral, Daily    Omvoh 300 mg, intravenous, Every 28 days    pantoprazole (PROTONIX) 40 mg, oral, Daily, Do not crush, chew, or split.    pregabalin (LYRICA) 150 mg, oral, 2 times daily    PreviDent 5000 Sensitive 1.1-5 % paste     syringe with needle (Syringe 3cc/22Gx1\") 3 mL 22 gauge x 1\" syringe 1 Application, miscellaneous, See admin instructions    tacrolimus (Protopic) 0.1 % ointment if needed.    tiZANidine (Zanaflex) 4 mg tablet Take 1 tab a day.    UltiCare Safety Syringe 3 mL 22 gauge x 1\" syringe     Vtama 1 % cream if needed.       Recent Labs     12/23/24  1526 12/12/24  1543   WBC 5.7 6.1   HGB 11.5* 12.2   HCT 35.9* 37.7    211   MCV 92 92     Recent Labs     07/12/24  1001 05/16/24  0812   PROTIME 13.0* 12.7   INR 1.2* 1.1   APTT 36 34     Lab Results   Component Value Date    ABO O 12/23/2024     Recent Labs     12/23/24  1526 12/05/24  0758 11/19/24  1015   EGFR 84 >90 >90   ANIONGAP 11 8* 11   BUN 13 16 17   CREATININE 0.89 0.75 0.74    139 139   K 4.1 4.2 4.0    105 104   CO2 27 30 28   GLUCOSE 104* 85 90       Lab Results   Component Value Date    STAPHMRSASCR (A) 07/12/2024     Isolated: Methicillin Susceptible Staphylococcus aureus (MSSA)       EKG 9/17/24 Sinus lore     Echo 9/26/23      Left ventricular systolic function is normal with a 65% estimated ejection fraction.    Visit Vitals  BP 96/60   Pulse 51   Temp 36.5 °C (97.7 °F) (Temporal)   Resp 16   Ht 1.676 m (5' 6\")   Wt 70.4 kg (155 lb 3.3 oz)   LMP 12/25/2024   SpO2 98%   BMI 25.05 kg/m²   OB Status Having periods "   Smoking Status Former   BSA 1.81 m²     Medical Gas Therapy: None (Room air)  Lab Results   Component Value Date    PREGTESTUR Negative 01/08/2025    HCGQUANT <2 12/13/2023 1/8/2025     7:06 AM 1/3/2025    12:58 PM 1/3/2025    11:21 AM 1/2/2025     1:33 PM 12/26/2024    10:37 AM 12/26/2024    10:04 AM 12/18/2024     3:09 PM   BP REVIEW   BP (ultimate) 96/60 95/57 101/62 98/68 82/48 85/60 90/65             Relevant Problems   Cardiac   (+) Mixed hyperlipidemia      Neuro   (+) Depression, major, recurrent, moderate   (+) Radiculopathy, cervical (Resolved)   (+) Radiculopathy, lumbar region      GI   (+) Gastroesophageal reflux disease without esophagitis   (+) Ulcerative colitis      Endocrine   (+) Hypothyroidism      Hematology   (+) Chronic ITP (idiopathic thrombocytopenic purpura) (Multi)   (+) Iron deficiency anemia due to chronic blood loss      Musculoskeletal   (+) Chronic pain syndrome   (+) Fibromyalgia      GYN   (+) Menorrhagia with irregular cycle   (+) Menorrhagia with regular cycle       Clinical information reviewed:   Tobacco  Allergies  Meds  Problems  Med Hx  Surg Hx  OB Status    Fam Hx  Soc Hx        NPO Detail:  NPO/Void Status  Carbohydrate Drink Given Prior to Surgery? : N  Date of Last Liquid: 01/08/25  Time of Last Liquid: 0330  Date of Last Solid: 01/07/25  Time of Last Solid: 2000  Last Intake Type: Clear fluids  Time of Last Void: 0720         Physical Exam    Airway  Mallampati: I  TM distance: >3 FB  Neck ROM: full     Cardiovascular   Rhythm: regular  Rate: normal     Dental - normal exam     Pulmonary   Comments: Normal RR  Non-labored respiration    Abdominal            Anesthesia Plan    History of general anesthesia?: yes  History of complications of general anesthesia?: no    ASA 3     general   (GETA with standard ASA monitoring)  intravenous induction   Postoperative administration of opioids is intended.  Anesthetic plan and risks discussed with  patient.  Use of blood products discussed with patient who consented to blood products.    Plan discussed with CAA and CRNA.    Ok to proceed without stress test per cardiologist, Dr. Gerson Barksdale (on [place])

## 2025-01-07 NOTE — TELEPHONE ENCOUNTER
RN called and verified Patient by name and   RN went over GYN Surgical Packet  RN educated Patient on Pre and Post Op directions  Patient completed PAT testing   GYN Surgery Packet will be uploaded to [facility]  2 week Post Op appt scheduled  Patient verbalizes understanding and has no further questions at this time  Gisela Harvey RN

## 2025-01-08 ENCOUNTER — ANESTHESIA (OUTPATIENT)
Dept: OPERATING ROOM | Facility: HOSPITAL | Age: 45
End: 2025-01-08
Payer: COMMERCIAL

## 2025-01-08 ENCOUNTER — HOSPITAL ENCOUNTER (OUTPATIENT)
Facility: HOSPITAL | Age: 45
Setting detail: OUTPATIENT SURGERY
Discharge: HOME | End: 2025-01-08
Attending: OBSTETRICS & GYNECOLOGY | Admitting: OBSTETRICS & GYNECOLOGY
Payer: COMMERCIAL

## 2025-01-08 ENCOUNTER — PHARMACY VISIT (OUTPATIENT)
Dept: PHARMACY | Facility: CLINIC | Age: 45
End: 2025-01-08
Payer: COMMERCIAL

## 2025-01-08 VITALS
SYSTOLIC BLOOD PRESSURE: 96 MMHG | DIASTOLIC BLOOD PRESSURE: 58 MMHG | HEIGHT: 66 IN | RESPIRATION RATE: 12 BRPM | TEMPERATURE: 97 F | OXYGEN SATURATION: 98 % | HEART RATE: 57 BPM | WEIGHT: 155.2 LBS | BODY MASS INDEX: 24.94 KG/M2

## 2025-01-08 DIAGNOSIS — N73.6 PELVIC ADHESIONS: Primary | ICD-10-CM

## 2025-01-08 DIAGNOSIS — G89.18 POSTOPERATIVE PAIN: ICD-10-CM

## 2025-01-08 DIAGNOSIS — N92.1 MENORRHAGIA WITH IRREGULAR CYCLE: ICD-10-CM

## 2025-01-08 PROBLEM — M54.12 RADICULOPATHY, CERVICAL: Status: RESOLVED | Noted: 2024-07-02 | Resolved: 2025-01-08

## 2025-01-08 LAB — PREGNANCY TEST URINE, POC: NEGATIVE

## 2025-01-08 PROCEDURE — 2720000007 HC OR 272 NO HCPCS: Performed by: OBSTETRICS & GYNECOLOGY

## 2025-01-08 PROCEDURE — 3700000001 HC GENERAL ANESTHESIA TIME - INITIAL BASE CHARGE: Performed by: OBSTETRICS & GYNECOLOGY

## 2025-01-08 PROCEDURE — 2500000004 HC RX 250 GENERAL PHARMACY W/ HCPCS (ALT 636 FOR OP/ED): Performed by: OBSTETRICS & GYNECOLOGY

## 2025-01-08 PROCEDURE — RXMED WILLOW AMBULATORY MEDICATION CHARGE

## 2025-01-08 PROCEDURE — 2500000004 HC RX 250 GENERAL PHARMACY W/ HCPCS (ALT 636 FOR OP/ED): Performed by: REGISTERED NURSE

## 2025-01-08 PROCEDURE — 7100000010 HC PHASE TWO TIME - EACH INCREMENTAL 1 MINUTE: Performed by: OBSTETRICS & GYNECOLOGY

## 2025-01-08 PROCEDURE — 36415 COLL VENOUS BLD VENIPUNCTURE: CPT | Performed by: OBSTETRICS & GYNECOLOGY

## 2025-01-08 PROCEDURE — 88307 TISSUE EXAM BY PATHOLOGIST: CPT | Mod: TC,AHULAB | Performed by: OBSTETRICS & GYNECOLOGY

## 2025-01-08 PROCEDURE — 81025 URINE PREGNANCY TEST: CPT | Performed by: OBSTETRICS & GYNECOLOGY

## 2025-01-08 PROCEDURE — 7100000002 HC RECOVERY ROOM TIME - EACH INCREMENTAL 1 MINUTE: Performed by: OBSTETRICS & GYNECOLOGY

## 2025-01-08 PROCEDURE — 2500000004 HC RX 250 GENERAL PHARMACY W/ HCPCS (ALT 636 FOR OP/ED): Performed by: ANESTHESIOLOGY

## 2025-01-08 PROCEDURE — 3600000017 HC OR TIME - EACH INCREMENTAL 1 MINUTE - PROCEDURE LEVEL SIX: Performed by: OBSTETRICS & GYNECOLOGY

## 2025-01-08 PROCEDURE — A58571 PR LAPAROSCOPY W TOT HYSTERECTUTERUS <=250 GRAM  W TUBE/OVARY: Performed by: ANESTHESIOLOGY

## 2025-01-08 PROCEDURE — 2500000005 HC RX 250 GENERAL PHARMACY W/O HCPCS: Performed by: OBSTETRICS & GYNECOLOGY

## 2025-01-08 PROCEDURE — 2500000005 HC RX 250 GENERAL PHARMACY W/O HCPCS: Performed by: ANESTHESIOLOGY

## 2025-01-08 PROCEDURE — 7100000009 HC PHASE TWO TIME - INITIAL BASE CHARGE: Performed by: OBSTETRICS & GYNECOLOGY

## 2025-01-08 PROCEDURE — 88307 TISSUE EXAM BY PATHOLOGIST: CPT | Performed by: PATHOLOGY

## 2025-01-08 PROCEDURE — 3700000002 HC GENERAL ANESTHESIA TIME - EACH INCREMENTAL 1 MINUTE: Performed by: OBSTETRICS & GYNECOLOGY

## 2025-01-08 PROCEDURE — 2780000003 HC OR 278 NO HCPCS: Performed by: OBSTETRICS & GYNECOLOGY

## 2025-01-08 PROCEDURE — S2900 ROBOTIC SURGICAL SYSTEM: HCPCS | Performed by: OBSTETRICS & GYNECOLOGY

## 2025-01-08 PROCEDURE — 7100000001 HC RECOVERY ROOM TIME - INITIAL BASE CHARGE: Performed by: OBSTETRICS & GYNECOLOGY

## 2025-01-08 PROCEDURE — 58571 TLH W/T/O 250 G OR LESS: CPT | Performed by: OBSTETRICS & GYNECOLOGY

## 2025-01-08 PROCEDURE — A58571 PR LAPAROSCOPY W TOT HYSTERECTUTERUS <=250 GRAM  W TUBE/OVARY: Performed by: REGISTERED NURSE

## 2025-01-08 PROCEDURE — 3600000018 HC OR TIME - INITIAL BASE CHARGE - PROCEDURE LEVEL SIX: Performed by: OBSTETRICS & GYNECOLOGY

## 2025-01-08 PROCEDURE — 2500000001 HC RX 250 WO HCPCS SELF ADMINISTERED DRUGS (ALT 637 FOR MEDICARE OP): Performed by: ANESTHESIOLOGY

## 2025-01-08 PROCEDURE — A4649 SURGICAL SUPPLIES: HCPCS | Performed by: OBSTETRICS & GYNECOLOGY

## 2025-01-08 RX ORDER — HYDRALAZINE HYDROCHLORIDE 20 MG/ML
5 INJECTION INTRAMUSCULAR; INTRAVENOUS EVERY 30 MIN PRN
Status: DISCONTINUED | OUTPATIENT
Start: 2025-01-08 | End: 2025-01-08 | Stop reason: HOSPADM

## 2025-01-08 RX ORDER — KETOROLAC TROMETHAMINE 30 MG/ML
INJECTION, SOLUTION INTRAMUSCULAR; INTRAVENOUS AS NEEDED
Status: DISCONTINUED | OUTPATIENT
Start: 2025-01-08 | End: 2025-01-08

## 2025-01-08 RX ORDER — SODIUM CHLORIDE, SODIUM LACTATE, POTASSIUM CHLORIDE, CALCIUM CHLORIDE 600; 310; 30; 20 MG/100ML; MG/100ML; MG/100ML; MG/100ML
100 INJECTION, SOLUTION INTRAVENOUS CONTINUOUS
Status: DISCONTINUED | OUTPATIENT
Start: 2025-01-08 | End: 2025-01-08 | Stop reason: HOSPADM

## 2025-01-08 RX ORDER — OXYCODONE HYDROCHLORIDE 5 MG/1
5 TABLET ORAL
Status: DISCONTINUED | OUTPATIENT
Start: 2025-01-08 | End: 2025-01-08 | Stop reason: HOSPADM

## 2025-01-08 RX ORDER — PROPOFOL 10 MG/ML
INJECTION, EMULSION INTRAVENOUS AS NEEDED
Status: DISCONTINUED | OUTPATIENT
Start: 2025-01-08 | End: 2025-01-08

## 2025-01-08 RX ORDER — OXYCODONE HYDROCHLORIDE 5 MG/1
5 TABLET ORAL EVERY 6 HOURS PRN
Qty: 15 TABLET | Refills: 0 | Status: SHIPPED | OUTPATIENT
Start: 2025-01-08

## 2025-01-08 RX ORDER — PROCHLORPERAZINE EDISYLATE 5 MG/ML
5 INJECTION INTRAMUSCULAR; INTRAVENOUS ONCE AS NEEDED
Status: DISCONTINUED | OUTPATIENT
Start: 2025-01-08 | End: 2025-01-08 | Stop reason: HOSPADM

## 2025-01-08 RX ORDER — CEFAZOLIN 1 G/1
INJECTION, POWDER, FOR SOLUTION INTRAVENOUS AS NEEDED
Status: DISCONTINUED | OUTPATIENT
Start: 2025-01-08 | End: 2025-01-08

## 2025-01-08 RX ORDER — BUPIVACAINE HYDROCHLORIDE 2.5 MG/ML
INJECTION, SOLUTION EPIDURAL; INFILTRATION; INTRACAUDAL AS NEEDED
Status: DISCONTINUED | OUTPATIENT
Start: 2025-01-08 | End: 2025-01-08 | Stop reason: HOSPADM

## 2025-01-08 RX ORDER — ONDANSETRON 4 MG/1
8 TABLET, ORALLY DISINTEGRATING ORAL ONCE
Status: COMPLETED | OUTPATIENT
Start: 2025-01-08 | End: 2025-01-08

## 2025-01-08 RX ORDER — MIDAZOLAM HYDROCHLORIDE 1 MG/ML
INJECTION, SOLUTION INTRAMUSCULAR; INTRAVENOUS AS NEEDED
Status: DISCONTINUED | OUTPATIENT
Start: 2025-01-08 | End: 2025-01-08

## 2025-01-08 RX ORDER — SODIUM CHLORIDE, SODIUM LACTATE, POTASSIUM CHLORIDE, CALCIUM CHLORIDE 600; 310; 30; 20 MG/100ML; MG/100ML; MG/100ML; MG/100ML
20 INJECTION, SOLUTION INTRAVENOUS CONTINUOUS
Status: DISCONTINUED | OUTPATIENT
Start: 2025-01-08 | End: 2025-01-08 | Stop reason: HOSPADM

## 2025-01-08 RX ORDER — ACETAMINOPHEN 500 MG
1000 TABLET ORAL EVERY 6 HOURS PRN
Qty: 30 TABLET | Refills: 0 | Status: SHIPPED | OUTPATIENT
Start: 2025-01-08

## 2025-01-08 RX ORDER — ROCURONIUM BROMIDE 10 MG/ML
INJECTION, SOLUTION INTRAVENOUS AS NEEDED
Status: DISCONTINUED | OUTPATIENT
Start: 2025-01-08 | End: 2025-01-08

## 2025-01-08 RX ORDER — HYDROMORPHONE HYDROCHLORIDE 1 MG/ML
INJECTION, SOLUTION INTRAMUSCULAR; INTRAVENOUS; SUBCUTANEOUS AS NEEDED
Status: DISCONTINUED | OUTPATIENT
Start: 2025-01-08 | End: 2025-01-08

## 2025-01-08 RX ORDER — FLUORESCEIN 500 MG/ML
INJECTION INTRAVENOUS CONTINUOUS PRN
Status: COMPLETED | OUTPATIENT
Start: 2025-01-08 | End: 2025-01-08

## 2025-01-08 RX ORDER — LIDOCAINE HYDROCHLORIDE 20 MG/ML
INJECTION, SOLUTION INFILTRATION; PERINEURAL AS NEEDED
Status: DISCONTINUED | OUTPATIENT
Start: 2025-01-08 | End: 2025-01-08

## 2025-01-08 RX ORDER — ONDANSETRON HYDROCHLORIDE 2 MG/ML
INJECTION, SOLUTION INTRAVENOUS AS NEEDED
Status: DISCONTINUED | OUTPATIENT
Start: 2025-01-08 | End: 2025-01-08

## 2025-01-08 RX ORDER — FENTANYL CITRATE 50 UG/ML
INJECTION, SOLUTION INTRAMUSCULAR; INTRAVENOUS AS NEEDED
Status: DISCONTINUED | OUTPATIENT
Start: 2025-01-08 | End: 2025-01-08

## 2025-01-08 RX ORDER — DROPERIDOL 2.5 MG/ML
0.62 INJECTION, SOLUTION INTRAMUSCULAR; INTRAVENOUS ONCE AS NEEDED
Status: DISCONTINUED | OUTPATIENT
Start: 2025-01-08 | End: 2025-01-08 | Stop reason: HOSPADM

## 2025-01-08 RX ORDER — SODIUM CHLORIDE 0.9 G/100ML
IRRIGANT IRRIGATION AS NEEDED
Status: DISCONTINUED | OUTPATIENT
Start: 2025-01-08 | End: 2025-01-08 | Stop reason: HOSPADM

## 2025-01-08 RX ORDER — PHENYLEPHRINE HCL IN 0.9% NACL 1 MG/10 ML
SYRINGE (ML) INTRAVENOUS AS NEEDED
Status: DISCONTINUED | OUTPATIENT
Start: 2025-01-08 | End: 2025-01-08

## 2025-01-08 RX ORDER — ONDANSETRON HYDROCHLORIDE 2 MG/ML
4 INJECTION, SOLUTION INTRAVENOUS ONCE AS NEEDED
Status: DISCONTINUED | OUTPATIENT
Start: 2025-01-08 | End: 2025-01-08 | Stop reason: HOSPADM

## 2025-01-08 RX ORDER — MAGNESIUM SULFATE HEPTAHYDRATE 500 MG/ML
INJECTION, SOLUTION INTRAMUSCULAR; INTRAVENOUS AS NEEDED
Status: DISCONTINUED | OUTPATIENT
Start: 2025-01-08 | End: 2025-01-08

## 2025-01-08 RX ADMIN — FENTANYL CITRATE 50 MCG: 50 INJECTION, SOLUTION INTRAMUSCULAR; INTRAVENOUS at 08:29

## 2025-01-08 RX ADMIN — ROCURONIUM BROMIDE 70 MG: 10 INJECTION, SOLUTION INTRAVENOUS at 07:45

## 2025-01-08 RX ADMIN — DEXAMETHASONE SODIUM PHOSPHATE 8 MG: 4 INJECTION, SOLUTION INTRAMUSCULAR; INTRAVENOUS at 07:55

## 2025-01-08 RX ADMIN — OXYCODONE HYDROCHLORIDE 5 MG: 5 TABLET ORAL at 11:23

## 2025-01-08 RX ADMIN — SODIUM CHLORIDE, POTASSIUM CHLORIDE, SODIUM LACTATE AND CALCIUM CHLORIDE: 600; 310; 30; 20 INJECTION, SOLUTION INTRAVENOUS at 10:01

## 2025-01-08 RX ADMIN — HYDROMORPHONE HYDROCHLORIDE 0.2 MG: 1 INJECTION, SOLUTION INTRAMUSCULAR; INTRAVENOUS; SUBCUTANEOUS at 10:22

## 2025-01-08 RX ADMIN — ONDANSETRON 4 MG: 2 INJECTION INTRAMUSCULAR; INTRAVENOUS at 10:16

## 2025-01-08 RX ADMIN — PROPOFOL 30 MG: 10 INJECTION, EMULSION INTRAVENOUS at 10:26

## 2025-01-08 RX ADMIN — GLYCOPYRROLATE 0.4 MG: 0.2 INJECTION, SOLUTION INTRAMUSCULAR; INTRAVENOUS at 07:45

## 2025-01-08 RX ADMIN — SUGAMMADEX 200 MG: 100 INJECTION, SOLUTION INTRAVENOUS at 10:25

## 2025-01-08 RX ADMIN — MAGNESIUM SULFATE HEPTAHYDRATE 1 G: 500 INJECTION, SOLUTION INTRAMUSCULAR; INTRAVENOUS at 08:15

## 2025-01-08 RX ADMIN — Medication 50 MCG: at 10:38

## 2025-01-08 RX ADMIN — ROCURONIUM BROMIDE 20 MG: 10 INJECTION, SOLUTION INTRAVENOUS at 08:26

## 2025-01-08 RX ADMIN — ONDANSETRON 8 MG: 4 TABLET, ORALLY DISINTEGRATING ORAL at 12:11

## 2025-01-08 RX ADMIN — FENTANYL CITRATE 50 MCG: 50 INJECTION, SOLUTION INTRAMUSCULAR; INTRAVENOUS at 07:50

## 2025-01-08 RX ADMIN — CEFAZOLIN 2 G: 330 INJECTION, POWDER, FOR SOLUTION INTRAMUSCULAR; INTRAVENOUS at 07:36

## 2025-01-08 RX ADMIN — MIDAZOLAM HYDROCHLORIDE 2 MG: 1 INJECTION, SOLUTION INTRAMUSCULAR; INTRAVENOUS at 07:27

## 2025-01-08 RX ADMIN — Medication 6 L/MIN: at 10:42

## 2025-01-08 RX ADMIN — SODIUM CHLORIDE: 9 INJECTION, SOLUTION INTRAVENOUS at 07:55

## 2025-01-08 RX ADMIN — SODIUM CHLORIDE, POTASSIUM CHLORIDE, SODIUM LACTATE AND CALCIUM CHLORIDE: 600; 310; 30; 20 INJECTION, SOLUTION INTRAVENOUS at 07:27

## 2025-01-08 RX ADMIN — ROCURONIUM BROMIDE 20 MG: 10 INJECTION, SOLUTION INTRAVENOUS at 09:16

## 2025-01-08 RX ADMIN — PROPOFOL 120 MG: 10 INJECTION, EMULSION INTRAVENOUS at 07:44

## 2025-01-08 RX ADMIN — PROPOFOL 50 MG: 10 INJECTION, EMULSION INTRAVENOUS at 08:35

## 2025-01-08 RX ADMIN — Medication 50 MCG: at 10:36

## 2025-01-08 RX ADMIN — LIDOCAINE HYDROCHLORIDE 60 MG: 20 INJECTION, SOLUTION INFILTRATION; PERINEURAL at 07:44

## 2025-01-08 RX ADMIN — KETOROLAC TROMETHAMINE 15 MG: 30 INJECTION, SOLUTION INTRAMUSCULAR at 10:18

## 2025-01-08 ASSESSMENT — PAIN SCALES - GENERAL
PAINLEVEL_OUTOF10: 0 - NO PAIN
PAINLEVEL_OUTOF10: 3
PAINLEVEL_OUTOF10: 0 - NO PAIN

## 2025-01-08 ASSESSMENT — PAIN - FUNCTIONAL ASSESSMENT
PAIN_FUNCTIONAL_ASSESSMENT: 0-10

## 2025-01-08 ASSESSMENT — PAIN DESCRIPTION - LOCATION: LOCATION: ABDOMEN

## 2025-01-08 NOTE — DISCHARGE INSTRUCTIONS
Call for fever or chills, temperature over 100  Call for persistent nausea and vomiting  Call for pain that exceeds home medication  Call for vaginal bleeding more than pinkish discharge or spotting.  Call for discharge or leakage  No driving while taking narcotics  Pelvic rest 6 to 8 weeks

## 2025-01-08 NOTE — OP NOTE
Robot Assist Laparoscopic Total Hysterectomy; Bilateral Salpingectomy; Cystoscopy: LYSIS OF ADHESIONS (B) Operative Note     Date: 2025  OR Location: [facility] A OR    Name: Ally Carpio, : 1980, Age: 44 y.o., MRN: [id number], Sex: female    Diagnosis  Pre-op Diagnosis      * Menorrhagia with irregular cycle [N92.1] Post-op Diagnosis     * Menorrhagia with irregular cycle [N92.1]     * Pelvic adhesions [N73.6]     Procedures  Robot Assist Laparoscopic Total Hysterectomy; Bilateral Salpingectomy; Cystoscopy: LYSIS OF ADHESIONS  38938 - GA LAPS TOTAL HYSTERECT 250 GM/< W/RMVL TUBE/OVARY    Robot Assist Laparoscopic Total Hysterectomy; Bilateral Salpingectomy; Cystoscopy: LYSIS OF ADHESIONS  83478 - GA CYSTOURETHROSCOPY      Surgeons      * Kwaku Schafer - Primary    Resident/Fellow/Other Assistant:  Surgeons and Role:  * No surgeons found with a matching role *    Staff:   Surgical Assistant:   Circulator: Nanette  Circulator: Cecilia Jennings Person: Mac Mercadoub Person: Larissa Child Scrub: Areli    Anesthesia Staff: Anesthesiologist: Betsy Browning MD  CRNA: ANA PAULA De La Garza, DNAP  C-AA: BEATRIZ Whiting  SRNA: Cody Black    Procedure Summary  Anesthesia: General  ASA: III  Estimated Blood Loss: 50mL  Intra-op Medications:   Administrations occurring from 0730 to 1030 on 25:   Medication Name Total Dose   bupivacaine PF 0.25 % (Marcaine) 0.25 % (2.5 mg/mL) injection 16 mL   fluorescein 500 mg/5 mL (10 %) injection 9.67 mL   sodium chloride 0.9 % irrigation solution 2,000 mL   ceFAZolin (Ancef) 1 g 2 g   dexAMETHasone (Decadron) 4 mg/mL 8 mg   fentaNYL PF 0.05 mg/mL 100 mcg   glycopyrrolate PF (Robinul) injection 0.4 mg   ketorolac (Toradol) injection 30 mg 15 mg   lactated Ringer's infusion Cannot be calculated   lidocaine (Xylocaine) 2 % 60 mg   magnesium sulfate 50 % injection 1 g   ondansetron 2 mg/mL 4 mg   propofol (Diprivan) injection 10 mg/mL 170 mg   rocuronium 10 mg/mL 110  mg   NaCl 0.9 % bolus Cannot be calculated              Anesthesia Record               Intraprocedure I/O Totals          Intake    NaCl 0.9 % bolus 225.00 mL    lactated Ringer's infusion 1000.00 mL    Total Intake 1225 mL       Output    Urine 500 mL    Est. Blood Loss 50 mL    Total Output 550 mL       Net    Net Volume 675 mL          Specimen:   ID Type Source Tests Collected by Time   1 : UTERUS, CERVIX, AND BILATERAL FALLOPIAN TUBES Tissue UTERUS, CERVIX, AND BILATERAL FALLOPIAN TUBES SURGICAL PATHOLOGY EXAM Kwaku Schafer MD 1/8/2025 0910                 Drains and/or Catheters:   Urethral Catheter Non-latex 16 Fr. (Active)       [REMOVED] NG/OG/Feeding Tube OG - [place] sump 14 Fr Center mouth (Removed)   Tube Status Low intermittent suction 01/08/25 0758   Placement Verification Gastric contents 01/08/25 0758   Site Assessment Dry;Clean;Intact 01/08/25 0758   Drainage Appearance Bile 01/08/25 0758       Tourniquet Times:         Implants:     Findings: Pelvic adhesions omental to anterior abdominal wall below umbilicus left side    Indications: Ally Carpio is an 44 y.o. female who is having surgery for Menorrhagia with irregular cycle [N92.1].     The patient was seen in the preoperative area. The risks, benefits, complications, treatment options, non-operative alternatives, expected recovery and outcomes were discussed with the patient. The possibilities of reaction to medication, pulmonary aspiration, injury to surrounding structures, bleeding, recurrent infection, the need for additional procedures, failure to diagnose a condition, and creating a complication requiring transfusion or operation were discussed with the patient. The patient concurred with the proposed plan, giving informed consent.  The site of surgery was properly noted/marked if necessary per policy. The patient has been actively warmed in preoperative area. Preoperative antibiotics  Venous thrombosis prophylaxis sequentials are  not indicated.    Procedure Details: Under satisfactory esthesia patient was placed in a modified dorsolithotomy position.  A vaginal perineal abdominal prep was carried out patient was draped in usual manner.  Jackson catheter is inserted under continuous drainage.  A forniceal manipulator was then placed and the uterus without difficulty.  Supraumbilical incision was made and a Veress needle was introduced into the abdominal cavity.  Abdomen sensibly proximately 3 L of come dioxide gas.  For Veress needle was withdrawn laparoscopic trocar was placed.  Laparoscope was placed in position examination showed some omental adhesions to the anterior abdominal wall below umbilicus on the left side uterus itself appeared to be slightly enlarged approximately 4 to 6 weeks size adnexa unremarkable some filmy adhesions were noted to the bladder flap.  At this point 3 more trocars were placed in usual pattern for a robotic case a robot was then docked without difficulty at this point the omental adhesions to the anterior abdominal wall were taken down without difficulty the tubes bilaterally were taken down and removed.  Round ligaments were both identified and taken down anterior and posterior sheaths were dissected ureters were identified bilaterally and were free and clear of operative sites suspensory ligament of the ovary broad ligaments were then taken down cardinal ligaments were taken down uterine arteries were identified isolated and coagulated as well bladder flap was incised adhesions were taken down and the bladder was retracted off the lower uterine segment.  Urine was clear at this point posterior colpotomy was then made and brought forward in a circumferential fashion.  Uterus and cervix were then removed irrigation was carried out all blood and clots were removed vaginal cuff was closed using a continuous 2 oh V-Loc suture excellent hemostasis was noted at this point cystoscopy was done bladder was intact no  defects or perforations were noted Plume was noted to be coming from both ureters and the urine was clear no blood was noted at this point abdomen was reirrigated and all remaining blood was removed Serafin was placed at the vaginal cuff excellent hemostasis was noted at this point the instruments were removed gas allowed to escape fascia was closed with a figure-of-eight 0 Vicryl suture skin was closed subcuticular 4-0 Vicryl stitch and Dermabond estimated blood loss for the procedure was approximately 50 cc.  Patient of recovery in good condition.  End of dictation  Complications:  None; patient tolerated the procedure well.    Disposition: PACU - hemodynamically stable.  Condition: stable     Task Performed by RNFA or Surgical Assistant:  Assisted with manipulation and placement of trocars and retraction, intraoperative manipulation of trocars closure of incisions      Attending Attestation: I performed the procedure.    Kwaku Schafer  Phone Number: [phone]

## 2025-01-08 NOTE — ANESTHESIA POSTPROCEDURE EVALUATION
Patient: Ally Carpio    Procedure Summary       Date: 01/08/25 Room / Location: [facility] OR 08 / [facility] OR    Anesthesia Start: 0727 Anesthesia Stop: 1047    Procedure: Robot Assist Laparoscopic Total Hysterectomy; Bilateral Salpingectomy; Cystoscopy: LYSIS OF ADHESIONS (Bilateral) Diagnosis:       Menorrhagia with irregular cycle      Pelvic adhesions      (Menorrhagia with irregular cycle [N92.1])    Surgeons: Kwaku Schafer MD Responsible Provider: Betsy Browning MD    Anesthesia Type: general ASA Status: 3            Anesthesia Type: general    Vitals Value Taken Time   /62 01/08/25 1046   Temp 36 °C (96.8 °F) 01/08/25 1042   Pulse 64 01/08/25 1056   Resp 17 01/08/25 1045   SpO2 92 % 01/08/25 1056   Vitals shown include unfiled device data.    Anesthesia Post Evaluation    Patient location during evaluation: PACU  Patient participation: complete - patient participated  Level of consciousness: awake  Pain management: adequate  Multimodal analgesia pain management approach  Airway patency: patent  Cardiovascular status: hemodynamically stable  Respiratory status: spontaneous ventilation  Hydration status: euvolemic  Postoperative Nausea and Vomiting: none        No notable events documented.

## 2025-01-08 NOTE — ANESTHESIA PROCEDURE NOTES
Airway  Date/Time: 1/8/2025 7:47 AM  Urgency: elective    Airway not difficult    Staffing  Performed: SRNA   Authorized by: Betsy Browning MD    Performed by: JORDAN De La Garza-CRNA, RADHA  Patient location during procedure: OR    Indications and Patient Condition  Indications for airway management: anesthesia  Spontaneous ventilation: present  Sedation level: deep  Preoxygenated: yes  Patient position: sniffing  Mask difficulty assessment: 1 - vent by mask    Final Airway Details  Final airway type: endotracheal airway      Successful airway: ETT  Cuffed: yes   Successful intubation technique: video laryngoscopy  Facilitating devices/methods: intubating stylet  Endotracheal tube insertion site: oral  Blade: Deanne  Blade size: #3  ETT size (mm): 7.0  Cormack-Lehane Classification: grade I - full view of glottis  Placement verified by: chest auscultation and capnometry   Cuff volume (mL): 5  Measured from: lips  ETT to lips (cm): 21  Number of attempts at approach: 1  Number of other approaches attempted: 0    Additional Comments  Easy, atraumatic, grade one view, on first attempt using elective Glidescope without difficulty, placed by Cody KING.  Head and neck maintained in neutral alignment. Dentition unchanged, no complications noted.

## 2025-01-08 NOTE — PERIOPERATIVE NURSING NOTE
1100: Dr Schafer messaged about kapoor catheter, Dr Schafer states no void trial needed to just remove catheter prior to discharge.

## 2025-01-08 NOTE — INTERVAL H&P NOTE
H&P reviewed. The patient was examined and there are no changes to the H&P.    Procedure risks and benefits reviewed again with patient.  Possibility of open case reviewed depending on presence of adhesions and increased risk of damage to internal organs due to prior surgery.

## 2025-01-14 ENCOUNTER — TELEPHONE (OUTPATIENT)
Dept: OBSTETRICS AND GYNECOLOGY | Facility: CLINIC | Age: 45
End: 2025-01-14
Payer: COMMERCIAL

## 2025-01-14 DIAGNOSIS — R11.2 NAUSEA AND VOMITING, UNSPECIFIED VOMITING TYPE: Primary | ICD-10-CM

## 2025-01-14 NOTE — TELEPHONE ENCOUNTER
THIS PATIENT WOULD LIKE A PRESCRIPTION FOR ZOFRAN (DISSOLVABLE) IF POSSIBLE PLEASE FOR NAUSEA. THE PHARMACY ON FILE IS STILL CURRENT .      THANK YOU

## 2025-01-15 RX ORDER — ONDANSETRON 4 MG/1
4 TABLET, ORALLY DISINTEGRATING ORAL EVERY 8 HOURS PRN
Qty: 20 TABLET | Refills: 0 | Status: SHIPPED | OUTPATIENT
Start: 2025-01-15 | End: 2025-01-22

## 2025-01-15 NOTE — TELEPHONE ENCOUNTER
RN returned Patient call, verified by name and   Patient post op - Robot Assist Laparoscopic Total Hysterectomy; Bilateral Salpingectomy; Cystoscopy: LYSIS OF ADHESIONS on 2025  Patient reports Nausea when she eats, denies vomiting and Denies constipation  Post Op Appt scheduled for 25  Order pended to physician for review  Gisela Harvey RN

## 2025-01-17 ENCOUNTER — INFUSION (OUTPATIENT)
Dept: INFUSION THERAPY | Facility: CLINIC | Age: 45
End: 2025-01-17
Payer: COMMERCIAL

## 2025-01-17 VITALS
OXYGEN SATURATION: 98 % | RESPIRATION RATE: 16 BRPM | DIASTOLIC BLOOD PRESSURE: 55 MMHG | SYSTOLIC BLOOD PRESSURE: 90 MMHG | BODY MASS INDEX: 24.77 KG/M2 | TEMPERATURE: 97.2 F | WEIGHT: 153.44 LBS | HEART RATE: 51 BPM

## 2025-01-17 DIAGNOSIS — R29.898 WEAKNESS OF BOTH LOWER EXTREMITIES: Primary | ICD-10-CM

## 2025-01-17 DIAGNOSIS — K51.819 OTHER ULCERATIVE COLITIS WITH COMPLICATION: ICD-10-CM

## 2025-01-17 RX ORDER — ALBUTEROL SULFATE 0.83 MG/ML
3 SOLUTION RESPIRATORY (INHALATION) AS NEEDED
OUTPATIENT
Start: 2025-02-12

## 2025-01-17 RX ORDER — DIPHENHYDRAMINE HYDROCHLORIDE 50 MG/ML
50 INJECTION INTRAMUSCULAR; INTRAVENOUS AS NEEDED
OUTPATIENT
Start: 2025-02-12

## 2025-01-17 RX ORDER — EPINEPHRINE 0.3 MG/.3ML
0.3 INJECTION SUBCUTANEOUS EVERY 5 MIN PRN
OUTPATIENT
Start: 2025-02-12

## 2025-01-17 RX ORDER — FAMOTIDINE 10 MG/ML
20 INJECTION INTRAVENOUS ONCE AS NEEDED
OUTPATIENT
Start: 2025-02-12

## 2025-01-17 ASSESSMENT — ENCOUNTER SYMPTOMS
VOMITING: 0
DIARRHEA: 0
SORE THROAT: 0
ABDOMINAL PAIN: 1
NERVOUS/ANXIOUS: 0
NUMBNESS: 0
MYALGIAS: 1
LEG SWELLING: 0
DYSURIA: 0
EYE PROBLEMS: 0
CONFUSION: 0
TROUBLE SWALLOWING: 0
PALPITATIONS: 0
ADENOPATHY: 0
WOUND: 0
HEADACHES: 0
FEVER: 0
SHORTNESS OF BREATH: 0
COUGH: 0
BLOOD IN STOOL: 0
NAUSEA: 1
ARTHRALGIAS: 1
FATIGUE: 1
DIZZINESS: 0
APPETITE CHANGE: 0
CONSTIPATION: 0
DEPRESSION: 1
DECREASED CONCENTRATION: 0

## 2025-01-17 NOTE — PATIENT INSTRUCTIONS
Today :We administered mirikizumab-mrkz.     For:   1. Other ulcerative colitis with complication         Your next appointment is due in: 4 WEEKS FROM TODAY.     Please read the  Medication Guide that was given to you and reviewed during todays visit.     (Tell all doctors including dentists that you are taking this medication)     Go to the emergency room or call 911 if:  -You have signs of allergic reaction:   -Rash, hives, itching.   -Swollen, blistered, peeling skin.   -Swelling of face, lips, mouth, tongue or throat.   -Tightness of chest, trouble breathing, swallowing or talking     Call your doctor:  - If IV / injection site gets red, warm, swollen, itchy or leaks fluid or pus.     (Leave dressing on your IV site for at least 2 hours and keep area clean and dry  - If you get sick or have symptoms of infection or are not feeling well for any reason.    (Wash your hands often, stay away from people who are sick)  - If you have side effects from your medication that do not go away or are bothersome.     (Refer to the teaching your nurse gave you for side effects to call your doctor about)    - Common side effects may include:  stuffy nose, headache, feeling tired, muscle aches, upset stomach  - Before receiving any vaccines     - Call the [facility] at   If:  - You get sick, are on antibiotics, have had a recent vaccine, have surgery or dental work and your doctor wants your visit rescheduled.  - You need to cancel and reschedule your visit for any reason. Call at least 2 days before your visit if you need to cancel.   - Your insurance changes before your next visit.    (We will need to get approval from your new insurance. This can take up to two weeks.)     The [facility] is opened Monday thru Friday. We are closed on weekends and holidays.   Voice mail will take your call if the center is closed. If you leave a message please allow 24 hours for a call back during weekdays.  If you leave a message on a weekend/holiday, we will call you back the next business day.    A pharmacist is available Monday - Friday from 8:30AM to 3:30PM to help answer any questions you may have about your prescriptions(s). Please call pharmacy at:    [facility]: [phone]  [facility]: [phone]  [facility]: [phone]

## 2025-01-17 NOTE — PROGRESS NOTES
"[facility]   Infusion Clinic Note   Date: 2025   Name: Ally Carpio  : 1980   MRN: [id number]          Reason for Visit: OP Infusion (PT. HERE FOR OMVOH 300 MG IV INFUSION./\"WEEK 4\")         Today: We administered mirikizumab-mrkz.       Visit Type: INFUSION       Ordered By: DR. CARDOSO       Accompanied by:       Diagnosis: Other ulcerative colitis with complication        Allergies:   Allergies as of 2025 - Reviewed 2025   Allergen Reaction Noted    Balsalazide Other 2024    Iron GI Upset 2024    Mesalamine Rash 2024          Current Medications has a current medication list which includes the following prescription(s): acetaminophen, cyanocobalamin, ergocalciferol, escitalopram, hydroxyzine hcl, levothyroxine, magnesium gluconate, omvoh, miscellaneous medical supply, multivitamin, ondansetron odt, pantoprazole, pregabalin, prevident 5000 sensitive, syringe 3cc/22gx1\", tacrolimus, tizanidine, ulticare safety syringe, vtama, norethindrone, and oxycodone.       Vitals:   Vitals:    25 0906 25 1007   BP: 96/60 90/55  Comment: nurse notified   Pulse: 50 51   Resp: 16 16   Temp: 36.2 °C (97.1 °F) 36.2 °C (97.2 °F)   TempSrc: Temporal    SpO2: 96% 98%   Weight: 69.6 kg (153 lb 7 oz)              Infusion Pre-procedure Checklist:   - Allergies reviewed: yes   - Medications reviewed: yes       - Previous reaction to current treatment: NO, TOLERATED FIRST INFUSION WELL.      Assess patient for the concerns below. Document provider notification as appropriate.  - Active or recent infection with/without current antibiotic use: no  - Recent or planned invasive dental work: no  - Recent or planned surgeries: yes, PT. HAD LAP HYSTERECTOMY ON 2025. PT. STATES MD'S DID OK INFUSION 1 WEEK AFTER SURGERY.  - Recently received or plans to receive vaccinations: no  - Has treatment related toxicities: no  - Is pregnant:  no    "   Pain: 3, PT. HAS CHRONIC BILAT LE MUSCLE PAIN.   - Is the pain different from normal: no   - Is your Doctor aware:  yes       Labs: N/A          Fall Risk Screening: Harp Fall Risk  History of Falling, Immediate or Within 3 Months: Yes (TRIPPED ON STEPS YESTERDAY,NO INJURY)  Secondary Diagnosis: Yes (CNS DEMYELINAZATION)  Ambulatory Aid: Crutches/cane/walker (USES ROLLATOR)  Intravenous Therapy/Heparin Lock: Yes  Gait/Transferring: Weak  Mental Status: Oriented to own ability  Harp Fall Risk Score: 85       Review Of Systems:  Review of Systems   Constitutional:  Positive for fatigue. Negative for appetite change and fever.        ADMITS TO MODERATE FATIGUE.   HENT:   Negative for hearing loss, mouth sores, sore throat and trouble swallowing.    Eyes:  Negative for eye problems.   Respiratory:  Negative for cough and shortness of breath.    Cardiovascular:  Negative for chest pain, leg swelling and palpitations.        PT. CURRENTLY WEARING HEART MONITOR FOR 1 MORE WEEK, R/T TO LOW BP AND HEART RATE.   Gastrointestinal:  Positive for abdominal pain and nausea. Negative for blood in stool, constipation, diarrhea and vomiting.        PT. WITH HX OF ULCERATIVE COLITIS, SWITCHING FROM HUMIRA.  HERE FOR OMVOH 300 MG IV INFUSION, WEEK 4.  ADMITS TO 5-6 LIQUID BM'S DAILY.  DENIES BLOOD,  SOME MUCUS, OCCASIONAL ABD CRAMPING.  NOCTURNAL STOOLS APPROX 1 TIME A WEEK.  PT. ADMITS TO OCCASIONAL NAUSEA WITHOUT EMESIS.  HX OF GERDS, WHICH SHE FEELS IS DOING OK.   Genitourinary:  Negative for dysuria.    Musculoskeletal:  Positive for arthralgias, gait problem and myalgias.        PT. WITH CNS DEMYELINAZATION, USES ROLLATOR, R/T BILAT FOOT DROP AND BILAT LEG WEAKNESS.  PT. FEELS KNEES BOTHER HER R/T HOW SHE WALKS.  PT. STATES SHE FEELS SHE IS GETTING WEAK IN HER SHOULDERS ALSO.   Skin:  Negative for itching, rash and wound.   Neurological:  Positive for gait problem. Negative for dizziness, headaches and numbness.        HX OF  "CNS DEMYELINAZATION, USES WALKER FOR BILAT LEG WEAKNESS AND FOOT DROP.  FEELS NUMB, TINGLING IS BETTER ON LYRICA.   Hematological:  Negative for adenopathy.   Psychiatric/Behavioral:  Positive for depression. Negative for confusion, decreased concentration and suicidal ideas. The patient is not nervous/anxious.         ADMITS TO MODERATE DEPRESSION, ON MEDS.  DENIES SI/HI.         ROS completed? Yes      Infusion Readiness:  - Assessment Concerns Related to Infusion: Yes, REGARDING RECENT SURGERY.  - Provider notified: LISSETTE POOLE NP AWARE THAT PT'S MD ALLOWED INFUSION 1 WEEK AFTER SURGERY.      Document Below Only If Indicated:   New Patient Education:    N/A (returning patient for continuation of therapy. Ongoing education provided as needed.)        Treatment Conditions & Drug Specific Questions:    Mirikizumab (OMVOH)    (Unless otherwise specified on patient specific therapy plan):     TREATMENT CONDITIONS:  Unless otherwise specified on patient specific therapy plan HOLD and notify provider prior to proceeding with treatment if:   o ALT GREATER than 3x ULN and/or AST GREATER than 3x ULN  o Positive T-spot      Chemistry    Lab Results   Component Value Date/Time     12/23/2024 1526    K 4.1 12/23/2024 1526     12/23/2024 1526    CO2 27 12/23/2024 1526    BUN 13 12/23/2024 1526    CREATININE 0.89 12/23/2024 1526    Lab Results   Component Value Date/Time    CALCIUM 8.5 (L) 12/23/2024 1526    ALKPHOS 43 12/05/2024 0758    AST 8 (L) 12/05/2024 0758    ALT 7 12/05/2024 0758    BILITOT 0.4 12/05/2024 0758        ,  Lab Results   Component Value Date    ALT 7 12/05/2024    AST 8 (L) 12/05/2024    ALKPHOS 43 12/05/2024    BILITOT 0.4 12/05/2024      Lab Results   Component Value Date    TBSIN Negative 09/24/2024    No results found for: \"NONUHFIRE\", \"NONUHSWGH\", \"NONUHFISH\", \"EXTHEPBSAG\"    Labs reviewed and patient meets treatment conditions? Yes    Recommended Vitals/Observation:  Vitals: Monitor " patient's vital signs prior to infusion start, at end of infusion and as needed.  Observation: No observation.          Weight Based Drug Calculations:    WEIGHT BASED DRUGS: NOT APPLICABLE / FLAT DOSE          Initiated By: ANNIE CROWDER RN

## 2025-01-20 ENCOUNTER — LAB (OUTPATIENT)
Dept: LAB | Facility: LAB | Age: 45
End: 2025-01-20
Payer: COMMERCIAL

## 2025-01-20 DIAGNOSIS — R29.898 WEAKNESS OF BOTH LOWER EXTREMITIES: ICD-10-CM

## 2025-01-20 LAB
CK SERPL-CCNC: 30 U/L (ref 0–215)
CRP SERPL-MCNC: <0.1 MG/DL
ERYTHROCYTE [SEDIMENTATION RATE] IN BLOOD BY WESTERGREN METHOD: 3 MM/H (ref 0–20)
LABORATORY COMMENT REPORT: NORMAL
LDH SERPL L TO P-CCNC: 105 U/L (ref 84–246)
PATH REPORT.FINAL DX SPEC: NORMAL
PATH REPORT.GROSS SPEC: NORMAL
PATH REPORT.RELEVANT HX SPEC: NORMAL
PATH REPORT.TOTAL CANCER: NORMAL

## 2025-01-20 PROCEDURE — 83615 LACTATE (LD) (LDH) ENZYME: CPT

## 2025-01-20 PROCEDURE — 36415 COLL VENOUS BLD VENIPUNCTURE: CPT

## 2025-01-20 PROCEDURE — 86140 C-REACTIVE PROTEIN: CPT

## 2025-01-20 PROCEDURE — 82085 ASSAY OF ALDOLASE: CPT

## 2025-01-20 PROCEDURE — 82550 ASSAY OF CK (CPK): CPT

## 2025-01-20 PROCEDURE — 85652 RBC SED RATE AUTOMATED: CPT

## 2025-01-21 ENCOUNTER — APPOINTMENT (OUTPATIENT)
Dept: INFUSION THERAPY | Facility: CLINIC | Age: 45
End: 2025-01-21
Payer: COMMERCIAL

## 2025-01-22 LAB — ALDOLASE SERPL-CCNC: 3.1 U/L (ref 1.2–7.6)

## 2025-01-24 ENCOUNTER — OFFICE VISIT (OUTPATIENT)
Dept: OBSTETRICS AND GYNECOLOGY | Facility: CLINIC | Age: 45
End: 2025-01-24
Payer: COMMERCIAL

## 2025-01-24 VITALS
DIASTOLIC BLOOD PRESSURE: 62 MMHG | HEIGHT: 66 IN | BODY MASS INDEX: 24.59 KG/M2 | SYSTOLIC BLOOD PRESSURE: 94 MMHG | WEIGHT: 153 LBS

## 2025-01-24 DIAGNOSIS — Z09 POSTOPERATIVE EXAMINATION: Primary | ICD-10-CM

## 2025-01-24 PROCEDURE — 3008F BODY MASS INDEX DOCD: CPT | Performed by: OBSTETRICS & GYNECOLOGY

## 2025-01-24 PROCEDURE — 99211 OFF/OP EST MAY X REQ PHY/QHP: CPT | Performed by: OBSTETRICS & GYNECOLOGY

## 2025-01-24 ASSESSMENT — PAIN SCALES - GENERAL: PAINLEVEL_OUTOF10: 0-NO PAIN

## 2025-01-24 NOTE — PROGRESS NOTES
Subjective   Patient ID: Ally Carpio is a 44 y.o. female who presents for Post-op Visit (2 week post op visit; Robot Assist Laparoscopic Total Hysterectomy; Bilateral Salpingectomy; Cystoscopy).  HPI  Patient postop robotically assisted laparoscopic hysterectomy bilateral salpingectomy 2 weeks ago.  Has been doing well.  Very minimal pain some bloating.  No nausea vomiting fever chills no GI or  complaints.  Taking diet and ambulating well.  Urinating and bowel movements without difficulty.  No vaginal bleeding discharge or leakage no back or flank pain    Review of Systems  Bloating all other systems negative    Objective   Physical Exam  Vitals reviewed.   Constitutional:       Appearance: Normal appearance. She is normal weight.   Cardiovascular:      Rate and Rhythm: Normal rate and regular rhythm.   Pulmonary:      Breath sounds: Normal breath sounds.   Abdominal:      General: Abdomen is flat. Bowel sounds are normal.      Palpations: Abdomen is soft.      Comments: Incisions clean dry intact healing well   Neurological:      Mental Status: She is alert.   Psychiatric:         Mood and Affect: Mood normal.         Behavior: Behavior normal.         Thought Content: Thought content normal.         Judgment: Judgment normal.         Assessment/Plan   Diagnoses and all orders for this visit:  Postoperative examination     Normal postoperative exam robotic assisted hysterectomy with bilateral salpingectomy.  Doing well.  Will return to office in 4 weeks.  Continue pelvic rest    Kwaku Schafer MD 01/24/25 1:29 PM

## 2025-01-28 ENCOUNTER — ALLIED HEALTH (OUTPATIENT)
Dept: INTEGRATIVE MEDICINE | Facility: CLINIC | Age: 45
End: 2025-01-28
Payer: COMMERCIAL

## 2025-01-28 DIAGNOSIS — M54.59 OTHER LOW BACK PAIN: ICD-10-CM

## 2025-01-28 DIAGNOSIS — M54.2 CHRONIC NECK PAIN: Primary | ICD-10-CM

## 2025-01-28 DIAGNOSIS — G89.29 CHRONIC NECK PAIN: Primary | ICD-10-CM

## 2025-01-28 PROCEDURE — 99202 OFFICE O/P NEW SF 15 MIN: CPT | Performed by: ACUPUNCTURIST

## 2025-01-28 PROCEDURE — 97810 ACUP 1/> WO ESTIM 1ST 15 MIN: CPT | Performed by: ACUPUNCTURIST

## 2025-01-28 PROCEDURE — 97811 ACUP 1/> W/O ESTIM EA ADD 15: CPT | Performed by: ACUPUNCTURIST

## 2025-01-28 SDOH — ECONOMIC STABILITY: FOOD INSECURITY: WITHIN THE PAST 12 MONTHS, YOU WORRIED THAT YOUR FOOD WOULD RUN OUT BEFORE YOU GOT MONEY TO BUY MORE.: NEVER TRUE

## 2025-01-28 SDOH — ECONOMIC STABILITY: FOOD INSECURITY: WITHIN THE PAST 12 MONTHS, THE FOOD YOU BOUGHT JUST DIDN'T LAST AND YOU DIDN'T HAVE MONEY TO GET MORE.: NEVER TRUE

## 2025-01-28 SDOH — SOCIAL STABILITY: SOCIAL NETWORK: PROMIS ABILITY TO PARTICIPATE IN SOCIAL ROLES & ACTIVITIES T-SCORE: 39

## 2025-01-28 SDOH — SOCIAL STABILITY: SOCIAL NETWORK

## 2025-01-28 SDOH — SOCIAL STABILITY: SOCIAL NETWORK: I HAVE TROUBLE DOING ALL OF THE FAMILY ACTIVITIES THAT I WANT TO DO: USUALLY

## 2025-01-28 SDOH — SOCIAL STABILITY: SOCIAL NETWORK: I HAVE TROUBLE DOING ALL OF MY REGULAR LEISURE ACTIVITIES WITH OTHERS: USUALLY

## 2025-01-28 SDOH — SOCIAL STABILITY: SOCIAL NETWORK: I HAVE TROUBLE DOING ALL OF MY USUAL WORK (INCLUDE WORK AT HOME): USUALLY

## 2025-01-28 SDOH — SOCIAL STABILITY: SOCIAL NETWORK: I HAVE TROUBLE DOING ALL OF THE ACTIVITIES WITH FRIENDS THAT I WANT TO DO: USUALLY

## 2025-01-28 ASSESSMENT — ANXIETY QUESTIONNAIRES
PAST MONTH HOW OFTEN HAVE YOU FELT DIFFICULTIES WERE PILING UP SO HIGH THAT YOU COULD NOT OVERCOME THEM: 2 - SOMETIMES
PAST MONTH HOW OFTEN HAVE YOU FELT THAT YOU WERE UNABLE TO CONTROL THE IMPORTANT THINGS IN YOUR LIFE: 1 - ALMOST NEVER
PAST MONTH HOW OFTEN HAVE YOU FELT CONFIDENT ABOUT YOUR ABILITY TO HANDLE YOUR PROBLEMS: 2 - SOMETIMES
PAST MONTH HOW OFTEN HAVE YOU FELT THAT YOU WERE UNABLE TO CONTROL THE IMPORTANT THINGS IN YOUR LIFE: 1 - ALMOST NEVER
PAST MONTH HOW OFTEN HAVE YOU FELT CONFIDENT ABOUT YOUR ABILITY TO HANDLE YOUR PROBLEMS: 2 - SOMETIMES
PAST MONTH HOW OFTEN HAVE YOU FELT THAT THINGS WERE GOING YOUR WAY: 3 - FAIRLY OFTEN
PAST MONTH HOW OFTEN HAVE YOU FELT THAT YOU WERE UNABLE TO CONTROL THE IMPORTANT THINGS IN YOUR LIFE: 1 - ALMOST NEVER
PAST MONTH HOW OFTEN HAVE YOU FELT DIFFICULTIES WERE PILING UP SO HIGH THAT YOU COULD NOT OVERCOME THEM: 2 - SOMETIMES
PAST MONTH HOW OFTEN HAVE YOU FELT THAT YOU WERE UNABLE TO CONTROL THE IMPORTANT THINGS IN YOUR LIFE: 1 - ALMOST NEVER
PAST MONTH HOW OFTEN HAVE YOU FELT CONFIDENT ABOUT YOUR ABILITY TO HANDLE YOUR PROBLEMS: 2 - SOMETIMES
PAST MONTH HOW OFTEN HAVE YOU FELT THAT THINGS WERE GOING YOUR WAY: 3 - FAIRLY OFTEN
PAST MONTH HOW OFTEN HAVE YOU FELT CONFIDENT ABOUT YOUR ABILITY TO HANDLE YOUR PROBLEMS: 2 - SOMETIMES

## 2025-01-28 ASSESSMENT — PROMIS GLOBAL HEALTH SCALE
RATE_AVERAGE_FATIGUE: SEVERE
CARRYOUT_PHYSICAL_ACTIVITIES: A LITTLE
RATE_GENERAL_HEALTH: FAIR
RATE_QUALITY_OF_LIFE: POOR
CARRYOUT_SOCIAL_ACTIVITIES: FAIR
RATE_PHYSICAL_HEALTH: POOR
RATE_AVERAGE_PAIN: 6
RATE_SOCIAL_SATISFACTION: POOR
EMOTIONAL_PROBLEMS: SOMETIMES
RATE_MENTAL_HEALTH: GOOD

## 2025-01-28 NOTE — PROGRESS NOTES
Acupuncture Visit:     Subjective   Patient ID: Ally Carpio is a 44 y.o. female who presents for No chief complaint on file.  2024 Acupuncture Benefits                                    12/27/2024 JAMEL Mojica  Covered Diagnosis: Medical Necessity   1/30 VPCY    R sided neck pain  Low back pain  BL foot drop  *Pt using a cane *notes using a rollator at home     R sided neck pain  Dxd with CND demyelination, notes multiple Drs have ruled out MS  States she does have lesions on her brain but not spine.  Notes seeing multiple neurologists  States failed neck surgery summer on 2024  RIGHT sided neck pain more behind ear, not at base of skull  Neck pain constant, always there, dull pain 4/10  Worse at end of the day when she has done a lot  BL arms feel weak, RIGHT more than left arm  Radiating to top of shoulder and to front of the shoulder joint  Both arms feel week but RIGHT arm mostly involved    LBP  Feels achy   RIGHT side pain 80% of the time, 20% Left sided   Radiating to piriformis and to the RIGHT calf mm.  BL foot drop    Other med hx  Ulcerative colitis, partial hysterectomy (hx heavy bleeding- Fe infusions in the past)            Session Information  Is this acupuncture treatment being billed to the patient's insurance company: Yes  This is visit number: 1  The patient has a total number of visits of: 30  Initial Acupuncture Treatment date: 01/28/25  Name of Insurance Company: 2024:  [place] Limitations (Visits, etc.): 30 VPCY   Covered Diagnosis: Medical Necessity  Visit Type: New patient         Review of Systems         Provider reviewed plan for the acupuncture session, precautions and contraindications. Patient/guardian/hospital staff has given consent to treat with full understanding of what to expect during the session. Before acupuncture began, provider explained to the patient to communicate at any time if the procedure was causing discomfort past their tolerance level. Patient agreed to  advise acupuncturist. The acupuncturist counseled the patient on the risks of acupuncture treatment including pain, infection, bleeding, and no relief of pain. The patient was positioned comfortably. There was no evidence of infection at the site of needle insertions.    Objective   Physical Exam              Acupuncture Treatment  Body Points - Bilateral: DU 20, Kd 3, SP 6,  GB 37, 40, UB 18, 22, 23, 25, 27,  EXB12  Body Points - Right: SJ 15-17, UB 54, 53, 57, [place]  Other Techniques Utilized: TDP Lamp  TDP Lamp Descripton: low back with 2 moxa  Needle Count In: 28  Needle Count Out: 28  Total Face to Face Time (min): 25 minutes              Assessment/Plan

## 2025-01-29 ENCOUNTER — ANCILLARY PROCEDURE (OUTPATIENT)
Dept: CARDIOLOGY | Facility: HOSPITAL | Age: 45
End: 2025-01-29
Payer: COMMERCIAL

## 2025-01-29 ENCOUNTER — APPOINTMENT (OUTPATIENT)
Dept: INTEGRATIVE MEDICINE | Facility: CLINIC | Age: 45
End: 2025-01-29
Payer: COMMERCIAL

## 2025-01-29 DIAGNOSIS — R00.1 BRADYCARDIA: ICD-10-CM

## 2025-01-29 DIAGNOSIS — R42 DIZZINESS: ICD-10-CM

## 2025-01-29 PROCEDURE — 93246 EXT ECG>7D<15D RECORDING: CPT

## 2025-01-29 PROCEDURE — 93248 EXT ECG>7D<15D REV&INTERPJ: CPT | Performed by: INTERNAL MEDICINE

## 2025-02-04 ENCOUNTER — APPOINTMENT (OUTPATIENT)
Dept: RADIOLOGY | Facility: HOSPITAL | Age: 45
End: 2025-02-04
Payer: COMMERCIAL

## 2025-02-04 ENCOUNTER — HOSPITAL ENCOUNTER (EMERGENCY)
Facility: HOSPITAL | Age: 45
Discharge: HOME | End: 2025-02-04
Attending: STUDENT IN AN ORGANIZED HEALTH CARE EDUCATION/TRAINING PROGRAM
Payer: COMMERCIAL

## 2025-02-04 ENCOUNTER — PHARMACY VISIT (OUTPATIENT)
Dept: PHARMACY | Facility: CLINIC | Age: 45
End: 2025-02-04
Payer: MEDICARE

## 2025-02-04 ENCOUNTER — APPOINTMENT (OUTPATIENT)
Dept: CARDIOLOGY | Facility: HOSPITAL | Age: 45
End: 2025-02-04
Payer: COMMERCIAL

## 2025-02-04 VITALS
RESPIRATION RATE: 15 BRPM | HEIGHT: 66 IN | SYSTOLIC BLOOD PRESSURE: 93 MMHG | TEMPERATURE: 98.6 F | BODY MASS INDEX: 24.11 KG/M2 | OXYGEN SATURATION: 98 % | HEART RATE: 51 BPM | WEIGHT: 150 LBS | DIASTOLIC BLOOD PRESSURE: 60 MMHG

## 2025-02-04 DIAGNOSIS — A08.4 VIRAL GASTROENTERITIS: Primary | ICD-10-CM

## 2025-02-04 LAB
ALBUMIN SERPL BCP-MCNC: 3.8 G/DL (ref 3.4–5)
ALP SERPL-CCNC: 38 U/L (ref 33–110)
ALT SERPL W P-5'-P-CCNC: 12 U/L (ref 7–45)
ANION GAP SERPL CALC-SCNC: 9 MMOL/L (ref 10–20)
AST SERPL W P-5'-P-CCNC: 16 U/L (ref 9–39)
BASOPHILS # BLD AUTO: 0.02 X10*3/UL (ref 0–0.1)
BASOPHILS NFR BLD AUTO: 0.6 %
BILIRUB SERPL-MCNC: 0.2 MG/DL (ref 0–1.2)
BUN SERPL-MCNC: 13 MG/DL (ref 6–23)
CALCIUM SERPL-MCNC: 8.1 MG/DL (ref 8.6–10.3)
CARDIAC TROPONIN I PNL SERPL HS: <3 NG/L (ref 0–13)
CHLORIDE SERPL-SCNC: 104 MMOL/L (ref 98–107)
CO2 SERPL-SCNC: 25 MMOL/L (ref 21–32)
CREAT SERPL-MCNC: 0.9 MG/DL (ref 0.5–1.05)
EGFRCR SERPLBLD CKD-EPI 2021: 82 ML/MIN/1.73M*2
EOSINOPHIL # BLD AUTO: 0.27 X10*3/UL (ref 0–0.7)
EOSINOPHIL NFR BLD AUTO: 7.9 %
ERYTHROCYTE [DISTWIDTH] IN BLOOD BY AUTOMATED COUNT: 12.6 % (ref 11.5–14.5)
FLUAV RNA RESP QL NAA+PROBE: NOT DETECTED
FLUBV RNA RESP QL NAA+PROBE: NOT DETECTED
GLUCOSE SERPL-MCNC: 95 MG/DL (ref 74–99)
HCT VFR BLD AUTO: 35.1 % (ref 36–46)
HGB BLD-MCNC: 11.8 G/DL (ref 12–16)
IMM GRANULOCYTES # BLD AUTO: 0.01 X10*3/UL (ref 0–0.7)
IMM GRANULOCYTES NFR BLD AUTO: 0.3 % (ref 0–0.9)
LACTATE SERPL-SCNC: 0.3 MMOL/L (ref 0.4–2)
LYMPHOCYTES # BLD AUTO: 0.86 X10*3/UL (ref 1.2–4.8)
LYMPHOCYTES NFR BLD AUTO: 25.1 %
MAGNESIUM SERPL-MCNC: 1.9 MG/DL (ref 1.6–2.4)
MCH RBC QN AUTO: 30.6 PG (ref 26–34)
MCHC RBC AUTO-ENTMCNC: 33.6 G/DL (ref 32–36)
MCV RBC AUTO: 91 FL (ref 80–100)
MONOCYTES # BLD AUTO: 0.35 X10*3/UL (ref 0.1–1)
MONOCYTES NFR BLD AUTO: 10.2 %
NEUTROPHILS # BLD AUTO: 1.92 X10*3/UL (ref 1.2–7.7)
NEUTROPHILS NFR BLD AUTO: 55.9 %
NRBC BLD-RTO: 0 /100 WBCS (ref 0–0)
PLATELET # BLD AUTO: 134 X10*3/UL (ref 150–450)
POTASSIUM SERPL-SCNC: 3.9 MMOL/L (ref 3.5–5.3)
PROT SERPL-MCNC: 6.4 G/DL (ref 6.4–8.2)
RBC # BLD AUTO: 3.86 X10*6/UL (ref 4–5.2)
SARS-COV-2 RNA RESP QL NAA+PROBE: NOT DETECTED
SODIUM SERPL-SCNC: 134 MMOL/L (ref 136–145)
WBC # BLD AUTO: 3.4 X10*3/UL (ref 4.4–11.3)

## 2025-02-04 PROCEDURE — 96374 THER/PROPH/DIAG INJ IV PUSH: CPT

## 2025-02-04 PROCEDURE — 83605 ASSAY OF LACTIC ACID: CPT | Performed by: STUDENT IN AN ORGANIZED HEALTH CARE EDUCATION/TRAINING PROGRAM

## 2025-02-04 PROCEDURE — 2550000001 HC RX 255 CONTRASTS: Performed by: STUDENT IN AN ORGANIZED HEALTH CARE EDUCATION/TRAINING PROGRAM

## 2025-02-04 PROCEDURE — 93005 ELECTROCARDIOGRAM TRACING: CPT

## 2025-02-04 PROCEDURE — 74177 CT ABD & PELVIS W/CONTRAST: CPT | Performed by: RADIOLOGY

## 2025-02-04 PROCEDURE — RXMED WILLOW AMBULATORY MEDICATION CHARGE

## 2025-02-04 PROCEDURE — 74177 CT ABD & PELVIS W/CONTRAST: CPT

## 2025-02-04 PROCEDURE — 2500000004 HC RX 250 GENERAL PHARMACY W/ HCPCS (ALT 636 FOR OP/ED): Performed by: STUDENT IN AN ORGANIZED HEALTH CARE EDUCATION/TRAINING PROGRAM

## 2025-02-04 PROCEDURE — 99285 EMERGENCY DEPT VISIT HI MDM: CPT | Mod: 25 | Performed by: STUDENT IN AN ORGANIZED HEALTH CARE EDUCATION/TRAINING PROGRAM

## 2025-02-04 PROCEDURE — 80053 COMPREHEN METABOLIC PANEL: CPT | Performed by: STUDENT IN AN ORGANIZED HEALTH CARE EDUCATION/TRAINING PROGRAM

## 2025-02-04 PROCEDURE — 85025 COMPLETE CBC W/AUTO DIFF WBC: CPT | Performed by: STUDENT IN AN ORGANIZED HEALTH CARE EDUCATION/TRAINING PROGRAM

## 2025-02-04 PROCEDURE — 84484 ASSAY OF TROPONIN QUANT: CPT | Performed by: STUDENT IN AN ORGANIZED HEALTH CARE EDUCATION/TRAINING PROGRAM

## 2025-02-04 PROCEDURE — 96361 HYDRATE IV INFUSION ADD-ON: CPT

## 2025-02-04 PROCEDURE — 83735 ASSAY OF MAGNESIUM: CPT | Performed by: STUDENT IN AN ORGANIZED HEALTH CARE EDUCATION/TRAINING PROGRAM

## 2025-02-04 PROCEDURE — 87636 SARSCOV2 & INF A&B AMP PRB: CPT | Performed by: STUDENT IN AN ORGANIZED HEALTH CARE EDUCATION/TRAINING PROGRAM

## 2025-02-04 PROCEDURE — 36415 COLL VENOUS BLD VENIPUNCTURE: CPT | Performed by: STUDENT IN AN ORGANIZED HEALTH CARE EDUCATION/TRAINING PROGRAM

## 2025-02-04 RX ORDER — ONDANSETRON 4 MG/1
4 TABLET, FILM COATED ORAL EVERY 8 HOURS PRN
Qty: 9 TABLET | Refills: 0 | Status: SHIPPED | OUTPATIENT
Start: 2025-02-04 | End: 2025-02-07

## 2025-02-04 RX ORDER — ONDANSETRON HYDROCHLORIDE 2 MG/ML
4 INJECTION, SOLUTION INTRAVENOUS ONCE
Status: COMPLETED | OUTPATIENT
Start: 2025-02-04 | End: 2025-02-04

## 2025-02-04 RX ORDER — DICYCLOMINE HYDROCHLORIDE 20 MG/1
20 TABLET ORAL 4 TIMES DAILY PRN
Qty: 20 TABLET | Refills: 0 | Status: SHIPPED | OUTPATIENT
Start: 2025-02-04 | End: 2025-02-09

## 2025-02-04 RX ADMIN — ONDANSETRON 4 MG: 2 INJECTION INTRAMUSCULAR; INTRAVENOUS at 14:41

## 2025-02-04 RX ADMIN — SODIUM CHLORIDE 1000 ML: 9 INJECTION, SOLUTION INTRAVENOUS at 14:41

## 2025-02-04 RX ADMIN — IOHEXOL 75 ML: 350 INJECTION, SOLUTION INTRAVENOUS at 15:17

## 2025-02-04 ASSESSMENT — LIFESTYLE VARIABLES
EVER FELT BAD OR GUILTY ABOUT YOUR DRINKING: NO
EVER HAD A DRINK FIRST THING IN THE MORNING TO STEADY YOUR NERVES TO GET RID OF A HANGOVER: NO
HAVE YOU EVER FELT YOU SHOULD CUT DOWN ON YOUR DRINKING: NO
HAVE PEOPLE ANNOYED YOU BY CRITICIZING YOUR DRINKING: NO
TOTAL SCORE: 0

## 2025-02-04 ASSESSMENT — PAIN DESCRIPTION - PAIN TYPE: TYPE: ACUTE PAIN

## 2025-02-04 ASSESSMENT — PAIN DESCRIPTION - DESCRIPTORS: DESCRIPTORS: SHARP

## 2025-02-04 ASSESSMENT — PAIN - FUNCTIONAL ASSESSMENT: PAIN_FUNCTIONAL_ASSESSMENT: 0-10

## 2025-02-04 ASSESSMENT — PAIN DESCRIPTION - LOCATION: LOCATION: ABDOMEN

## 2025-02-04 ASSESSMENT — PAIN SCALES - GENERAL: PAINLEVEL_OUTOF10: 6

## 2025-02-04 NOTE — ED TRIAGE NOTES
Pt. Arrived to the ED via private car for c/o abdominal pain and vomiting Pt. States that for the last two weeks she has had a stomach virus. Pt. States she was starting to feel better but the last few days she has been having increased abdominal pain, vomiting, and diarrhea. Pt. States she has been able to eat or rink much for the last few days. Pt.. states that she has also had some dizziness

## 2025-02-04 NOTE — ED PROVIDER NOTES
HPI   Chief Complaint   Patient presents with    Abdominal Pain    Vomiting       HPI: Patient is a 44-year-old female, history of GERD, PHAN, history of postural dizziness, history of ulcerative colitis, history of GERD, she is presenting to the emergency department today for concern for abdominal pain nausea vomiting and diarrhea.  Symptoms initially started a week and a half ago, got better, then recurred over the weekend.  Multiple episodes of nonbloody nonbilious emesis and multiple episodes of nonbloody diarrhea.  She reports feeling very lightheaded and dehydrated, she reports 2 syncopal episodes going to the bathroom over the weekend as well where she was on her way got very lightheaded and passed out.  Patient is coming in today because of continued and worsening symptoms.  She reports no travel outside of [place].  Does report some sick contacts.  Reports a decrease in oral intake over the past several days.  Recent hysterectomy 1 month ago      ROS: Complete 12 point review of systems performed, otherwise negative except as noted in the history of present illness    PMH: Reviewed, documented below in note. Pertinents in HPI  PSH: Reviewed and documented below in note. Pertinents in HPI  SH: No illicits.  Not homeless.  Fam: Reviewed, noncontributory to patients current complaint  MEDS: Reviewed and documented below in note. Pertinents in HPI  ALLERGIES: Reviewed and documented below in note.        History provided by:  Patient   used: No                          Sigrid Coma Scale Score: 15                  Patient History   Past Medical History:   Diagnosis Date    ALEXEY positive     Cardiology follow-up encounter 09/17/2024    evaluation of bradycardia and fatigue Stress test recommended; however, patient is unable to perform s/t neurological dysfunction    Chronic pain syndrome     Depression     PIERRE (dyspnea on exertion)     Encounter for hematology follow-up 12/16/2024    Ursula  "LENCHO Salinas    Fibromyalgia     GERD (gastroesophageal reflux disease)     H/O echocardiogram 2023    CONCLUSIONS:   1. Left ventricular systolic function is normal with a 65% estimated ejection fraction.    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     s/p C6-C7 Cervical Spine Arthroplasty ~ Anterior Approach 24    History of ITP     s/p D & C Hysteroscopy 24    Hx of idiopathic thrombocytopenic purpura     follows with heme, 6.13.24: plt 111    Hypothyroidism     CAMDEN (iron deficiency anemia)     24 H&H WNL- IV iron   Started on oral iron but can't tolerate due to GI upset   Oncology Ursula LENCHO Salinas 2024    Inflammatory bowel disease     Insomnia     Menorrhagia with regular cycle     Plan: Robot Assisted Laparoscopic Total Hysterectomy; Possible Removal of Tubes & or  Ovaries; Cystoscopy 25    Neurology follow-up encounter 10/15/2024    Paul Holt MD \"It remains unclear whether you have MS or not. I will repeat your brain MRI again in 2025, which together with the eye test can help in determining whether you have MS or not. If the picture remains unclear, we may need to schedule you for a spinal tap.\"    Neuropathy     Palpitation     Psoriasis     Radiculopathy, cervical     Radiculopathy, lumbar region     RLS (restless legs syndrome)     Sleep apnea     Ulcerative colitis      Past Surgical History:   Procedure Laterality Date    CERVICAL SPINE SURGERY  2024    C6-C7 Cervical Spine Arthroplasty ~ Anterior Approach     SECTION, LOW TRANSVERSE      x 2, .     COLONOSCOPY      DILATION AND CURETTAGE OF UTERUS      HYSTEROSCOPY  2024    D & C Hysteroscopy    UPPER GASTROINTESTINAL ENDOSCOPY       Family History   Problem Relation Name Age of Onset    Colon cancer Mother Shari     COPD Mother Shari     Hyperlipidemia Father Archuleta     Prostate cancer Father Archuleta     Hyperlipidemia Sister      Hypothyroidism Sister      " "Diverticulitis Sister      No Known Problems Sister      No Known Problems Brother      Colon cancer Mother's Brother      Colon cancer Mother's Brother Gary     Pancreatic cancer Mother's Brother Gary     Breast cancer Father's Sister Na      Social History     Tobacco Use    Smoking status: Former     Current packs/day: 0.00     Average packs/day: 1 pack/day for 12.5 years (12.5 ttl pk-yrs)     Types: Cigarettes     Start date: 2004     Quit date:      Years since quittin.0    Smokeless tobacco: Never   Vaping Use    Vaping status: Never Used   Substance Use Topics    Alcohol use: Not Currently    Drug use: Never       Physical Exam   Visit Vitals  /71   Pulse 60   Temp 37 °C (98.6 °F)   Resp 16   Ht 1.676 m (5' 6\")   Wt 68 kg (150 lb)   LMP 2024   SpO2 100%   BMI 24.21 kg/m²   OB Status Hysterectomy   Smoking Status Former   BSA 1.78 m²      Physical Exam  Vitals and nursing note reviewed.   Constitutional:       General: She is not in acute distress.     Appearance: Normal appearance. She is well-developed.   HENT:      Head: Normocephalic and atraumatic.      Mouth/Throat:      Comments: Dry MM  Neck:      Vascular: No carotid bruit.   Cardiovascular:      Rate and Rhythm: Normal rate and regular rhythm.      Pulses: Normal pulses.      Heart sounds: Normal heart sounds.   Pulmonary:      Effort: Pulmonary effort is normal.      Breath sounds: Normal breath sounds.   Abdominal:      General: There is no distension.      Palpations: Abdomen is soft.      Tenderness: There is generalized abdominal tenderness. There is no right CVA tenderness, left CVA tenderness, guarding or rebound.   Musculoskeletal:         General: No tenderness, deformity or signs of injury.      Cervical back: Normal range of motion. No rigidity.   Skin:     General: Skin is warm and dry.      Capillary Refill: Capillary refill takes less than 2 seconds.   Neurological:      General: No focal deficit present.      " "Mental Status: She is alert and oriented to person, place, and time.      Sensory: No sensory deficit.      Motor: No weakness.   Psychiatric:         Mood and Affect: Mood normal.         Behavior: Behavior normal.         CT abdomen pelvis w IV contrast    (Results Pending)       Labs Reviewed   CBC WITH AUTO DIFFERENTIAL   MAGNESIUM   COMPREHENSIVE METABOLIC PANEL   LACTATE   TROPONIN I, HIGH SENSITIVITY   SARS-COV-2 AND INFLUENZA A/B PCR         ED Course & MDM            Medical Decision Making         Your medication list        CONTINUE taking these medications        Instructions Last Dose Given Next Dose Due   miscellaneous medical supply misc      Bilateral AFO. Apply to bilateral lower legs daily.       Syringe 3cc/22Gx1\" 3 mL 22 gauge x 1\" syringe  Generic drug: syringe with needle      1 Application see administration instructions.       UltiCare Safety Syringe 3 mL 22 gauge x 1\" syringe  Generic drug: syringe with needle, safety                  ASK your doctor about these medications        Instructions Last Dose Given Next Dose Due   acetaminophen 500 mg tablet  Commonly known as: Tylenol      Take 2 tablets (1,000 mg) by mouth every 6 hours if needed for mild pain (1 - 3).       cyanocobalamin 1,000 mcg/mL injection  Commonly known as: Vitamin B-12      Inject 1 mL (1,000 mcg) into the muscle see administration instructions. Please inject daily x 7, followed by Weekly x 4 and then Monthly       ergocalciferol 1.25 MG (04206 UT) capsule  Commonly known as: Vitamin D-2      Take 1 capsule (1,250 mcg) by mouth 1 (one) time per week.       escitalopram 20 mg tablet  Commonly known as: Lexapro      Take 1 tablet (20 mg) by mouth once daily.       hydrOXYzine HCL 10 mg tablet  Commonly known as: Atarax      Take 1-2 tablets (10-20 mg) by mouth as needed at bedtime (insomnia).       levothyroxine 25 mcg tablet  Commonly known as: Synthroid, Levoxyl      Take 0.5 tablets (12.5 mcg) by mouth once daily.     "   magnesium gluconate 27.5 mg magne- sium (500 mg) tablet  Commonly known as: Magonate      Take 1 tablet (27.5 mg) by mouth 2 times a day.       multivitamin tablet           norethindrone 5 mg tablet  Commonly known as: Aygestin      Take 1 tablet (5 mg) by mouth once daily.       Omvoh 300 mg/15 mL (20 mg/mL) solution injection  Generic drug: mirikizumab-mrkz      Infuse 15 mL (300 mg) over 30 minutes into a venous catheter every 28 (twenty-eight) days.       oxyCODONE 5 mg immediate release tablet  Commonly known as: Roxicodone      Take 1 tablet (5 mg) by mouth every 6 hours if needed for severe pain (7 - 10).       pantoprazole 40 mg EC tablet  Commonly known as: ProtoNix      Take 1 tablet (40 mg) by mouth once daily. Do not crush, chew, or split.       pregabalin 150 mg capsule  Commonly known as: Lyrica      Take 1 capsule (150 mg) by mouth 2 times a day.       PreviDent 5000 Sensitive 1.1-5 % paste  Generic drug: sodium fluoride-pot nitrate           tacrolimus 0.1 % ointment  Commonly known as: Protopic           tiZANidine 4 mg tablet  Commonly known as: Zanaflex      Take 1 tab a day.       Vtama 1 % cream  Generic drug: tapinarof                    Procedure  Procedures     *This report was transcribed using voice recognition software.  Every effort was made to ensure accuracy; however, inadvertent computerized transcription errors may be present.*  Bertrand Dyer MD  02/04/25       COVID-19/ Influenza A/B infections and should not be used as the sole basis for diagnosis, treatment, or other management decisions. Testing for SARS CoV-2 is recommended only for patients who meet current clinical and/or epidemiological criteria defined by federal, state, or local public health directives.         ED Course & MDM   Diagnoses as of 02/20/25 3410   Viral gastroenteritis           Medical Decision Making  All mentioned lab results, ECGs, and imaging were independently reviewed by myself  - Patient evaluated. Patient is presenting to the emergency department today for some abdominal pain and vomiting with diarrhea.  Differential includes but is not limited to potential exacerbation of her ulcerative colitis, gastroenteritis, colitis, bowel obstruction, versus functional diarrhea and abdominal pain to name a few.  She did syncopized twice, dehydration, vasovagal syncope, or atypical ACS and arrhythmia all considered on the differential.  Patient had an IV established, was given fluids and antiemetics and basic labs were obtained in addition to a CT scan.  EKG demonstrates sinus rhythm with a ventricular rate of 56, bradycardia, normal axis with normal intervals, no evidence of an acute STEMI or malignant arrhythmia.  Patient's labs demonstrate a pancytopenia, leukopenia with some thrombocytopenia and anemia, normal kidney and liver function, mild hyponatremia noted, cardiac enzymes and viral swabs within normal limits.  CT scan demonstrates no bowel obstruction, normal appendix, and some infectious versus inflammatory colitis noted, she also has a cystic lesion, likely ovarian cyst, the left posterior pelvic ovoid in the right pelvic adnexa.  Believe this is an incidental finding.  On repeat evaluation she is feeling improved and is tolerating oral intake. Patient updated on her results.  At this time I feel she is stable for discharge with supportive care therapies and close outpatient follow-up to her  "primary care physician.  Prescription therapy was given to the patient, all questions were answered.  She was discharged otherwise stable condition.    - Monitored for any changes in stability or symptomatology. Patient remained stable.   - Counseled regarding labs, imaging, diagnosis, and plan. Patient was agreeable. All questions were answered. The patient was receptive and agreeable to the plan of care.   -The patient was instructed to return to the emergency department if any symptoms recurred, worsened, or if there were any additional concerns.    *Disclaimer: This note was dictated by speech recognition. Minor errors in transcription may be present. Please call with questions.    Capo Dyer MD             Your medication list        START taking these medications        Instructions Last Dose Given Next Dose Due   dicyclomine 20 mg tablet  Commonly known as: Bentyl      Take 1 tablet (20 mg) by mouth 4 times a day as needed (abdominal cramping) for up to 5 days.       ondansetron 4 mg tablet  Commonly known as: Zofran      Take 1 tablet (4 mg) by mouth every 8 hours if needed for vomiting or nausea for up to 3 days.              CONTINUE taking these medications        Instructions Last Dose Given Next Dose Due   miscellaneous medical supply misc      Bilateral AFO. Apply to bilateral lower legs daily.       Syringe 3cc/22Gx1\" 3 mL 22 gauge x 1\" syringe  Generic drug: syringe with needle      1 Application see administration instructions.       UltiCare Safety Syringe 3 mL 22 gauge x 1\" syringe  Generic drug: syringe with needle, safety                  ASK your doctor about these medications        Instructions Last Dose Given Next Dose Due   acetaminophen 500 mg tablet  Commonly known as: Tylenol      Take 2 tablets (1,000 mg) by mouth every 6 hours if needed for mild pain (1 - 3).       cyanocobalamin 1,000 mcg/mL injection  Commonly known as: Vitamin B-12      Inject 1 mL (1,000 mcg) into the " muscle see administration instructions. Please inject daily x 7, followed by Weekly x 4 and then Monthly       ergocalciferol 1.25 MG (28434 UT) capsule  Commonly known as: Vitamin D-2      Take 1 capsule (1,250 mcg) by mouth 1 (one) time per week.       escitalopram 20 mg tablet  Commonly known as: Lexapro      Take 1 tablet (20 mg) by mouth once daily.       hydrOXYzine HCL 10 mg tablet  Commonly known as: Atarax      Take 1-2 tablets (10-20 mg) by mouth as needed at bedtime (insomnia).       levothyroxine 25 mcg tablet  Commonly known as: Synthroid, Levoxyl      Take 0.5 tablets (12.5 mcg) by mouth once daily.       magnesium gluconate 27.5 mg magne- sium (500 mg) tablet  Commonly known as: Magonate      Take 1 tablet (27.5 mg) by mouth 2 times a day.       multivitamin tablet           norethindrone 5 mg tablet  Commonly known as: Aygestin      Take 1 tablet (5 mg) by mouth once daily.       Omvoh 300 mg/15 mL (20 mg/mL) solution injection  Generic drug: mirikizumab-mrkz      Infuse 15 mL (300 mg) over 30 minutes into a venous catheter every 28 (twenty-eight) days.       oxyCODONE 5 mg immediate release tablet  Commonly known as: Roxicodone      Take 1 tablet (5 mg) by mouth every 6 hours if needed for severe pain (7 - 10).       pantoprazole 40 mg EC tablet  Commonly known as: ProtoNix      Take 1 tablet (40 mg) by mouth once daily. Do not crush, chew, or split.       pregabalin 150 mg capsule  Commonly known as: Lyrica      Take 1 capsule (150 mg) by mouth 2 times a day.       PreviDent 5000 Sensitive 1.1-5 % paste  Generic drug: sodium fluoride-pot nitrate           tacrolimus 0.1 % ointment  Commonly known as: Protopic           tiZANidine 4 mg tablet  Commonly known as: Zanaflex      Take 1 tab a day.       Vtama 1 % cream  Generic drug: tapinarof                     Where to Get Your Medications        These medications were sent to [facility]  [address] [place] [id number]       Hours: 8AM to 6PM Mon-Fri, 8AM to 4PM Sat-Sun Phone: [phone]   dicyclomine 20 mg tablet  ondansetron 4 mg tablet         Procedure  Procedures     *This report was transcribed using voice recognition software.  Every effort was made to ensure accuracy; however, inadvertent computerized transcription errors may be present.*  Bertrand Dyer MD  02/20/25         Bertrand Dyer MD  02/20/25 0375

## 2025-02-05 ENCOUNTER — TELEPHONE (OUTPATIENT)
Dept: OBSTETRICS AND GYNECOLOGY | Facility: CLINIC | Age: 45
End: 2025-02-05
Payer: COMMERCIAL

## 2025-02-05 LAB
ATRIAL RATE: 56 BPM
P AXIS: 63 DEGREES
PR INTERVAL: 152 MS
Q ONSET: 251 MS
QRS COUNT: 9 BEATS
QRS DURATION: 94 MS
QT INTERVAL: 447 MS
QTC CALCULATION(BAZETT): 432 MS
QTC FREDERICIA: 437 MS
R AXIS: -10 DEGREES
T AXIS: 26 DEGREES
T OFFSET: 475 MS
VENTRICULAR RATE: 56 BPM

## 2025-02-05 NOTE — TELEPHONE ENCOUNTER
Patient in ER yesterday. They found a cyst on her ovaries (5 by 6 cm oval shape) She has an appointment with Dr. Schafer on 2/21/25. She would like to speak to someone before her visit because she has some concerns.

## 2025-02-09 ASSESSMENT — ENCOUNTER SYMPTOMS: DIZZINESS: 1

## 2025-02-09 NOTE — PROGRESS NOTES
"Counseling:  The patient was counseled regarding diagnostic results, instructions for management, risk factor reductions, prognosis, patient and family education, impressions, risks and benefits of treatment options and importance of compliance with treatment.      Chief Complaint:   The patient presents today for 1-month followup of bradycardia, hypotension and fatigue s/p Holter monitor.     History Of Present Illness:    ANT MESA is a 44 year old female patient who presents today for 1-month followup of bradycardia, hypotension and fatigue s/p Holter monitor. Her PMH is significant for GERD and PHAN. Holter monitoring performed from 2025 to 2025 revealed a min HR of 39 bpm, max HR of 120 bpm, and 1 run of SVT. Today, the patient states that she is feeling well with no new cardiac complaints.      Last Recorded Vitals:  Vitals:    02/10/25 1533   BP: 90/56   BP Location: Right arm   Pulse: 56   Weight: 68 kg (150 lb)   Height: 1.676 m (5' 6\")       Past Surgical History:  She has a past surgical history that includes  section, low transverse; Colonoscopy; Upper gastrointestinal endoscopy; Dilation and curettage of uterus; Cervical spine surgery (2024); and Hysteroscopy (2024).      Social History:  She reports that she quit smoking about 8 years ago. Her smoking use included cigarettes. She started smoking about 20 years ago. She has a 12.5 pack-year smoking history. She has never used smokeless tobacco. She reports that she does not currently use alcohol. She reports that she does not use drugs.    Family History:  Family History   Problem Relation Name Age of Onset    Colon cancer Mother Shari     COPD Mother Shari     Hyperlipidemia Father Archuleta     Prostate cancer Father Archuleta     Hyperlipidemia Sister      Hypothyroidism Sister      Diverticulitis Sister      No Known Problems Sister      No Known Problems Brother      Colon cancer Mother's Brother      Colon cancer Mother's " "Brother Gary     Pancreatic cancer Mother's Brother Gary     Breast cancer Father's Sister Na       Allergies:  Balsalazide, Iron, and Mesalamine    Outpatient Medications:  Current Outpatient Medications   Medication Instructions    acetaminophen (TYLENOL) 1,000 mg, oral, Every 6 hours PRN    cyanocobalamin (VITAMIN B-12) 1,000 mcg, intramuscular, See admin instructions, Please inject daily x 7, followed by Weekly x 4 and then Monthly    ergocalciferol (VITAMIN D-2) 1,250 mcg, oral, Once Weekly    escitalopram (LEXAPRO) 20 mg, oral, Daily    hydrOXYzine HCL (ATARAX) 10-20 mg, oral, Nightly PRN    levothyroxine (SYNTHROID, LEVOXYL) 12.5 mcg, oral, Daily    magnesium gluconate (Magonate) 27.5 mg magne- sium (500 mg) tablet 27.5 mg, oral, 2 times daily    miscellaneous medical supply misc Bilateral AFO. Apply to bilateral lower legs daily.    multivitamin tablet 1 tablet, Daily    norethindrone (AYGESTIN) 5 mg, oral, Daily    Omvoh 300 mg, intravenous, Every 28 days    oxyCODONE (ROXICODONE) 5 mg, oral, Every 6 hours PRN    pantoprazole (PROTONIX) 40 mg, oral, Daily, Do not crush, chew, or split.    pregabalin (LYRICA) 150 mg, oral, 2 times daily    PreviDent 5000 Sensitive 1.1-5 % paste     syringe with needle (Syringe 3cc/22Gx1\") 3 mL 22 gauge x 1\" syringe 1 Application, miscellaneous, See admin instructions    tacrolimus (Protopic) 0.1 % ointment if needed.    tiZANidine (Zanaflex) 4 mg tablet Take 1 tab a day.    UltiCare Safety Syringe 3 mL 22 gauge x 1\" syringe     Vtama 1 % cream if needed.     Review of Systems   Constitutional: Positive for malaise/fatigue.   Neurological:  Positive for dizziness.   All other systems reviewed and are negative.     Physical Exam:  Constitutional:       Appearance: Healthy appearance. Not in distress.   Neck:      Vascular: No JVR. JVD normal.   Pulmonary:      Effort: Pulmonary effort is normal.      Breath sounds: Normal breath sounds. No wheezing. No rhonchi. No rales. "   Chest:      Chest wall: Not tender to palpatation.   Cardiovascular:      PMI at left midclavicular line. Normal rate. Regular rhythm. Normal S1. Normal S2.       Murmurs: There is no murmur.      No gallop.  No click. No rub.   Pulses:     Intact distal pulses.   Edema:     Peripheral edema absent.   Abdominal:      General: Bowel sounds are normal.      Palpations: Abdomen is soft.      Tenderness: There is no abdominal tenderness.   Musculoskeletal: Normal range of motion.         General: No tenderness. Skin:     General: Skin is warm and dry.   Neurological:      General: No focal deficit present.      Mental Status: Alert and oriented to person, place and time.     Last Labs:  CBC -  Lab Results   Component Value Date    WBC 3.4 (L) 02/04/2025    HGB 11.8 (L) 02/04/2025    HCT 35.1 (L) 02/04/2025    MCV 91 02/04/2025     (L) 02/04/2025       CMP -  Lab Results   Component Value Date    CALCIUM 8.1 (L) 02/04/2025    PHOS 3.2 11/19/2024    PROT 6.4 02/04/2025    ALBUMIN 3.8 02/04/2025    AST 16 02/04/2025    ALT 12 02/04/2025    ALKPHOS 38 02/04/2025    BILITOT 0.2 02/04/2025       LIPID PANEL -   Lab Results   Component Value Date    CHOL 196 12/05/2024    HDL 45.2 12/05/2024    CHHDL 4.3 12/05/2024    VLDL 29 12/05/2024    TRIG 147 12/05/2024    NHDL 151 (H) 12/05/2024       RENAL FUNCTION PANEL -   Lab Results   Component Value Date    K 3.9 02/04/2025    PHOS 3.2 11/19/2024       Lab Results   Component Value Date    HGBA1C 4.9 12/05/2024       Last Cardiology Tests:  01/09/2025 to 01/23/2025 - Holter Monitor  1. Predominant underlying rhythm was sinus rhythm; min HR 39 bpm, max  bpm, avg HR 54 bpm.  2. 1 supraventricular tachycardia run occurred; fastest interval lasting 5 beats with max rate 120 bpm (avg 109 bpm).   3. Isolated SVEs were rare, SVE couplets were rare, and no SVE triplets were present.  4. Isolated VEs were rare, and no VE couplets or VE triplets were present.  5. Difficulty  discerning atrial activity making definitive diagnosis difficult to ascertain.      09/26/2023 - TTE  Left ventricular systolic function is normal with a 65% estimated ejection fraction.     Diagnostic review: I have personally reviewed the result(s) of the Holter Monitor.      Assessment/Plan   1) Cardiovascular Risk Stratification Prior to Bariatric Surgery  H/O palpitations s/t anemia requiring 6 iron infusions  Denies CP, chest discomfort or SOB  No past h/o h/o HTN or diabetes  Sleep study 08/01/2023 with mild sleep apnea with a SpO2 karson of 86.0% - will be starting CPAP therapy   ECG with ? old anteroseptal infarct  TTE 09/26/2023 with LVEF 65%  Low risk for planned surgery    2) Bradycardia, Hypotension, Fatigue  Several month h/o symptomatic bradycardia and hypotension  TSH 03/12/2024 WNL at 2.78  Currently on tizanidine   Advised to followup with PCP re: alternative to tizanidine as this is the likely culprit to her symptoms   Stress test recommended; however, patient is unable to perform s/t neurological dysfunction    Reports persistent fatigue and postural dizziness  Remains on Tizanidine as symptoms persisted despite coming off  Recommend optimization of medications as tizanidine, Atarax and Lyrica can all cause dizziness  Holter 01/09/2025 to 01/23/2025 with min HR of 39 bpm, max HR of 120 bpm, 1 run of SVT.  Patient reassured about benign findings on Holter  Increase p.o. fluids  Followup with Deya Wagner NP, in 6 months      Scribe Attestation  By signing my name below, I, Rene Rose   attest that this documentation has been prepared under the direction and in the presence of Gerson Barksdale MD.

## 2025-02-10 ENCOUNTER — OFFICE VISIT (OUTPATIENT)
Dept: CARDIOLOGY | Facility: HOSPITAL | Age: 45
End: 2025-02-10
Payer: COMMERCIAL

## 2025-02-10 VITALS
DIASTOLIC BLOOD PRESSURE: 56 MMHG | SYSTOLIC BLOOD PRESSURE: 90 MMHG | HEIGHT: 66 IN | HEART RATE: 56 BPM | BODY MASS INDEX: 24.11 KG/M2 | WEIGHT: 150 LBS

## 2025-02-10 DIAGNOSIS — R42 DIZZINESS: ICD-10-CM

## 2025-02-10 DIAGNOSIS — R00.1 BRADYCARDIA: ICD-10-CM

## 2025-02-10 PROCEDURE — 99212 OFFICE O/P EST SF 10 MIN: CPT | Performed by: INTERNAL MEDICINE

## 2025-02-10 PROCEDURE — 1036F TOBACCO NON-USER: CPT | Performed by: INTERNAL MEDICINE

## 2025-02-10 PROCEDURE — 3008F BODY MASS INDEX DOCD: CPT | Performed by: INTERNAL MEDICINE

## 2025-02-10 ASSESSMENT — ENCOUNTER SYMPTOMS
LOSS OF SENSATION IN FEET: 0
DEPRESSION: 0
OCCASIONAL FEELINGS OF UNSTEADINESS: 1

## 2025-02-10 NOTE — PATIENT INSTRUCTIONS
Continue all current medications as prescribed.  Please be sure to keep yourself well hydrated throughout the day.  Followup with Deya Wagner NP, in 6 months.      If you have any questions or cardiac concerns, please call our office at [phone].

## 2025-02-11 ENCOUNTER — APPOINTMENT (OUTPATIENT)
Dept: INTEGRATIVE MEDICINE | Facility: CLINIC | Age: 45
End: 2025-02-11
Payer: COMMERCIAL

## 2025-02-11 DIAGNOSIS — G89.29 CHRONIC NECK PAIN: Primary | ICD-10-CM

## 2025-02-11 DIAGNOSIS — M54.59 OTHER LOW BACK PAIN: ICD-10-CM

## 2025-02-11 DIAGNOSIS — M54.2 CHRONIC NECK PAIN: Primary | ICD-10-CM

## 2025-02-11 PROCEDURE — 97811 ACUP 1/> W/O ESTIM EA ADD 15: CPT | Performed by: ACUPUNCTURIST

## 2025-02-11 PROCEDURE — 97810 ACUP 1/> WO ESTIM 1ST 15 MIN: CPT | Performed by: ACUPUNCTURIST

## 2025-02-11 NOTE — PROGRESS NOTES
Acupuncture Visit:     Subjective   Patient ID: Ally Carpio is a 44 y.o. female who presents for No chief complaint on file.  2024 Acupuncture Benefits                                    12/27/2024 RA   [place]  Covered Diagnosis: Medical Necessity   2/30 VPCY    R sided neck pain  Low back pain  BL foot drop  *Pt using a cane *notes using a rollator at home     States a little relief after the first tx  Notes unable to flu a week after 1st tx due to needing to stay at home  No menses, hysterectomy, ovaries intact    Initial  R sided neck pain  Dxd with CND demyelination, notes multiple Drs have ruled out MS  States she does have lesions on her brain but not spine.  Notes seeing multiple neurologists  States failed neck surgery summer on 2024  RIGHT sided neck pain more behind ear, not at base of skull  Neck pain constant, always there, dull pain 4/10  Worse at end of the day when she has done a lot  BL arms feel weak, RIGHT more than left arm  Radiating to top of shoulder and to front of the shoulder joint  Both arms feel week but RIGHT arm mostly involved    LBP  Feels achy   RIGHT side pain 80% of the time, 20% Left sided   Radiating to piriformis and to the RIGHT calf mm.  BL foot drop    Other med hx  Ulcerative colitis, partial hysterectomy (hx heavy bleeding- Fe infusions in the past)            Session Information  Is this acupuncture treatment being billed to the patient's insurance company: Yes  This is visit number: 2  The patient has a total number of visits of: 30  Initial Acupuncture Treatment date: 01/28/25  Name of Insurance Company: 2024:  [place] Limitations (Visits, etc.): 30 VPCY   Covered Diagnosis: Medical Necessity  Visit Type: Follow-up visit         Review of Systems         Provider reviewed plan for the acupuncture session, precautions and contraindications. Patient/guardian/hospital staff has given consent to treat with full understanding of what to expect during the session. Before  acupuncture began, provider explained to the patient to communicate at any time if the procedure was causing discomfort past their tolerance level. Patient agreed to advise acupuncturist. The acupuncturist counseled the patient on the risks of acupuncture treatment including pain, infection, bleeding, and no relief of pain. The patient was positioned comfortably. There was no evidence of infection at the site of needle insertions.    Objective   Physical Exam              Acupuncture Treatment  Body Points - Bilateral: DU 20, Kd 3, SP 6,  GB 37, 40, UB 22, 23, 25, 27,  57, EXB12  Body Points - Right: SJ 15-17, UB 54, 53, Hsiachi  Other Techniques Utilized: TDP Lamp  TDP Lamp Descripton: low back with 2 moxa  Needle Count In: 27  Needle Count Out: 27  Total Face to Face Time (min): 25 minutes              Assessment/Plan

## 2025-02-14 ENCOUNTER — APPOINTMENT (OUTPATIENT)
Dept: INFUSION THERAPY | Facility: CLINIC | Age: 45
End: 2025-02-14
Payer: COMMERCIAL

## 2025-02-14 VITALS
SYSTOLIC BLOOD PRESSURE: 95 MMHG | TEMPERATURE: 97.5 F | RESPIRATION RATE: 16 BRPM | BODY MASS INDEX: 24.46 KG/M2 | OXYGEN SATURATION: 96 % | DIASTOLIC BLOOD PRESSURE: 57 MMHG | HEART RATE: 54 BPM | WEIGHT: 151.57 LBS

## 2025-02-14 DIAGNOSIS — K51.819 OTHER ULCERATIVE COLITIS WITH COMPLICATION: ICD-10-CM

## 2025-02-14 RX ORDER — ALBUTEROL SULFATE 0.83 MG/ML
3 SOLUTION RESPIRATORY (INHALATION) AS NEEDED
Status: CANCELLED | OUTPATIENT
Start: 2025-02-14

## 2025-02-14 RX ORDER — EPINEPHRINE 0.3 MG/.3ML
0.3 INJECTION SUBCUTANEOUS EVERY 5 MIN PRN
Status: CANCELLED | OUTPATIENT
Start: 2025-02-14

## 2025-02-14 RX ORDER — FAMOTIDINE 10 MG/ML
20 INJECTION INTRAVENOUS ONCE AS NEEDED
Status: CANCELLED | OUTPATIENT
Start: 2025-02-14

## 2025-02-14 RX ORDER — DIPHENHYDRAMINE HYDROCHLORIDE 50 MG/ML
50 INJECTION INTRAMUSCULAR; INTRAVENOUS AS NEEDED
Status: CANCELLED | OUTPATIENT
Start: 2025-02-14

## 2025-02-14 ASSESSMENT — ENCOUNTER SYMPTOMS
LEG SWELLING: 0
NUMBNESS: 0
SORE THROAT: 0
EYE PROBLEMS: 0
ARTHRALGIAS: 0
CHILLS: 0
MYALGIAS: 0
FEVER: 0
UNEXPECTED WEIGHT CHANGE: 0
VOICE CHANGE: 0
EXTREMITY WEAKNESS: 0
VOMITING: 0
ABDOMINAL PAIN: 0
BLOOD IN STOOL: 0
SHORTNESS OF BREATH: 0
DIARRHEA: 0
WHEEZING: 0
DIZZINESS: 0
LIGHT-HEADEDNESS: 0
HEADACHES: 0
DYSURIA: 0
COUGH: 0
NERVOUS/ANXIOUS: 0
TROUBLE SWALLOWING: 0
HEMATURIA: 0
FATIGUE: 0
DEPRESSION: 0
PALPITATIONS: 0
APPETITE CHANGE: 0
WOUND: 0
NAUSEA: 0
FREQUENCY: 0
CONSTIPATION: 0

## 2025-02-14 NOTE — PATIENT INSTRUCTIONS
Today :We administered mirikizumab-mrkz.     For:   1. Other ulcerative colitis with complication         Your next appointment is due in:  INFUSIONS COMPLETE. START AT HOME INJECTIONS 4 WEEKS AFTER TODAY'S APT.         Please read the  Medication Guide that was given to you and reviewed during todays visit.     (Tell all doctors including dentists that you are taking this medication)     Go to the emergency room or call 911 if:  -You have signs of allergic reaction:   -Rash, hives, itching.   -Swollen, blistered, peeling skin.   -Swelling of face, lips, mouth, tongue or throat.   -Tightness of chest, trouble breathing, swallowing or talking     Call your doctor:  - If IV / injection site gets red, warm, swollen, itchy or leaks fluid or pus.     (Leave dressing on your IV site for at least 2 hours and keep area clean and dry  - If you get sick or have symptoms of infection or are not feeling well for any reason.    (Wash your hands often, stay away from people who are sick)  - If you have side effects from your medication that do not go away or are bothersome.     (Refer to the teaching your nurse gave you for side effects to call your doctor about)    - Common side effects may include:  stuffy nose, headache, feeling tired, muscle aches, upset stomach  - Before receiving any vaccines     - Call the [facility] at   If:  - You get sick, are on antibiotics, have had a recent vaccine, have surgery or dental work and your doctor wants your visit rescheduled.  - You need to cancel and reschedule your visit for any reason. Call at least 2 days before your visit if you need to cancel.   - Your insurance changes before your next visit.    (We will need to get approval from your new insurance. This can take up to two weeks.)     The [facility] is opened Monday thru Friday. We are closed on weekends and holidays.   Voice mail will take your call if the center is closed. If you leave a message  please allow 24 hours for a call back during weekdays. If you leave a message on a weekend/holiday, we will call you back the next business day.    A pharmacist is available Monday - Friday from 8:30AM to 3:30PM to help answer any questions you may have about your prescriptions(s). Please call pharmacy at:    [facility]: [phone]  [facility]: [phone]  [facility]: [phone]

## 2025-02-14 NOTE — PROGRESS NOTES
[facility]   Infusion Clinic Note   Date: 2025   Name: Ally Carpio  : 1980   MRN: [id number]         Reason for Visit: Follow-up and OP Infusion (PT HERE FOR [facility] 300MG/NEXT APT: INFUSIONS COMPLETE )         Today: We administered mirikizumab-mrkz.       Ordered By: No ref. provider found DR MARTINS      For a Diagnosis of: Other ulcerative colitis with complication       At today's visit patient accompanied by:       Vitals:   Vitals:    25 1502 25 1555 25 1600   BP: 114/69 87/54 95/57   Pulse: 60 54    Resp: 16 16    Temp: 36.3 °C (97.3 °F) 36.4 °C (97.5 °F)    TempSrc:  Temporal    SpO2: 99% 96%    Weight: 68.8 kg (151 lb 9.1 oz)     LMP: 2024             Infusion Pre-procedure Checklist:      - Previous reaction to current treatment: no      Assess patient for the concerns below. Document provider notification as appropriate.  - Active or recent infection with/without current antibiotic use: no  - Recent or planned invasive dental work: no  - Recent or planned surgeries: no  - Recently received or plans to receive vaccinations: no  - Has treatment related toxicities: no  - Any chance may be pregnant:  n/a      Pain: 7   - Is the pain different from normal: n/a   - Is prescribing Doctor aware:  n/a      Labs: Reviewed       Fall Risk Screening: Harp Fall Risk  History of Falling, Immediate or Within 3 Months: Yes  Secondary Diagnosis: Yes  Ambulatory Aid: Crutches/cane/walker  Intravenous Therapy/Heparin Lock: No  Gait/Transferring: Weak  Mental Status: Oriented to own ability  Harp Fall Risk Score: 65       Review Of Systems:  Review of Systems   Constitutional:  Negative for appetite change, chills, fatigue, fever and unexpected weight change.   HENT:   Negative for hearing loss, mouth sores, sore throat, tinnitus, trouble swallowing and voice change.    Eyes:  Negative for eye problems.   Respiratory:  Negative for cough,  "shortness of breath and wheezing.    Cardiovascular:  Negative for chest pain, leg swelling and palpitations.   Gastrointestinal:  Negative for abdominal pain, blood in stool, constipation, diarrhea, nausea and vomiting.        PT REPORTS 5 PLUS BM'S PER DAY  PT DENIES MUCOUS AND BLOOD IN STOOL    Genitourinary:  Negative for dysuria, frequency and hematuria.    Musculoskeletal:  Negative for arthralgias and myalgias.   Skin:  Negative for itching, rash and wound.   Neurological:  Negative for dizziness, extremity weakness, headaches, light-headedness and numbness.   Psychiatric/Behavioral:  Negative for depression. The patient is not nervous/anxious.          Infusion Readiness:  - Assessment Concerns Related to Infusion: No  - Provider notified: n/a      New Patient Education:    N/A (returning patient for continuation of therapy. Ongoing education provided as needed.)        Treatment Conditions & Drug Specific Questions:    Mirikizumab ([facility])    (Unless otherwise specified on patient specific therapy plan):     TREATMENT CONDITIONS:  Unless otherwise specified on patient specific therapy plan HOLD and notify provider prior to proceeding with treatment if:   o ALT GREATER than 3x ULN and/or AST GREATER than 3x ULN  o Positive T-spot      Chemistry    Lab Results   Component Value Date/Time     (L) 02/04/2025 1433    K 3.9 02/04/2025 1433     02/04/2025 1433    CO2 25 02/04/2025 1433    BUN 13 02/04/2025 1433    CREATININE 0.90 02/04/2025 1433    Lab Results   Component Value Date/Time    CALCIUM 8.1 (L) 02/04/2025 1433    ALKPHOS 38 02/04/2025 1433    AST 16 02/04/2025 1433    ALT 12 02/04/2025 1433    BILITOT 0.2 02/04/2025 1433        ,  Lab Results   Component Value Date    ALT 12 02/04/2025    AST 16 02/04/2025    ALKPHOS 38 02/04/2025    BILITOT 0.2 02/04/2025      Lab Results   Component Value Date    TBSIN Negative 09/24/2024    No results found for: \"NONUHFIRE\", \"NONUHSWGH\", \"NONUHFISH\", " "\"EXTHEPBSAG\"    Patient meets treatment conditions? Yes    Recommended Vitals/Observation:  Vitals: Monitor patient's vital signs prior to infusion start, at end of infusion and as needed.  Observation: No observation.          Weight Based Drug Calculations:    WEIGHT BASED DRUGS: NOT APPLICABLE / FLAT DOSE          Initiated By: Lucian Interiano RN        "

## 2025-02-17 DIAGNOSIS — K51.819 OTHER ULCERATIVE COLITIS WITH COMPLICATION: Primary | ICD-10-CM

## 2025-02-18 ENCOUNTER — APPOINTMENT (OUTPATIENT)
Dept: INTEGRATIVE MEDICINE | Facility: CLINIC | Age: 45
End: 2025-02-18
Payer: COMMERCIAL

## 2025-02-18 DIAGNOSIS — G89.29 CHRONIC NECK PAIN: ICD-10-CM

## 2025-02-18 DIAGNOSIS — M54.2 CHRONIC NECK PAIN: ICD-10-CM

## 2025-02-18 DIAGNOSIS — M54.59 OTHER LOW BACK PAIN: Primary | ICD-10-CM

## 2025-02-18 PROCEDURE — 97810 ACUP 1/> WO ESTIM 1ST 15 MIN: CPT | Performed by: ACUPUNCTURIST

## 2025-02-18 PROCEDURE — 97811 ACUP 1/> W/O ESTIM EA ADD 15: CPT | Performed by: ACUPUNCTURIST

## 2025-02-18 RX ORDER — MIRIKIZUMAB-MRKZ 100 MG/ML
200 INJECTION, SOLUTION SUBCUTANEOUS
Qty: 2 ML | Refills: 5 | Status: SHIPPED | OUTPATIENT
Start: 2025-02-18

## 2025-02-18 NOTE — PROGRESS NOTES
Acupuncture Visit:     Subjective   Patient ID: Ally Carpio is a 44 y.o. female who presents for No chief complaint on file.  2024 Acupuncture Benefits                                    12/27/2024 JAMEL Mojica  Covered Diagnosis: Medical Necessity   3/30 VPCY    Notes less pain x 3 days  Increase energy  States R sides pain less intense    prev  R sided neck pain  Low back pain  BL foot drop  *Pt using a cane *notes using a rollator at home     States a little relief after the first tx  Notes unable to flu a week after 1st tx due to needing to stay at home  No menses, hysterectomy, ovaries intact    Initial  R sided neck pain  Dxd with CND demyelination, notes multiple Drs have ruled out MS  States she does have lesions on her brain but not spine.  Notes seeing multiple neurologists  States failed neck surgery summer on 2024  RIGHT sided neck pain more behind ear, not at base of skull  Neck pain constant, always there, dull pain 4/10  Worse at end of the day when she has done a lot  BL arms feel weak, RIGHT more than left arm  Radiating to top of shoulder and to front of the shoulder joint  Both arms feel week but RIGHT arm mostly involved    LBP  Feels achy   RIGHT side pain 80% of the time, 20% Left sided   Radiating to piriformis and to the RIGHT calf mm.  BL foot drop    Other med hx  Ulcerative colitis, partial hysterectomy (hx heavy bleeding- Fe infusions in the past)            Session Information  Is this acupuncture treatment being billed to the patient's insurance company: Yes  This is visit number: 3  The patient has a total number of visits of: 30  Initial Acupuncture Treatment date: 01/28/25  Name of Insurance Company: 2024:  [place] Limitations (Visits, etc.): 30 VPCY   Covered Diagnosis: Medical Necessity  Visit Type: Follow-up visit         Review of Systems         Provider reviewed plan for the acupuncture session, precautions and contraindications. Patient/guardian/hospital staff has given  consent to treat with full understanding of what to expect during the session. Before acupuncture began, provider explained to the patient to communicate at any time if the procedure was causing discomfort past their tolerance level. Patient agreed to advise acupuncturist. The acupuncturist counseled the patient on the risks of acupuncture treatment including pain, infection, bleeding, and no relief of pain. The patient was positioned comfortably. There was no evidence of infection at the site of needle insertions.    Objective   Physical Exam              Acupuncture Treatment  Body Points - Left: 1 mid gluteal atrophy  Body Points - Bilateral: DU 20, Kd 3, SP 6,  GB 37, 40,  UB 20, 22-25,  EXB12  Body Points - Right: SJ 15-17, UB 54, 53, Hsiachi  Other Techniques Utilized: TDP Lamp  TDP Lamp Descripton: low back with 2 moxa  Needle Count In: 28  Needle Count Out: 28  Total Face to Face Time (min): 25 minutes              Assessment/Plan

## 2025-02-19 ENCOUNTER — SPECIALTY PHARMACY (OUTPATIENT)
Dept: PHARMACY | Facility: CLINIC | Age: 45
End: 2025-02-19

## 2025-02-21 ENCOUNTER — OFFICE VISIT (OUTPATIENT)
Dept: OBSTETRICS AND GYNECOLOGY | Facility: CLINIC | Age: 45
End: 2025-02-21
Payer: COMMERCIAL

## 2025-02-21 VITALS
DIASTOLIC BLOOD PRESSURE: 44 MMHG | HEIGHT: 66 IN | BODY MASS INDEX: 24.65 KG/M2 | SYSTOLIC BLOOD PRESSURE: 98 MMHG | WEIGHT: 153.4 LBS

## 2025-02-21 DIAGNOSIS — Z09 POSTOPERATIVE EXAMINATION: Primary | ICD-10-CM

## 2025-02-21 PROCEDURE — 3008F BODY MASS INDEX DOCD: CPT | Performed by: OBSTETRICS & GYNECOLOGY

## 2025-02-21 PROCEDURE — 1036F TOBACCO NON-USER: CPT | Performed by: OBSTETRICS & GYNECOLOGY

## 2025-02-21 PROCEDURE — 99211 OFF/OP EST MAY X REQ PHY/QHP: CPT | Performed by: OBSTETRICS & GYNECOLOGY

## 2025-02-21 RX ORDER — MAGNESIUM GLUCONATE 27 MG(500)
1 TABLET ORAL
COMMUNITY
Start: 2025-01-29

## 2025-02-21 ASSESSMENT — ENCOUNTER SYMPTOMS
OCCASIONAL FEELINGS OF UNSTEADINESS: 0
DEPRESSION: 0
LOSS OF SENSATION IN FEET: 0

## 2025-02-21 ASSESSMENT — PAIN SCALES - GENERAL: PAINLEVEL_OUTOF10: 4

## 2025-02-21 NOTE — PROGRESS NOTES
Subjective   Patient ID: Ally Carpio is a 44 y.o. female who presents for Post-op Visit (Pt is here for her second Post Op Visit. She has no concern or issue at this time./Pain is a 4 in her legs/No Fall/ST MA).  HPI  Patient for postop visit.  Status post robotically assisted laparoscopic hysterectomy.  Patient is doing well.  Has been under workup for hypotension and low heart rate.  Has had multiple test.  Has been cleared by cardiology no obvious defects noted.  Patient does not have any symptoms at this point    Review of Systems  Review of systems currently negative    Objective   Physical Exam  Vitals reviewed.   Constitutional:       Appearance: Normal appearance. She is obese.   Cardiovascular:      Rate and Rhythm: Normal rate and regular rhythm.   Pulmonary:      Effort: Pulmonary effort is normal.      Breath sounds: Normal breath sounds.   Abdominal:      General: Abdomen is flat. Bowel sounds are normal. There is no distension.      Palpations: Abdomen is soft. There is no mass.      Tenderness: There is no abdominal tenderness.      Comments: Incisions well-healed   Genitourinary:     Comments: Genitalia unremarkable  Vagina clear cuff intact  Cervix and uterus surgically absent  Adnexa masses or tenderness  Perineum without lesions or swelling  Neurological:      General: No focal deficit present.      Mental Status: She is alert.   Psychiatric:         Mood and Affect: Mood normal.         Behavior: Behavior normal.         Thought Content: Thought content normal.         Judgment: Judgment normal.     Assessment/Plan   Diagnoses and all orders for this visit:  Postoperative examination  -     Follow Up In Gynecology; Future     Unremarkable postoperative examination today.  Patient doing well meeting goals.  Patient return to office in 3 months for follow-up or as needed  Kwaku Schafer MD 02/21/25 1:24 PM

## 2025-02-25 DIAGNOSIS — G37.9 CNS DEMYELINATION (MULTI): ICD-10-CM

## 2025-02-25 RX ORDER — TIZANIDINE 4 MG/1
TABLET ORAL
Qty: 90 TABLET | Refills: 0 | Status: SHIPPED | OUTPATIENT
Start: 2025-02-25

## 2025-02-26 ENCOUNTER — OFFICE VISIT (OUTPATIENT)
Dept: PRIMARY CARE | Facility: CLINIC | Age: 45
End: 2025-02-26
Payer: COMMERCIAL

## 2025-02-26 ENCOUNTER — APPOINTMENT (OUTPATIENT)
Dept: INTEGRATIVE MEDICINE | Facility: CLINIC | Age: 45
End: 2025-02-26
Payer: COMMERCIAL

## 2025-02-26 VITALS
WEIGHT: 155 LBS | BODY MASS INDEX: 25.02 KG/M2 | DIASTOLIC BLOOD PRESSURE: 84 MMHG | OXYGEN SATURATION: 98 % | HEART RATE: 56 BPM | SYSTOLIC BLOOD PRESSURE: 122 MMHG

## 2025-02-26 DIAGNOSIS — R29.898 WEAKNESS OF BOTH SHOULDERS: Primary | ICD-10-CM

## 2025-02-26 DIAGNOSIS — F33.1 DEPRESSION, MAJOR, RECURRENT, MODERATE: ICD-10-CM

## 2025-02-26 DIAGNOSIS — M79.7 FIBROMYALGIA: ICD-10-CM

## 2025-02-26 PROCEDURE — 99214 OFFICE O/P EST MOD 30 MIN: CPT | Performed by: FAMILY MEDICINE

## 2025-02-26 PROCEDURE — 1036F TOBACCO NON-USER: CPT | Performed by: FAMILY MEDICINE

## 2025-02-26 RX ORDER — CLOBETASOL PROPIONATE 0.5 MG/G
OINTMENT TOPICAL 2 TIMES DAILY
COMMUNITY
Start: 2025-02-21

## 2025-02-26 RX ORDER — BUPROPION HYDROCHLORIDE 150 MG/1
150 TABLET ORAL EVERY MORNING
Qty: 90 TABLET | Refills: 1 | Status: SHIPPED | OUTPATIENT
Start: 2025-02-26 | End: 2025-08-25

## 2025-02-26 RX ORDER — ESCITALOPRAM OXALATE 20 MG/1
20 TABLET ORAL DAILY
Qty: 90 TABLET | Refills: 1 | Status: SHIPPED | OUTPATIENT
Start: 2025-02-26 | End: 2025-08-25

## 2025-02-26 ASSESSMENT — ENCOUNTER SYMPTOMS
FEVER: 0
NERVOUS/ANXIOUS: 0
COUGH: 0
CHILLS: 0
DYSPHORIC MOOD: 1
WEAKNESS: 1

## 2025-02-26 NOTE — PROGRESS NOTES
Subjective   Patient ID: Ally Carpio is a 44 y.o. female who presents for Pain (Recheck chronic pain) and Depression (Discuss Meds).    Ally has been having weakness in her bilateral shoulders.  She is having difficulty lifting her arms above 90 degrees in all directions.  The symptoms started approximately 1 month ago.  She did notify her neurologist of these findings, and blood work was performed to further evaluate.  She is scheduled to see her neurologist at the end of April.  She continues to have weakness in both of her shoulders.  She has difficulty lifting arms overhead.  She reports that her weakness progressively gets worse as the day goes on.  She is not experiencing any shoulder pain.    She continues to have issues with chronic pain.  Does not feel that her symptoms are well-controlled.  He is taking Lyrica as prescribed, but she is interested in seeing pain management to discuss other options.    In addition, she feels that her depression medication was not working well.  She has been taking Lexapro.  Symptoms initially improved, but recently they have been progressively worsening.  She is feeling depressed and hopeless, especially because of her worsening symptoms without a diagnosis.         Review of Systems   Constitutional:  Negative for chills and fever.   HENT:  Negative for congestion.    Respiratory:  Negative for cough.    Neurological:  Positive for weakness.   Psychiatric/Behavioral:  Positive for dysphoric mood. The patient is not nervous/anxious.        Objective   /84   Pulse 56   Wt 70.3 kg (155 lb)   LMP 12/25/2024   SpO2 98%   BMI 25.02 kg/m²     Physical Exam  Constitutional:       General: She is not in acute distress.     Appearance: Normal appearance.   HENT:      Head: Normocephalic.      Mouth/Throat:      Mouth: Mucous membranes are moist.   Eyes:      Extraocular Movements: Extraocular movements intact.      Conjunctiva/sclera: Conjunctivae normal.    Cardiovascular:      Rate and Rhythm: Normal rate and regular rhythm.      Heart sounds: No murmur heard.  Pulmonary:      Breath sounds: No wheezing or rhonchi.   Musculoskeletal:      Cervical back: Neck supple.      Comments: Bilateral shoulder range of motion restricted to 90 degrees with flexion and abduction.   Skin:     General: Skin is warm and dry.   Neurological:      Mental Status: She is alert.      Comments: +5/5 strength with elbow flexion/extension, wrist flexion,extension. Normal testing of radial, ulnar and median nerves bilaterally.    Psychiatric:         Mood and Affect: Mood normal.         Behavior: Behavior normal.         Assessment/Plan   Diagnoses and all orders for this visit:  Weakness of both shoulders  Comments:  Possibly related to underlying neurologic disease.  Also possible frozen shoulder.  Recommend trial of physical therapy.  Consider orthopedic consult.  Orders:  -     Referral to Physical Therapy; Future  -     Referral to Pain Medicine; Future  Fibromyalgia  Comments:  Continue Lyrica.  Referred to pain management.  Orders:  -     Referral to Pain Medicine; Future  Depression, major, recurrent, moderate  Comments:  Continue escitalopram.  Start bupropion  mg daily.  Consider switching escitalopram to duloxetine if persistent pain persistent  Orders:  -     escitalopram (Lexapro) 20 mg tablet; Take 1 tablet (20 mg) by mouth once daily.  -     buPROPion XL (Wellbutrin XL) 150 mg 24 hr tablet; Take 1 tablet (150 mg) by mouth once daily in the morning. Do not crush, chew, or split.  -     Referral to Psychiatry; Future

## 2025-02-27 ENCOUNTER — TELEPHONE (OUTPATIENT)
Dept: NEUROLOGY | Facility: CLINIC | Age: 45
End: 2025-02-27
Payer: COMMERCIAL

## 2025-02-27 ENCOUNTER — OFFICE VISIT (OUTPATIENT)
Dept: PAIN MEDICINE | Facility: HOSPITAL | Age: 45
End: 2025-02-27
Payer: COMMERCIAL

## 2025-02-27 ENCOUNTER — TELEPHONE (OUTPATIENT)
Dept: GASTROENTEROLOGY | Facility: HOSPITAL | Age: 45
End: 2025-02-27
Payer: COMMERCIAL

## 2025-02-27 DIAGNOSIS — R29.898 WEAKNESS OF BOTH SHOULDERS: ICD-10-CM

## 2025-02-27 DIAGNOSIS — M79.7 FIBROMYALGIA: ICD-10-CM

## 2025-02-27 PROCEDURE — 99214 OFFICE O/P EST MOD 30 MIN: CPT | Performed by: ANESTHESIOLOGY

## 2025-02-27 PROCEDURE — 99204 OFFICE O/P NEW MOD 45 MIN: CPT | Performed by: ANESTHESIOLOGY

## 2025-02-27 ASSESSMENT — PAIN SCALES - GENERAL: PAINLEVEL_OUTOF10: 7

## 2025-02-27 NOTE — TELEPHONE ENCOUNTER
Patient left voicemail stating her medication through  specialty was canceled and inquired on update. Reaching out to pharmacy.    Tripped and fell going around a corner. Uses a cane. Denies CP, no SOB, no dizziness pt on coumadin. Pain L Knee. L hand L side pain

## 2025-02-27 NOTE — TELEPHONE ENCOUNTER
Ally Carpio called. She has an MRI scheduled for 03/03/25. She would like to request oral sedation for her scan. Please send to [facility] in [place] on file.

## 2025-02-27 NOTE — PROGRESS NOTES
Subjective   Patient ID: Ally Carpio is a 44 y.o. female with a past medical history of ITP, HLD, GERD, fibromyalgia, ACDF       HPI:   44-year-old female presenting to the pain management clinic for evaluation of pain in the neck, right shoulder, mid back, and legs.  The patient's pain began about a year ago after cessation of 2 to 3 months of steroid therapy for ulcerative colitis.  Patient noted that her pain initially began in her low back and would radiate down her right leg down to her feet she would note distal numbness and tingling in her right foot.  She since in the last year has had progressively worsening pain down the mid back and neck down into the right shoulder area.  Her mid back pain radiating across the right chest wall into her ribs.  He is she complains of muscle weakness in the lower extremities and upper extremities related to this pain.  She says the pain worsens with any sort of activity and at worst is 8 or 9 out of 10.  She describes the pain as sharp when it flares and dull and achy otherwise.    She has previously had an ACDF in her neck to attempt to fix the shoulder and arm pain.  She is previously done acupuncture, massage therapy, and physical therapy with minimal improvement in her symptoms.    She is attempted to take Lyrica which she says mildly improves her symptoms and continues to take muscle relaxants which helped her pain.    Physical Therapy: The patient has done six or more weeks of physical therapy in the past six months with minimal improvement  Other Conservative Measures she has tried: Acupuncture, TENS, Heating Pad, Ice, and Massage/myofascial release  Classes of medications tried in the past: Gabapentenoids and Muscle Relaxants        Review of Systems   13-point ROS done and negative except for HPI.     Current Outpatient Medications   Medication Instructions    buPROPion XL (WELLBUTRIN XL) 150 mg, oral, Every morning, Do not crush, chew, or split.     "clobetasol (Temovate) 0.05 % ointment 2 times daily    cyanocobalamin (VITAMIN B-12) 1,000 mcg, intramuscular, See admin instructions, Please inject daily x 7, followed by Weekly x 4 and then Monthly    ergocalciferol (VITAMIN D-2) 1,250 mcg, oral, Once Weekly    escitalopram (LEXAPRO) 20 mg, oral, Daily    hydrOXYzine HCL (ATARAX) 10-20 mg, oral, Nightly PRN    levothyroxine (SYNTHROID, LEVOXYL) 12.5 mcg, oral, Daily    Mag-G 27 mg magnesium (500 mg) tablet 1 tablet, Every 12 hours scheduled (0630,1830)    magnesium gluconate (Magonate) 27.5 mg magne- sium (500 mg) tablet 27.5 mg, oral, 2 times daily    miscellaneous medical supply misc Bilateral AFO. Apply to bilateral lower legs daily.    multivitamin tablet 1 tablet, Daily    Omvoh Pen 200 mg, subcutaneous, Every 28 days    Omvoh 300 mg, intravenous, Every 28 days    pantoprazole (PROTONIX) 40 mg, oral, Daily, Do not crush, chew, or split.    pregabalin (LYRICA) 150 mg, oral, 2 times daily    PreviDent 5000 Sensitive 1.1-5 % paste     syringe with needle (Syringe 3cc/22Gx1\") 3 mL 22 gauge x 1\" syringe 1 Application, miscellaneous, See admin instructions    tacrolimus (Protopic) 0.1 % ointment if needed.    tiZANidine (Zanaflex) 4 mg tablet TAKE ONE TABLET BY MOUTH DAILY    UltiCare Safety Syringe 3 mL 22 gauge x 1\" syringe     Vtama 1 % cream if needed.       Past Medical History:   Diagnosis Date    ALEXEY positive     Cardiology follow-up encounter 09/17/2024    evaluation of bradycardia and fatigue Stress test recommended; however, patient is unable to perform s/t neurological dysfunction    Chronic pain syndrome     Depression     PIERRE (dyspnea on exertion)     Encounter for hematology follow-up 12/16/2024    Ursula Salinas PA-C    Fibromyalgia     GERD (gastroesophageal reflux disease)     H/O echocardiogram 09/26/2023    CONCLUSIONS:   1. Left ventricular systolic function is normal with a 65% estimated ejection fraction.    Herniation of intervertebral disc of " "cervical spine with myelopathy     s/p C6-C7 Cervical Spine Arthroplasty ~ Anterior Approach 24    History of ITP     s/p D & C Hysteroscopy 24    Hx of idiopathic thrombocytopenic purpura     follows with heme, 624: plt 111    Hypothyroidism     [place] (iron deficiency anemia)     24 H&H WNL- IV iron   Started on oral iron but can't tolerate due to GI upset   Oncology Ursula Salinas PA-C 2024    Inflammatory bowel disease     Insomnia     Menorrhagia with regular cycle     Plan: Robot Assisted Laparoscopic Total Hysterectomy; Possible Removal of Tubes & or  Ovaries; Cystoscopy 25    Neurology follow-up encounter 10/15/2024    Paul Holt MD \"It remains unclear whether you have MS or not. I will repeat your brain MRI again in 2025, which together with the eye test can help in determining whether you have MS or not. If the picture remains unclear, we may need to schedule you for a spinal tap.\"    Neuropathy     Palpitation     Psoriasis     Radiculopathy, cervical     Radiculopathy, lumbar region     RLS (restless legs syndrome)     Sleep apnea     Ulcerative colitis         Past Surgical History:   Procedure Laterality Date    CERVICAL SPINE SURGERY  2024    C6-C7 Cervical Spine Arthroplasty ~ Anterior Approach     SECTION, LOW TRANSVERSE      x 2, .     COLONOSCOPY      DILATION AND CURETTAGE OF UTERUS      HYSTEROSCOPY  2024    D & C Hysteroscopy    UPPER GASTROINTESTINAL ENDOSCOPY          Family History   Problem Relation Name Age of Onset    Colon cancer Mother Shari     COPD Mother Shari     Hyperlipidemia Father Archuleta     Prostate cancer Father Archuleta     Hyperlipidemia Sister      Hypothyroidism Sister      Diverticulitis Sister      No Known Problems Sister      No Known Problems Brother      Colon cancer Mother's Brother      Colon cancer Mother's Brother Gary     Pancreatic cancer Mother's Brother Gary     Breast cancer Father's Sister Na     "     Allergies   Allergen Reactions    Balsalazide Other     Mouth ulcers    Iron GI Upset    Mesalamine Rash        Objective     There were no vitals filed for this visit.     Physical Exam  General: NAD, well groomed, well nourished  Eyes: Non-icteric sclera, EOMI  Ears, Nose, Mouth, and Throat: External ears and nose appear to be without deformity or rash. No lesions or masses noted. Hearing is grossly intact.   Neck: Trachea midline  Respiratory: Nonlabored breathing   Cardiovascular: no peripheral edema   Skin: No rashes or open lesions/ulcers identified on skin.    Back:   Palpation: No tenderness to palpation over lumbar, cervical paraspinous muscles.   Straight leg raise: does not reproduce their pain, bilaterally   DEWEY Maneuver does not reproduce pain bilaterally    Shoulder: Active and passive internal/external rotation intact. Neer's, campa, empty can negative. Speed's, yergason's negative. Nontender to palpation over shoulder joint.     Neurologic:   Cranial nerves grossly intact.   Strength: 3/5 and symmetric plantar/dorsiflexion   Shoulder flexion and extension 4/5. Biceps flexion 5/5.  Sensation: Normal to light touch throughout, pinprick intact throughout.  Reyes: absent  Clonus: absent    Psychiatric: Alert, orientation to person, place, and time. Cooperative.    Imaging personally reviewed and independently interpreted      MRI c spine 08/27/2024  EVALUATION OF INDIVIDUAL LEVELS:  C2-3: No disc herniation  spinal canal or neuroforaminal stenosis.      C3-4: Uncovertebral and facet hypertrophy mildly narrow the left  neural foramen. Spinal canal right neural foramina are patent.      C4-5: Shallow central disc protrusion with minimal narrowing of the  spinal canal. Bilateral foramina are patent.      C5-6: No disc herniation  spinal canal or neuroforaminal stenosis.      C6-7: Uncovertebral and facet hypertrophy mildly narrow the left  neural foramen. Spinal canal and right neural foramina are  patent.      C7-T1: No disc herniation  spinal canal or neuroforaminal stenosis.      IMPRESSION:  Status post C6-7 disc implant. T2 stir hyperintense signal within the  C6-7 vertebral bodies may represent edema and/or artifact secondary  to hardware.    MR thoracic spine 09/24/2024  IMPRESSION:  1. No abnormal spinal cord signal or enhancement to suggest  demyelinating disease or active demyelination.  2. At the T10-T11 level, there is a disc osteophyte complex which  effaces the ventral CSF space and flattens the ventral aspect of the  spinal cord with mild spinal canal stenosis.    MR lumbar spine 04/26/2024  Evaluation by level:      T12-L1: No spinal canal or neural foraminal stenosis.      L1-L2: No spinal canal or neural foraminal stenosis      L2-L3: No spinal canal or neural foraminal stenosis      L3-L4: Disc bulge and facet arthrosis. No spinal canal stenosis. Mild  neural foraminal stenosis, left greater than right.      L4-L5: Right eccentric disc bulge and facet arthrosis. No spinal  canal stenosis. Mild bilateral neural foraminal stenosis. There may  be abutment of the exiting right L4 nerve root. Please correlate  clinically for symptoms of right L4 radiculopathy.      L5-S1: Disc bulge and facet arthrosis. No spinal canal stenosis. Mild  neural foraminal stenosis.      IMPRESSION:  Degenerative changes in the lower lumbar spine without spinal canal  stenosis. Mild neural foraminal narrowing at L3-L4, L4-L5 and L5-S1.    Assessment/Plan   44-year-old female with history of ITP, fibromyalgia, ulcerative colitis presenting with 1 year of worsening mid back, neck and shoulder pain.  Patient's symptoms are likely multifactorial involving some component of her underlying potential neurologic disorder as well as neuroforaminal narrowing at C6-7 and mild spinal canal stenosis at T10, which may be contributing to her radiating pain.    Plan:  -T10-T11 interlaminar epidural steroid injection with  fluoroscopy    The patient has failed treatment with : Physical therapy , alternative therapies, and they have failed surgery    We discussed  the risks, benefits and alternatives of the procedure including but not limited to: , Lack of efficacy , Transiently worsening pain , Bleeding, Infection , and Nerve Damage    Follow up: After procedure    The patient was invited to contact us back anytime with any questions or concerns and follow-up with us in the office as needed.     Diagnoses and all orders for this visit:  Weakness of both shoulders  Comments:  Possibly related to underlying neurologic disease.  Also possible frozen shoulder.  Recommend trial of physical therapy.  Consider orthopedic consult.  Orders:  -     Referral to Pain Medicine  Fibromyalgia  Comments:  Continue Lyrica.  Referred to pain management.  Orders:  -     Referral to Pain Medicine      This note was generated with the aid of dictation software, there may be typos despite my attempts at proofreading.

## 2025-03-03 ENCOUNTER — HOSPITAL ENCOUNTER (OUTPATIENT)
Dept: RADIOLOGY | Facility: HOSPITAL | Age: 45
Discharge: HOME | End: 2025-03-03
Payer: COMMERCIAL

## 2025-03-03 ENCOUNTER — PATIENT MESSAGE (OUTPATIENT)
Dept: PRIMARY CARE | Facility: CLINIC | Age: 45
End: 2025-03-03
Payer: COMMERCIAL

## 2025-03-03 DIAGNOSIS — G37.9 CNS DEMYELINATION (MULTI): ICD-10-CM

## 2025-03-03 PROCEDURE — A9575 INJ GADOTERATE MEGLUMI 0.1ML: HCPCS | Performed by: PSYCHIATRY & NEUROLOGY

## 2025-03-03 PROCEDURE — 2550000001 HC RX 255 CONTRASTS: Performed by: PSYCHIATRY & NEUROLOGY

## 2025-03-03 PROCEDURE — 70553 MRI BRAIN STEM W/O & W/DYE: CPT

## 2025-03-03 RX ORDER — GADOTERATE MEGLUMINE 376.9 MG/ML
0.2 INJECTION INTRAVENOUS
Status: COMPLETED | OUTPATIENT
Start: 2025-03-03 | End: 2025-03-03

## 2025-03-03 RX ADMIN — GADOTERATE MEGLUMINE 14 ML: 376.9 INJECTION INTRAVENOUS at 12:50

## 2025-03-04 ENCOUNTER — APPOINTMENT (OUTPATIENT)
Dept: INTEGRATIVE MEDICINE | Facility: CLINIC | Age: 45
End: 2025-03-04
Payer: COMMERCIAL

## 2025-03-06 ENCOUNTER — ALLIED HEALTH (OUTPATIENT)
Dept: INTEGRATIVE MEDICINE | Facility: CLINIC | Age: 45
End: 2025-03-06
Payer: COMMERCIAL

## 2025-03-06 DIAGNOSIS — J34.2 DEVIATED NASAL SEPTUM: ICD-10-CM

## 2025-03-06 DIAGNOSIS — M54.59 OTHER LOW BACK PAIN: Primary | ICD-10-CM

## 2025-03-06 DIAGNOSIS — M54.2 CHRONIC NECK PAIN: ICD-10-CM

## 2025-03-06 DIAGNOSIS — J34.3 HYPERTROPHY OF NASAL TURBINATES: ICD-10-CM

## 2025-03-06 DIAGNOSIS — Z01.818 PRE-OP TESTING: ICD-10-CM

## 2025-03-06 DIAGNOSIS — G89.29 CHRONIC NECK PAIN: ICD-10-CM

## 2025-03-06 PROCEDURE — 97811 ACUP 1/> W/O ESTIM EA ADD 15: CPT | Performed by: ACUPUNCTURIST

## 2025-03-06 PROCEDURE — 97810 ACUP 1/> WO ESTIM 1ST 15 MIN: CPT | Performed by: ACUPUNCTURIST

## 2025-03-06 NOTE — PROGRESS NOTES
Acupuncture Visit:     Subjective   Patient ID: Ally Carpio is a 44 y.o. female who presents for No chief complaint on file.  2024 Acupuncture Benefits                                    12/27/2024 JAMEL Mojica  Covered Diagnosis: Medical Necessity   4/30 VPCY    States less intense pain  She got new braces to help her walk which is helping    prev  Notes less pain x 3 days  Increase energy  States R sides pain less intense    prev  R sided neck pain  Low back pain  BL foot drop  *Pt using a cane *notes using a rollator at home     States a little relief after the first tx  Notes unable to flu a week after 1st tx due to needing to stay at home  No menses, hysterectomy, ovaries intact    Initial  R sided neck pain  Dxd with CND demyelination, notes multiple Drs have ruled out MS  States she does have lesions on her brain but not spine.  Notes seeing multiple neurologists  States failed neck surgery summer on 2024  RIGHT sided neck pain more behind ear, not at base of skull  Neck pain constant, always there, dull pain 4/10  Worse at end of the day when she has done a lot  BL arms feel weak, RIGHT more than left arm  Radiating to top of shoulder and to front of the shoulder joint  Both arms feel week but RIGHT arm mostly involved    LBP  Feels achy   RIGHT side pain 80% of the time, 20% Left sided   Radiating to piriformis and to the RIGHT calf mm.  BL foot drop    Other med hx  Ulcerative colitis, partial hysterectomy (hx heavy bleeding- Fe infusions in the past)            Session Information  Is this acupuncture treatment being billed to the patient's insurance company: Yes  This is visit number: 4  The patient has a total number of visits of: 30  Initial Acupuncture Treatment date: 01/28/25  Name of Insurance Company: 2024:  Galina Limitations (Visits, etc.): 30 VPCY   Covered Diagnosis: Medical Necessity         Review of Systems         Provider reviewed plan for the acupuncture session, precautions and  contraindications. Patient/guardian/hospital staff has given consent to treat with full understanding of what to expect during the session. Before acupuncture began, provider explained to the patient to communicate at any time if the procedure was causing discomfort past their tolerance level. Patient agreed to advise acupuncturist. The acupuncturist counseled the patient on the risks of acupuncture treatment including pain, infection, bleeding, and no relief of pain. The patient was positioned comfortably. There was no evidence of infection at the site of needle insertions.    Objective   Physical Exam              Acupuncture Treatment  Body Points - Left: 1 mid gluteal atrophy  Body Points - Bilateral: DU 20, Kd 3, SP 6,  GB 39,  40,  UB 20, 22-25,  EXB12  Body Points - Right: SJ 5, 15-17, UB 54, 53  Other Techniques Utilized: TDP Lamp  TDP Lamp Descripton: low back with 2 moxa  Needle Count In: 28  Needle Count Out: 28  Total Face to Face Time (min): 25 minutes              Assessment/Plan

## 2025-03-10 NOTE — PROGRESS NOTES
Subjective   Patient ID: Ally Carpio is a 44 y.o. female who presents for follow up of UC.   HPI  This is a 42yo F w/ PMH of psoriasis since 2023 (previously on topical treatment only), C6-7 cervical spine arthroplasty, lTP since November of 2023 (never required treatment), chronic menorrhagia, iron deficiency anemia, RLS since 2014, prior smoking (quit in 2017 smoked 1 ppd for 12 years before that), depression, hypothyroidism, insomnia, ?concern for MS (seeing neuro) who comes in for follow up of UC. She is s/p 3 IV doses on omvoh for induction is due for subcutaneous omvoh on March 14th     Interval hx:   Patient was recently started on Omvoh. Has completed 3 infusions with the last one on 2/14. She has not faced any severe side effects and states that her symptoms have improved by 50% better. Patient continues to endorse diarrhea; however, it is much improved. Prior to omvoh she had >20 Bms a day but s/p 3 IV doses, she has 4-8 loose to semi solid brown Bms a day. Her BRBPR has completely resolved and her abdominal pain has significantly improved. Patient was prescribed a 2 month taper of uceris at the previous visit which she had completed in mid-January and states that she hasdnot seen any improvement with it. She endorses nighttime bowel movements about once a week which is also improved post omvoh. Does endorse occasional nausea.    Labs Feb 2025:  LFTs wnl CRP <0.10 , Hgb 11.8 Platelets 134  Labs 2024: fecal calpro 2880, TB spot neg, Hep B and C neg    Past hx:   Per pt, over Sep to Oct, she has had worsening of sx- after stopping adalimumab biosimilar in Sept 2024, 7-10 loose brown Bms with intermittent scant BRBPR. She has been off all meds since Sept 2024. Endorses intermittent lower abd pain- dull, achy, 3-5/10.  Endorses chronic fatigue.     She stopped adalimumab biosimilar in Sept 2024 due to c/f MS (was on it from April to Sept 2024, prior to that was on balsalazide- had mucositis, mesalamine-  stopped due to skin rash, dxed Jan 2024). Denies abd sugery, no other immunomodulators, small molecules, biologics. She has been on prednisone in the past with mild mood changes.     She previously saw Estevan Hanley over at [facility] and while neuro had rec zeposia (given c/f MS), her HR was noted to low in the 40s which made zeposia a less than ideal choice.     C-scope Jan 2024 for hematochezia and diarrhea:     The terminal ileum appeared normal. Performed random biopsy using biopsy forceps.  The cecum, ascending colon and hepatic flexure appeared normal. Performed random biopsy using biopsy forceps, labeled right colon.  Subcentimeter polyp in the transverse colon was removed with cold snare (bx: inflammatory polyp).  The descending colon appeared normal. Performed random biopsy using biopsy forceps, labeled left colon.  Moderate abnormal mucosa in the rectosigmoid, with edema, erythema and aphthous ulcerations in contiguous distribution.  This is most suggestive of ulcerative proctitis; performed cold forceps biopsy (bx: chronic active colitis).     PMH/PSH: As above, C-sections, Hysterectomy (Jan 2025)     SH: denies smoking, alcohol, IVDU; she is a former massage therapist     FH: Mother had CRC at age 60s, denies FH of IBD    Review of Systems  Denies dysphagia, odynophagia, melena, f/c, n/v, wt loss, heartburn, 12 point ROS done and neg unless otherwise stated.     EIM: denies new mucositis, skin rash, joint pains, eye redness or pain.    Objective   Vitals:  /64 (BP Location: Left arm, Patient Position: Sitting, BP Cuff Size: Adult)   Pulse 55   Temp 36.6 °C (97.9 °F) (Temporal)   Resp 18   Wt 70.3 kg (155 lb)   LMP 12/25/2024   SpO2 99%   BMI 25.02 kg/m²      Physical Exam  Constitutional: Well-developed female in no acute distress.  HEENT: Normocephalic, atraumatic. PERRL. EOMI. No cervical lymphadenopathy.  Respiratory: CTA bilaterally. No wheezes, rales, or rhonchi. Normal respiratory  effort.  Cardiovascular: RRR. No murmurs, gallops, or rubs. No JVD. Radial pulses 2+.  Abdominal: Soft, nondistended, nontender to palpation. Bowel sounds present. No hepatosplenomegaly or masses. No CVA tenderness.  Neuro: CN II-XII intact. UE and LE strength 5/5 bilaterally and sensation intact. Normal FTN testing.  MSK: No LE edema bilaterally.  Skin: Warm, dry. No rashes or wounds.  Psych: Appropriate mood and affect.     Assessment/Plan   This is a 44yo F w/ PMH of psoriasis since 2023 (previously on topical treatment only), C6-7 cervical spine arthroplasty, lTP since November of 2023 (never required treatment), chronic menorrhagia, iron deficiency anemia, RLS since 2014, prior smoking (quit in 2017 smoked 1 ppd for 12 years before that), depression, hypothyroidism, insomnia, ?concern for MS (seeing neuro) who comes in for follow up of Ulcerative proctitis. She is s/p 3 IV doses on omvoh for induction is due for subcutaneous omvoh on March 14th      She stopped adalimumab biosimilar in Sept 2024 due to c/f MS (was on it from April to Sept 2024, prior to that was on balsalazide- had mucositis, mesalamine- stopped due to skin rash, dxed Jan 2024).      She previously saw Estevan bernstein at [facility] and while neuro had rec zeposia (given c/f MS), her HR was noted to low in the 40s which made zeposia a less than ideal choice. Anti TNFs contraindicated relatively due to c/f MS, JCV Ab is positive and pt would like to avoid entyvio for that reason.     She is s/p 3 IV doses on omvoh for induction and is due for subcutaneous omvoh on March 14th. Her abd pain, BRBPR and diarrhea are significantly improved post omvoh induction and has had very good clinical response without any adverse events.    Plan:  -we will work with insurance to obtain approval for subcutaneous omvoh starting in March 2025 at 200mg subcutaneous every 4 weeks.  -follow up with PCP for being uptodate with age appropriate vaccinations  -obtain stool  test- fecal calprotectin    We will see you back in 3-4 months    Health maintenance in IBD: reference for PCP     -Vaccinations: pt was counseled that it is recommended to get inactivated flu, pneumococcal, COVID-19 vaccine. No LIVE vaccines while on immunosuppressant therapy---->defer to PCP      -All women w/ IBD on systemic immunosuppression defined as [(pred >20 mg/d for >14 days), AZA, 6-MP, MTX, cyclosporine, tacrolimus, anti-TNFs, USK, TOFA] should undergo cervical cancer screening by cytology annually (if cytology alone) or every 2 years (if HPV negative)      -annual latest TB exposure risk assessment and latent TB screening at Baseline---> (TB spot was negative in 2024)     -Osteoporosis screening by DEXA scan in patients with IBD if any risk factors for osteoporosis (low BMI, >3 mths cumulative steroid exposure, post-menopausal,hypogonadism). Repeat in 5 yrs if inital screen is normal--->>normal in 2024     -screen all patients with IBD for depression (PHQ9) and anxiety (GAD7) at baseline and annually---->>pt denies s/s of depression     -screen all patients for smoking status and refer current smokers to smoking cessation therapy---->>does not apply to this patient currently       [I will follow this patient for management of her chronic ulcerative colitis]    Reanna Mack MD 03/11/25 10:28 AM

## 2025-03-11 ENCOUNTER — APPOINTMENT (OUTPATIENT)
Dept: GASTROENTEROLOGY | Facility: HOSPITAL | Age: 45
End: 2025-03-11
Payer: COMMERCIAL

## 2025-03-11 VITALS
DIASTOLIC BLOOD PRESSURE: 64 MMHG | SYSTOLIC BLOOD PRESSURE: 105 MMHG | TEMPERATURE: 97.9 F | OXYGEN SATURATION: 99 % | WEIGHT: 155 LBS | BODY MASS INDEX: 25.02 KG/M2 | HEART RATE: 55 BPM | RESPIRATION RATE: 18 BRPM

## 2025-03-11 DIAGNOSIS — K51.819 OTHER ULCERATIVE COLITIS WITH COMPLICATION: Primary | ICD-10-CM

## 2025-03-11 DIAGNOSIS — M79.7 FIBROMYALGIA: Primary | ICD-10-CM

## 2025-03-11 PROCEDURE — G2211 COMPLEX E/M VISIT ADD ON: HCPCS | Performed by: STUDENT IN AN ORGANIZED HEALTH CARE EDUCATION/TRAINING PROGRAM

## 2025-03-11 PROCEDURE — 99215 OFFICE O/P EST HI 40 MIN: CPT | Performed by: STUDENT IN AN ORGANIZED HEALTH CARE EDUCATION/TRAINING PROGRAM

## 2025-03-11 RX ORDER — PREGABALIN 200 MG/1
200 CAPSULE ORAL 2 TIMES DAILY
Qty: 60 CAPSULE | Refills: 0 | Status: SHIPPED | OUTPATIENT
Start: 2025-03-11 | End: 2025-04-10

## 2025-03-11 ASSESSMENT — PAIN SCALES - GENERAL: PAINLEVEL_OUTOF10: 4

## 2025-03-11 NOTE — PATIENT INSTRUCTIONS
Thank you for coming to GI clinic today, Your symptoms have improved on omvoh which is reassuring.    Plan:     -we will work with insurance to obtain approval for subcutaneous omvoh starting in March 2025 at 200mg subcutaneous every 4 weeks.  -follow up with PCP for being uptodate with age appropriate vaccinations  -obtain stool test- fecal calprotectin    We will see you back in 3-4 months

## 2025-03-12 ENCOUNTER — APPOINTMENT (OUTPATIENT)
Dept: INTEGRATIVE MEDICINE | Facility: CLINIC | Age: 45
End: 2025-03-12
Payer: COMMERCIAL

## 2025-03-14 ENCOUNTER — TELEPHONE (OUTPATIENT)
Dept: GASTROENTEROLOGY | Facility: HOSPITAL | Age: 45
End: 2025-03-14

## 2025-03-14 ENCOUNTER — HOSPITAL ENCOUNTER (OUTPATIENT)
Facility: HOSPITAL | Age: 45
Discharge: HOME | End: 2025-03-14
Payer: COMMERCIAL

## 2025-03-14 VITALS
SYSTOLIC BLOOD PRESSURE: 104 MMHG | OXYGEN SATURATION: 98 % | DIASTOLIC BLOOD PRESSURE: 68 MMHG | HEART RATE: 50 BPM | RESPIRATION RATE: 18 BRPM | TEMPERATURE: 98.1 F

## 2025-03-14 DIAGNOSIS — M79.7 FIBROMYALGIA: ICD-10-CM

## 2025-03-14 PROCEDURE — 2550000001 HC RX 255 CONTRASTS: Performed by: ANESTHESIOLOGY

## 2025-03-14 PROCEDURE — 62321 NJX INTERLAMINAR CRV/THRC: CPT | Performed by: ANESTHESIOLOGY

## 2025-03-14 PROCEDURE — 2500000004 HC RX 250 GENERAL PHARMACY W/ HCPCS (ALT 636 FOR OP/ED): Performed by: ANESTHESIOLOGY

## 2025-03-14 RX ORDER — MIDAZOLAM HYDROCHLORIDE 1 MG/ML
INJECTION, SOLUTION INTRAMUSCULAR; INTRAVENOUS
Status: COMPLETED | OUTPATIENT
Start: 2025-03-14 | End: 2025-03-14

## 2025-03-14 RX ORDER — LIDOCAINE HYDROCHLORIDE 5 MG/ML
INJECTION, SOLUTION INFILTRATION; INTRAVENOUS
Status: COMPLETED | OUTPATIENT
Start: 2025-03-14 | End: 2025-03-14

## 2025-03-14 RX ORDER — METHYLPREDNISOLONE ACETATE 40 MG/ML
INJECTION, SUSPENSION INTRA-ARTICULAR; INTRALESIONAL; INTRAMUSCULAR; SOFT TISSUE
Status: COMPLETED | OUTPATIENT
Start: 2025-03-14 | End: 2025-03-14

## 2025-03-14 RX ADMIN — LIDOCAINE HYDROCHLORIDE 6 ML: 5 INJECTION, SOLUTION INFILTRATION at 12:19

## 2025-03-14 RX ADMIN — METHYLPREDNISOLONE ACETATE 40 MG: 40 INJECTION, SUSPENSION INTRA-ARTICULAR; INTRALESIONAL; INTRAMUSCULAR; SOFT TISSUE at 12:20

## 2025-03-14 RX ADMIN — IOHEXOL 2 ML: 240 INJECTION, SOLUTION INTRATHECAL; INTRAVASCULAR; INTRAVENOUS; ORAL at 12:19

## 2025-03-14 RX ADMIN — MIDAZOLAM 2 MG: 1 INJECTION INTRAMUSCULAR; INTRAVENOUS at 12:15

## 2025-03-14 ASSESSMENT — PAIN - FUNCTIONAL ASSESSMENT
PAIN_FUNCTIONAL_ASSESSMENT: 0-10
PAIN_FUNCTIONAL_ASSESSMENT: WONG-BAKER FACES
PAIN_FUNCTIONAL_ASSESSMENT: 0-10
PAIN_FUNCTIONAL_ASSESSMENT: WONG-BAKER FACES
PAIN_FUNCTIONAL_ASSESSMENT: 0-10

## 2025-03-14 ASSESSMENT — PAIN SCALES - GENERAL
PAINLEVEL_OUTOF10: 0 - NO PAIN
PAINLEVEL_OUTOF10: 4
PAINLEVEL_OUTOF10: 4
PAINLEVEL_OUTOF10: 0 - NO PAIN
PAINLEVEL_OUTOF10: 4

## 2025-03-14 ASSESSMENT — COLUMBIA-SUICIDE SEVERITY RATING SCALE - C-SSRS
1. IN THE PAST MONTH, HAVE YOU WISHED YOU WERE DEAD OR WISHED YOU COULD GO TO SLEEP AND NOT WAKE UP?: NO
2. HAVE YOU ACTUALLY HAD ANY THOUGHTS OF KILLING YOURSELF?: NO
6. HAVE YOU EVER DONE ANYTHING, STARTED TO DO ANYTHING, OR PREPARED TO DO ANYTHING TO END YOUR LIFE?: NO

## 2025-03-14 NOTE — DISCHARGE INSTRUCTIONS
DISCHARGE INSTRUCTIONS FOR INJECTIONS     You underwent thoracic epidural steroid injection today    After most injections, it is recommended that you relax and limit your activity for the remainder of the day unless you have been told otherwise by your pain physician.  You should not drive a car, operate machinery, or make important legal decisions unless otherwise directed by your pain physician.  You may resume your normal activity, including exercise, tomorrow.      Keep a written pain diary of how much pain relief you experienced following the injection procedure and the length of time of pain relief you experienced pain relief. Following diagnostic injections like medial branch nerve blocks, sacroiliac joint blocks, stellate ganglion injections and other blocks, it is very important you record the specific amount of pain relief you experienced immediately after the injectionand how long it lasted. Your doctor will ask you for this information at your follow up visit.     For all injections, please keep the injection site dry and inspect the site for a couple of days. You may remove the Band-Aid the day of the injection at any time.     Some discomfort, bruising or slight swelling may occur at the injection site. This is not abnormal if it occurs.  If needed you may:    -Take over the counter medication such as Tylenol or Motrin.   -Apply an ice pack for 30 minutes, 2 to 3 times a day for the first 24 hours.     You may shower today; no soaking baths, hot tubs, whirlpools or swimming pools for two days.      If you are given steroids in your injection, it may take 3-5 days for the steroid medication to take effect. You may notice a worsening of your symptoms for 1-2 days after the injection. This is not abnormal.  You may use acetaminophen, ibuprofen, or prescription medication that your doctor may have prescribed for you if you need to do so.     A few common side effects of steroids include facial flushing,  sweating, restlessness, irritability,difficulty sleeping, increase in blood sugar, and increased blood pressure. If you have diabetes, please monitor your blood sugar at least once a day for at least 5 days. If you have poorly controlled high blood pressure, monitoryour blood pressure for at least 2 days and contact your primary care physician if these numbers are unusually high for you.      If you take aspirin or non-steroidal anti-inflammatory drugs (examples are Motrin, Advil, ibuprofen, Naprosyn, Voltaren, Relafen, etc.) you may restart these this evening, but stop taking it 3 days before your next appointment, unless instructed otherwiseby your physician.      You do not need to discontinue non-aspirin-containing pain medications prior to an injection (examples: Celebrex, tramadol, hydrocodone and acetaminophen).      If you take a blood thinning medication (Coumadin, Lovenox, Fragmin,Ticlid, Plavix, Pradaxa, etc.), please discuss this with your primary care physician/cardiologist and your pain physician. These medications MUST be discontinued before you can have an injection safely, without the risk of uncontrolled bleeding. If these medications are not discontinued for an appropriate period of time, you will not be able to receivean injection. Please adhere to instructions given to you about when to restart your blood thinning medication. If you have any questions please reach out to our team.    If you are taking Coumadin, please have your INR checked the morning of your procedure and bring the result to your appointment unless otherwise instructed. If your INR is over 1.2, your injection may need to be rescheduled to avoid uncontrolled bleeding from the needle placement.     Call [facility]  and ask for Pain Management at [phone] between 8am-4pm Monday - Friday if you are experiencing the following:    If you received an epidural or spinal injection:    -Headache that doesnot go away with medicine, is  worse when sitting or standing up, and is greatly relieved upon lying down.   -Severe pain worse than or different than your baseline pain.   -Chills or fever (101º F or greater).   -Drainage or signs of infection at the injection site     Go directly to the Emergency Department if you are experiencing the following and received an epidural or spinal injection:   -Abrupt weakness or progressive weakness in your legs that starts after you leave the clinic.   -Abrupt severe or worsening numbness in your legs.   -Inability to urinate after the injection or loss of bowel or bladder control without the urge to defecate or urinate.     If you have a clinical question that cannot wait until your next appointment, please call [phone] between 8am-4pm Monday - Friday or send a [employer] message. We do our best to return all non-emergency messages within 24 hours, Monday - Friday. A nurse or physician will return your message. You may also try calling Dr. Jalen Henry's nurse ([phone]) and they will do their best to answer your question(s).    If you need to cancel an appointment, please call the scheduling staff at [phone] during normal business hours or leave a message at least 24 hours in advance.     If you are going to be sedated for your next procedure, you MUST have responsible adult who can legally drive accompany you home. You cannot eat or drink for at least eight hours prior to the planned procedure if you are going to receive sedation. You may take your non-blood thinning medications with a small sip of water.

## 2025-03-14 NOTE — H&P
Pain Management H&P    History Of Present Illness  Ally Carpio is a 44 y.o. female presents for procedure state below. Endorses no changes in past medical history or medical health since last seen in clinic.      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    Surgical History  She has a past surgical history that includes  section, low transverse; Colonoscopy; Upper gastrointestinal endoscopy; Dilation and curettage of uterus; Cervical spine surgery (2024); and Hysteroscopy (2024).     Social History  She reports that she quit smoking about 8 years ago. Her smoking use included cigarettes. She started smoking about 20 years ago. She has a 12.5 pack-year smoking history. She has never used smokeless tobacco. She reports that she does not currently use alcohol. She reports that she does not use drugs.    Family History  Family History   Problem Relation Name Age of Onset    Colon cancer Mother Shari     COPD Mother Shari     Hyperlipidemia Father Archuleta     Prostate cancer Father Archuleta     Hyperlipidemia Sister      Hypothyroidism Sister      Diverticulitis Sister      No Known Problems Sister      No Known Problems Brother      Colon cancer Mother's Brother      Colon cancer Mother's Brother Gary      Pancreatic cancer Mother's Brother Gary     Breast cancer Father's Sister Na         Allergies  Balsalazide, Iron, and Mesalamine    Review of Symptoms:   Constitutional: Negative for chills, diaphoresis or fever  HENT: Negative for neck swelling  Eyes:.  Negative for eye pain  Respiratory:.  Negative for cough, shortness of breath or wheezing    Cardiovascular:.  Negative for chest pain or palpitations  Gastrointestinal:.  Negative for abdominal pain, nausea and vomiting  Genitourinary:.  Negative for urgency  Musculoskeletal:  Positive for back pain. Positive for joint pain. Denies falls within the past 3 months.  Skin: Negative for wounds or itching   Neurological: Negative for dizziness, seizures, loss of consciousness and weakness  Endo/Heme/Allergies: Does not bruise/bleed easily  Psychiatric/Behavioral: Negative for depression. The patient does not appear anxious.      Pre-sedation Evaluation  ASA class 2  Mallampati score 2     PHYSICAL EXAM  Vitals signs reviewed  Constitutional:       General: Not in acute distress     Appearance: Normal appearance. Not ill-appearing.  HENT:     Head: Normocephalic and atraumatic  Eyes:     Conjunctiva/sclera: Conjunctivae normal  Cardiovascular:     Rate and Rhythm: Normal rate and regular rhythm  Pulmonary:     Effort: No respiratory distress  Abdominal:     Palpations: Abdomen is soft  Musculoskeletal: STATON  Skin:     General: Skin is warm and dry  Neurological:     General: No focal deficit present  Psychiatric:         Mood and Affect: Mood normal         Behavior: Behavior normal     Last Recorded Vitals  There were no vitals taken for this visit.    Relevant Results  Current Outpatient Medications   Medication Instructions    buPROPion XL (WELLBUTRIN XL) 150 mg, oral, Every morning, Do not crush, chew, or split.    clobetasol (Temovate) 0.05 % ointment 2 times daily    cyanocobalamin (VITAMIN B-12) 1,000 mcg, intramuscular, See admin instructions, Please inject daily  "x 7, followed by Weekly x 4 and then Monthly    ergocalciferol (VITAMIN D-2) 1,250 mcg, oral, Once Weekly    escitalopram (LEXAPRO) 20 mg, oral, Daily    hydrOXYzine HCL (ATARAX) 10-20 mg, oral, Nightly PRN    levothyroxine (SYNTHROID, LEVOXYL) 12.5 mcg, oral, Daily    Mag-G 27 mg magnesium (500 mg) tablet 1 tablet, Every 12 hours scheduled (0630,1830)    magnesium gluconate (Magonate) 27.5 mg magne- sium (500 mg) tablet 27.5 mg, oral, 2 times daily    miscellaneous medical supply misc Bilateral AFO. Apply to bilateral lower legs daily.    multivitamin tablet 1 tablet, Daily    Omvoh Pen 200 mg, subcutaneous, Every 28 days    Omvoh 300 mg, intravenous, Every 28 days    pantoprazole (PROTONIX) 40 mg, oral, Daily, Do not crush, chew, or split.    pregabalin (LYRICA) 200 mg, oral, 2 times daily    PreviDent 5000 Sensitive 1.1-5 % paste     syringe with needle (Syringe 3cc/22Gx1\") 3 mL 22 gauge x 1\" syringe 1 Application, miscellaneous, See admin instructions    tacrolimus (Protopic) 0.1 % ointment if needed.    tiZANidine (Zanaflex) 4 mg tablet TAKE ONE TABLET BY MOUTH DAILY    UltiCare Safety Syringe 3 mL 22 gauge x 1\" syringe     Vtama 1 % cream if needed.         XR cervical spine 2-3 views 07/19/2024    Narrative  Interpreted By:  Neha Jones,  STUDY:  XR CERVICAL SPINE 2-3 VIEWS; 7/19/2024 4:15 pm    INDICATION:  Signs/Symptoms:s/p C6-7 TDR.    COMPARISON:  None.    ACCESSION NUMBER(S):  [id number]    ORDERING CLINICIAN:  LAMINE RAY    FINDINGS:  Multiple views of the  cervical spine are obtained. Straightening of  normal cervical lordosis. Disc space implant at C6-C7. Endplate  sclerosis and osteophytes from C5 through C7. No fracture-dislocation.    Impression  Postsurgical changes at C6-C7 suggesting TDR..      Signed by: Neha Jones 7/19/2024 4:21 PM  Dictation workstation:   [id number]      XR cervical spine 2-3 views 06/13/2024    Narrative  Interpreted By:  Cosme Paez,  STUDY:  XR CERVICAL SPINE " 2-3 VIEWS    INDICATION:  Signs/Symptoms:CERVICAL SPONDYLOSIS.    COMPARISON:  October 28, 2010    ACCESSION NUMBER(S):  [id number]    ORDERING CLINICIAN:  LAMINE RAY    FINDINGS:  Focal discogenic degenerative disease of the C5-6 level developed in  the interval, moderate in degree. Alignment normal. Prevertebral soft  tissues normal.    Impression  Moderate cervical degenerative change C5-6. Alignment normal.    Signed by: Cosme Paez 6/14/2024 6:03 PM  Dictation workstation:   [id number]      MR lumbar spine wo IV contrast 05/10/2024    Narrative  Interpreted By:  Kaylee Kaba,  STUDY:  MRI of the lumbar spine without IV contrast;  5/10/2024 12:25 pm    INDICATION:  Signs/Symptoms:Pain.    COMPARISON:  None.    ACCESSION NUMBER(S):  [id number]    ORDERING CLINICIAN:  MOMO MADRIGAL    TECHNIQUE:  Sagittal and axial STIR and T1-weighted MRI images of the lumbar  spine were acquired using a spondylolysis protocol.  No contrast was  administered.    FINDINGS:  For counting purposes the last lumbarized vertebral body is labeled  L5.    Alignment, vertebral body heights and marrow signal pattern are  within normal limits.    There is desiccated disc signal at L3-L4, L4-L5 and L5-S1. Mild  degenerative endplate changes at L5-S1. There is a Schmorl's node  along the inferior endplate of L5.    The conus terminates at L1-L2 and is unremarkable. Paraspinal soft  tissues are unremarkable.    Evaluation by level:    T12-L1: No spinal canal or neural foraminal stenosis.    L1-L2: No spinal canal or neural foraminal stenosis    L2-L3: No spinal canal or neural foraminal stenosis    L3-L4: Disc bulge and facet arthrosis. No spinal canal stenosis. Mild  neural foraminal stenosis, left greater than right.    L4-L5: Right eccentric disc bulge and facet arthrosis. No spinal  canal stenosis. Mild bilateral neural foraminal stenosis. There may  be abutment of the exiting right L4 nerve root. Please correlate  clinically for  symptoms of right L4 radiculopathy.    L5-S1: Disc bulge and facet arthrosis. No spinal canal stenosis. Mild  neural foraminal stenosis.    Impression  Degenerative changes in the lower lumbar spine without spinal canal  stenosis. Mild neural foraminal narrowing at L3-L4, L4-L5 and L5-S1.    I personally reviewed the images/study and I agree with the findings  as stated. This study was interpreted at [place], [place].    MACRO:  None    Signed by: Kaylee Kaba 5/10/2024 1:40 PM  Dictation workstation:   [id number]     No image results found.       No diagnosis found.     ASSESSMENT/PLAN  Ally Carpio is a 44 y.o. female here for T10-11 TESI under fluoroscopy    Patient denies any recent antibiotic use or infections, denies any blood thinner use, and denies contrast or local anesthetic allergies     Risks, benefits, alternatives discussed. All questions answered to the best of my ability. Patient agrees to proceed.      Our plan is as follows:  - Proceed with aforementioned procedure          Cornelius Santos DO   Pain fellow

## 2025-03-17 ENCOUNTER — APPOINTMENT (OUTPATIENT)
Dept: INTEGRATIVE MEDICINE | Facility: CLINIC | Age: 45
End: 2025-03-17
Payer: COMMERCIAL

## 2025-03-17 ENCOUNTER — TELEMEDICINE (OUTPATIENT)
Dept: BEHAVIORAL HEALTH | Facility: CLINIC | Age: 45
End: 2025-03-17
Payer: COMMERCIAL

## 2025-03-17 DIAGNOSIS — F41.1 GAD (GENERALIZED ANXIETY DISORDER): ICD-10-CM

## 2025-03-17 DIAGNOSIS — F33.1 DEPRESSION, MAJOR, RECURRENT, MODERATE: ICD-10-CM

## 2025-03-17 PROCEDURE — 1036F TOBACCO NON-USER: CPT

## 2025-03-17 PROCEDURE — 90791 PSYCH DIAGNOSTIC EVALUATION: CPT

## 2025-03-17 ASSESSMENT — PATIENT HEALTH QUESTIONNAIRE - PHQ9
SUM OF ALL RESPONSES TO PHQ QUESTIONS 1-9: 12
10. IF YOU CHECKED OFF ANY PROBLEMS, HOW DIFFICULT HAVE THESE PROBLEMS MADE IT FOR YOU TO DO YOUR WORK, TAKE CARE OF THINGS AT HOME, OR GET ALONG WITH OTHER PEOPLE: VERY DIFFICULT
9. THOUGHTS THAT YOU WOULD BE BETTER OFF DEAD, OR OF HURTING YOURSELF: NOT AT ALL
8. MOVING OR SPEAKING SO SLOWLY THAT OTHER PEOPLE COULD HAVE NOTICED. OR THE OPPOSITE, BEING SO FIGETY OR RESTLESS THAT YOU HAVE BEEN MOVING AROUND A LOT MORE THAN USUAL: NOT AT ALL
1. LITTLE INTEREST OR PLEASURE IN DOING THINGS: NEARLY EVERY DAY
4. FEELING TIRED OR HAVING LITTLE ENERGY: NEARLY EVERY DAY
SUM OF ALL RESPONSES TO PHQ9 QUESTIONS 1 & 2: 4
6. FEELING BAD ABOUT YOURSELF - OR THAT YOU ARE A FAILURE OR HAVE LET YOURSELF OR YOUR FAMILY DOWN: NOT AT ALL
7. TROUBLE CONCENTRATING ON THINGS, SUCH AS READING THE NEWSPAPER OR WATCHING TELEVISION: NEARLY EVERY DAY
5. POOR APPETITE OR OVEREATING: NOT AT ALL
3. TROUBLE FALLING OR STAYING ASLEEP: MORE THAN HALF THE DAYS
2. FEELING DOWN, DEPRESSED OR HOPELESS: SEVERAL DAYS

## 2025-03-17 ASSESSMENT — ANXIETY QUESTIONNAIRES
6. BECOMING EASILY ANNOYED OR IRRITABLE: NEARLY EVERY DAY
4. TROUBLE RELAXING: NEARLY EVERY DAY
3. WORRYING TOO MUCH ABOUT DIFFERENT THINGS: NEARLY EVERY DAY
IF YOU CHECKED OFF ANY PROBLEMS ON THIS QUESTIONNAIRE, HOW DIFFICULT HAVE THESE PROBLEMS MADE IT FOR YOU TO DO YOUR WORK, TAKE CARE OF THINGS AT HOME, OR GET ALONG WITH OTHER PEOPLE: VERY DIFFICULT
5. BEING SO RESTLESS THAT IT IS HARD TO SIT STILL: NEARLY EVERY DAY
7. FEELING AFRAID AS IF SOMETHING AWFUL MIGHT HAPPEN: NEARLY EVERY DAY
2. NOT BEING ABLE TO STOP OR CONTROL WORRYING: NEARLY EVERY DAY
GAD7 TOTAL SCORE: 21
1. FEELING NERVOUS, ANXIOUS, OR ON EDGE: NEARLY EVERY DAY

## 2025-03-17 ASSESSMENT — ENCOUNTER SYMPTOMS
CONSTITUTIONAL NEGATIVE: 1
NERVOUS/ANXIOUS: 1
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
## 2025-03-18 NOTE — TELEPHONE ENCOUNTER
Patient requesting information on mirikizumab-mrkz (Omvoh) 300 mg prescription and follow up from [facility].

## 2025-03-19 ENCOUNTER — APPOINTMENT (OUTPATIENT)
Dept: INTEGRATIVE MEDICINE | Facility: CLINIC | Age: 45
End: 2025-03-19
Payer: COMMERCIAL

## 2025-03-19 DIAGNOSIS — M54.2 CHRONIC NECK PAIN: Primary | ICD-10-CM

## 2025-03-19 DIAGNOSIS — G89.29 CHRONIC NECK PAIN: Primary | ICD-10-CM

## 2025-03-19 DIAGNOSIS — M54.59 OTHER LOW BACK PAIN: ICD-10-CM

## 2025-03-19 PROCEDURE — 97811 ACUP 1/> W/O ESTIM EA ADD 15: CPT | Performed by: NATUROPATH

## 2025-03-19 PROCEDURE — 97810 ACUP 1/> WO ESTIM 1ST 15 MIN: CPT | Performed by: NATUROPATH

## 2025-03-19 NOTE — PROGRESS NOTES
Acupuncture Visit:     Subjective   Patient ID: Ally Carpio is a 44 y.o. female who presents for No chief complaint on file.  2024 Acupuncture Benefits                                    12/27/2024 RA   [place]  Covered Diagnosis: Medical Necessity   4/30 VPCY    Less pain in shoulder after tx  Low back has been OK,   Neck tight on right side  Some radiation from shoulder   No radiating into hand,   Left side is better  2-3/10  A little less fatigue as well.        States less intense pain  She got new braces to help her walk which is helping    prev  Notes less pain x 3 days  Increase energy  States R sides pain less intense    prev  R sided neck pain  Low back pain  BL foot drop  *Pt using a cane *notes using a rollator at home     States a little relief after the first tx  Notes unable to flu a week after 1st tx due to needing to stay at home  No menses, hysterectomy, ovaries intact    Initial  R sided neck pain  Dxd with CND demyelination, notes multiple Drs have ruled out MS  States she does have lesions on her brain but not spine.  Notes seeing multiple neurologists  States failed neck surgery summer on 2024  RIGHT sided neck pain more behind ear, not at base of skull  Neck pain constant, always there, dull pain 4/10  Worse at end of the day when she has done a lot  BL arms feel weak, RIGHT more than left arm  Radiating to top of shoulder and to front of the shoulder joint  Both arms feel week but RIGHT arm mostly involved    LBP  Feels achy   RIGHT side pain 80% of the time, 20% Left sided   Radiating to piriformis and to the RIGHT calf mm.  BL foot drop    Other med hx  Ulcerative colitis, partial hysterectomy (hx heavy bleeding- Fe infusions in the past)            Session Information  Is this acupuncture treatment being billed to the patient's insurance company: Yes  This is visit number: 5  The patient has a total number of visits of: 30  Initial Acupuncture Treatment date: 01/28/25  Name of  Insurance Company: 2024:  [place] Limitations (Visits, etc.): 30 VPCY   Covered Diagnosis: Medical Necessity  Visit Type: Follow-up visit  Medical History Reviewed: I have reviewed pertinent medical history in EHR, and no contraindications are present to provide treatment         Review of Systems         Provider reviewed plan for the acupuncture session, precautions and contraindications. Patient/guardian/hospital staff has given consent to treat with full understanding of what to expect during the session. Before acupuncture began, provider explained to the patient to communicate at any time if the procedure was causing discomfort past their tolerance level. Patient agreed to advise acupuncturist. The acupuncturist counseled the patient on the risks of acupuncture treatment including pain, infection, bleeding, and no relief of pain. The patient was positioned comfortably. There was no evidence of infection at the site of needle insertions.    Objective   Physical Exam                             Assessment/Plan     Diagnoses and all orders for this visit:  Chronic neck pain (Primary)  Other low back pain

## 2025-03-20 ENCOUNTER — EVALUATION (OUTPATIENT)
Dept: PHYSICAL THERAPY | Facility: HOSPITAL | Age: 45
End: 2025-03-20
Payer: COMMERCIAL

## 2025-03-20 DIAGNOSIS — R29.898 WEAKNESS OF BOTH SHOULDERS: ICD-10-CM

## 2025-03-20 PROCEDURE — 97110 THERAPEUTIC EXERCISES: CPT | Mod: GP | Performed by: PHYSICAL THERAPIST

## 2025-03-20 PROCEDURE — 97161 PT EVAL LOW COMPLEX 20 MIN: CPT | Mod: GP | Performed by: PHYSICAL THERAPIST

## 2025-03-20 ASSESSMENT — PAIN SCALES - GENERAL: PAINLEVEL_OUTOF10: 4

## 2025-03-20 ASSESSMENT — PAIN DESCRIPTION - DESCRIPTORS: DESCRIPTORS: ACHING;TIGHTNESS;SHARP

## 2025-03-20 ASSESSMENT — PAIN - FUNCTIONAL ASSESSMENT: PAIN_FUNCTIONAL_ASSESSMENT: 0-10

## 2025-03-20 ASSESSMENT — ENCOUNTER SYMPTOMS
DEPRESSION: 0
OCCASIONAL FEELINGS OF UNSTEADINESS: 1
LOSS OF SENSATION IN FEET: 1

## 2025-03-20 NOTE — PROGRESS NOTES
Physical Therapy    Physical Therapy Evaluation and Treatment      Patient Name: Ally Carpio  MRN: [id number]  : 1980   Today's Date: 3/20/2025  Time Calculation  Start Time: 1345  Stop Time: 1430  Time Calculation (min): 45 min     PT Evaluation Time Entry  PT Evaluation (Low) Time Entry: 30  PT Therapeutic Procedures Time Entry  Therapeutic Exercise Time Entry: 15         Visit # 1    Assessment: Pt presents with kraen UE weakness though she has full shoulder PROM karen. There may be a possible underlying neurological issue for which she continues to work with neurology. Pt would benefit from skilled PT to address the strength limitations for improved function and quality of life.    PT Assessment Results: Decreased strength, Decreased range of motion, Pain  Rehab Prognosis: Good    Plan:  Treatment/Interventions: Education/ Instruction, Manual therapy, Therapeutic exercises  PT Plan: Skilled PT  PT Frequency: 2 times per week  Duration: 4-6 wks  Certification Period Start Date: 25  Certification Period End Date: 26  Number of Treatments Authorized: 60  Rehab Potential: Good  Plan of Care Agreement: Patient    Current Problem:   1. Weakness of both shoulders  Referral to Physical Therapy    Follow Up In Physical Therapy    Possibly related to underlying neurologic disease.  Also possible frozen shoulder.  Recommend trial of physical therapy.  Consider orthopedic consult.          Subjective      Chief complaint: Pt c/o karen shoulder weakness with right shoulder pain starting around December, getting worse about a month ago. Difficulty with hair care, reaching into cupboards. Started in  to have karen foot neuropathy pain and weakness, wearing AFO's. Can catch foot getting out of the shower and has fallen. Use of cane in community and sometimes at home. MRI, EMG done, states lesions on brain that haven't changed in a year; bloodwork normal. Being followed by neurology.    Pain Better:  acupuncture, heat, Biofreeze, muscle relaxers    Pain Worse: Any use of UE (feels like overuse)    Imaging: NA    Prior level of function: hasn't worked in about a year, currently drives    Current limitations: reaching high, lifting, pulling, vacuuming    Home setup: Lives with  in 3-story home    Work: NA    Patient's goal: Help with pain and strength    Precautions:  Precautions  STEADI Fall Risk Score (The score of 4 or more indicates an increased risk of falling): 8  Precautions Comment: Fall risk    Pain:  Pain Assessment  Pain Assessment: 0-10  0-10 (Numeric) Pain Score: 4  Pain Location: Shoulder  Pain Orientation: Right  Pain Radiating Towards: right side of neck  Pain Descriptors: Aching, Tightness, Sharp    Objective:  Objective   Posture: scap protraction karen    Shoulder AROM  FLEX: 78 deg L, 103 deg  ABD: 115 deg  IR: WNL    Shoulder PROM:  WNL karen    UE Strength:  Shoulder FLEX: 3-/5 karen  Shoulder ABD: 3-/5 karen  Shoulder ER: 4+/5 karen  Shoulder IR: 4+/5 karen  Elbow FLEX: 4/5 karen  Elbow EXT: 4/5 karen    Scapula Stabilizer Strength:  Lower trapezius: 4/5 karen  Mid trapezius: 4+/5 karen  Serratus anterior: 4-/5 karen    Crossover test: (-) R  Ravi: (-) R  Empty Can: (-) R    Outcome Measures:  Other Measures  Disability of Arm Shoulder Hand (DASH): 28     Treatments:  EXERCISES Date  Date  Date Date   VISIT# # # # #    REPS REPS REPS REPS          Pulleys:       FF       SCAP                     T-band:       Pull downs       Mid rows       ER       IR                     Wall push-ups (+)                     Side lying ER       Side lying ABD       Supine 90 deg arm circles, CW/CCW                     Prone:       Y's       T's       I's                            HEP: Access Code: [email or web address]    Exercises  - Supine Shoulder Flexion Extension AAROM with Dowel  - 1-2 x daily - 1-2 sets - 10 reps  - Supine Shoulder Protraction with Dowel  - 2 x daily - 1-2 sets - 10 reps  - Seated Shoulder Abduction  AAROM with Dowel  - 1-2 x daily - 1-2 sets - 10 reps  - Seated Scapular Retraction  - 1-2 x daily - 1-2 sets - 10 reps          Goals:  Active       Chuck shoulder weakness       Improve chuck shoulder/scapula stabilizer strength >=1/2 muscle grade for improved lifting.         Start:  03/20/25    Expected End:  06/18/25            <=1/10 right shoulder pain for improved house/yard work.        Start:  03/20/25    Expected End:  06/18/25            Improve QuickDash by >=10 points for improved mobility.         Start:  03/20/25    Expected End:  06/18/25            Chuck shoulder AROM WFL for reaching into cupboards.        Start:  03/20/25    Expected End:  06/18/25            Independent HEP for continued gains after PT is complete.        Start:  03/20/25    Expected End:  06/18/25

## 2025-03-24 ENCOUNTER — TREATMENT (OUTPATIENT)
Dept: PHYSICAL THERAPY | Facility: HOSPITAL | Age: 45
End: 2025-03-24
Payer: COMMERCIAL

## 2025-03-24 DIAGNOSIS — R29.898 WEAKNESS OF BOTH SHOULDERS: ICD-10-CM

## 2025-03-24 PROCEDURE — 97110 THERAPEUTIC EXERCISES: CPT | Mod: GP,CQ

## 2025-03-24 ASSESSMENT — PAIN SCALES - GENERAL
PAINLEVEL_OUTOF10: 2
PAINLEVEL_OUTOF10: 4

## 2025-03-24 ASSESSMENT — PAIN - FUNCTIONAL ASSESSMENT: PAIN_FUNCTIONAL_ASSESSMENT: 0-10

## 2025-03-24 NOTE — PROGRESS NOTES
Physical Therapy    Physical Therapy Treatment    Patient Name: Ally Carpio  MRN: [id number]  : 1980  Today's Date: 3/24/2025  Time Calculation  Start Time: 1530  Stop Time: 1615  Time Calculation (min): 45 min    PT Therapeutic Procedures Time Entry  Therapeutic Exercise Time Entry: 45          VISIT:# 2    Current Problem  Problem List Items Addressed This Visit             ICD-10-CM       Musculoskeletal and Injuries    Weakness of both shoulders R29.898        Subjective    Name and  confirmed at the beginning of the session. She is having some pain in her R shoulder and BLE. She states that most of her pain and discomfort is resulting from her high fatigue level. She has had no recent falls and states that she has been compliant with her HEP when she has the energy to complete them.     Pain  Pain Assessment: 0-10  0-10 (Numeric) Pain Score: 2  Pain Location: Shoulder  Pain Orientation: Right  Multiple Pain Sites: Two  Pain Score 2: 4  Pain Location 2: Leg  Pain Orientation 2: Right, Left    Objective    DNC prone ITYs today due to pt fatigue              Precautions  Precautions  STEADI Fall Risk Score (The score of 4 or more indicates an increased risk of falling): 8  Precautions Comment: Fall risk      Treatments:     EXERCISES Date 3/24/2025  Date  Date Date   VISIT# #2 # # #    REPS REPS REPS REPS          Pulleys:       FF 3 min      SCAP 3 min                    T-band:       Pull downs 2x10 Red      Mid rows 2x10 Red      ER 2x10 Red      IR 2x10 Red                    Wall push-ups (+) 2x10                    Side lying ER 2 x 10 R      Side lying ABD 2 x 10 AAROM R      Supine 90 deg arm circles, CW/CCW 10x all dir                    Prone:       Y's Next      T's Next      I's Next                           HEP: Access Code: [email or web address]    Exercises  - Supine Shoulder Flexion Extension AAROM with Dowel  - 1-2 x daily - 1-2 sets - 10 reps  - Supine Shoulder Protraction with Dowel  - 2 x  daily - 1-2 sets - 10 reps  - Seated Shoulder Abduction AAROM with Dowel  - 1-2 x daily - 1-2 sets - 10 reps  - Seated Scapular Retraction  - 1-2 x daily - 1-2 sets - 10 reps                              Assessment:  At the end of the session pt reported some discomfort that she described as tightness and muscle fatigue at a 4/10. She states that the discomfort is in her R shoulder and radiates into her mid back on the right side. Pt did well today but fatigues quickly at times and requires frequent rest breaks. Overall she was able to complete most exercises with minimum to moderate difficulty.       Plan:   OP PT Plan  Treatment/Interventions: Education/ Instruction, Manual therapy, Therapeutic exercises  PT Plan: Skilled PT  PT Frequency: 2 times per week  Duration: 4-6 wks  Certification Period Start Date: 02/26/25  Certification Period End Date: 02/26/26  Number of Treatments Authorized: 60  Rehab Potential: Good  Plan of Care Agreement: Patient    Goals:  Active       Chuck shoulder weakness       Improve chuck shoulder/scapula stabilizer strength >=1/2 muscle grade for improved lifting.         Start:  03/20/25    Expected End:  06/18/25            <=1/10 right shoulder pain for improved house/yard work.        Start:  03/20/25    Expected End:  06/18/25            Improve QuickDash by >=10 points for improved mobility.         Start:  03/20/25    Expected End:  06/18/25            Chuck shoulder AROM WFL for reaching into cupboards.        Start:  03/20/25    Expected End:  06/18/25            Independent HEP for continued gains after PT is complete.        Start:  03/20/25    Expected End:  06/18/25

## 2025-03-25 ENCOUNTER — APPOINTMENT (OUTPATIENT)
Dept: NEUROLOGY | Facility: HOSPITAL | Age: 45
End: 2025-03-25
Payer: COMMERCIAL

## 2025-03-26 ENCOUNTER — PATIENT MESSAGE (OUTPATIENT)
Dept: PRIMARY CARE | Facility: CLINIC | Age: 45
End: 2025-03-26

## 2025-03-26 ENCOUNTER — TREATMENT (OUTPATIENT)
Dept: PHYSICAL THERAPY | Facility: HOSPITAL | Age: 45
End: 2025-03-26
Payer: COMMERCIAL

## 2025-03-26 DIAGNOSIS — R29.898 WEAKNESS OF BOTH SHOULDERS: Primary | ICD-10-CM

## 2025-03-26 DIAGNOSIS — M25.511 BILATERAL SHOULDER PAIN, UNSPECIFIED CHRONICITY: ICD-10-CM

## 2025-03-26 DIAGNOSIS — M25.512 BILATERAL SHOULDER PAIN, UNSPECIFIED CHRONICITY: ICD-10-CM

## 2025-03-26 DIAGNOSIS — R29.898 WEAKNESS OF BOTH SHOULDERS: ICD-10-CM

## 2025-03-26 PROCEDURE — 97110 THERAPEUTIC EXERCISES: CPT | Mod: GP,CQ

## 2025-03-26 ASSESSMENT — PAIN DESCRIPTION - DESCRIPTORS: DESCRIPTORS: DULL;BURNING

## 2025-03-26 ASSESSMENT — PAIN - FUNCTIONAL ASSESSMENT: PAIN_FUNCTIONAL_ASSESSMENT: 0-10

## 2025-03-26 ASSESSMENT — PAIN SCALES - GENERAL: PAINLEVEL_OUTOF10: 6

## 2025-03-26 NOTE — PROGRESS NOTES
Physical Therapy    Physical Therapy Treatment    Patient Name: Ally Carpio  MRN: [id number]  : 1980  Today's Date: 3/26/2025  Time Calculation  Start Time: 1515  Stop Time: 1600  Time Calculation (min): 45 min    PT Therapeutic Procedures Time Entry  Therapeutic Exercise Time Entry: 45          VISIT:# 3    Current Problem  Problem List Items Addressed This Visit             ICD-10-CM       Musculoskeletal and Injuries    Weakness of both shoulders R29.898        Subjective    Today patient presents with pain in her R shoulder that his radiating into her R arm. She has had no recent falls since her last visit. She is ambulating with a SPC.     Pain  Pain Assessment: 0-10  0-10 (Numeric) Pain Score: 6  Pain Location: Shoulder  Pain Orientation: Right  Pain Radiating Towards: R arm  Pain Descriptors: Dull, Burning       Objective                 Precautions  Precautions  STEADI Fall Risk Score (The score of 4 or more indicates an increased risk of falling): 8  Precautions Comment: Fall risk      Treatments:     EXERCISES Date 3/24/2025  Date 3/26/2025  Date Date   VISIT# #2 #3 # #    REPS REPS REPS REPS          Pulleys:       FF 3 min 3 min     SCAP 3 min 3 min                   T-band:  Seated     Pull downs 2x10 Red 2x10 Red     Mid rows 2x10 Red 2x10 Red     ER 2x10 Red 2x10 Red     IR 2x10 Red 2x10 Red                   Wall push-ups (+) 2x10 2x10                   Side lying ER 2 x 10 R 2x10 R     Side lying ABD 2 x 10 AAROM R 2x10 AAROM R     Supine 90 deg arm circles, CW/CCW 10x all dir 10x all dir                   Prone:       Y's Next X10 R     T's Next X10 R     I's Next X10 R                          HEP: Access Code: [email or web address]    Exercises  - Supine Shoulder Flexion Extension AAROM with Dowel  - 1-2 x daily - 1-2 sets - 10 reps  - Supine Shoulder Protraction with Dowel  - 2 x daily - 1-2 sets - 10 reps  - Seated Shoulder Abduction AAROM with Dowel  - 1-2 x daily - 1-2 sets - 10 reps  -  Seated Scapular Retraction  - 1-2 x daily - 1-2 sets - 10 reps                             Assessment:  Pt tolerated treatment well today. She did not present with as much fatigue today and required less breaks than her last visit. Her pain in her R shoulder remained the same after treatment but she was able to complete all exercises with minimal difficulty.      Plan:   OP PT Plan  Treatment/Interventions: Education/ Instruction, Manual therapy, Therapeutic exercises  PT Plan: Skilled PT  PT Frequency: 2 times per week  Duration: 4-6 wks  Certification Period Start Date: 02/26/25  Certification Period End Date: 02/26/26  Number of Treatments Authorized: 60  Rehab Potential: Good  Plan of Care Agreement: Patient    Goals:  Active       Chuck shoulder weakness       Improve chuck shoulder/scapula stabilizer strength >=1/2 muscle grade for improved lifting.         Start:  03/20/25    Expected End:  06/18/25            <=1/10 right shoulder pain for improved house/yard work.        Start:  03/20/25    Expected End:  06/18/25            Improve QuickDash by >=10 points for improved mobility.         Start:  03/20/25    Expected End:  06/18/25            Chuck shoulder AROM WFL for reaching into cupboards.        Start:  03/20/25    Expected End:  06/18/25            Independent HEP for continued gains after PT is complete.        Start:  03/20/25    Expected End:  06/18/25

## 2025-03-27 DIAGNOSIS — K51.819 OTHER ULCERATIVE COLITIS WITH COMPLICATION: Primary | ICD-10-CM

## 2025-03-27 LAB — CALPROTECTIN STL-MCNT: 21 MCG/G

## 2025-03-27 RX ORDER — MIRIKIZUMAB-MRKZ 100 MG/ML
200 INJECTION, SOLUTION SUBCUTANEOUS
Qty: 2 ML | Refills: 11 | Status: SHIPPED | OUTPATIENT
Start: 2025-03-27

## 2025-03-31 ENCOUNTER — TREATMENT (OUTPATIENT)
Dept: PHYSICAL THERAPY | Facility: HOSPITAL | Age: 45
End: 2025-03-31
Payer: COMMERCIAL

## 2025-03-31 DIAGNOSIS — Z01.818 PRE-OP TESTING: ICD-10-CM

## 2025-03-31 DIAGNOSIS — R29.898 WEAKNESS OF BOTH SHOULDERS: ICD-10-CM

## 2025-03-31 PROCEDURE — 97110 THERAPEUTIC EXERCISES: CPT | Mod: GP | Performed by: PHYSICAL THERAPIST

## 2025-03-31 ASSESSMENT — DUKE ACTIVITY SCORE INDEX (DASI)
CAN YOU PARTICIPATE IN MODERATE RECREATIONAL ACTIVITIES LIKE GOLF, BOWLING, DANCING, DOUBLES TENNIS OR THROWING A BASEBALL OR FOOTBALL: NO
CAN YOU TAKE CARE OF YOURSELF (EAT, DRESS, BATHE, OR USE TOILET): YES
CAN YOU WALK A BLOCK OR TWO ON LEVEL GROUND: YES
CAN YOU DO YARD WORK LIKE RAKING LEAVES, WEEDING OR PUSHING A MOWER: NO
CAN YOU DO LIGHT WORK AROUND THE HOUSE LIKE DUSTING OR WASHING DISHES: YES
CAN YOU DO HEAVY WORK AROUND THE HOUSE LIKE SCRUBBING FLOORS OR LIFTING AND MOVING HEAVY FURNITURE: YES
CAN YOU PARTICIPATE IN STRENOUS SPORTS LIKE SWIMMING, SINGLES TENNIS, FOOTBALL, BASKETBALL, OR SKIING: NO
CAN YOU WALK INDOORS, SUCH AS AROUND YOUR HOUSE: YES
DASI METS SCORE: 6.7
CAN YOU DO MODERATE WORK AROUND THE HOUSE LIKE VACUUMING, SWEEPING FLOORS OR CARRYING GROCERIES: YES
CAN YOU HAVE SEXUAL RELATIONS: YES
TOTAL_SCORE: 32.2
CAN YOU RUN A SHORT DISTANCE: NO
CAN YOU CLIMB A FLIGHT OF STAIRS OR WALK UP A HILL: YES

## 2025-03-31 ASSESSMENT — PAIN SCALES - GENERAL: PAINLEVEL_OUTOF10: 7

## 2025-03-31 ASSESSMENT — ENCOUNTER SYMPTOMS
ROS SKIN COMMENTS: PSORIASIS
NUMBNESS: 1
WEAKNESS: 1

## 2025-03-31 ASSESSMENT — PAIN - FUNCTIONAL ASSESSMENT: PAIN_FUNCTIONAL_ASSESSMENT: 0-10

## 2025-03-31 NOTE — PREPROCEDURE INSTRUCTIONS
Pre-Operative Instructions &?Checklist      Your surgery has been scheduled at [facility] at [address] [place], [address], [address], in the [facility]. Parking is to the left of the main entrance.     You will be contacted about the time of your surgery the day before your surgery (if your surgery is on a Monday, you will be called the Friday before surgery). If you are unable to answer the phone, a detailed voicemail message will be left. Make sure that your voicemail box is not full so a message can be left. If you have not received a call by 3:00 pm you may call [phone] between the hours of 3:00 and 4:00 pm. Please be available by phone the night before/day of surgery in case there is a change in the schedule which may require you to arrive earlier/later.     ?     14 DAYS BEFORE SURGERY STOP TAKING WEIGHT LOSS MEDICATIONS      ?7 DAYS BEFORE SURGERY STOP THESE MEDICATIONS:       *Multiple Vitamins containing Vitamin E       * Herbal supplements, Fish Oil, garlic pills, turmeric, CoQ enzyme       *Stop taking aspirin, aspirin-containing products, and NSAID's like Advil, Motrin, Aleve, Ibuprofen, Meloxicam, Celecoxib, and Diclofenac.. Tylenol is okay to take for pain relief.        *If you are currently taking Coumadin/Warfarin, we will have to coordinate that with your PCP &/or the Anticoagulation Clinic.     THE DAY BEFORE SURGERY:   Do not eat any food after midnight the night before surgery.    You are permitted to have no more than 4 ounces of clear liquid up to 3 hours before your arrival time.   Clear liquids are liquids you see through such as: water, pulp-free juices like apple or white grape juice, coffee and tea without creamer or milk (sugar is okay); clear soda and sports drinks.     DAY OF SURGERY TAKE THESE MEDICATIONS (if it is not listed, do not take it.)    Take: Levothyroxine; pantoprazole; escitalopram; bupropion; pregabalin  If taking medications in  tablet/capsule form take with a small sip of water.     ON THE MORNING OF SURGERY:       *Shower either the night before your surgery or the morning of your surgery       *Do not use moisturizers, creams, lotions or perfume, or make-up.       *Wear comfortable, loose fitting clothing.        *All jewelry and valuables should be left at home.       *Prosthetic devices such as contact lenses, hearing aids, dentures, eyelash extensions, hairpins and body piercings must be removed before surgery. Bring         containers for eyeglasses/contacts, dentures, or hearing aids with you.     ? Diabetics: Please check fasting blood sugars upon waking up. ?If fasting blood sugars are <80ml/dl, please drink 100ml/3oz. of apple juice no later than 2 hours prior                    to surgery.     ?BRING WITH YOU:        *Photo ID and insurance card       *Current list of medicines and allergies       *Pacemaker/Defibrillator/Heart stent cards       *Copy of your complete Advanced Directive/DHPOA, or proof of guardianship-if applicable     ?SMOKING:       *Quitting smoking can make a huge difference to your health and recovery from surgery. ?       *If you need help with quitting, call [phone]-NOW.       ALCOHOL:       *No alcoholic beverages for 48 hours before surgery.     ?AFTER OUTPATIENT SURGERY:       *A responsible adult MUST accompany you at the time of discharge and stay with you for 24 hours after your surgery.       *You may NOT drive yourself home after surgery.       *You may use a taxi or ride sharing service ([employer], Uber) to return home ONLY if you are accompanied by a friend or family member       *Instructions for resuming your medications will be provided by your surgeon.     CONTACT SURGEON'S OFFICE IF YOU DEVELOP:       *Fever =/>?100.4 F        *New respiratory symptoms (e.g. cough, shortness of breath, respiratory distress, sore throat)       *Recent loss of taste or smell       *Flu like symptoms such as  headache, fatigue or gastrointestinal symptoms       *If you develop any open sores, shingles, burning or painful urination    AND/OR:       *You no longer wish to have the surgery.       *Any other personal circumstances change that may lead to the need to cancel or defer this surgery.       *You were admitted to any hospital within one week of your planned procedure.     ?If you have any questions regarding these preoperative instructions, you may call [phone]. If you have questions regarding you surgical procedure, or post-operative care/recovery please call your surgeon's office.     Link to [facility] Laboratory Services Locations   [email or web address]     Link to [facility]   [email or web address]

## 2025-03-31 NOTE — PROGRESS NOTES
Physical Therapy    Physical Therapy Treatment    Patient Name: Ally Carpio  MRN: [id number]  : 1980   Today's Date: 3/31/2025  Time Calculation  Start Time: 1300  Stop Time: 1340  Time Calculation (min): 40 min       PT Therapeutic Procedures Time Entry  Therapeutic Exercise Time Entry: 40         Visit #4    Assessment:  Pt needs a rollator walker for gait today stating pressure of cane through her UE has been too painful lately. She doesn't believe the PT is making anything worse, anyway. She describes massage, acupuncture and chiropractic for relief but only temporary. Fatigues with progression of exercise reos and needs assist with sidelying ABD.     Pt reports 6/10 pain after treatment.    Plan: Progress slowly as tolerated. Review additional HEP exercises.  OP PT Plan  Treatment/Interventions: Education/ Instruction, Manual therapy, Therapeutic exercises  PT Plan: Skilled PT  PT Frequency: 2 times per week  Duration: 4-6 wks  Certification Period Start Date: 25  Certification Period End Date: 26  Number of Treatments Authorized: 60  Rehab Potential: Good  Plan of Care Agreement: Patient    Current Problem  1. Weakness of both shoulders  Follow Up In Physical Therapy    Possibly related to underlying neurologic disease.  Also possible frozen shoulder.  Recommend trial of physical therapy.  Consider orthopedic consult.          Subjective   General  Pt c/o increased right UE pain and flank pain that wraps around her right side. She feel like her left shoulder is elevated and/or her right shoulder is depressed. Doing HEP on days she's not in PT.    Precautions  Precautions  STEADI Fall Risk Score (The score of 4 or more indicates an increased risk of falling): 8  Precautions Comment: Fall risk    Pain  Pain Assessment: 0-10  0-10 (Numeric) Pain Score: 7  Pain Location: Shoulder  Pain Orientation: Right  Pain Radiating Towards: R arm    Objective   Comes to PT today with rollator which  "she states she uses when going long distance and/or with increased pain    Treatments:  EXERCISES Date 3/24/2025  Date 3/26/2025  Date 3/31/2025  Date   VISIT# #2 #3 #4 #    REPS REPS REPS REPS          Left upper trap stretch   3x30\" - HEP    Levator scap stretch   3x30\" - HEP           Pulleys:       FF 3 min 3 min 3'    SCAP 3 min 3 min 3'                  T-band:  Seated Seated    Pull downs 2x10 Red 2x10 Red Red 2x10    Mid rows 2x10 Red 2x10 Red Red 2x10    ER 2x10 Red 2x10 Red Red 2x10    IR 2x10 Red 2x10 Red Red 2x10                  Wall push-ups (+) 2x10 2x10 HOLD                  Side lying ER 2 x 10 R 2x10 R 2x10 chuck    Side lying ABD 2 x 10 AAROM R 2x10 AAROM R 2x10 AAROM chuck    Supine 90 deg arm circles, CW/CCW 10x all dir 10x all dir 2x10 chuck, alternating                  Prone:       Y's Next X10 R 10x    T's Next X10 R 10x    I's Next X10 R 10x                         HEP: Access Code: [email or web address]    Exercises  - Supine Shoulder Flexion Extension AAROM with Dowel  - 1-2 x daily - 1-2 sets - 10 reps  - Supine Shoulder Protraction with Dowel  - 2 x daily - 1-2 sets - 10 reps  - Seated Shoulder Abduction AAROM with Dowel  - 1-2 x daily - 1-2 sets - 10 reps  - Seated Scapular Retraction  - 1-2 x daily - 1-2 sets - 10 reps    NEW 3/31/25:           Goals:  Active       Chuck shoulder weakness       Improve chuck shoulder/scapula stabilizer strength >=1/2 muscle grade for improved lifting.         Start:  03/20/25    Expected End:  06/18/25            <=1/10 right shoulder pain for improved house/yard work.        Start:  03/20/25    Expected End:  06/18/25            Improve QuickDash by >=10 points for improved mobility.         Start:  03/20/25    Expected End:  06/18/25            Chuck shoulder AROM WFL for reaching into cupboards.        Start:  03/20/25    Expected End:  06/18/25            Independent HEP for continued gains after PT is complete.        Start:  03/20/25    Expected End:  06/18/25           "

## 2025-03-31 NOTE — CPM/PAT H&P
CPM/PAT Evaluation       Name: Ally Carpio   /Age: 1980       TELEMEDICINE ENCOUNTER  Patient was interviewed by telephone for preadmission testing perioperative risk assessment prior to surgery.    DATE OF CONSULT: 2025  REFERRING PROVIDER: Dr. Soo Darnell  SURGERY, DATE, AND LENGTH: Septoplasty, bilateral turbinate reduction    CHIEF COMPLAINT  Deviated nasal septum, nasal obstructed breathing    HPI  Ally Carpio is a 44-year-old female with nasal obstructed breathing, and deviated nasal septum.  She also has mild PHAN with SpO2 karson of 86.0%.  Patient has been referred to preadmission testing in anticipation of septoplasty surgery.  Patient with medical history significant for ulcerative colitis; bilateral lower extremity weakness (ambulates with braces, and occasional walker/cane.) - currently being worked up for possible MS diagnosis); hypothyroidism; neuropathies; dermatologic psoriasis; chronic idiopathic thrombocytopenia purpura (platelets of 134 from CBC lab drawn on 2025); iron deficiency anemia with history of 6 iron infusions; acid reflux; hypotension; bradycardia; restless leg syndrome.  Patient has not tested positive for any respiratory illnesses in the past month including Covid 19, Influenza; Respiratory Syncytial Virus (RSV), or pneumonia.      ACTIVE PROBLEMS  Patient Active Problem List   Diagnosis    Iron deficiency anemia due to chronic blood loss    Mixed hyperlipidemia    IFG (impaired fasting glucose)    Hypothyroidism    Gastroesophageal reflux disease without esophagitis    Psoriasis (a type of skin inflammation)    Ulcerative colitis    Chronic ITP (idiopathic thrombocytopenic purpura) (Multi)    Menorrhagia with regular cycle    Left hemiparesis (Multi)    Restless leg syndrome    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    Radiculopathy, lumbar region    Chronic pain syndrome    Weakness of both lower extremities     "Depression, major, recurrent, moderate    Menorrhagia with irregular cycle    Optic neuritis    Insomnia    CNS demyelination (Multi)    Fibromyalgia    Vasomotor rhinitis    Lichen sclerosus of female genitalia    Deviated nasal septum    Hypertrophy of nasal turbinates    Weakness of both shoulders        PAST MEDICAL HISTORY  Past Medical History:   Diagnosis Date    ALEXEY positive     Cardiology follow-up encounter 09/17/2024    evaluation of bradycardia and fatigue Stress test recommended; however, patient is unable to perform s/t neurological dysfunction    Chronic pain syndrome     Depression     PIERRE (dyspnea on exertion)     Encounter for hematology follow-up 12/16/2024    Ursula Salinas PA-C    Fibromyalgia     GERD (gastroesophageal reflux disease)     H/O echocardiogram 09/26/2023    CONCLUSIONS:   1. Left ventricular systolic function is normal with a 65% estimated ejection fraction.    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     s/p C6-C7 Cervical Spine Arthroplasty ~ Anterior Approach 7/19/24    History of ITP     s/p D & C Hysteroscopy 5/22/24    Hx of idiopathic thrombocytopenic purpura     follows with heme, 6.13.24: plt 111    Hypothyroidism     CAM[place] (iron deficiency anemia)     12/12/24 H&H WNL- IV iron   Started on oral iron but can't tolerate due to GI upset   Oncology Ursula Salinas PA-C 12/16/2024    Inflammatory bowel disease     Insomnia     Menorrhagia with regular cycle     Plan: Robot Assisted Laparoscopic Total Hysterectomy; Possible Removal of Tubes & or  Ovaries; Cystoscopy 1/8/25    Neurology follow-up encounter 10/15/2024    Paul Holt MD \"It remains unclear whether you have MS or not. I will repeat your brain MRI again in March of 2025, which together with the eye test can help in determining whether you have MS or not. If the picture remains unclear, we may need to schedule you for a spinal tap.\"    Neuropathy     Palpitation     Psoriasis     " Radiculopathy, cervical     Radiculopathy, lumbar region     RLS (restless legs syndrome)     Sleep apnea     Ulcerative colitis         SURGICAL HISTORY  Past Surgical History:   Procedure Laterality Date    CERVICAL SPINE SURGERY  2024    C6-C7 Cervical Spine Arthroplasty ~ Anterior Approach     SECTION, LOW TRANSVERSE      x 2, .     COLONOSCOPY      DILATION AND CURETTAGE OF UTERUS      HYSTEROSCOPY  2024    D & C Hysteroscopy    UPPER GASTROINTESTINAL ENDOSCOPY          ANESTHESIA HISTORY  Denies problems with anesthesia in the past such as PONV, prolonged sedation, awareness, dental damage, aspiration, cardiac arrest, difficult intubation, or unexpected hospital admissions. Denies family history of malignant hyperthermia, or pseudocholinesterase deficiency.       SOCIAL HISTORY  Former cigarette smoker quit in 2017; rare alcohol use; no recreational drug use.  Patient states that participation in physical activities is limited due to her bilateral leg weakness.  She denies any cardiac chest pain or activity limiting shortness of breath.  METS >6    FAMILY HISTORY  Family History   Problem Relation Name Age of Onset    Colon cancer Mother Shari     COPD Mother Shari     Hyperlipidemia Father Archuleta     Prostate cancer Father Archuleta     Hyperlipidemia Sister      Hypothyroidism Sister      Diverticulitis Sister      No Known Problems Sister      No Known Problems Brother      Colon cancer Mother's Brother      Colon cancer Mother's Brother Gary     Pancreatic cancer Mother's Brother Gary     Breast cancer Father's Sister Na         ALLERGIES  Allergies   Allergen Reactions    Balsalazide Other     Mouth ulcers    Iron GI Upset    Mesalamine Rash        MEDICATIONS  No current facility-administered medications for this encounter.    Current Outpatient Medications:     buPROPion XL (Wellbutrin XL) 150 mg 24 hr tablet, Take 1 tablet (150 mg) by mouth once daily in the morning. Do not crush,  "chew, or split., Disp: 90 tablet, Rfl: 1    cyanocobalamin (Vitamin B-12) 1,000 mcg/mL injection, Inject 1 mL (1,000 mcg) into the muscle see administration instructions. Please inject daily x 7, followed by Weekly x 4 and then Monthly, Disp: 30 mL, Rfl: 3    ergocalciferol (Vitamin D-2) 1.25 MG (29959 UT) capsule, Take 1 capsule (1,250 mcg) by mouth 1 (one) time per week., Disp: 4 capsule, Rfl: 11    escitalopram (Lexapro) 20 mg tablet, Take 1 tablet (20 mg) by mouth once daily., Disp: 90 tablet, Rfl: 1    hydrOXYzine HCL (Atarax) 10 mg tablet, Take 1-2 tablets (10-20 mg) by mouth as needed at bedtime (insomnia)., Disp: 180 tablet, Rfl: 1    levothyroxine (Synthroid, Levoxyl) 25 mcg tablet, Take 0.5 tablets (12.5 mcg) by mouth once daily., Disp: 45 tablet, Rfl: 1    Mag-G 27 mg magnesium (500 mg) tablet, Take 1 tablet (27 mg) by mouth every 12 hours., Disp: , Rfl:     mirikizumab-mrkz (Omvoh Pen) 100 mg/mL pen injector injection, Inject 2 mL (200 mg) under the skin every 28 (twenty-eight) days., Disp: 2 mL, Rfl: 11    multivitamin tablet, Take 1 tablet by mouth once daily., Disp: , Rfl:     pantoprazole (ProtoNix) 40 mg EC tablet, Take 1 tablet (40 mg) by mouth once daily. Do not crush, chew, or split., Disp: 90 tablet, Rfl: 1    pregabalin (Lyrica) 200 mg capsule, Take 1 capsule (200 mg) by mouth 2 times a day., Disp: 60 capsule, Rfl: 0    tacrolimus (Protopic) 0.1 % ointment, if needed., Disp: , Rfl:     tiZANidine (Zanaflex) 4 mg tablet, TAKE ONE TABLET BY MOUTH DAILY, Disp: 90 tablet, Rfl: 0    clobetasol (Temovate) 0.05 % ointment, Apply topically 2 times a day., Disp: , Rfl:     miscellaneous medical supply misc, Bilateral AFO. Apply to bilateral lower legs daily. (Patient not taking: Reported on 3/17/2025), Disp: 1 each, Rfl: 0    PreviDent 5000 Sensitive 1.1-5 % paste, , Disp: , Rfl:     syringe with needle (Syringe 3cc/22Gx1\") 3 mL 22 gauge x 1\" syringe, 1 Application see administration instructions., " "Disp: 30 each, Rfl: 11    UltiCare Safety Syringe 3 mL 22 gauge x 1\" syringe, , Disp: , Rfl:     Vtama 1 % cream, if needed., Disp: , Rfl:      REVIEW OF SYSTEMS  Review of Systems   HENT:          Nasal obstructed breathing, deviated nasal septum   Skin:         Psoriasis   Neurological:  Positive for weakness (Bilateral lower extremities) and numbness (Bilateral neuropathies in feet).   Hematological:         Thrombocytopenia; iron deficiency anemia   All other systems reviewed and are negative.    STOP BANG:  Positive for mild PHAN    DASI/METS SCORE  [facility] Activity Status Index (DASI)  DASI Score: 32.2   MET Score: 6.7      PHYSICAL EXAM  Deferred    AIRWAY EXAM  Deferred    VITALS  No vitals taken for telemedicine visit  BMI Readings from Last 1 Encounters:   03/11/25 25.02 kg/m²      BP Readings from Last 4 Encounters:   03/14/25 104/68   03/11/25 105/64   02/26/25 122/84   02/21/25 (!) 98/44        LABS  Lab Results   Component Value Date    WBC 3.4 (L) 02/04/2025    HGB 11.8 (L) 02/04/2025    HCT 35.1 (L) 02/04/2025    MCV 91 02/04/2025     (L) 02/04/2025      Lab Results   Component Value Date    GLUCOSE 95 02/04/2025    CALCIUM 8.1 (L) 02/04/2025     (L) 02/04/2025    K 3.9 02/04/2025    CO2 25 02/04/2025     02/04/2025    BUN 13 02/04/2025    CREATININE 0.90 02/04/2025      Lab Results   Component Value Date    HGBA1C 4.9 12/05/2024      Lab Results   Component Value Date    CHOL 196 12/05/2024    CHOL 183 09/30/2023    CHOL 176 04/04/2023     Lab Results   Component Value Date    HDL 45.2 12/05/2024    HDL 26.6 09/30/2023    HDL 30.1 (A) 04/04/2023     Lab Results   Component Value Date    LDLCALC 121 (H) 12/05/2024    LDLCALC 142 09/30/2023     Lab Results   Component Value Date    TRIG 147 12/05/2024    TRIG 74 09/30/2023    TRIG 98 04/04/2023     Lab order for CBC, CMP placed on 03/25/2025 by Dr. Darnell.  Patient verbally knowledge lab work is to be completed before " surgery.    Cardiac IMAGING  Encounter Date: 02/04/25   ECG 12 lead   Result Value    Ventricular Rate 56    Atrial Rate 56    OH Interval 152    QRS Duration 94    QT Interval 447    QTC Calculation(Bazett) 432    P Axis 63    R Axis -10    T Axis 26    QRS Count 9    Q Onset 251    T Offset 475    QTC Fredericia 437    Narrative    Sinus rhythm  Low voltage, precordial leads  Probable anteroseptal infarct, old  Baseline wander in lead(s) V2    See ED provider note for full interpretation and clinical correlation  Confirmed by Velvet Forbes ([id number]) on 2/26/2025 12:49:55 PM        Zio report from 01/29/2025  Minimum heart rate 39 bpm, maximum heart rate 120 bpm, average heart rate of 54 bpm.  Predominant underlying rhythm was sinus rhythm.  1 run of supraventricular tachycardia occurred lasting 5 beats with a maximum rate of 120 bpm.  Isolated SVE's were rare less than 1%, SVE couplets were rare less than 1%, and no SVE triplets were present.  Isolated VE's were rare less than 1% and no VE couplets or VE triplets were present.  Difficulty discerning atrial activity making definitive diagnosis difficult to ascertain.      Echocardiogram from 09/26/2023  Indication:    Murmur  Procedure/CPT: Echo Complete w Full Doppler-[id number]  Study Detail: The following Echo studies were performed: 2D, M-Mode, Doppler and  color flow. Technically challenging study due to body habitus and  prominent lung artifact.        PHYSICIAN INTERPRETATION:  Left Ventricle: Left ventricular systolic function is normal, with an estimated ejection fraction of 65%. There are no regional wall motion abnormalities. The left ventricular cavity size is normal. Spectral Doppler shows a normal pattern of left ventricular diastolic filling.  Left Atrium: The left atrium is normal in size.  Right Ventricle: The right ventricle is upper limits of normal in size. There is normal right ventricular global systolic function.  Right Atrium: The right atrium  is mildly dilated.  Aortic Valve: The aortic valve appears structurally normal. There is no evidence of aortic valve regurgitation. The peak instantaneous gradient of the aortic valve is 6.8 mmHg. The mean gradient of the aortic valve is 3.5 mmHg.  Mitral Valve: The mitral valve is normal in structure. There is trace mitral valve regurgitation.  Tricuspid Valve: The tricuspid valve is structurally normal. There is trace tricuspid regurgitation.  Pulmonic Valve: The pulmonic valve is not well visualized. There is no indication of pulmonic valve regurgitation.  Pericardium: There is no pericardial effusion noted.  Aorta: The aortic root is normal.        CONCLUSIONS:  1. Left ventricular systolic function is normal with a 65% estimated ejection fraction.      ASSESSMENT/PLAN  Deviated nasal septum  Septoplasty, bilateral nasal turbinate reduction      Preoperative instructions reviewed in detail with patient during this encounter. A copy of these instructions has been unloaded to  [employer] along with a copy sent to either home email address or mailed to home address.  This note was created in part upon personal review of patient's medical records.  Speech recognition transcription software was used in the creation of this note. Despite proofreading, several typographical errors might be present that might affect the meaning of the content.

## 2025-04-01 ENCOUNTER — APPOINTMENT (OUTPATIENT)
Dept: INTEGRATIVE MEDICINE | Facility: CLINIC | Age: 45
End: 2025-04-01
Payer: COMMERCIAL

## 2025-04-01 LAB
ALBUMIN SERPL-MCNC: 4.2 G/DL (ref 3.6–5.1)
ALP SERPL-CCNC: 44 U/L (ref 31–125)
ALT SERPL-CCNC: 13 U/L (ref 6–29)
ANION GAP SERPL CALCULATED.4IONS-SCNC: 5 MMOL/L (CALC) (ref 7–17)
AST SERPL-CCNC: 13 U/L (ref 10–30)
BILIRUB SERPL-MCNC: 0.2 MG/DL (ref 0.2–1.2)
BUN SERPL-MCNC: 20 MG/DL (ref 7–25)
CALCIUM SERPL-MCNC: 9.1 MG/DL (ref 8.6–10.2)
CHLORIDE SERPL-SCNC: 104 MMOL/L (ref 98–110)
CO2 SERPL-SCNC: 30 MMOL/L (ref 20–32)
CREAT SERPL-MCNC: 0.78 MG/DL (ref 0.5–0.99)
EGFRCR SERPLBLD CKD-EPI 2021: 97 ML/MIN/1.73M2
ERYTHROCYTE [DISTWIDTH] IN BLOOD BY AUTOMATED COUNT: 13.5 % (ref 11–15)
GLUCOSE SERPL-MCNC: 100 MG/DL (ref 65–139)
HCT VFR BLD AUTO: 37.3 % (ref 35–45)
HGB BLD-MCNC: 12.4 G/DL (ref 11.7–15.5)
MCH RBC QN AUTO: 31.2 PG (ref 27–33)
MCHC RBC AUTO-ENTMCNC: 33.2 G/DL (ref 32–36)
MCV RBC AUTO: 93.7 FL (ref 80–100)
PLATELET # BLD AUTO: 122 THOUSAND/UL (ref 140–400)
PMV BLD REES-ECKER: 12.8 FL (ref 7.5–12.5)
POTASSIUM SERPL-SCNC: 4.5 MMOL/L (ref 3.5–5.3)
PROT SERPL-MCNC: 6.2 G/DL (ref 6.1–8.1)
RBC # BLD AUTO: 3.98 MILLION/UL (ref 3.8–5.1)
SODIUM SERPL-SCNC: 139 MMOL/L (ref 135–146)
WBC # BLD AUTO: 5.4 THOUSAND/UL (ref 3.8–10.8)

## 2025-04-02 ENCOUNTER — APPOINTMENT (OUTPATIENT)
Dept: INTEGRATIVE MEDICINE | Facility: CLINIC | Age: 45
End: 2025-04-02
Payer: COMMERCIAL

## 2025-04-02 DIAGNOSIS — G89.29 CHRONIC NECK PAIN: Primary | ICD-10-CM

## 2025-04-02 DIAGNOSIS — M54.2 CHRONIC NECK PAIN: Primary | ICD-10-CM

## 2025-04-02 DIAGNOSIS — M54.59 OTHER LOW BACK PAIN: ICD-10-CM

## 2025-04-02 PROCEDURE — 97811 ACUP 1/> W/O ESTIM EA ADD 15: CPT | Performed by: ACUPUNCTURIST

## 2025-04-02 PROCEDURE — 97810 ACUP 1/> WO ESTIM 1ST 15 MIN: CPT | Performed by: ACUPUNCTURIST

## 2025-04-02 NOTE — PROGRESS NOTES
Acupuncture Visit:     Subjective   Patient ID: Ally Carpio is a 44 y.o. female who presents for No chief complaint on file.  2024 Acupuncture Benefits                                    12/27/2024 RA   [place]  Covered Diagnosis: Medical Necessity   6/30 VPCY    States sore after last tx  R sided face with pain into jaw area    prev  Less pain in shoulder after tx  Low back has been OK,   Neck tight on right side  Some radiation from shoulder   No radiating into hand,   Left side is better  2-3/10  A little less fatigue as well.        States less intense pain  She got new braces to help her walk which is helping    prev  Notes less pain x 3 days  Increase energy  States R sides pain less intense    prev  R sided neck pain  Low back pain  BL foot drop  *Pt using a cane *notes using a rollator at home     States a little relief after the first tx  Notes unable to flu a week after 1st tx due to needing to stay at home  No menses, hysterectomy, ovaries intact    Initial  R sided neck pain  Dxd with CND demyelination, notes multiple Drs have ruled out MS  States she does have lesions on her brain but not spine.  Notes seeing multiple neurologists  States failed neck surgery summer on 2024  RIGHT sided neck pain more behind ear, not at base of skull  Neck pain constant, always there, dull pain 4/10  Worse at end of the day when she has done a lot  BL arms feel weak, RIGHT more than left arm  Radiating to top of shoulder and to front of the shoulder joint  Both arms feel week but RIGHT arm mostly involved    LBP  Feels achy   RIGHT side pain 80% of the time, 20% Left sided   Radiating to piriformis and to the RIGHT calf mm.  BL foot drop    Other med hx  Ulcerative colitis, partial hysterectomy (hx heavy bleeding- Fe infusions in the past)            Session Information  This is visit number: 6  The patient has a total number of visits of: 30  Initial Acupuncture Treatment date: 01/28/25  Name of Insurance Company:  2024:  [place] Limitations (Visits, etc.): 30 VPCY   Covered Diagnosis: Medical Necessity  Visit Type: Follow-up visit         Review of Systems         Provider reviewed plan for the acupuncture session, precautions and contraindications. Patient/guardian/hospital staff has given consent to treat with full understanding of what to expect during the session. Before acupuncture began, provider explained to the patient to communicate at any time if the procedure was causing discomfort past their tolerance level. Patient agreed to advise acupuncturist. The acupuncturist counseled the patient on the risks of acupuncture treatment including pain, infection, bleeding, and no relief of pain. The patient was positioned comfortably. There was no evidence of infection at the site of needle insertions.    Objective   Physical Exam              Acupuncture Treatment  Body Points - Bilateral: Kd 3, 6, 7, GB40, LR3  UB 11, 17, 22, 23, 62   EXB12  Body Points - Right: GB20, 21, LI15, SJ14, LI11, SI3  LI 4  Auricular Points - Bilateral: BFA 1-3  Other Techniques Utilized: TDP Lamp  TDP Lamp Descripton: low back  Needle Count In: 35  Needle Count Out: 35  Total Face to Face Time (min): 25 minutes              Assessment/Plan     Diagnoses and all orders for this visit:  Chronic neck pain (Primary)  Other low back pain

## 2025-04-03 ENCOUNTER — TREATMENT (OUTPATIENT)
Dept: PHYSICAL THERAPY | Facility: HOSPITAL | Age: 45
End: 2025-04-03
Payer: COMMERCIAL

## 2025-04-03 DIAGNOSIS — R29.898 WEAKNESS OF BOTH SHOULDERS: ICD-10-CM

## 2025-04-03 PROCEDURE — 97110 THERAPEUTIC EXERCISES: CPT | Mod: GP,CQ

## 2025-04-03 ASSESSMENT — PAIN SCALES - GENERAL: PAINLEVEL_OUTOF10: 7

## 2025-04-03 ASSESSMENT — PAIN - FUNCTIONAL ASSESSMENT: PAIN_FUNCTIONAL_ASSESSMENT: 0-10

## 2025-04-03 NOTE — PROGRESS NOTES
"Physical Therapy    Physical Therapy Treatment    Patient Name: Ally Carpio  MRN: [id number]  : 1980   Today's Date: 4/3/2025  Time Calculation  Start Time: 1459  Stop Time: 1540  Time Calculation (min): 41 min     PT Therapeutic Procedures Time Entry  Therapeutic Exercise Time Entry: 41                 Visit Number:     Current Problem  Problem List Items Addressed This Visit             ICD-10-CM    Weakness of both shoulders R29.898        Subjective   Pt states she had acupuncture yesterday which helped her neck and shoulder pain, states today her mid to upper back is sore. Pt reports she is doing her HEP on the days she is not at therapy and she has had no recent falls.   Precautions  Precautions  Precautions Comment: Fall risk    Pain  Pain Assessment: 0-10  0-10 (Numeric) Pain Score: 7  Pain Location: Back  Pain Orientation: Mid, Upper      Objective     Treatments:  EXERCISES Date 3/24/2025  Date 3/26/2025  Date 3/31/2025  Date 4/3/2025   VISIT# #2 #3 #4 #5    REPS REPS REPS REPS          Left upper trap stretch   3x30\" - HEP    Levator scap stretch   3x30\" - HEP           Pulleys:       FF 3 min 3 min 3' 3 min   SCAP 3 min 3 min 3' 3 min                 T-band:  Seated Seated Seated    Pull downs 2x10 Red 2x10 Red Red 2x10 Red 2x10   Mid rows 2x10 Red 2x10 Red Red 2x10 Red 2x10   ER 2x10 Red 2x10 Red Red 2x10 Red [id number]    IR 2x10 Red 2x10 Red Red 2x10 Red [id number]    Chuck ER     Red 2x10   Horz Abd     Red x10          Wall push-ups (+) 2x10 2x10 HOLD                  Side lying ER 2 x 10 R 2x10 R 2x10 chuck [id number]    Side lying ABD 2 x 10 AAROM R 2x10 AAROM R 2x10 AAROM chukc 2x10   Supine 90 deg arm circles, CW/CCW 10x all dir 10x all dir 2x10 chuck, alternating [id number] alternating                  Prone:       Y's Next X10 R 10x X10 Chuck (low)   T's Next X10 R 10x X10 Chuck    I's Next X10 R 10x x10Bil                         HEP: Access Code: [email or web address]    Exercises  - Supine Shoulder Flexion " Extension AAROM with Dowel  - 1-2 x daily - 1-2 sets - 10 reps  - Supine Shoulder Protraction with Dowel  - 2 x daily - 1-2 sets - 10 reps  - Seated Shoulder Abduction AAROM with Dowel  - 1-2 x daily - 1-2 sets - 10 reps  - Seated Scapular Retraction  - 1-2 x daily - 1-2 sets - 10 reps    NEW 3/31/25:             Assessment:  Pt tolerated all exercises well with minimal difficulty reporting no change in pain at end of session.     Plan:  Continue to improve Chuck UE strength as tolerated   OP PT Plan  Treatment/Interventions: Education/ Instruction, Manual therapy, Therapeutic exercises  PT Plan: Skilled PT  PT Frequency: 2 times per week  Duration: 4-6 wks  Certification Period Start Date: 02/26/25  Certification Period End Date: 02/26/26  Number of Treatments Authorized: 60  Rehab Potential: Good  Plan of Care Agreement: Patient    Goals:  Active       Chuck shoulder weakness       Improve chuck shoulder/scapula stabilizer strength >=1/2 muscle grade for improved lifting.         Start:  03/20/25    Expected End:  06/18/25            <=1/10 right shoulder pain for improved house/yard work.        Start:  03/20/25    Expected End:  06/18/25            Improve QuickDash by >=10 points for improved mobility.         Start:  03/20/25    Expected End:  06/18/25            Chuck shoulder AROM WFL for reaching into cupboards.        Start:  03/20/25    Expected End:  06/18/25            Independent HEP for continued gains after PT is complete.        Start:  03/20/25    Expected End:  06/18/25

## 2025-04-07 ENCOUNTER — APPOINTMENT (OUTPATIENT)
Dept: ORTHOPEDIC SURGERY | Age: 45
End: 2025-04-07
Payer: COMMERCIAL

## 2025-04-07 VITALS — BODY MASS INDEX: 24.59 KG/M2 | WEIGHT: 153 LBS | HEIGHT: 66 IN

## 2025-04-07 DIAGNOSIS — M25.511 BILATERAL SHOULDER PAIN, UNSPECIFIED CHRONICITY: ICD-10-CM

## 2025-04-07 DIAGNOSIS — M25.512 BILATERAL SHOULDER PAIN, UNSPECIFIED CHRONICITY: ICD-10-CM

## 2025-04-07 DIAGNOSIS — R29.898 WEAKNESS OF BOTH SHOULDERS: Primary | ICD-10-CM

## 2025-04-07 PROCEDURE — 1036F TOBACCO NON-USER: CPT | Performed by: EMERGENCY MEDICINE

## 2025-04-07 PROCEDURE — 3008F BODY MASS INDEX DOCD: CPT | Performed by: EMERGENCY MEDICINE

## 2025-04-07 PROCEDURE — 99204 OFFICE O/P NEW MOD 45 MIN: CPT | Performed by: EMERGENCY MEDICINE

## 2025-04-07 ASSESSMENT — PAIN - FUNCTIONAL ASSESSMENT: PAIN_FUNCTIONAL_ASSESSMENT: 0-10

## 2025-04-07 ASSESSMENT — PAIN SCALES - GENERAL: PAINLEVEL_OUTOF10: 4

## 2025-04-07 ASSESSMENT — PAIN DESCRIPTION - DESCRIPTORS: DESCRIPTORS: ACHING;DULL

## 2025-04-07 NOTE — PROGRESS NOTES
Subjective    Patient ID: Ally Carpio is a 44 y.o. female.    Chief Complaint: Pain and Fatigue of the Right Shoulder (Pt is a new patient presnting today for Bilateral shoulder pain. Pt admits her right shoulder does seem worse. Weakness has been ongoing since January. Previous Hx of massage therapy,physical therapy as well as accupucture with very little relief. Tylenol as needed for pain. XR done today. ) and Pain and Fatigue of the Left Shoulder     Last Surgery: No surgery found  Last Surgery Date: No surgery found    Patient is a very pleasant 44-year-old female who is coming in with chronic weakness in both upper extremities but with some right shoulder pain/discomfort that started in January.  She has a long history of spinal and neurologic issues.  She has had a disc replaced at the C6-C7 level.  She also has possible demyelinating disease and is following up with a neurologist later at the end of this month.  Her PCP had her start physical therapy recently and for the past few months she has been having some diffuse right shoulder pain.  She also reports a little bit of decreased range of motion but states that this is only active range of motion and that her passive range of motion seems to be completely intact.  She denies any trauma.  No infectious symptoms.  She has had MRIs of her entire spine and brain.  X-rays were done today of her shoulders.        Objective   Right Shoulder Exam     Tenderness   The patient is experiencing no tenderness.    Range of Motion   Active abduction:  90 abnormal   Passive abduction:  normal     Muscle Strength   Abduction: 4/5   Internal rotation: 4/5   External rotation: 4/5   Supraspinatus: 4/5   Subscapularis: 4/5     Tests   Garcia test: negative  Impingement: negative    Other   Erythema: absent      Left Shoulder Exam     Tenderness   The patient is experiencing no tenderness.     Range of Motion   Active abduction:  90 abnormal   Passive abduction:   normal     Muscle Strength   Abduction: 4/5   Internal rotation: 4/5   External rotation: 4/5   Supraspinatus: 4/5   Subscapularis: 4/5     Tests   Garcia test: negative  Impingement: negative    Other   Erythema: absent             Image Results:  MRI of the cervical spine was reviewed and interpreted by me on 4/7/2025 and overall I agree with the radiologist's findings and interpretations.  X-rays of both shoulders were also reviewed and interpreted by me on 4/7/2025 and were grossly unremarkable.    Assessment/Plan   Encounter Diagnoses:  Weakness of both shoulders    Bilateral shoulder pain, unspecified chronicity    Orders Placed This Encounter    XR shoulder 2+ views bilateral    Referral to Physical Medicine Rehab     No follow-ups on file.    I had a long discussion with the patient about her treatment options and eventually we decided to refer her to my colleague and physical medicine and rehabilitation.  I encouraged her to make an appointment after she sees her neurologist because that appointment with neurology will likely determine our next steps.  They are considering doing a lumbar puncture and are trying to figure out if she has a demyelinating or inflammatory process in her central nervous system.  If she ends up needing spinal injections she could have this done with physical medicine and rehabilitation.  She can follow-up with me as needed moving forward but I think that at this time it is most likely spinal related rather than shoulder.    Referred by Dr. Thibodeaux.     ** Please excuse any errors in grammar or translation related to this dictation. Voice recognition software was utilized to prepare this document. **       Kwaku Mederos MD  [facility]

## 2025-04-08 ENCOUNTER — TREATMENT (OUTPATIENT)
Dept: PHYSICAL THERAPY | Facility: HOSPITAL | Age: 45
End: 2025-04-08
Payer: COMMERCIAL

## 2025-04-08 ENCOUNTER — APPOINTMENT (OUTPATIENT)
Dept: INTEGRATIVE MEDICINE | Facility: CLINIC | Age: 45
End: 2025-04-08
Payer: COMMERCIAL

## 2025-04-08 DIAGNOSIS — G89.29 CHRONIC NECK PAIN: ICD-10-CM

## 2025-04-08 DIAGNOSIS — M54.59 OTHER LOW BACK PAIN: Primary | ICD-10-CM

## 2025-04-08 DIAGNOSIS — M54.2 CHRONIC NECK PAIN: ICD-10-CM

## 2025-04-08 DIAGNOSIS — R29.898 WEAKNESS OF BOTH SHOULDERS: ICD-10-CM

## 2025-04-08 PROCEDURE — 97811 ACUP 1/> W/O ESTIM EA ADD 15: CPT | Performed by: INTERNAL MEDICINE

## 2025-04-08 PROCEDURE — 97810 ACUP 1/> WO ESTIM 1ST 15 MIN: CPT | Performed by: INTERNAL MEDICINE

## 2025-04-08 PROCEDURE — 97110 THERAPEUTIC EXERCISES: CPT | Mod: GP,CQ

## 2025-04-08 ASSESSMENT — PAIN - FUNCTIONAL ASSESSMENT: PAIN_FUNCTIONAL_ASSESSMENT: 0-10

## 2025-04-08 ASSESSMENT — PAIN SCALES - GENERAL: PAINLEVEL_OUTOF10: 4

## 2025-04-08 NOTE — PROGRESS NOTES
Acupuncture Visit:     Subjective   Patient ID: Ally Carpio is a 44 y.o. female who presents for No chief complaint on file.  2024 Acupuncture Benefits                                    12/27/2024 RA   [place]  Covered Diagnosis: Medical Necessity   7/30 VPCY    R sided neck, shoulder and arm pain   Pain relief for 1-2 days p tx, then pain comes back up   Foot drop in both feet   Energy level picks up p tx as well  Massage 1/week     prev  Less pain in shoulder after tx  Low back has been OK,   Neck tight on right side  Some radiation from shoulder   No radiating into hand,   Left side is better  2-3/10  A little less fatigue as well.        States less intense pain  She got new braces to help her walk which is helping    prev  Notes less pain x 3 days  Increase energy  States R sides pain less intense    prev  R sided neck pain  Low back pain  BL foot drop  *Pt using a cane *notes using a rollator at home     States a little relief after the first tx  Notes unable to flu a week after 1st tx due to needing to stay at home  No menses, hysterectomy, ovaries intact    Initial  R sided neck pain  Dxd with CND demyelination, notes multiple Drs have ruled out MS  States she does have lesions on her brain but not spine.  Notes seeing multiple neurologists  States failed neck surgery summer on 2024  RIGHT sided neck pain more behind ear, not at base of skull  Neck pain constant, always there, dull pain 4/10  Worse at end of the day when she has done a lot  BL arms feel weak, RIGHT more than left arm  Radiating to top of shoulder and to front of the shoulder joint  Both arms feel week but RIGHT arm mostly involved    LBP  Feels achy   RIGHT side pain 80% of the time, 20% Left sided   Radiating to piriformis and to the RIGHT calf mm.  BL foot drop    Other med hx  Ulcerative colitis, partial hysterectomy (hx heavy bleeding- Fe infusions in the past)            Session Information  Is this acupuncture treatment being  billed to the patient's insurance company: Yes  This is visit number: 7  The patient has a total number of visits of: 30  Initial Acupuncture Treatment date: 01/28/25  Name of Insurance Company: 2024:  [place] Limitations (Visits, etc.): 30 VPCY   Covered Diagnosis: Medical Necessity  Visit Type: Follow-up visit  Description of present complaint: Chronic pain         Review of Systems         Provider reviewed plan for the acupuncture session, precautions and contraindications. Patient/guardian/hospital staff has given consent to treat with full understanding of what to expect during the session. Before acupuncture began, provider explained to the patient to communicate at any time if the procedure was causing discomfort past their tolerance level. Patient agreed to advise acupuncturist. The acupuncturist counseled the patient on the risks of acupuncture treatment including pain, infection, bleeding, and no relief of pain. The patient was positioned comfortably. There was no evidence of infection at the site of needle insertions.    Objective   Physical Exam              Acupuncture Treatment  Patient Position: Prone on a table  Acupuncture Needling: Yes  Needle Guage: 36 guage /.20/ Blue seirin  Body Points: With retention  Body Points - Bilateral: DU20 GB20 GB21 4 edyta BL13 BL14 PC6 SI3/BL62 BL18 BL23  Other Techniques Utilized: TDP Lamp  TDP Lamp Descripton: upper back/neck + light moxa  Needle Count In: 23  Needle Count Out: 23  Needle Retention Time (min): 35 minutes  Total Face to Face Time (min): 25 minutes              Assessment/Plan     Diagnoses and all orders for this visit:  Other low back pain (Primary)  Chronic neck pain

## 2025-04-08 NOTE — PROGRESS NOTES
4/18/2025 Post op visit sp septoplasty and bilateral turbinates on 4/15/2025    Patient returns today on day 3 of septoplasty, turbinate reduction.  Patient reports no significant pain.  Patient has taken postop medication correctly. Today exam shows septal in midline, inferior turbinates in adequate position/size.  Patient is going to continue nasal saline rinses and return for remaining follow-up      8/7/2024 New Patient Visit for PHAN and nasal symptoms    Patient presents today after significant weight loss with improvement in sleep apnea but maintains a significant complaint of nasal obstruction not responsive to clinical treatment.  Patient has had different rounds of both nasal corticosteroids and antihistaminics without improvement in nasal obstruction.     Home Sleep test performed 6/18/2024 to assess PHAN improvement after significant weight loss (38 BMI to 24 BMI)   The patient spent 353 min (82%) supine, and spent 78 min (18%) non-supine.   The REI3% was 5.8/hr. The REI4% was 1.5/hr. KIMBERLEE was 0.0/hr.   The supine REI3% was 6.4; non-supine REI3% was 3.1.   The supine REI4% was 1.7; non-supine REI4% was 0.8.   The mean oxygen saturation was 96% during the monitoring time.   The oxygen saturation was < = 88% for 0 minutes. The SpO2 karson was 91%.     Previous Home Sleep Study 8/1/2023  FATUMA 3% was 8.0 , supine was 10.8 and non supine was 6.3 events per hour  FATUMA 4% was 4.8 , supine was 7.7 and non supine was 3.3 events per hour  KIMBERLEE was per hour 0.0  The oxygen saturation was < = 88% for 0 minutes  Spo2 karson was 86%      CPAP supplies provided by [facility]  Compliance Report  Usage 04/29/2024 - 06/27/2024  Usage days 35/60 days (58%)  >= 4 hours 16 days (27%)  < 4 hours 19 days (32%)  Usage hours 134 hours 5 minutes  Average usage (total days) 2 hours 14 minutes  Average usage (days used) 3 hours 50 minutes  Median usage (days used) 3 hours 15 minutes  Total used hours (value since last reset -  06/27/2024) 1,273 hours  AirSense 10 AutoSet  Serial number [id number]  Mode AutoSet  Min Pressure 4 cmH2O  Max Pressure 8 cmH2O  EPR Fulltime  EPR level 3  Response Standard  Therapy  Pressure - cmH2O Median: 5.2 95th percentile: 6.5 Maximum: 7.1  Leaks - L/min Median: 6.5 95th percentile: 19.6 Maximum: 31.2  Events per hour AI: 1.0 HI: 0.1 AHI: 1.1  Apnea Index Central: 0.5 Obstructive: 0.2 Unknown: 0.3  RERA Index 0.0      Physical Exam:    GENERAL:  Well-developed, well-nourished.      EYES:  Ocular motility intact.       EARS:  Otoscopy of external auditory canals and tympanic membranes is normal with clinical speech reception thresholds grossly intact. No mass/lesion of auricle.      NOSE:   Anterior rhinoscopy shows septum is deviated to the right anteriorly and bilateral inferior turbinate enlarged  Right Turbinate: 3  Left Turbinate: 4  Nasal Valve collapse: No, Isha Maneuver is negative  Nasal mucosa is unremarkable.  There are no other masses polyps or purulent drainage.      NECK:  No cervical lymphadenopathy, no neck mass/crepitus/ asymmetry, trachea is midline, no thyroid enlargement/tenderness/mass.      OROPHARYNX:   Tonsil size: 1  Modified Mallampatti tongue position: 3  Tongue position is Freidman 3  Scalloping is noted.   Soft Palate: is low-lying;  is redundant     MAXILLOFACIAL:   On frontal repose, patient shows assymmetrical facial thirds, symmetrical facial fifths.   There is not paranasal flattening.  There is not deep nasolabial folds.   On profile view, the patient has a inadequate facial profile,  with pronounced negative overjet and Class 3 Facial Skeletal relationship. There is a dorsal nasal hump.  Underdeveloped maxilla overprojected mandible.        DENTAL:  The occlusal plane is flat. Negative 3mm Overjet.   Right - Class 3 canine, Class 3 molar  Left - Class 3 canine, Class 3 molar  There is no transverse discrepancy or narrow maxilla.  Dentition: There is adequate dentition.  There is dental crowding.        TMJ:  On TMJ examination, the maximal incisal opening is about 45 mm. The jaw does not deviate upon opening. There is TMJ click/pop. There is TMJ tenderness upon function.      NEURO/PSYCH:  Cranial nerves grossly intact, oriented x3 (time, place, person), appropriate mood and affect.      RESPIRATION:  Symmetric expansion during respiration, normal respiratory effort.      CARDIOVASCULAR: Pulse is regular rhythm and rate      Procedure: Diagnostic Nasal/ Pharyngeal Endoscopy     Indication for procedure: To evaluate static and dynamic upper anatomy not visible by anterior rhinoscopy and oral cavitiy examination for anatomic risk factors of obstructive sleep apnea.      Anesthesia: 1% phenylephrine,4% lidocaine topical spray     Description:   A flexible endoscope was used to examine the left and right nasal cavities.  The nasal valve areas were examined for abnormalities or collapse.  The inferior and middle turbinates were evaluated and abnormalities noted. The scope was then passed through the nasopharygneal, oropharyngeal, and hypopharyngeal airway. The Shelby Maneuver was performed with the patient in sitting position.Collapse was assessed during a maximal inspiratory effort against a closed mouth and sealed nose. The patient tolerated the procedure without complications and was returned to ambulatory status.       Findings:   Nasopharynx: There is not adenoid hypertrophy.   Oropharynx: There is not palatine tonsillar hypertrophy.   Tongue base is significantly retropositioned due to dentofacial deformity.   With Romero maneuver, soft palatal collapse is grade 2, lateral pharyngeal wall collapse is grade 1.  Hypopharynx: There is not lingual tonsillar hypertrophy, with lingual tonsils of Grade 2. Vocal Cord position with Grade 3 view.  With Romero maneuver, base of tongue collapse is grade 3. This is improved with the patient doing a jaw thrust maneuver.    Diagnose:  Deviated  septum, hypertrophied turbinates  Underdeveloped maxilla   Class 3 dentofacial deformity    Plan:  Patient has failed clinical treatment and septoplasty is discussed.   Patient will reach out if she wants to proceed.

## 2025-04-08 NOTE — PROGRESS NOTES
4/18/2025 Post op visit sp septoplasty and bilateral turbinates on 4/15/2025        8/7/2024 New Patient Visit for PHAN and nasal symptoms    Patient presents today after significant weight loss with improvement in sleep apnea but maintains a significant complaint of nasal obstruction not responsive to clinical treatment.  Patient has had different rounds of both nasal corticosteroids and antihistaminics without improvement in nasal obstruction.     Home Sleep test performed 6/18/2024 to assess PHAN improvement after significant weight loss (38 BMI to 24 BMI)   The patient spent 353 min (82%) supine, and spent 78 min (18%) non-supine.   The REI3% was 5.8/hr. The REI4% was 1.5/hr. KIMBERLEE was 0.0/hr.   The supine REI3% was 6.4; non-supine REI3% was 3.1.   The supine REI4% was 1.7; non-supine REI4% was 0.8.   The mean oxygen saturation was 96% during the monitoring time.   The oxygen saturation was < = 88% for 0 minutes. The SpO2 karson was 91%.     Previous Home Sleep Study 8/1/2023  FATUMA 3% was 8.0 , supine was 10.8 and non supine was 6.3 events per hour  FATUMA 4% was 4.8 , supine was 7.7 and non supine was 3.3 events per hour  KIMBERLEE was per hour 0.0  The oxygen saturation was < = 88% for 0 minutes  Spo2 karson was 86%      CPAP supplies provided by [employer]  Compliance Report  Usage 04/29/2024 - 06/27/2024  Usage days 35/60 days (58%)  >= 4 hours 16 days (27%)  < 4 hours 19 days (32%)  Usage hours 134 hours 5 minutes  Average usage (total days) 2 hours 14 minutes  Average usage (days used) 3 hours 50 minutes  Median usage (days used) 3 hours 15 minutes  Total used hours (value since last reset - 06/27/2024) 1,273 hours  AirSense 10 AutoSet  Serial number [id number]  Mode AutoSet  Min Pressure 4 cmH2O  Max Pressure 8 cmH2O  EPR Fulltime  EPR level 3  Response Standard  Therapy  Pressure - cmH2O Median: 5.2 95th percentile: 6.5 Maximum: 7.1  Leaks - L/min Median: 6.5 95th percentile: 19.6 Maximum: 31.2  Events per  hour AI: 1.0 HI: 0.1 AHI: 1.1  Apnea Index Central: 0.5 Obstructive: 0.2 Unknown: 0.3  RERA Index 0.0      Physical Exam:    GENERAL:  Well-developed, well-nourished.      EYES:  Ocular motility intact.       EARS:  Otoscopy of external auditory canals and tympanic membranes is normal with clinical speech reception thresholds grossly intact. No mass/lesion of auricle.      NOSE:   Anterior rhinoscopy shows septum is deviated to the right anteriorly and bilateral inferior turbinate enlarged  Right Turbinate: 3  Left Turbinate: 4  Nasal Valve collapse: No, Avery Maneuver is negative  Nasal mucosa is unremarkable.  There are no other masses polyps or purulent drainage.      NECK:  No cervical lymphadenopathy, no neck mass/crepitus/ asymmetry, trachea is midline, no thyroid enlargement/tenderness/mass.      OROPHARYNX:   Tonsil size: 1  Modified Mallampatti tongue position: 3  Tongue position is Freidman 3  Scalloping is noted.   Soft Palate: is low-lying;  is redundant     MAXILLOFACIAL:   On frontal repose, patient shows assymmetrical facial thirds, symmetrical facial fifths.   There is not paranasal flattening.  There is not deep nasolabial folds.   On profile view, the patient has a inadequate facial profile,  with pronounced negative overjet and Class 3 Facial Skeletal relationship. There is a dorsal nasal hump.  Underdeveloped maxilla overprojected mandible.        DENTAL:  The occlusal plane is flat. Negative 3mm Overjet.   Right - Class 3 canine, Class 3 molar  Left - Class 3 canine, Class 3 molar  There is no transverse discrepancy or narrow maxilla.  Dentition: There is adequate dentition. There is dental crowding.        TMJ:  On TMJ examination, the maximal incisal opening is about 45 mm. The jaw does not deviate upon opening. There is TMJ click/pop. There is TMJ tenderness upon function.      NEURO/PSYCH:  Cranial nerves grossly intact, oriented x3 (time, place, person), appropriate mood and affect.       RESPIRATION:  Symmetric expansion during respiration, normal respiratory effort.      CARDIOVASCULAR: Pulse is regular rhythm and rate      Procedure: Diagnostic Nasal/ Pharyngeal Endoscopy     Indication for procedure: To evaluate static and dynamic upper anatomy not visible by anterior rhinoscopy and oral cavitiy examination for anatomic risk factors of obstructive sleep apnea.      Anesthesia: 1% phenylephrine,4% lidocaine topical spray     Description:   A flexible endoscope was used to examine the left and right nasal cavities.  The nasal valve areas were examined for abnormalities or collapse.  The inferior and middle turbinates were evaluated and abnormalities noted. The scope was then passed through the nasopharygneal, oropharyngeal, and hypopharyngeal airway. The Shelby Maneuver was performed with the patient in sitting position.Collapse was assessed during a maximal inspiratory effort against a closed mouth and sealed nose. The patient tolerated the procedure without complications and was returned to ambulatory status.       Findings:   Nasopharynx: There is not adenoid hypertrophy.   Oropharynx: There is not palatine tonsillar hypertrophy.   Tongue base is significantly retropositioned due to dentofacial deformity.   With Romero maneuver, soft palatal collapse is grade 2, lateral pharyngeal wall collapse is grade 1.  Hypopharynx: There is not lingual tonsillar hypertrophy, with lingual tonsils of Grade 2. Vocal Cord position with Grade 3 view.  With Romero maneuver, base of tongue collapse is grade 3. This is improved with the patient doing a jaw thrust maneuver.    Diagnose:  Deviated septum, hypertrophied turbinates  Underdeveloped maxilla   Class 3 dentofacial deformity    Plan:  Patient has failed clinical treatment and septoplasty is discussed.   Patient will reach out if she wants to proceed.

## 2025-04-10 ENCOUNTER — PATIENT MESSAGE (OUTPATIENT)
Dept: PRIMARY CARE | Facility: CLINIC | Age: 45
End: 2025-04-10

## 2025-04-10 ENCOUNTER — TREATMENT (OUTPATIENT)
Dept: PHYSICAL THERAPY | Facility: HOSPITAL | Age: 45
End: 2025-04-10
Payer: COMMERCIAL

## 2025-04-10 DIAGNOSIS — R29.898 WEAKNESS OF BOTH SHOULDERS: ICD-10-CM

## 2025-04-10 DIAGNOSIS — M79.7 FIBROMYALGIA: ICD-10-CM

## 2025-04-10 PROCEDURE — 97110 THERAPEUTIC EXERCISES: CPT | Mod: GP,CQ

## 2025-04-10 RX ORDER — PREGABALIN 200 MG/1
200 CAPSULE ORAL 2 TIMES DAILY
Qty: 60 CAPSULE | Refills: 2 | Status: SHIPPED | OUTPATIENT
Start: 2025-04-10

## 2025-04-10 ASSESSMENT — PAIN - FUNCTIONAL ASSESSMENT: PAIN_FUNCTIONAL_ASSESSMENT: 0-10

## 2025-04-10 ASSESSMENT — PAIN SCALES - GENERAL: PAINLEVEL_OUTOF10: 4

## 2025-04-10 NOTE — PROGRESS NOTES
"Physical Therapy    Physical Therapy Treatment    Patient Name: Ally Carpio  MRN: [id number]  : 1980   Today's Date: 4/10/2025  Time Calculation  Start Time: 0800  Stop Time: 0840  Time Calculation (min): 40 min     PT Therapeutic Procedures Time Entry  Therapeutic Exercise Time Entry: 40                 Visit Number:     Current Problem  Problem List Items Addressed This Visit             ICD-10-CM    Weakness of both shoulders R29.898        Subjective   Pt reports she has overall aching today because of the rainy weather.   Precautions  Precautions  Precautions Comment: Fall risk    Pain  Pain Assessment: 0-10  0-10 (Numeric) Pain Score: 4  Pain Location: Shoulder  Pain Orientation: Right, Left      Objective   Shoulder AROM supine   FLEX: R 43*, L 65*  ABD: R 82*, L 82*    UE Strength:  Shoulder FLEX: R 4/5 L 3+/5  Shoulder ABD: R 3/5 L 3+/5  Shoulder ER: 4+/5 chuck  Shoulder IR: 4+/5 chuck  Elbow FLEX: 4+/5 chuck  Elbow EXT: R 4+/5 L 4/5    Scapula Stabilizer Strength:  Lower trapezius: R 3+/5, L 4/5  Mid trapezius: R 3+/5, L 4/5  Serratus anterior: 4/5 chuck       Outcome Measures:  Other Measures  Disability of Arm Shoulder Hand (DASH): 34    Treatments:     EXERCISES Date 3/31/2025  Date 4/3/2025 Date 2025 Date 4/10/2025   VISIT# #4 #5 #6 #7    REPS REPS            Left upper trap stretch 3x30\" - HEP      Levator scap stretch 3x30\" - HEP             NuStep   L3 10min L3 5min   Pulleys:       FF 3' 3 min 3 min 3min   SCAP 3' 3 min 3 min 3min                 T-band: Seated Seated  Seated     Pull downs Red 2x10 Red 2x10 Red 2x10    Mid rows Red 2x10 Red 2x10 Red 2x10    ER Red 2x10 Red 4m16Cnn  Red 7y44Tmf     IR Red 2x10 Red 8y68Uku  Red 8y02Lsv     Chuck ER   Red 2x10 Red 2x10    Horz Abd   Red x10 Red 2x10           Wall push-ups (+) HOLD                    Side lying ER 2x10 chuck [id number]  5g78Adb     Side lying ABD 2x10 AAROM chuck 2x10 2x10 Chuck     Supine 90 deg arm circles, CW/CCW 2x10 chuck, " alternating 8z48Cpu alternating  4e03Oae alt                  Prone:       Y's 10x X10 Chuck (low)     T's 10x X10 Chuck      I's 10x x10Bil                           HEP: Access Code: [email or web address]    Exercises  - Supine Shoulder Flexion Extension AAROM with Dowel  - 1-2 x daily - 1-2 sets - 10 reps  - Supine Shoulder Protraction with Dowel  - 2 x daily - 1-2 sets - 10 reps  - Seated Shoulder Abduction AAROM with Dowel  - 1-2 x daily - 1-2 sets - 10 reps  - Seated Scapular Retraction  - 1-2 x daily - 1-2 sets - 10 reps    NEW 3/31/25:  New 4/10/25    Access Code: [email or web address]         Assessment:  Pt tolerated all exercises well, demonstrating minimal progress towards goals this date. Pt demonstrates good understanding of new HEP without questions this date.     Plan:  Pt is having a nose surgery, will be on hold until cleared by Dr to return to PT.  OP PT Plan  Treatment/Interventions: Education/ Instruction, Manual therapy, Therapeutic exercises  PT Plan: Skilled PT  PT Frequency: 2 times per week  Duration: 4-6 wks  Certification Period Start Date: 02/26/25  Certification Period End Date: 02/26/26  Number of Treatments Authorized: [id number]  Rehab Potential: Good  Plan of Care Agreement: Patient    Goals:  Active       Chuck shoulder weakness       Improve chuck shoulder/scapula stabilizer strength >=1/2 muscle grade for improved lifting.         Start:  03/20/25    Expected End:  06/18/25            <=1/10 right shoulder pain for improved house/yard work.        Start:  03/20/25    Expected End:  06/18/25            Improve QuickDash by >=10 points for improved mobility.         Start:  03/20/25    Expected End:  06/18/25            Chuck shoulder AROM WFL for reaching into cupboards.        Start:  03/20/25    Expected End:  06/18/25            Independent HEP for continued gains after PT is complete.        Start:  03/20/25    Expected End:  06/18/25

## 2025-04-14 ENCOUNTER — ANESTHESIA EVENT (OUTPATIENT)
Dept: OPERATING ROOM | Facility: CLINIC | Age: 45
End: 2025-04-14
Payer: COMMERCIAL

## 2025-04-14 ENCOUNTER — APPOINTMENT (OUTPATIENT)
Dept: BEHAVIORAL HEALTH | Facility: CLINIC | Age: 45
End: 2025-04-14
Payer: COMMERCIAL

## 2025-04-14 ENCOUNTER — APPOINTMENT (OUTPATIENT)
Dept: PHYSICAL THERAPY | Facility: HOSPITAL | Age: 45
End: 2025-04-14
Payer: COMMERCIAL

## 2025-04-14 DIAGNOSIS — F41.1 GAD (GENERALIZED ANXIETY DISORDER): ICD-10-CM

## 2025-04-14 DIAGNOSIS — F33.1 DEPRESSION, MAJOR, RECURRENT, MODERATE: ICD-10-CM

## 2025-04-14 PROCEDURE — 99215 OFFICE O/P EST HI 40 MIN: CPT

## 2025-04-14 PROCEDURE — 1036F TOBACCO NON-USER: CPT

## 2025-04-14 RX ORDER — ARIPIPRAZOLE 5 MG/1
5 TABLET ORAL DAILY
Qty: 30 TABLET | Refills: 2 | Status: SHIPPED | OUTPATIENT
Start: 2025-04-14 | End: 2025-04-14 | Stop reason: SDUPTHER

## 2025-04-14 RX ORDER — ARIPIPRAZOLE 5 MG/1
5 TABLET ORAL DAILY
Qty: 30 TABLET | Refills: 0 | Status: SHIPPED | OUTPATIENT
Start: 2025-04-14 | End: 2025-04-14 | Stop reason: SDUPTHER

## 2025-04-14 RX ORDER — ARIPIPRAZOLE 5 MG/1
5 TABLET ORAL DAILY
Qty: 30 TABLET | Refills: 2 | Status: SHIPPED | OUTPATIENT
Start: 2025-04-14 | End: 2025-07-13

## 2025-04-14 ASSESSMENT — ENCOUNTER SYMPTOMS
CONSTITUTIONAL NEGATIVE: 1
NERVOUS/ANXIOUS: 1

## 2025-04-14 NOTE — PROGRESS NOTES
"Impression : Patient reports Wellbutrin  mg helped improve her energy level and low motivation. Explains she is no longer sleeping all day long. Report new onset of feeling grumpy,  irritability, increased anxiety and restlessness since starting Wellbutrin.  Patient reports she does not want to switch Lexapro to another SSRI to help with anxiety since it has been helpful for her. CANDIDA discontinued Wellbutrin and introduced Abilify to help with irritability and mood symptoms.    I performed this visit using real-time telehealth tools, including an AUDIO/VIDEO connection between Ally Carpio who is at Home and DEANA Jacinto who is at At home office working via Telehealth.  Virtual or Telephone Consent    An interactive audio and video telecommunication system which permits real time communications between the patient (at the originating site) and provider (at the distant site) was utilized to provide this telehealth service.   Verbal consent was requested and obtained from Ally Carpio on this date, 04/14/25 for a telehealth visit and the patient's location was confirmed at the time of the visit.      Assessment/Plan     Impression:  Ally Carpio is a 44 y.o. female who presents via telehealth visit for a follow up visit with CC of  \" I feel like I am more grumpy, mean I guess it is the Wellbutrin. It is making anxious, but  at least it is not making me tired. \"     Patient consents to treatment.    Problem List Items Addressed This Visit             ICD-10-CM    Depression, major, recurrent, moderate F33.1    Relevant Medications    ARIPiprazole (Abilify) 5 mg tablet    Other Relevant Orders    Follow Up In Psychiatry     Other Visit Diagnoses         Codes    CHRISTINA (generalized anxiety disorder)     F41.1    Relevant Medications    ARIPiprazole (Abilify) 5 mg tablet    Other Relevant Orders    Follow Up In Psychiatry            Patient is reminded that if there is SI to call [phone] and " get themselves to the closest ED for evaluation, otherwise contact me for other questions/concerns.    Patient presenting to outpatient treatment for a scheduled psych follow up visit.      HPI   Background:   Patient presenting to outpatient treatment for a scheduled psych follow up visit.  Patient is at home for the appointment.  Patient was seen a couple of weeks ago for management of  low energy, low motivation and depression. Patient was started on Wellbutrin  mg. Patient reports the medication has helped with increasing energy and improving  low motivation, but she feels more irritable and more anxious on Wellbutrin. Reports she would like something that will help with low energy and irritability.  Patient hopes to feel more energy but less irritable and restless.    Characteristics/Recent psychiatric symptoms (pertinent positives and negatives):    Reports increased anxiety. Reports increased irritability and some restlessness. Denies depressive symptoms. Denies  panic attacks.   Reports getting 7 hours of sleep at night. Appetite is normal.  Denies wishes for death or consideration for suicide.  CSSRS negative.  Denies hx hospitalization.   Patient does explain that current dose of Wellbutrin has NOT been successful in the management of anxiety  symptoms.     -SI/HI : Denies        Psychiatric Review Of Systems:  Depressive Symptoms: negative  Manic Symptoms: negative    Anxiety Symptoms: General Anxiety Disorder (CHRISTINA)CHRISTINA Behaviors: irritability and restlessness  Psychotic Symptoms: negative      REVIEW OF SYSTEMS  Review of Systems   Constitutional: Negative.    Psychiatric/Behavioral:  The patient is nervous/anxious.      All other ROS negative    Current Medications:    Current Outpatient Medications:     clobetasol (Temovate) 0.05 % ointment, Apply topically 2 times a day., Disp: , Rfl:     cyanocobalamin (Vitamin B-12) 1,000 mcg/mL injection, Inject 1 mL (1,000 mcg) into the muscle see  "administration instructions. Please inject daily x 7, followed by Weekly x 4 and then Monthly, Disp: 30 mL, Rfl: 3    escitalopram (Lexapro) 20 mg tablet, Take 1 tablet (20 mg) by mouth once daily., Disp: 90 tablet, Rfl: 1    hydrOXYzine HCL (Atarax) 10 mg tablet, Take 1-2 tablets (10-20 mg) by mouth as needed at bedtime (insomnia)., Disp: 180 tablet, Rfl: 1    levothyroxine (Synthroid, Levoxyl) 25 mcg tablet, Take 0.5 tablets (12.5 mcg) by mouth once daily., Disp: 45 tablet, Rfl: 1    Mag-G 27 mg magnesium (500 mg) tablet, Take 1 tablet (27 mg) by mouth every 12 hours., Disp: , Rfl:     mirikizumab-mrkz ([place] Pen) 100 mg/mL pen injector injection, Inject 2 mL (200 mg) under the skin every 28 (twenty-eight) days., Disp: 2 mL, Rfl: 11    multivitamin tablet, Take 1 tablet by mouth once daily., Disp: , Rfl:     pantoprazole (ProtoNix) 40 mg EC tablet, Take 1 tablet (40 mg) by mouth once daily. Do not crush, chew, or split., Disp: 90 tablet, Rfl: 1    pregabalin (Lyrica) 200 mg capsule, Take 1 capsule (200 mg) by mouth 2 times a day., Disp: 60 capsule, Rfl: 2    PreviDent 5000 Sensitive 1.1-5 % paste, , Disp: , Rfl:     tacrolimus (Protopic) 0.1 % ointment, if needed., Disp: , Rfl:     tiZANidine (Zanaflex) 4 mg tablet, TAKE ONE TABLET BY MOUTH DAILY, Disp: 90 tablet, Rfl: 0    UltiCare Safety Syringe 3 mL 22 gauge x 1\" syringe, , Disp: , Rfl:     Vtama 1 % cream, if needed., Disp: , Rfl:     ARIPiprazole (Abilify) 5 mg tablet, Take 1 tablet (5 mg) by mouth once daily. For the first 2 weeks take 2.5 mg daily, Disp: 30 tablet, Rfl: 2    ergocalciferol (Vitamin D-2) 1.25 MG (53938 UT) capsule, Take 1 capsule (1,250 mcg) by mouth 1 (one) time per week., Disp: 4 capsule, Rfl: 11    miscellaneous medical supply misc, Bilateral AFO. Apply to bilateral lower legs daily. (Patient not taking: Reported on 4/7/2025), Disp: 1 each, Rfl: 0     Medical History:  Past Medical History:   Diagnosis Date    ALEXEY positive     Cardiology " "follow-up encounter 2024    evaluation of bradycardia and fatigue Stress test recommended; however, patient is unable to perform s/t neurological dysfunction    Chronic pain syndrome     Depression     PIERRE (dyspnea on exertion)     Encounter for hematology follow-up 2024    Ursula Salinas PA-C    Fibromyalgia     GERD (gastroesophageal reflux disease)     H/O echocardiogram 2023    CONCLUSIONS:   1. Left ventricular systolic function is normal with a 65% estimated ejection fraction.    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     s/p C6-C7 Cervical Spine Arthroplasty ~ Anterior Approach 24    History of ITP     s/p D & C Hysteroscopy 24    Hx of idiopathic thrombocytopenic purpura     follows with heme, 6.13.24: plt 111    Hypothyroidism     [place] (iron deficiency anemia)     24 H&H WNL- IV iron   Started on oral iron but can't tolerate due to GI upset   Oncology Ursula Salinas PA-C 2024    Inflammatory bowel disease     Insomnia     Menorrhagia with regular cycle     Plan: Robot Assisted Laparoscopic Total Hysterectomy; Possible Removal of Tubes & or  Ovaries; Cystoscopy 25    Neurology follow-up encounter 10/15/2024    Paul Holt MD \"It remains unclear whether you have MS or not. I will repeat your brain MRI again in 2025, which together with the eye test can help in determining whether you have MS or not. If the picture remains unclear, we may need to schedule you for a spinal tap.\"    Neuropathy     Palpitation     Psoriasis     Radiculopathy, cervical     Radiculopathy, lumbar region     RLS (restless legs syndrome)     Sleep apnea     Ulcerative colitis      Surgical History:  Past Surgical History:   Procedure Laterality Date    CERVICAL SPINE SURGERY  2024    C6-C7 Cervical Spine Arthroplasty ~ Anterior Approach     SECTION, LOW TRANSVERSE      x 2, . 2009    COLONOSCOPY      DILATION AND CURETTAGE OF UTERUS   "    HYSTEROSCOPY  2024    D & C Hysteroscopy    UPPER GASTROINTESTINAL ENDOSCOPY       Family History:  Family History   Problem Relation Name Age of Onset    Colon cancer Mother Shari     COPD Mother Shari     Hyperlipidemia Father Archuleta     Prostate cancer Father Archuleta     Hyperlipidemia Sister      Hypothyroidism Sister      Diverticulitis Sister      No Known Problems Sister      No Known Problems Brother      Colon cancer Mother's Brother      Colon cancer Mother's Brother Gary     Pancreatic cancer Mother's Brother Gary     Breast cancer Father's Sister Na      Social History:  Social History     Socioeconomic History    Marital status:      Spouse name: Not on file    Number of children: Not on file    Years of education: Not on file    Highest education level: Not on file   Occupational History    Not on file   Tobacco Use    Smoking status: Former     Current packs/day: 0.00     Average packs/day: 1 pack/day for 12.5 years (12.5 ttl pk-yrs)     Types: Cigarettes     Start date: 2004     Quit date:      Years since quittin.2    Smokeless tobacco: Never   Vaping Use    Vaping status: Never Used   Substance and Sexual Activity    Alcohol use: Not Currently     Comment: on rare occasions    Drug use: Never    Sexual activity: Yes     Partners: Male     Birth control/protection: Male Sterilization     Comment: Pt stopped her Norethindrone about one week ago- does not desire to be on birth control pills any longer   Other Topics Concern    Not on file   Social History Narrative    Not on file     Social Drivers of Health     Financial Resource Strain: Not on file   Food Insecurity: No Food Insecurity (2024)    Hunger Vital Sign     Worried About Running Out of Food in the Last Year: Never true     Ran Out of Food in the Last Year: Never true   Transportation Needs: Not on file   Physical Activity: Not on file   Stress: No Stress Concern Present (2025)    [place] of  "Occupational Health - Occupational Stress Questionnaire     Feeling of Stress : Not at all   Social Connections: Not on file   Intimate Partner Violence: Not At Risk (2/21/2025)    Humiliation, Afraid, Rape, and Kick questionnaire     Fear of Current or Ex-Partner: No     Emotionally Abused: No     Physically Abused: No     Sexually Abused: No   Housing Stability: Not on file     Vitals:  There were no vitals filed for this visit.  Allergies:  Allergies   Allergen Reactions    Balsalazide Other     Mouth ulcers    Iron GI Upset    Mesalamine Rash       -PCP: Deena Thibodeaux, DO  -Pt reports currently is not pregnant. Has hysterectomy  -TBI/head trauma/LOC/seizure hx: Denies    Objective   Appearance: Well groomed    Attitude: Cooperative, and engaged in conversation.    Behavior: Appropriate eye contact.     Motor Activity: No psychomotor agitation or retardation. No abnormal movements, tremors or tics. No evidence of extrapyramidal symptoms or tardive dyskinesia.    Speech: Regular rate, rhythm, volume. Spontaneous, no pressured speech.    Mood:  \" I have been okay, the Wellbutrin helped but made me grumpy. I don't want to be a bitch. \"    Affect: Full range, mood congruent.    Thought Process: Linear, logical, and goal-directed. No loose associations or gross thought disorganization.    Thought Content: Denied current suicidal ideation or thoughts of harm to self, denied homicidal ideation or thoughts of harm to others. No delusional thinking elicited. No perseverations or obsessions identified.     Perception: Did not endorse auditory or visual hallucinations, did not appear to be responding to hallucinatory stimuli.     Cognition: Alert, oriented x3. Preserved attention span and concentration, recent and remote memory. Adequate fund of knowledge. No deficits in language.     Insight: Fair, in regards to understanding mental health condition    Judgement: Fair        Physical Exam      -Ally was seen " today for anxiety, follow-up, med management and irritability.  Diagnoses and all orders for this visit:  Depression, major, recurrent, moderate  Comments:  Continue escitalopram.  Start bupropion  mg daily.  Consider switching escitalopram to duloxetine if persistent pain persistent  Orders:  -     Follow Up In Psychiatry  -     Discontinue: ARIPiprazole (Abilify) 5 mg tablet; Take 1 tablet (5 mg) by mouth once daily.  -     Discontinue: ARIPiprazole (Abilify) 5 mg tablet; Take 1 tablet (5 mg) by mouth once daily.  -     ARIPiprazole (Abilify) 5 mg tablet; Take 1 tablet (5 mg) by mouth once daily. For the first 2 weeks take 2.5 mg daily  -     Follow Up In Psychiatry; Future  CHRISTINA (generalized anxiety disorder)  -     Follow Up In Psychiatry  -     Discontinue: ARIPiprazole (Abilify) 5 mg tablet; Take 1 tablet (5 mg) by mouth once daily.  -     Discontinue: ARIPiprazole (Abilify) 5 mg tablet; Take 1 tablet (5 mg) by mouth once daily.  -     ARIPiprazole (Abilify) 5 mg tablet; Take 1 tablet (5 mg) by mouth once daily. For the first 2 weeks take 2.5 mg daily  -     Follow Up In Psychiatry; Future         MEDICAL-DECISION MAKING    -Patient educated and verbalized understanding on benefits, risks, and side effects of Abilify.     Psych med regimen as follows:  -STOP Wellbutrin due to increased irritability onset  - START Abilify 5 mg daily for irritability and low motivation  -START Psychotherapy  -CONTINUE exercising and eating heathy diet  -CONTINUE current psychiatric medications as prescribed : Lexapro 20 mg daily and Hydroxyzine 10-20 nightly for sleep  -Safety plan reviewed  -READ attached information about the prescribed new medication  -CALL OFFICE IN CASE OF SIDE EFFECT TO SCHEDULE A VISIT FOR ASSESSMENT -[phone]    SI/HI ASSESSMENT  -Patient denied current suicidal ideation or thoughts of harm to self, denied homicidal ideation or thoughts of harm to others. No delusional thinking elicited. No  perseverations or obsessions identified.     PLAN  -Continue Medications as directed.  -Follow-up with this provider in 8 weeks.  -Risks/benefits/assessment of medication interventions discussed with pt; pt agreeable to plan. Will continue to monitor for symptoms mgmt and SEs and adjust plan as needed.  -MI to increase coping skills/behavior regulation.  -Safety plan reviewed.  -Call  Psychiatry at [phone] with issues.  -For [facility], [employer] is a 24/7 hotline you can call for assistance at [phone] [phone]. Please call 911 or go to your closest Emergency Room if you feel worse. This includes thoughts of hurting yourself or anyone else, or having other troubles such as hearing voices, seeing visions, or having new and scary thoughts about the people around you.    OARRS:  DEANA Jacinto on 4/14/2025 10:09 AM  I have personally reviewed the OARRS report for Ally Carpio. I have considered the risks of abuse, dependence, addiction and diversion  Medication is felt to be clinically appropriate based on documented diagnosis.      DEANA Jacinto  Record Review: extensive   CALL OFFICE IN CASE OF SIDE EFFECT TO SCHEDULE A VISIT FOR ASSESSMENT -[phone]  Follow up:   June 23rd at 10 am virtually  Time Spent:  Prep:   Direct time: 34 minutes  Documentation: 4  minutes  Total: 38 minutes

## 2025-04-15 ENCOUNTER — HOSPITAL ENCOUNTER (OUTPATIENT)
Facility: CLINIC | Age: 45
Setting detail: OUTPATIENT SURGERY
Discharge: HOME | End: 2025-04-15
Payer: COMMERCIAL

## 2025-04-15 ENCOUNTER — ANESTHESIA (OUTPATIENT)
Dept: OPERATING ROOM | Facility: CLINIC | Age: 45
End: 2025-04-15
Payer: COMMERCIAL

## 2025-04-15 VITALS
HEART RATE: 70 BPM | OXYGEN SATURATION: 98 % | DIASTOLIC BLOOD PRESSURE: 75 MMHG | TEMPERATURE: 97.2 F | RESPIRATION RATE: 16 BRPM | SYSTOLIC BLOOD PRESSURE: 122 MMHG

## 2025-04-15 DIAGNOSIS — J34.2 DEVIATED NASAL SEPTUM: Primary | ICD-10-CM

## 2025-04-15 DIAGNOSIS — J34.3 HYPERTROPHY OF NASAL TURBINATES: ICD-10-CM

## 2025-04-15 PROCEDURE — 3600000008 HC OR TIME - EACH INCREMENTAL 1 MINUTE - PROCEDURE LEVEL THREE

## 2025-04-15 PROCEDURE — 7100000002 HC RECOVERY ROOM TIME - EACH INCREMENTAL 1 MINUTE

## 2025-04-15 PROCEDURE — 3700000002 HC GENERAL ANESTHESIA TIME - EACH INCREMENTAL 1 MINUTE

## 2025-04-15 PROCEDURE — 7100000010 HC PHASE TWO TIME - EACH INCREMENTAL 1 MINUTE

## 2025-04-15 PROCEDURE — 2500000005 HC RX 250 GENERAL PHARMACY W/O HCPCS: Performed by: ANESTHESIOLOGY

## 2025-04-15 PROCEDURE — 2500000004 HC RX 250 GENERAL PHARMACY W/ HCPCS (ALT 636 FOR OP/ED): Performed by: NURSE ANESTHETIST, CERTIFIED REGISTERED

## 2025-04-15 PROCEDURE — A30520 PR REPAIR OF NASAL SEPTUM: Performed by: ANESTHESIOLOGY

## 2025-04-15 PROCEDURE — 2720000007 HC OR 272 NO HCPCS

## 2025-04-15 PROCEDURE — 3600000003 HC OR TIME - INITIAL BASE CHARGE - PROCEDURE LEVEL THREE

## 2025-04-15 PROCEDURE — A30520 PR REPAIR OF NASAL SEPTUM: Performed by: NURSE ANESTHETIST, CERTIFIED REGISTERED

## 2025-04-15 PROCEDURE — 30140 RESECT INFERIOR TURBINATE: CPT

## 2025-04-15 PROCEDURE — 3700000001 HC GENERAL ANESTHESIA TIME - INITIAL BASE CHARGE

## 2025-04-15 PROCEDURE — 2500000005 HC RX 250 GENERAL PHARMACY W/O HCPCS

## 2025-04-15 PROCEDURE — 2500000005 HC RX 250 GENERAL PHARMACY W/O HCPCS: Performed by: NURSE ANESTHETIST, CERTIFIED REGISTERED

## 2025-04-15 PROCEDURE — 2500000001 HC RX 250 WO HCPCS SELF ADMINISTERED DRUGS (ALT 637 FOR MEDICARE OP): Performed by: ANESTHESIOLOGY

## 2025-04-15 PROCEDURE — 7100000001 HC RECOVERY ROOM TIME - INITIAL BASE CHARGE

## 2025-04-15 PROCEDURE — 2500000001 HC RX 250 WO HCPCS SELF ADMINISTERED DRUGS (ALT 637 FOR MEDICARE OP)

## 2025-04-15 PROCEDURE — 30930 THER FX NASAL INF TURBINATE: CPT

## 2025-04-15 PROCEDURE — 7100000009 HC PHASE TWO TIME - INITIAL BASE CHARGE

## 2025-04-15 PROCEDURE — 2500000004 HC RX 250 GENERAL PHARMACY W/ HCPCS (ALT 636 FOR OP/ED)

## 2025-04-15 PROCEDURE — 30520 REPAIR OF NASAL SEPTUM: CPT

## 2025-04-15 PROCEDURE — 2500000002 HC RX 250 W HCPCS SELF ADMINISTERED DRUGS (ALT 637 FOR MEDICARE OP, ALT 636 FOR OP/ED): Performed by: ANESTHESIOLOGY

## 2025-04-15 RX ORDER — ACETAMINOPHEN 500 MG
1000 TABLET ORAL EVERY 6 HOURS PRN
Qty: 90 TABLET | Refills: 0 | Status: SHIPPED | OUTPATIENT
Start: 2025-04-15 | End: 2025-04-30

## 2025-04-15 RX ORDER — SODIUM CHLORIDE 0.9 G/100ML
INJECTION, SOLUTION IRRIGATION AS NEEDED
Status: DISCONTINUED | OUTPATIENT
Start: 2025-04-15 | End: 2025-04-15 | Stop reason: HOSPADM

## 2025-04-15 RX ORDER — PROPOFOL 10 MG/ML
INJECTION, EMULSION INTRAVENOUS AS NEEDED
Status: DISCONTINUED | OUTPATIENT
Start: 2025-04-15 | End: 2025-04-15

## 2025-04-15 RX ORDER — LIDOCAINE HYDROCHLORIDE 10 MG/ML
0.1 INJECTION, SOLUTION EPIDURAL; INFILTRATION; INTRACAUDAL; PERINEURAL ONCE
Status: DISCONTINUED | OUTPATIENT
Start: 2025-04-15 | End: 2025-04-15 | Stop reason: HOSPADM

## 2025-04-15 RX ORDER — ACETAMINOPHEN 325 MG/1
650 TABLET ORAL EVERY 4 HOURS PRN
Status: DISCONTINUED | OUTPATIENT
Start: 2025-04-15 | End: 2025-04-15 | Stop reason: HOSPADM

## 2025-04-15 RX ORDER — OXYMETAZOLINE HCL 0.05 %
SPRAY, NON-AEROSOL (ML) NASAL AS NEEDED
Status: DISCONTINUED | OUTPATIENT
Start: 2025-04-15 | End: 2025-04-15 | Stop reason: HOSPADM

## 2025-04-15 RX ORDER — FENTANYL CITRATE 50 UG/ML
INJECTION, SOLUTION INTRAMUSCULAR; INTRAVENOUS AS NEEDED
Status: DISCONTINUED | OUTPATIENT
Start: 2025-04-15 | End: 2025-04-15

## 2025-04-15 RX ORDER — SODIUM CHLORIDE 0.65 %
2 AEROSOL, SPRAY (ML) NASAL EVERY 4 HOURS
Qty: 44 ML | Refills: 1 | Status: SHIPPED | OUTPATIENT
Start: 2025-04-15 | End: 2025-04-30

## 2025-04-15 RX ORDER — METOCLOPRAMIDE HYDROCHLORIDE 5 MG/ML
10 INJECTION INTRAMUSCULAR; INTRAVENOUS ONCE AS NEEDED
Status: DISCONTINUED | OUTPATIENT
Start: 2025-04-15 | End: 2025-04-15 | Stop reason: HOSPADM

## 2025-04-15 RX ORDER — ONDANSETRON HYDROCHLORIDE 2 MG/ML
4 INJECTION, SOLUTION INTRAVENOUS ONCE AS NEEDED
Status: DISCONTINUED | OUTPATIENT
Start: 2025-04-15 | End: 2025-04-15 | Stop reason: HOSPADM

## 2025-04-15 RX ORDER — CEFAZOLIN 1 G/1
INJECTION, POWDER, FOR SOLUTION INTRAVENOUS AS NEEDED
Status: DISCONTINUED | OUTPATIENT
Start: 2025-04-15 | End: 2025-04-15

## 2025-04-15 RX ORDER — OXYCODONE HYDROCHLORIDE 5 MG/1
5 TABLET ORAL EVERY 4 HOURS PRN
Status: DISCONTINUED | OUTPATIENT
Start: 2025-04-15 | End: 2025-04-15 | Stop reason: HOSPADM

## 2025-04-15 RX ORDER — APREPITANT 40 MG/1
40 CAPSULE ORAL ONCE
Status: COMPLETED | OUTPATIENT
Start: 2025-04-15 | End: 2025-04-15

## 2025-04-15 RX ORDER — SODIUM CHLORIDE, SODIUM LACTATE, POTASSIUM CHLORIDE, CALCIUM CHLORIDE 600; 310; 30; 20 MG/100ML; MG/100ML; MG/100ML; MG/100ML
100 INJECTION, SOLUTION INTRAVENOUS CONTINUOUS
Status: SHIPPED | OUTPATIENT
Start: 2025-04-15 | End: 2025-04-15

## 2025-04-15 RX ORDER — LIDOCAINE HCL/PF 100 MG/5ML
SYRINGE (ML) INTRAVENOUS AS NEEDED
Status: DISCONTINUED | OUTPATIENT
Start: 2025-04-15 | End: 2025-04-15

## 2025-04-15 RX ORDER — MIDAZOLAM HYDROCHLORIDE 1 MG/ML
INJECTION, SOLUTION INTRAMUSCULAR; INTRAVENOUS AS NEEDED
Status: DISCONTINUED | OUTPATIENT
Start: 2025-04-15 | End: 2025-04-15

## 2025-04-15 RX ORDER — ONDANSETRON HYDROCHLORIDE 2 MG/ML
INJECTION, SOLUTION INTRAVENOUS AS NEEDED
Status: DISCONTINUED | OUTPATIENT
Start: 2025-04-15 | End: 2025-04-15

## 2025-04-15 RX ORDER — SODIUM CHLORIDE, SODIUM LACTATE, POTASSIUM CHLORIDE, CALCIUM CHLORIDE 600; 310; 30; 20 MG/100ML; MG/100ML; MG/100ML; MG/100ML
INJECTION, SOLUTION INTRAVENOUS CONTINUOUS PRN
Status: DISCONTINUED | OUTPATIENT
Start: 2025-04-15 | End: 2025-04-15

## 2025-04-15 RX ORDER — OXYCODONE HYDROCHLORIDE 5 MG/1
10 TABLET ORAL EVERY 4 HOURS PRN
Status: DISCONTINUED | OUTPATIENT
Start: 2025-04-15 | End: 2025-04-15 | Stop reason: HOSPADM

## 2025-04-15 RX ORDER — OXYMETAZOLINE HCL 0.05 %
2 SPRAY, NON-AEROSOL (ML) NASAL EVERY 12 HOURS PRN
Qty: 15 ML | Refills: 0 | Status: SHIPPED | OUTPATIENT
Start: 2025-04-15 | End: 2025-04-18

## 2025-04-15 RX ORDER — AMOXICILLIN 250 MG
1 CAPSULE ORAL DAILY
Qty: 10 TABLET | Refills: 0 | Status: SHIPPED | OUTPATIENT
Start: 2025-04-15 | End: 2025-04-25

## 2025-04-15 RX ORDER — CEPHALEXIN 500 MG/1
500 CAPSULE ORAL 2 TIMES DAILY
Qty: 20 CAPSULE | Refills: 0 | Status: SHIPPED | OUTPATIENT
Start: 2025-04-15 | End: 2025-04-25

## 2025-04-15 RX ORDER — NORETHINDRONE AND ETHINYL ESTRADIOL 0.5-0.035
KIT ORAL AS NEEDED
Status: DISCONTINUED | OUTPATIENT
Start: 2025-04-15 | End: 2025-04-15

## 2025-04-15 RX ORDER — ROCURONIUM BROMIDE 10 MG/ML
INJECTION, SOLUTION INTRAVENOUS AS NEEDED
Status: DISCONTINUED | OUTPATIENT
Start: 2025-04-15 | End: 2025-04-15

## 2025-04-15 RX ORDER — TRAMADOL HYDROCHLORIDE 50 MG/1
50 TABLET ORAL EVERY 6 HOURS PRN
Qty: 20 TABLET | Refills: 0 | Status: SHIPPED | OUTPATIENT
Start: 2025-04-15 | End: 2025-04-20

## 2025-04-15 RX ORDER — FENTANYL CITRATE 50 UG/ML
25 INJECTION, SOLUTION INTRAMUSCULAR; INTRAVENOUS EVERY 5 MIN PRN
Status: DISCONTINUED | OUTPATIENT
Start: 2025-04-15 | End: 2025-04-15 | Stop reason: HOSPADM

## 2025-04-15 RX ORDER — FENTANYL CITRATE 50 UG/ML
50 INJECTION, SOLUTION INTRAMUSCULAR; INTRAVENOUS EVERY 5 MIN PRN
Status: DISCONTINUED | OUTPATIENT
Start: 2025-04-15 | End: 2025-04-15 | Stop reason: HOSPADM

## 2025-04-15 RX ORDER — LIDOCAINE HYDROCHLORIDE AND EPINEPHRINE 10; 10 UG/ML; MG/ML
INJECTION, SOLUTION INFILTRATION; PERINEURAL AS NEEDED
Status: DISCONTINUED | OUTPATIENT
Start: 2025-04-15 | End: 2025-04-15 | Stop reason: HOSPADM

## 2025-04-15 RX ADMIN — PROPOFOL 200 MG: 10 INJECTION, EMULSION INTRAVENOUS at 11:50

## 2025-04-15 RX ADMIN — Medication 8 L/MIN: at 13:05

## 2025-04-15 RX ADMIN — MIDAZOLAM 2 MG: 1 INJECTION INTRAMUSCULAR; INTRAVENOUS at 11:45

## 2025-04-15 RX ADMIN — SUGAMMADEX 200 MG: 100 INJECTION, SOLUTION INTRAVENOUS at 12:57

## 2025-04-15 RX ADMIN — ROCURONIUM BROMIDE 50 MG: 10 SOLUTION INTRAVENOUS at 11:50

## 2025-04-15 RX ADMIN — CEFAZOLIN 2 G: 1 INJECTION, POWDER, FOR SOLUTION INTRAMUSCULAR; INTRAVENOUS at 12:05

## 2025-04-15 RX ADMIN — OXYCODONE HYDROCHLORIDE 5 MG: 5 TABLET ORAL at 13:29

## 2025-04-15 RX ADMIN — ONDANSETRON 4 MG: 2 INJECTION, SOLUTION INTRAMUSCULAR; INTRAVENOUS at 12:43

## 2025-04-15 RX ADMIN — FENTANYL CITRATE 100 MCG: 50 INJECTION, SOLUTION INTRAMUSCULAR; INTRAVENOUS at 11:50

## 2025-04-15 RX ADMIN — SODIUM CHLORIDE, POTASSIUM CHLORIDE, SODIUM LACTATE AND CALCIUM CHLORIDE: 600; 310; 30; 20 INJECTION, SOLUTION INTRAVENOUS at 11:45

## 2025-04-15 RX ADMIN — LIDOCAINE HYDROCHLORIDE 80 MG: 20 INJECTION INTRAVENOUS at 11:50

## 2025-04-15 RX ADMIN — APREPITANT 40 MG: 40 CAPSULE ORAL at 10:34

## 2025-04-15 RX ADMIN — EPHEDRINE SULFATE 10 MG: 50 INJECTION, SOLUTION INTRAVENOUS at 12:01

## 2025-04-15 SDOH — HEALTH STABILITY: MENTAL HEALTH: CURRENT SMOKER: 0

## 2025-04-15 ASSESSMENT — PAIN - FUNCTIONAL ASSESSMENT
PAIN_FUNCTIONAL_ASSESSMENT: 0-10

## 2025-04-15 ASSESSMENT — PAIN SCALES - GENERAL
PAINLEVEL_OUTOF10: 0 - NO PAIN
PAIN_LEVEL: 4
PAINLEVEL_OUTOF10: 3
PAINLEVEL_OUTOF10: 4

## 2025-04-15 NOTE — OP NOTE
SEPTOPLASTY, NOSE, EXCISION, NASAL TURBINATE (B), REDUCTION, NASAL TURBINATE (B) Operative Note     Date: 4/15/2025  OR Location: [facility] OR    Name: Ally Carpio, : 1980, Age: 44 y.o., MRN: [id number], Sex: female    Diagnosis  Pre-op Diagnosis      * Deviated nasal septum [J34.2]     * Hypertrophy of nasal turbinates [J34.3] * No Diagnosis Codes entered *     Procedures  * No procedures documented on diagnosis form *    Surgeons      * Soo Diaz - Primary    Resident/Fellow/Other Assistant:  Surgeons and Role:  * No surgeons found with a matching role *    Staff:       Anesthesia Staff: Anesthesiologist: (Unknown)    Procedure Summary  Anesthesia: Anesthesia type not filed in the log.  ASA: ASA status not filed in the log.  Estimated Blood Loss: 5 mL  Intra-op Medications: Administrations occurring from 1115 to 1245 on 04/15/25:  * No intraprocedure medications in log *      Specimen: No specimens collected     Drains and/or Catheters: * None in log *      Findings:    Right sided caudal septal deviation and septal floor spur.     Indications: Ally Carpio is an 44 y.o. female who presents with a history of @DX@. Therefore, the aforementioned procedures were indicated. The patient was seen in the preoperative area. The risks, benefits, complications, treatment options, non-operative alternatives, expected recovery and outcomes were discussed with the patient. The possibilities of reaction to medication, pulmonary aspiration, injury to surrounding structures, bleeding, infection, the need for additional procedures, failure to diagnose a condition, and creating a complication requiring transfusion or operation were discussed with the patient. The patient / family concurred with the proposed plan, giving informed consent.  The site of surgery was properly noted/marked if necessary per policy. The patient has been actively warmed in preoperative area.     Procedure in Detail:   The  patient was met in the preoperative area. The details of the listed procedure were discussed in detail including the risks, benefits, and alternatives. All questions were answered and the patient expressed understanding. Written informed consent was obtained from the appropriate party. The patient was brought back to the operating room by anesthesia and transferred to the operating table in the supine position. A preoperative timeout was conducted by the attending physician with all members of the operative team present. 1% lidocaine with 1:100,000 parts epinephrine was injected into the nasal septum for anesthetic and vasoconstrictive effects.      At this time, the patient was prepped and draped in standard fashion.     Bilateral anterior rhinoscopy was performed  which revealed a Right sided septal deviation and septal floor spur.     Attention was then turned to the septoplasty. Left-sided hemitransfixion incision was made and elevation continued in a submucoperiosteal fashion. The decussating fibers inferiorly were released as elevation continued along the floor and septal floor spur. The mucoperichondrial flap was elevated and the spur was isolated and resected using an assortment of true cutting instrumentation, while taking care to preserve the caudal and dorsal 1 cm strut.       Following this, the bilateral inferior turbinates were reduced. 1% lidocaine with 1:100,000 parts epinephrine was injected into the axilla of the inferior turbinates bilaterally for anesthetic and vasoconstrictive effects.  First, a stab incision was made in the axilla of right inferior turbinate using a #9 elevator. We then elevated submucosal flap along the lateral aspect of the inferior turbinate using a [place]. Next, a microdebrider was used to resect the lateral and inferior aspect of the inferior turbinate in a submucosal fashion. This was then followed by outfracturing the right inferior turbinate. This was then replicated on  the left side. Again meticulous hemostasis was obtained bilaterally with the suction Bovie, and was confirmed with a Valsalva.     Next the septum was closed. The hemitransfixion incision was closed in interrupted fashion using 3-0 chromic suture. Following this Gamino splints were placed which were secured with a 2-0 prolene suture on a Kit needle.      This concluded our procedure. The patient tolerated the procedure well without any complications. The patient was turned over to Anesthesia, extubated, and transferred to the PACU in stable condition. Please note that all counts were correct times two at the conclusion of the case.    Complications:  None; patient tolerated the procedure well.    Disposition: PACU - hemodynamically stable.  Condition: stable             Attending Attestation: I was present and scrubbed for the entire procedure.    Soo Diaz  Phone Number: [phone]

## 2025-04-15 NOTE — ANESTHESIA POSTPROCEDURE EVALUATION
Patient: Ally Carpio    Procedure Summary       Date: 04/15/25 Room / Location: [facility] OR 01 / Virtual [facility] OR    Anesthesia Start: 1145 Anesthesia Stop: 1308    Procedures:       SEPTOPLASTY, NOSE (Nose)      EXCISION, NASAL TURBINATE (Bilateral: Nose)      REDUCTION, NASAL TURBINATE (Bilateral: Nose) Diagnosis:       Deviated nasal septum      Hypertrophy of nasal turbinates      (Deviated nasal septum [J34.2])      (Hypertrophy of nasal turbinates [J34.3])    Surgeons: Soo Diaz MD Responsible Provider: Cosme Pennington MD    Anesthesia Type: general ASA Status: 2            Anesthesia Type: general    Vitals Value Taken Time   /76 04/15/25 1329   Temp 36.6 °C (97.9 °F) 04/15/25 1305   Pulse 82 04/15/25 1329   Resp 16 04/15/25 1329   SpO2 99 % 04/15/25 1329       Anesthesia Post Evaluation    Patient location during evaluation: PACU  Patient participation: complete - patient participated  Level of consciousness: awake and alert  Pain score: 4  Pain management: adequate  Airway patency: patent  Cardiovascular status: acceptable  Respiratory status: acceptable  Hydration status: acceptable  Postoperative Nausea and Vomiting: none    No notable events documented.

## 2025-04-15 NOTE — LETTER
April 15, 2025     Patient: Ally Carpio   YOB: 1980   Date of Visit: 4/15/2025       To Whom It May Concern:    Ally Carpio was seen at [facility] on 4/15/2025.  Please excuse Alyl for her absence from physical therapy on this day to make the appointment.    Per Dr. Darnell, pt may return to physical therapy in a few days if feeling up to it, otherwise in a week.    If you have any questions or concerns, please don't hesitate to call.         Sincerely,         Davida Jackson RN        CC: Dr. Darnell

## 2025-04-15 NOTE — DISCHARGE INSTRUCTIONS
[facility]        Home Going Instructions after Septoplasty/ turbinate reduction    Activity:   Resume normal activities of daily living, as you feel able.  However, avoid strenuous activity and heavy lifting (more than 10 lbs) for 14-21 days after surgery.  Light activity such as walking may be resumed after 1 week after surgery.  Sport activities may be resumed 1 month after surgery but try to protect your nose as it is still healing.  Return to work will be discussed after your post-op appointment.  You may have light bleeding or spotting for several days after septoplasty .  Shower: You may shower 24 hours after surgery.  Do not let the shower spray hit your face/nose directly and do not soak your face in water.  If you have a cast or dressing on the outside of the nose, try to keep it as dry as possible.  Towel blot your nose/cast after your shower.      Eating/drinking:  Drink small amounts of liquids initially and then slowly increase your intake of food. Drinking fluids will keep your bowels regular.   Avoid foods that are spicy or hard to digest today.  You may take a stool softener or miralax/milk of magnesia to help with constipation that may occur after anesthesia.  Please make sure a responsible adult is with you for at least 24 hours after surgery and do not drive or make important decisions during this time. Anesthesia may affect your judgment, coordination, and reaction time.    Wound Care:  Head Elevation: Keep your head elevated (the height of 2 pillows is appropriate) for 3 days to help with swelling.   Ice: Apply cold compresses to the cheeks and forehead, up to 20 minutes of each hour while awake after surgery, for the first 48 hours.  This will help reduce swelling and bruising.   Nasal Splints:  If you have splints inside the nose that are sutured into place, they will be removed at your follow-up appointment.  Nasal Care: Use nasal saline spray, 4  sprays to each nostril every 2-3 hours while awake.  This will help keep the nasal passages moist and prevent scabbing in the nose.  It is normal to feel congested for several weeks after surgery.  Do not blow your nose for two weeks after surgery.      Bleeding:  It is normal to experience some bleeding from the mouth and nose for the first 7 to 10 days after your surgery. This should not however, be profuse, excessive.  Applying ice over the nose and face will usually help stop the bleeding. You may also squirt afrin to both nose to help stop bleeding.   Most patients have mild, active bleeding the first night.  Some blood-tinged drainage is normal for 1-2 weeks after surgery.  Afrin nasal spray may be helpful for bleeding after surgery but should not be used for more than 3 days.  Excessive bleeding that does not stop is not expected; please call the office or seek medical attention if this occurs.    Pain:  As discussed this is a painful post-op. Please stay on top of pain medication for the initial 5-7 days. As you feel pain is not that significant try to tamper of stronger pain medication (oxycodone) and relay more on tylenol.     Trouble Breathing  If you have trouble breathing through your nose, spray Afrin once in each nostril for decongestion. Make sure to use nasal saline (2 sprays in each nostril every 3 hours) to remove blood debris.     Follow up:  Follow up with Dr Darnell a few days after your procedure as scheduled to check in and make sure you know what your next steps are.     When to call your provider:  Profuse bleeding that does not taper down.  Fever of 100.4 or higher with chills.    To reach someone during nights or weekends for urgent issues, call [phone] and ask for the “ENT Resident On Call.”        **********NO TRAMADOL BEFORE 7 PM******

## 2025-04-15 NOTE — ANESTHESIA PREPROCEDURE EVALUATION
Patient: Ally Carpio    Procedure Information       Anesthesia Start Date/Time: 04/15/25 1145    Procedures:       SEPTOPLASTY, NOSE - Procedure: septoplasty, bilateral submucosal resection and bilateral tubinate outfracture    Procedure time 1 hr      EXCISION, NASAL TURBINATE (Bilateral)      REDUCTION, NASAL TURBINATE (Bilateral)    Location: [facility] [place] [facility] OR 01 / [facility] [place] [place] [facility] [place]    Surgeons: Soo Diaz MD            Relevant Problems   Cardiac   (+) Mixed hyperlipidemia      Neuro   (+) Depression, major, recurrent, moderate   (+) Radiculopathy, lumbar region      GI   (+) Gastroesophageal reflux disease without esophagitis   (+) Ulcerative colitis      Endocrine   (+) Hypothyroidism      Hematology   (+) Chronic ITP (idiopathic thrombocytopenic purpura) (Multi)   (+) Iron deficiency anemia due to chronic blood loss      Musculoskeletal   (+) Chronic pain syndrome   (+) Fibromyalgia      GYN   (+) Menorrhagia with irregular cycle   (+) Menorrhagia with regular cycle       Clinical information reviewed:   Tobacco  Allergies  Meds  Problems  Med Hx  Surg Hx  OB Status    Fam Hx          NPO Detail:  NPO/Void Status  Date of Last Liquid: 04/15/25  Time of Last Liquid: 0600  Date of Last Solid: 04/14/25  Time of Last Solid: 1900         Physical Exam    Airway  Mallampati: I  TM distance: >3 FB  Neck ROM: full     Cardiovascular - normal exam     Dental - normal exam     Pulmonary - normal exam     Abdominal - normal exam         Anesthesia Plan    History of general anesthesia?: yes  History of complications of general anesthesia?: no    ASA 2     general     The patient is not a current smoker.  Patient was not previously instructed to abstain from smoking on day of procedure.  Patient did not smoke on day of procedure.    intravenous induction   Postoperative administration of opioids is intended.  Anesthetic plan and risks discussed with patient.  Use of blood products discussed  with patient who.    Plan discussed with CRNA.

## 2025-04-15 NOTE — H&P
Otolaryngology - Head and Neck Surgery Pre-Operative History and Physical    History Of Present Illness  Ally Carpio is a 44 y.o. female     Patient has a history of Pre-op Diagnosis      * Deviated nasal septum [J34.2]     * Hypertrophy of nasal turbinates [J34.3]  Presents today for Procedure(s):  SEPTOPLASTY, NOSE  EXCISION, NASAL TURBINATE  REDUCTION, NASAL TURBINATE    Has had no significant changes since last visit.      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    Surgical History  She has a past surgical history that includes  section, low transverse; Colonoscopy; Upper gastrointestinal endoscopy; Dilation and curettage of uterus; Cervical spine surgery (2024); and Hysteroscopy (2024).    Medications  Current Outpatient Medications   Medication Instructions    ARIPiprazole (ABILIFY) 5 mg, oral, Daily, For the first 2 weeks take 2.5 mg daily    clobetasol (Temovate) 0.05 % ointment 2 times daily    cyanocobalamin (VITAMIN B-12) 1,000 mcg, intramuscular, See admin instructions, Please inject daily x 7, followed by Weekly x 4 and then Monthly    ergocalciferol (VITAMIN D-2) 1,250 mcg, oral, Once Weekly    escitalopram (LEXAPRO) 20 mg, oral, Daily    hydrOXYzine HCL (ATARAX) 10-20 mg,  "oral, Nightly PRN    levothyroxine (SYNTHROID, LEVOXYL) 12.5 mcg, oral, Daily    Mag-G 27 mg magnesium (500 mg) tablet 1 tablet, Every 12 hours scheduled (0630,1830)    miscellaneous medical supply misc Bilateral AFO. Apply to bilateral lower legs daily.    multivitamin tablet 1 tablet, Daily    [place] Pen 200 mg, subcutaneous, Every 28 days    pantoprazole (PROTONIX) 40 mg, oral, Daily, Do not crush, chew, or split.    pregabalin (LYRICA) 200 mg, oral, 2 times daily    PreviDent 5000 Sensitive 1.1-5 % paste     tacrolimus (Protopic) 0.1 % ointment if needed.    tiZANidine (Zanaflex) 4 mg tablet TAKE ONE TABLET BY MOUTH DAILY    UltiCare Safety Syringe 3 mL 22 gauge x 1\" syringe     Vtama 1 % cream if needed.         Allergies  Balsalazide, Iron, and Mesalamine    Review of Systems:  A 12-point review of systems was performed and noted be negative except for that which was mentioned in the history of present illness     Last Recorded Vitals  Last menstrual period 12/25/2024.     Physical Exam:  Vitals reviewed  General: Alert, oriented, no acute distress  Resp: Breathing comfortably on room air, no stridor  Head: Atraumatic, normocephalic  Oral Cavity: MMM  Ears: Normal external ears  Nose: External nose midline        Plan:    Patient presenting with Pre-op Diagnosis      * Deviated nasal septum [J34.2]     * Hypertrophy of nasal turbinates [J34.3]    Will proceed to the OR for Procedure(s):  SEPTOPLASTY, NOSE  EXCISION, NASAL TURBINATE  REDUCTION, NASAL TURBINATE      Ashish Felder MD  Dept. of Otolaryngology - Head and Neck Surgery, PGY-3  Personal pager: [phone]  ENT Consults: [id number]  ENT Overnight (5p-6a), and Weekends: [id number]  ENT Head and Neck Surgery Phone: [phone]  ENT Peds: [id number]  ENT Outpatient scheduling number: [phone]       "

## 2025-04-15 NOTE — ANESTHESIA PROCEDURE NOTES
Airway  Date/Time: 4/15/2025 11:52 AM  Urgency: elective      Staffing  Performed: CRNA   Authorized by: Cosme Pennington MD    Performed by: JORDAN Gonzalez-OLVIN  Patient location during procedure: OR    Indications and Patient Condition  Indications for airway management: anesthesia  Spontaneous Ventilation: absent  Sedation level: deep  Preoxygenated: yes  Patient position: sniffing  MILS maintained throughout  Mask difficulty assessment: 1 - vent by mask    Final Airway Details  Final airway type: endotracheal airway      Successful airway: ETT  Cuffed: yes   Successful intubation technique: direct laryngoscopy  Endotracheal tube insertion site: oral  Blade: Deanne  Blade size: #3  ETT size (mm): 7.0  Cormack-Lehane Classification: grade I - full view of glottis  Placement verified by: chest auscultation and capnometry   Measured from: teeth  ETT to teeth (cm): 22  Number of attempts at approach: 1  Ventilation between attempts: none  Number of other approaches attempted: 0

## 2025-04-16 ASSESSMENT — PAIN SCALES - GENERAL: PAINLEVEL_OUTOF10: 0 - NO PAIN

## 2025-04-17 ENCOUNTER — APPOINTMENT (OUTPATIENT)
Dept: PHYSICAL THERAPY | Facility: HOSPITAL | Age: 45
End: 2025-04-17
Payer: COMMERCIAL

## 2025-04-18 ENCOUNTER — APPOINTMENT (OUTPATIENT)
Dept: OTOLARYNGOLOGY | Facility: CLINIC | Age: 45
End: 2025-04-18
Payer: COMMERCIAL

## 2025-04-18 ENCOUNTER — OFFICE VISIT (OUTPATIENT)
Dept: OTOLARYNGOLOGY | Facility: CLINIC | Age: 45
End: 2025-04-18
Payer: COMMERCIAL

## 2025-04-18 VITALS
BODY MASS INDEX: 25.07 KG/M2 | HEIGHT: 66 IN | TEMPERATURE: 98 F | SYSTOLIC BLOOD PRESSURE: 91 MMHG | OXYGEN SATURATION: 99 % | WEIGHT: 156 LBS | HEART RATE: 66 BPM | DIASTOLIC BLOOD PRESSURE: 62 MMHG | RESPIRATION RATE: 16 BRPM

## 2025-04-18 DIAGNOSIS — G47.33 OBSTRUCTIVE SLEEP APNEA: Primary | ICD-10-CM

## 2025-04-18 PROCEDURE — 99214 OFFICE O/P EST MOD 30 MIN: CPT

## 2025-04-18 PROCEDURE — 99024 POSTOP FOLLOW-UP VISIT: CPT

## 2025-04-18 PROCEDURE — 1036F TOBACCO NON-USER: CPT

## 2025-04-18 PROCEDURE — 3008F BODY MASS INDEX DOCD: CPT

## 2025-04-18 ASSESSMENT — ENCOUNTER SYMPTOMS
OCCASIONAL FEELINGS OF UNSTEADINESS: 1
LOSS OF SENSATION IN FEET: 1
DEPRESSION: 0

## 2025-04-21 ENCOUNTER — OFFICE VISIT (OUTPATIENT)
Dept: NEUROLOGY | Facility: HOSPITAL | Age: 45
End: 2025-04-21
Payer: COMMERCIAL

## 2025-04-21 VITALS — DIASTOLIC BLOOD PRESSURE: 76 MMHG | SYSTOLIC BLOOD PRESSURE: 117 MMHG | HEART RATE: 79 BPM

## 2025-04-21 DIAGNOSIS — R93.89 ABNORMAL MRI: ICD-10-CM

## 2025-04-21 DIAGNOSIS — G37.9 CNS DEMYELINATION (MULTI): Primary | ICD-10-CM

## 2025-04-21 DIAGNOSIS — R53.1 WEAKNESS: ICD-10-CM

## 2025-04-21 PROCEDURE — 99215 OFFICE O/P EST HI 40 MIN: CPT | Performed by: PSYCHIATRY & NEUROLOGY

## 2025-04-21 NOTE — PROGRESS NOTES
Subjective     Ally Carpio is a 44 y.o. right handed female with hx of UC recently diagnosed in 1/2024 currently on adalimumab since late April of 2024, psoriasis since 2023 (previously on topical treatment only), lTP since November of 2023 (never required treatment), chronic menorrhagia, iron deficiency anemia, RLS since 2014, prior smoking (quit in 2017 smoked 1 ppd for 12 years before that), and hypothyroidism who is referred by Dr. Mckeon for evaluation.       History of Present Illness   [facility] NEURO IMMUNOLOGY HPI: Date of Onset: 2/2024  Date of Diagnosis: diagnosis not finalized yet  Last MRI Brain: 9/2024  Last MRI Cervical: 9/2024  Last MRI Thoracis: 4/2024  Last OCT/VEP: not done  Last CBC: NA  Disease Course at Onset: RR  Disease Course Now: RR  Current Disease Modifying Therapies: None  Previous Disease Modifying Therapies: prednisone  Cerebrospinal Fluid: not done  Gary [place] Virus: not done  Varicella Zoster Virus: not done  Hep Panel: negative  Neuromyelitis Optica: not done  Myelin Oligodendrocyte Glycoprotein: not done    HPI  Patient was diagnosed with ITP in 2023 based on blood counts but was not started on treatment as she was not symptomatic apart from chronic menorrhagia, which she had for years. Shortly after, she started having skin rash and was diagnosed with psoriasis and started on topical treatment. She experienced painful mouth ulcers. Early 2024, developed GI symptoms and was diagnosed with UC after colonoscopy and positive biopsy. She was given prednisone with improvement of GI symptoms. However, as she was weaning down prednisone, she developed tingling in the whole body including all limbs, trunk, face, and scalp. She also developed a woozy and nauseous feeling. Months after, she started having weakness in the LLE and then the RLE a few months after that. She had transient bladder urgency and incontinence that later resolved on its own. She started having painful spasms in  all limbs and in the trunk on the right side below the costal margin. She also started having word finding difficulty and brain fogginess. Most recently she noticed visual blurriness in both eyes with pain behind the right eye.     She had a brain MRI in March of 2024 that showed a few large demyelinating-like lesions in true periventricular and juxtacortical locations without enhancement. She was referred to the [facility] where she saw Dr. Rustam Roberto who asked her to hold off on adalimumab treatment, which was planned by her GI doctor, and obtained MRI of the C/T spine. Unexpectedly, the spine MRI came back without any clear spinal cord demyelination. She did have a cervical disc causing cervical canal stenosis but without abnormal cord signal. 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 After that, she noticed some improvement in her hand tingling and possibly in urinary symptoms. However, her motor symptoms continued to worsen after the surgery including new RLE weakness. She was cleared for adalimumab after demyelination was felt less likely (brain lesions were thought to be related to dilated perivascular spaces). However, after she started adalimumab for UC in April 2024, she noticed worsening of her neurological symptoms especially the BLE weakness and the tonic spasms. She Saw Dr. Mckeon who repeated brain and cervical MRIs in early Sepetember, which showed stable findings. EMG was normal. He subsequently referred her to the MS clinic.     MS Symptom Review: Weakness: yes left then right leg  Sensory Changes: yes presenting symptom in the whole body   Incoordination: yes both hands  Falling: once  Gait Change: limping  Painful Vision Loss: bilateral R>L visual blurring with pain + floaters in the right eye  Double Vision: No double vision   Vertigo: woozy and nauseous  Facial/Bulvar Weakness: No facial/bulvar weakness  Bladder/Bowel  Dysfunction: yes transient frequency, urgency, and occasional incontinence  Spasticity: No spasticity  Tonic Spasms: yes isometric painful spasms in all limbs and the right part of the trunk below the ribcage  Tremors: No tremors  Restless Leg Syndrome: yes for at least ten years both legs related to iron deficiency   Dystonia: dystonic flexion of the toes and twitching of the right thumb  Other Movement Disorders: twitches allover the body  Heat Sensitivity: heat makes her tingling and nausea worse but makes her muscles feel better  Fatigue: yes  Depression: depressed mood because of all the symptoms  Anxiety: yes but better  Cognitive Changes: word finding difficulty   Disease Modifying Therapies: No Disease modifying therapies   Side Effects: No side effects  Is the patient taking Vitamin D supplements? no  Is the patient taking Symptomatic medications? Pregabalin,        Past Surgical History:   Procedure Laterality Date    CERVICAL SPINE SURGERY  2024    C6-C7 Cervical Spine Arthroplasty ~ Anterior Approach     SECTION, LOW TRANSVERSE      x 2, .     COLONOSCOPY      DILATION AND CURETTAGE OF UTERUS      HYSTEROSCOPY  2024    D & C Hysteroscopy    UPPER GASTROINTESTINAL ENDOSCOPY       Social History     Tobacco Use    Smoking status: Former     Current packs/day: 0.00     Average packs/day: 1 pack/day for 12.5 years (12.5 ttl pk-yrs)     Types: Cigarettes     Start date: 2004     Quit date: 2017     Years since quittin.3    Smokeless tobacco: Never   Substance Use Topics    Alcohol use: Not Currently     Comment: on rare occasions     Allergies   Allergen Reactions    Adhesive Tape-Silicones Rash    Balsalazide Other     Mouth ulcers    Iron GI Upset    Mesalamine Rash     Visit Vitals  /76   Pulse 79   LMP 2024   OB Status Hysterectomy   Smoking Status Former       Objective   Neurological Exam  Mental Status  Awake, alert and oriented to person, place and time.  Recent and remote memory are intact. Speech is normal. Language is fluent with no aphasia. Attention and concentration are normal.    Cranial Nerves  CN II: Visual fields full to confrontation.  CN III, IV, VI: Extraocular movements intact bilaterally.   Right pupil: 2 mm.   Left pupil: 4 mm.  CN V: Facial sensation is normal.  CN VII: Full and symmetric facial movement.  CN VIII: Hearing is normal.  CN IX, X: Palate elevates symmetrically. Normal gag reflex.  CN XI: Shoulder shrug strength is normal.  CN XII: Tongue midline without atrophy or fasciculations.    Motor  Normal muscle bulk throughout. No abnormal involuntary movements.  4/5 BLE most pronounced distally.    Sensory  Right hemisensory level T8.    Reflexes                                            Right                      Left  Brachioradialis                    2+                         2+  Biceps                                 2+                         2+  Triceps                                2+                          Patellar                                3+                         3+  Achilles                                3+                         3+    Right pathological reflexes: Ankle clonus absent.  Left pathological reflexes: Ankle clonus absent.    Coordination  Right: Finger-to-nose normal. Rapid alternating movement normal.Left: Finger-to-nose normal. Rapid alternating movement normal.    Gait    High steppage gait wide-based gait. .    Physical Exam  Eyes:      Extraocular Movements: Extraocular movements intact.   Neurological:      Deep Tendon Reflexes:      Reflex Scores:       Tricep reflexes are 2+ on the right side.       Bicep reflexes are 2+ on the right side and 2+ on the left side.       Brachioradialis reflexes are 2+ on the right side and 2+ on the left side.       Patellar reflexes are 3+ on the right side and 3+ on the left side.       Achilles reflexes are 3+ on the right side and 3+ on the left  side.  Psychiatric:         Speech: Speech normal.         CLINICAL SCORES  Scales and Scores:      Assessment/Plan     This is a 44 y.o. year old right handed female  With a PMH of UC recently diagnosed in 1/2024 currently on adalimumab since late April of 2024, psoriasis since 2023 (previously on topical treatment only), lTP since November of 2023 (never required treatment), chronic menorrhagia, iron deficiency anemia, RLS since 2014, prior smoking (quit in 2017 smoked 1 ppd for 12 years before that), and hypothyroidism who presents with several sequential neurological symptoms since February of 2024 including paraesthesia of all limbs, trunk, and face, LLE then RLE weakness, transient bladder dysfunction, painful isometric tonic spasms in all limbs and truncal (below the right costal margin), wooziness, nausea, brain fogginess, headaches, and most recently bilateral visual blurring with right eye pain. Symptoms started as she was weaning down prednisone for UC. Symptoms improved only partially after cervical decompression for disc prolapse but then continued to worsen especially after she started adalimumab for UC.  The Neurological Exam showed unequal L>R pupils, mild BLE weakness distal > proximal, right hemisensory level T8, and high-steppage wide-based gait.   The MRI brain showed a few non-enhancing large demyelinating-like lesions in periventricular, subcortical, and juxtacortical locations stable in September compared to March 2024. MRI cervical spine without cord lesions in April and September 2024 but did show central disc prolapse with canal stenosis that was released. MRI thoracic spine in April did not show cord lesions either. EMG normal.   Cerebrospinal Fluid not done  OCT/VEP not done  MS Mimics not ruled out  The overall picture is suspicious for a systemic autoimmune disease mimicking ITP, UC, psoriasis, and MS since all four conditions occurred within nearly a one-year-period. She admits to  painful mouth ulcers so Behchet's is on the top of the differential. Also sarcoidosis. Alternatively, she may have multiple coexisting autoimmune disorders. Her neurological presentation is highly suspicious for myelitis (acute onset while tapering down prednisone, MS juanito, tonic spasms, brain demyelinating-like lesion, worsening with humira, etc.) but the scans have been negative. Therefore, causes of MRI negative myelitis should be considered especially MOGAD and less so NMOSD. She may also simply have coexisting MS.   Disease Modifying Therapies: she should stop adalimumab and consider switching to an agent that would work for both UC and MS like zeposia once mimickers are ruled out.     PLAN:  You were evaluated for body tingling, leg weakness, transient bladder dysfunction, frequent spasms, and recent visual blurring and eye pain. Your brain MRI showed demyelinating lesions (similar to what is seen in MS and related disorders) but your spinal cord did not show any lesions. Your symptoms, the findings on brain MRI, and your history of autoimmune disorders are highly suggestive of inflammation of the spinal cord (myelitis) that was not picked up by the MRIs.    We need to rule out MS and its mimickers including conditions that can cause MRI negative myelitis (like MOGAD) and systemic autoimmune disorders that can mimic psoriasis, ulcerative colitis, and MS (like Behcet's disease and sarcoidosis).     I will order STAT MRI of your orbits, thoracic cord, and CT of your chest along with several blood tests to evaluate those mimickers (MOG, AQP4, HALB51, ACE).     I will refer you to PT.     Will prescribe vitamin D 71227 units weekly.     Based on the scan, we can decide whether or not we should give you a trial of intravenous steroids.     In the meantime, please hold off on hyrimoz and discuss zeposia instead with your GI doctor.     Follow up after four weeks.     May need LP and OCT.     Will share the note with  the referring physician via EMR    The impression and plan were discussed with the patient and family (if present) in details and all questions answered.      Paul Holt MD]

## 2025-04-21 NOTE — PATIENT INSTRUCTIONS
Your MRI did not show any new or active lesions, which is great.     Your muscle enzymes and inflammatory markers came back normal arguing against muscle inflammation.     I will order a repeat EMG to focus on your shoulder muscles to see if you might have a non-inflammatory muscle disease.     I will repeat your brain MRI again in September to monitor for MS. I will try to do the OCT eye test on you if the machine is fixed by then.     Please continue to take vitamin D same dose.     Please keep your appointment with orthopedics.     I will refer you to PT.     Follow up in September or October after the MRI.     Thank you for visiting the clinic today    Paul Holt MD

## 2025-04-23 ENCOUNTER — APPOINTMENT (OUTPATIENT)
Dept: INTEGRATIVE MEDICINE | Facility: CLINIC | Age: 45
End: 2025-04-23
Payer: COMMERCIAL

## 2025-04-23 DIAGNOSIS — M54.2 CHRONIC NECK PAIN: Primary | ICD-10-CM

## 2025-04-23 DIAGNOSIS — G89.29 CHRONIC NECK PAIN: Primary | ICD-10-CM

## 2025-04-23 DIAGNOSIS — M54.59 OTHER LOW BACK PAIN: ICD-10-CM

## 2025-04-23 PROCEDURE — 97810 ACUP 1/> WO ESTIM 1ST 15 MIN: CPT | Performed by: ACUPUNCTURIST

## 2025-04-23 PROCEDURE — 97811 ACUP 1/> W/O ESTIM EA ADD 15: CPT | Performed by: ACUPUNCTURIST

## 2025-04-23 NOTE — PROGRESS NOTES
Acupuncture Visit:     Subjective   Patient ID: Ally Carpio is a 44 y.o. female who presents for No chief complaint on file.  2024 Acupuncture Benefits                                    12/27/2024 RA   [place]  Covered Diagnosis: Medical Necessity   8/30 VPCY    States pain relief for 4 days p tx  Recent sinus surgery, 3 day recovery, no complications    prev  R sided neck, shoulder and arm pain   Pain relief for 1-2 days p tx, then pain comes back up   Foot drop in both feet   Energy level picks up p tx as well  Massage 1/week     prev  Less pain in shoulder after tx  Low back has been OK,   Neck tight on right side  Some radiation from shoulder   No radiating into hand,   Left side is better  2-3/10  A little less fatigue as well.        States less intense pain  She got new braces to help her walk which is helping    prev  Notes less pain x 3 days  Increase energy  States R sides pain less intense    prev  R sided neck pain  Low back pain  BL foot drop  *Pt using a cane *notes using a rollator at home     States a little relief after the first tx  Notes unable to flu a week after 1st tx due to needing to stay at home  No menses, hysterectomy, ovaries intact    Initial  R sided neck pain  Dxd with CND demyelination, notes multiple Drs have ruled out MS  States she does have lesions on her brain but not spine.  Notes seeing multiple neurologists  States failed neck surgery summer on 2024  RIGHT sided neck pain more behind ear, not at base of skull  Neck pain constant, always there, dull pain 4/10  Worse at end of the day when she has done a lot  BL arms feel weak, RIGHT more than left arm  Radiating to top of shoulder and to front of the shoulder joint  Both arms feel week but RIGHT arm mostly involved    LBP  Feels achy   RIGHT side pain 80% of the time, 20% Left sided   Radiating to piriformis and to the RIGHT calf mm.  BL foot drop    Other med hx  Ulcerative colitis, partial hysterectomy (hx heavy  bleeding- Fe infusions in the past)            Session Information  Is this acupuncture treatment being billed to the patient's insurance company: Yes  This is visit number: 8  The patient has a total number of visits of: 30  Initial Acupuncture Treatment date: 01/28/25  Name of Insurance Company: 2024:  [place] Limitations (Visits, etc.): 30 VPCY   Covered Diagnosis: Medical Necessity         Review of Systems         Provider reviewed plan for the acupuncture session, precautions and contraindications. Patient/guardian/hospital staff has given consent to treat with full understanding of what to expect during the session. Before acupuncture began, provider explained to the patient to communicate at any time if the procedure was causing discomfort past their tolerance level. Patient agreed to advise acupuncturist. The acupuncturist counseled the patient on the risks of acupuncture treatment including pain, infection, bleeding, and no relief of pain. The patient was positioned comfortably. There was no evidence of infection at the site of needle insertions.    Objective   Physical Exam              Acupuncture Treatment  Body Points - Left: P 6  Body Points - Bilateral: DU20 GB20 GB21 GB 40  BL13 BL14  SI3/BL62 BL18 BL23   Kd 3  TDP Lamp Descripton: upper back/neck + light moxa  Needle Count In: 22  Needle Count Out: 22  Total Face to Face Time (min): 25 minutes              Assessment/Plan     Diagnoses and all orders for this visit:  Chronic neck pain (Primary)  Other low back pain

## 2025-05-01 ENCOUNTER — APPOINTMENT (OUTPATIENT)
Dept: NEUROLOGY | Facility: HOSPITAL | Age: 45
End: 2025-05-01
Payer: COMMERCIAL

## 2025-05-02 ENCOUNTER — APPOINTMENT (OUTPATIENT)
Dept: NEUROLOGY | Facility: HOSPITAL | Age: 45
End: 2025-05-02
Payer: COMMERCIAL

## 2025-05-05 ENCOUNTER — HOSPITAL ENCOUNTER (OUTPATIENT)
Dept: NEUROLOGY | Facility: HOSPITAL | Age: 45
Discharge: HOME | End: 2025-05-05
Payer: COMMERCIAL

## 2025-05-05 DIAGNOSIS — R53.1 WEAKNESS: ICD-10-CM

## 2025-05-05 DIAGNOSIS — G37.9 CNS DEMYELINATION (MULTI): ICD-10-CM

## 2025-05-05 DIAGNOSIS — R93.89 ABNORMAL MRI: ICD-10-CM

## 2025-05-05 PROCEDURE — 95886 MUSC TEST DONE W/N TEST COMP: CPT | Performed by: PSYCHIATRY & NEUROLOGY

## 2025-05-05 PROCEDURE — 95908 NRV CNDJ TST 3-4 STUDIES: CPT | Performed by: PSYCHIATRY & NEUROLOGY

## 2025-05-06 ENCOUNTER — TREATMENT (OUTPATIENT)
Dept: PHYSICAL THERAPY | Facility: HOSPITAL | Age: 45
End: 2025-05-06
Payer: COMMERCIAL

## 2025-05-06 DIAGNOSIS — R53.1 WEAKNESS: Primary | ICD-10-CM

## 2025-05-06 DIAGNOSIS — G37.9 CNS DEMYELINATING DISORDER (MULTI): ICD-10-CM

## 2025-05-06 DIAGNOSIS — R29.898 WEAKNESS OF BOTH SHOULDERS: ICD-10-CM

## 2025-05-06 PROCEDURE — 97110 THERAPEUTIC EXERCISES: CPT | Mod: GP | Performed by: PHYSICAL THERAPIST

## 2025-05-06 ASSESSMENT — BALANCE ASSESSMENTS
STANDING UNSUPPORTED WITH FEET TOGETHER: NEEDS HELP TO ATTAIN POSITION AND UNABLE TO HOLD FOR 15 SECONDS
REACHING FORWARD WITH OUTSTRETCHED ARM WHILE STANDING: CAN REACH FORWARD 5 CM (2 INCHES)
STANDING UNSUPPORTED ONE FOOT IN FRONT: LOSES BALANCE WHILE STEPPING OR STANDING
PLACE ALTERNATE FOOT ON STEP OR STOOL WHILE STANDING UNSUPPORTED: NEEDS ASSISTANCE TO KEEP FROM FALLING/UNABLE TO TRY
STANDING TO SITTING: ABLE TO STAND USING HANDS AFTER SEVERAL TRIES
TRANSFERS: ABLE TO TRANSFER SAFELY WITH MINOR USE OF HANDS
TURN 360 DEGREES: NEEDS CLOSE SUPERVISION OR VERBAL CUEING
LONG VERSION TOTAL SCORE (MAX 56): 23
STANDING TO SITTING: USES BACK OF LEGS AGAINST CHAIR TO CONTROL DESCENT
STANDING UNSUPPORTED: ABLE TO STAND 2 MINUTES WITH SUPERVISION
STANDING ON ONE LEG: UNABLE TO TRY NEEDS ASSIST TO PREVENT FALL
PLACE ALTERNATE FOOT ON STEP OR STOOL WHILE STANDING UNSUPPORTED: TURNS SIDEWAYS ONLY BUT MAINTAINS BALANCE
PICK UP OBJECT FROM THE FLOOR FROM A STANDING POSITION: ABLE TO PICK UP SLIPPER BUT NEEDS SUPERVISION
STANDING UNSUPPORTED WITH EYES CLOSED: NEEDS HELP TO KEEP FROM FALLING
SITTING WITH BACK UNSUPPORTED BUT FEET SUPPORTED ON FLOOR OR ON A STOOL: ABLE TO SIT SAFELY AND SECURELY FOR 2 MINUTES

## 2025-05-06 ASSESSMENT — PAIN - FUNCTIONAL ASSESSMENT: PAIN_FUNCTIONAL_ASSESSMENT: 0-10

## 2025-05-06 ASSESSMENT — PAIN SCALES - GENERAL: PAINLEVEL_OUTOF10: 4

## 2025-05-06 NOTE — PROGRESS NOTES
Physical Therapy    Physical Therapy Treatment / Reassessment    Patient Name: Ally Carpio  MRN: [id number]  : 1980   Today's Date: 2025  Time Calculation  Start Time: 1030  Stop Time: 1115  Time Calculation (min): 45 min       PT Therapeutic Procedures Time Entry  Therapeutic Exercise Time Entry: 45         Visit #8    Assessment:  Pt arrives with new order for general weakness/demyelinating disorder. She would benefit from continued PT for general strengthening and balance exercises including aquatic therapy.    Pt reports 3/10 pain after treatment.    Plan: One day pool, one day land.  OP PT Plan  Treatment/Interventions: Education/ Instruction, Manual therapy, Therapeutic exercises  PT Plan: Skilled PT  PT Frequency: 2 times per week  Duration: 4-6 wks  Certification Period Start Date: 25  Certification Period End Date: 26  Number of Treatments Authorized: 60  Rehab Potential: Good  Plan of Care Agreement: Patient    Current Problem  1. Weakness        2. Weakness of both shoulders  Follow Up In Physical Therapy    Possibly related to underlying neurologic disease.  Also possible frozen shoulder.  Recommend trial of physical therapy.  Consider orthopedic consult.      3. CNS demyelinating disorder (Multi)            Subjective   General  Pt states her shoulder pain is less from getting massage therapy. Still has c/o shoulder and UE weakness, feels like she is dropping a lot of things, difficulty with hair care. Increased pain with activity.   Saw neuro recently who gave a new order for PT for general weakness. She reports using rollator in community, cane or no assistive device at home. Has had some falls recently with no injury (3-4/wk), able to get self up. Feels she would like to improve balance and endurance, maintain strength and motion.     Precautions  Precautions  Precautions Comment: Fall risk    Pain  Pain Assessment: 0-10  0-10 (Numeric) Pain Score: 4  Pain Location:  Shoulder  Pain Orientation: Right, Left    Objective   Posture: scap protraction karen    Shoulder AROM  FLEX: 85 deg L, 100 deg  ABD: 92 deg  IR: WNL    Shoulder PROM:  WNL karen    UE Strength:  Shoulder FLEX: 3-/5 karen  Shoulder ABD: 3-/5 karen  Shoulder ER: 4+/5 karen  Shoulder IR: 4+/5 karen  Elbow FLEX: 4/5 karen  Elbow EXT: 4/5 karen    Scapula Stabilizer Strength:  Lower trapezius: 4/5 karen  Mid trapezius: 4+/5 karen  Serratus anterior: 4-/5 karen    LE strength:  4/5 hip ABD, ADD, FLEX  4-/5 knee EXT  4/5 knee FLEX    Outcome Measures:  Other Measures  Disability of Arm Shoulder Hand (DASH): 28     Merino Balance Scale  1. Sitting to Standing: Able to stand using hands after several tries  2. Standing Unsupported: Able to stand 2 minutes with supervision  3. Sitting with Back Unsupported but Feet Supported on Floor or on a Stool: Able to sit safely and securely for 2 minutes  4. Standing to Sitting: Uses back of legs against chair to control descent  5.  Transfers: Able to transfer safely with minor use of hands  6. Standing Unsupported with Eyes Closed: Needs help to keep from falling  7. Standing Unsupported with Feet Together: Needs help to attain position and unable to hold for 15 seconds  8. Reach Forward with Outstretched Arm While Standing: Can reach forward 5 cm (2 inches)  9.  Object from Floor from a Standing Position: Able to  slipper but needs supervision  10. Turning to Look Behind Over Left and Right Shoulders While Standing: Turns sideways only but maintains balance  11. Turn 360 Degrees: Needs close supervision or verbal cueing  12. Place Alternate Foot on Step or Stool While Standing Unsupported: Needs assistance to keep from falling/unable to try  13. Standing Unsupported One Foot in Front: Loses balance while stepping or standing  14. Standing on One Leg: Unable to try needs assist to prevent fall  Merino Balance Score: 23      Treatments:  EXERCISES Date 4/3/2025 Date 4/8/2025 Date 4/10/2025  5/6/2025    VISIT# #5 #6 #7 #8    REPS             6-min walk test           **NEXT   Left upper trap stretch       Levator scap stretch              NuStep  L3 10min L3 5min NEXT   Pulleys:       FF 3 min 3 min 3min NEXT   SCAP 3 min 3 min 3min NEXT          Parallel bars:       Lateral walk    NEXT   Marches    NEXT   Tap-ups    NEXT   Foam marches    NEXT   Foam trunk turns    NEXT   Walk with head turns    NEXT                 Shuttle       DLP    NEXT   SLP - chuck    NEXT                 T-band: Seated  Seated  All bands HEP    Pull downs Red 2x10 Red 2x10     Mid rows Red 2x10 Red 2x10     ER Red 3i21Rwt  Red 7t75Vdi      IR Red 8z77Qkt  Red 3s18Zbn      Chuck ER  Red 2x10 Red 2x10     Horz Abd  Red x10 Red 2x10            Wall push-ups (+)                     Side lying ER 0j92Ams  3b04Hff      Side lying ABD 2x10 2x10 Chuck      Supine 90 deg arm circles, CW/CCW 5b16Byt alternating  9l62Gya alt                   Prone:       Y's X10 Chuck (low)      T's X10 Chuck       I's x10Bil                         Reassess and review/progress HEP - 45 min    HEP: Access Code: [email or web address]    Exercises  - Supine Shoulder Flexion Extension AAROM with Dowel  - 1-2 x daily - 1-2 sets - 10 reps  - Supine Shoulder Protraction with Dowel  - 2 x daily - 1-2 sets - 10 reps  - Seated Shoulder Abduction AAROM with Dowel  - 1-2 x daily - 1-2 sets - 10 reps  - Seated Scapular Retraction  - 1-2 x daily - 1-2 sets - 10 reps    NEW 3/31/25:  New 4/10/25   Access Code: [email or web address]        Goals:  Active       Chuck shoulder weakness       Improve chuck shoulder/scapula stabilizer strength >=1/2 muscle grade for improved lifting.         Start:  03/20/25    Expected End:  06/18/25            <=1/10 right shoulder pain for improved house/yard work.        Start:  03/20/25    Expected End:  06/18/25            Improve QuickDash by >=10 points for improved mobility.         Start:  03/20/25    Expected End:  06/18/25            Chuck shoulder AROM WFL for  reaching into cupboards.        Start:  03/20/25    Expected End:  06/18/25            Independent HEP for continued gains after PT is complete.        Start:  03/20/25    Expected End:  06/18/25                Improve Merino Balance Assessment score >=6 points to avoid falls.        Start:  05/06/25    Expected End:  08/04/25            Pt will tolerate >=40 minutes of aquatic exercise for improved endurance.        Start:  05/06/25    Expected End:  08/04/25                Improve karen LE strength >=1/2 muscle grade for improved balance and mobility.        Start:  05/06/25    Expected End:  08/04/25

## 2025-05-07 ENCOUNTER — APPOINTMENT (OUTPATIENT)
Facility: CLINIC | Age: 45
End: 2025-05-07
Payer: COMMERCIAL

## 2025-05-07 VITALS
SYSTOLIC BLOOD PRESSURE: 96 MMHG | HEART RATE: 67 BPM | DIASTOLIC BLOOD PRESSURE: 62 MMHG | OXYGEN SATURATION: 100 % | BODY MASS INDEX: 25.02 KG/M2 | WEIGHT: 155 LBS

## 2025-05-07 DIAGNOSIS — M25.50 POLYARTHRALGIA: Primary | ICD-10-CM

## 2025-05-07 PROCEDURE — 99205 OFFICE O/P NEW HI 60 MIN: CPT | Performed by: INTERNAL MEDICINE

## 2025-05-07 PROCEDURE — 99417 PROLNG OP E/M EACH 15 MIN: CPT | Performed by: INTERNAL MEDICINE

## 2025-05-07 PROCEDURE — 1036F TOBACCO NON-USER: CPT | Performed by: INTERNAL MEDICINE

## 2025-05-07 ASSESSMENT — ENCOUNTER SYMPTOMS
DEPRESSION: 0
LOSS OF SENSATION IN FEET: 0
OCCASIONAL FEELINGS OF UNSTEADINESS: 0

## 2025-05-07 ASSESSMENT — PAIN SCALES - GENERAL: PAINLEVEL_OUTOF10: 4

## 2025-05-07 NOTE — PROGRESS NOTES
Subjective   Patient ID: [id number]   Ally Carpio is a 44 y.o. female who presents for New Patient Visit.    HPI  She saw Dr. Paniagua for LBP 10/2023    She has a lot of joint pain since November 2024, feet (on tops of feet), hands (dorsum of hand, wrists, PIPs) and knees, pain is worse with overuse. Reports that she feels stiff in AM, half an hour. Gets better initially the more she does then gets worse with overuse.    Ankles not as bad as other joints but also can get a little painful with overuse.   Hips pop out of place but no pain.   She has chronic LBP for years, constant, worse with overuse and she feels that her L shoulder is higher than the R and reports weakness in her shoulder. No pain in her shoulder.     She was diagnosed with PsO April 2023 uses topical clobetasol and tacrolimus, then was diagnosed with ITP November 2023 and then UC Jan 2024    She is on mirikizumab for UC since December 2024.  She was on humira prior to the mirikizumab April-August/September 2024, it worked well for the PsO and UC but mirikizumab worked better for UC. She was taken off humira for hypothetical MS, she reports she has white spots on her brain, she reports that she would get numbness/tingling a few days after injecting the humira.   prior to that was on balsalazide- had mucositis, mesalamine- stopped due to skin rash, dxed Jan 2024).     She takes abilify, lexapro, lyrica 200mg BID    Per Dr. Holt's note:  several sequential neurological symptoms since February of 2024 including paraesthesia of all limbs, trunk, and face, LLE then RLE weakness, transient bladder dysfunction, painful isometric tonic spasms in all limbs and truncal (below the right costal margin), wooziness, nausea, brain fogginess, headaches, and most recently bilateral visual blurring with right eye pain. Symptoms started as she was weaning down prednisone for UC. Symptoms improved only partially after cervical decompression for disc prolapse but  then continued to worsen especially after she started adalimumab for UC.  The Neurological Exam showed unequal L>R pupils, mild BLE weakness distal > proximal, right hemisensory level T8, and high-steppage wide-based gait.   The MRI brain showed a few non-enhancing large demyelinating-like lesions in periventricular, subcortical, and juxtacortical locations stable in September compared to March 2024. MRI cervical spine without cord lesions in April and September 2024 but did show central disc prolapse with canal stenosis that was released. MRI thoracic spine in April did not show cord lesions either. EMG normal. Cerebrospinal Fluid not done  Neuromyelitis Optica: negative 9/2024  Myelin Oligodendrocyte Glycoprotein: negative 9/2024  The overall picture is suspicious for a systemic autoimmune disease mimicking ITP, UC, psoriasis, and MS since all four conditions occurred within nearly a one-year-period. She admits to painful mouth ulcers so Behchet's is on the top of the differential. Alternatively, she may have multiple coexisting autoimmune disorders. Her neurological presentation is highly suspicious for myelitis (acute onset while tapering down prednisone, MS juanito, tonic spasms, brain demyelinating-like lesion, worsening with humira, etc.) but the scans have been negative. Therefore, causes of MRI negative myelitis should be considered especially MOGAD but she tested negative for the antibody. She may also simply have coexisting MS or double seronegative NMOSD. An FND with incidental RIS is also possible. . MRI orbit showed questionable small area of enhancement in the retrobulbar left optic nerve that was seen on coronal but not axial post-CHRISTINA images. I arranged for outpatient solumedrol for her to see if it will help. Her vision and headache improved only for one days after solumedrol and then she became even worse after completing the three days with more headache and more fatigue. Reports asymmetric smile and  facial weakness but no evidence of facial weakness on exam. The diagnosis remains unclear. Her spine MRIs repeatedly failed to show a lesion that would be symptomatic and explain her symptoms. Her brain lesions are all in locations expected to be clinically silent. Her orbital findings are questionable and could be artifactual especially with the unusual steroid response. 4/21/2025: Brain MRI in March stable without new or active lesions. She messaged me about new bilateral shoulder weakness and right shoulder pain. I ordered muscle enzymes and inflammatory markers that all came back negative. She saw orthopedics and is being referred to a shoulder specialist. OCT machine still down. Exam with some new mild ratchet-like weakness in the shoulders and more prominent diffuse L>R hyperreflexia. She denies any recent stressors and doesn't think that her shoulder symptoms could be stress or mood-related.    She sees Dr. Welch from GI   -scope Jan 2024 for hematochezia and diarrhea:     The terminal ileum appeared normal. Performed random biopsy using biopsy forceps.  The cecum, ascending colon and hepatic flexure appeared normal. Performed random biopsy using biopsy forceps, labeled right colon.  Subcentimeter polyp in the transverse colon was removed with cold snare (bx: inflammatory polyp).  The descending colon appeared normal. Performed random biopsy using biopsy forceps, labeled left colon.  Moderate abnormal mucosa in the rectosigmoid, with edema, erythema and aphthous ulcerations in contiguous distribution.  This is most suggestive of ulcerative proctitis; performed cold forceps biopsy (bx: chronic active colitis).    New medication with no effect on joint pains, helps UC a lot, PsO has gotten worse, gluteal cleft, hard to sit.    Shoulder weakness hasn't gotten worse but she cannot lift her arms above 90 degrees, R with more ROM than L, also has pain with abd above 60 degrees in deltoids   Also feels weak in her  feet.     ROS  Denies fever, chills, weight loss. + Fatigue, unrefreshed in AM. + dry eyes, no ocular inflx. + dry mouth, denies dental loss, loss of taste, Hx of oral ulcers, around tongue and gum line that are painful. No difficulty swallowing. Denies chest pain. Denies shortness of breath, cough, asthma, or recurrent respiratory infections. + nausea occ. No vomiting, heartburn, abdominal pain, constipation, diarrhea, melena or hematochezia. No recurrent urinary infections or STDs, no genital or anal ulcers. Denies Raynaud's phenomenon. + numbness/tingling thoracic area, lyrica helps. Denies history of clots (arterial or venous), or miscarriages     PMH/PSH: Hypothyroid, PsO sees [facility] diagnosed in , lichen sclerosis, UC, anxiety, depression, PTSD, cervical discectomy, RLS since ,   Social: Exsmoker quit 2017, alcohl once in a blue moon, no drug use. 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 Has 2 biological children.   FHx: Aunt with RA and hashimoto's, daughter with hashimoto's       Problem List[1]     Medical History[2]     Surgical History[3]     Social History     Socioeconomic History    Marital status:      Spouse name: Not on file    Number of children: Not on file    Years of education: Not on file    Highest education level: Not on file   Occupational History    Not on file   Tobacco Use    Smoking status: Former     Current packs/day: 0.00     Average packs/day: 1 pack/day for 12.5 years (12.5 ttl pk-yrs)     Types: Cigarettes     Start date: 2004     Quit date: 2017     Years since quittin.3    Smokeless tobacco: Never   Vaping Use    Vaping status: Never Used   Substance and Sexual Activity    Alcohol use: Not Currently     Comment: on rare occasions    Drug use: Never    Sexual activity: Yes     Partners: Male     Birth control/protection: Male Sterilization     Comment: Pt stopped her Norethindrone about one week ago- does not desire to be on birth control  pills any longer   Other Topics Concern    Not on file   Social History Narrative    Not on file     Social Drivers of Health     Financial Resource Strain: Not on file   Food Insecurity: No Food Insecurity (12/16/2024)    Hunger Vital Sign     Worried About Running Out of Food in the Last Year: Never true     Ran Out of Food in the Last Year: Never true   Transportation Needs: Not on file   Physical Activity: Not on file   Stress: No Stress Concern Present (2/21/2025)    [employer] [place] [employer] - Occupational Stress Questionnaire     Feeling of Stress : Not at all   Social Connections: Not on file   Intimate Partner Violence: Not At Risk (2/21/2025)    Humiliation, Afraid, Rape, and Kick questionnaire     Fear of Current or Ex-Partner: No     Emotionally Abused: No     Physically Abused: No     Sexually Abused: No   Housing Stability: Not on file        RX Allergies[4]     Current Medications[5]       Objective     Visit Vitals  BP 96/62 (BP Location: Left arm, Patient Position: Sitting)   Pulse 67        Physical Exam  General: AAOx3, Cooperative  Head: normocephalic, atraumatic  Eyes: EOMI, conjunctiva clear, sclera white, anicteric  Throat/Mouth: No oral deformities, no cheek swelling, mucosa appear moist, no oral ulcers   Neck/Lymph: FROM, trachea midline  MSK: Diffuse myofascial tenderness present   Hand/Fingers: No erythema, swelling, tenderness or warmth at DIP, PIP, or MCP joints, FROM grossly. Good hand . No nodules. No deformities   Wrists: No erythema, swelling, warmth or tenderness at wrist, FROM grossly  Elbows: No tenderness, swelling, erythema or warmth at elbows, FROM grossly. No nodules   Shoulders: AROM limited to 90 deg abd, PROM normal without pain, no swelling, erythema  Lower Extremities:   Hips: No obvious deformities. Log roll test negative bilaterally.   Knees: No tenderness, deformities, swelling, rashes, or warmth, normal ROM grossly.  Ankles: No deformities,  "tenderness, edema, erythema, ulceration, or warmth at the ankle  Feet: No swelling, erythema or warmth of MTPs or Ips.      Lab Results   Component Value Date    WBC 5.4 03/31/2025    HGB 12.4 03/31/2025    HCT 37.3 03/31/2025    MCV 93.7 03/31/2025     (L) 03/31/2025        Chemistry    Lab Results   Component Value Date/Time     03/31/2025 1412    K 4.5 03/31/2025 1412     03/31/2025 1412    CO2 30 03/31/2025 1412    BUN 20 03/31/2025 1412    CREATININE 0.78 03/31/2025 1412    Lab Results   Component Value Date/Time    CALCIUM 9.1 03/31/2025 1412    ALKPHOS 44 03/31/2025 1412    AST 13 03/31/2025 1412    ALT 13 03/31/2025 1412    BILITOT 0.2 03/31/2025 1412           Lab Results   Component Value Date    CRP <0.10 01/20/2025      Lab Results   Component Value Date    ALEXEY POSITIVE (A) 08/01/2023    RF <10 10/25/2023    SEDRATE 3 01/20/2025      Lab Results   Component Value Date    CKTOTAL 30 01/20/2025     Lab Results   Component Value Date    NEUTROABS 1.92 02/04/2025      Lab Results   Component Value Date    FERRITIN 41 12/12/2024      Lab Results   Component Value Date    HEPAIGM NONREACTIVE 08/01/2023    HEPBCIGM NONREACTIVE 08/01/2023    HEPBCAB Nonreactive 09/24/2024    HEPCAB Nonreactive 09/24/2024      Lab Results   Component Value Date    ALT 13 03/31/2025    AST 13 03/31/2025    ALKPHOS 44 03/31/2025    BILITOT 0.2 03/31/2025      No results found for: \"PPD\"   No results found for: \"URICACID\"   Lab Results   Component Value Date    PTH 37.9 09/30/2023    CALCIUM 9.1 03/31/2025    PHOS 3.2 11/19/2024      Lab Results   Component Value Date    SPEP NORMAL 08/01/2023      No results found for: \"ALBUR\", \"[id number]\"   === 04/15/25 ===    XR TRANSFER OF OUTSIDE FILMS   === 03/03/25 ===    MR BRAIN W AND WO CONTRAST    - Impression -  *There is no measurable change compared to the previous exam.  *Findings suggestive of a primary demyelinating process such as  multiple sclerosis. No evidence " of intracranial mass, extra-axial  collection, diffusion restriction or abnormal enhancement.      MACRO:  none    Signed by: Marcus Sylvester 3/3/2025 1:13 PM  Dictation workstation:   [id number]      05/2025  IMPRESSION:  Impression:     This is a normal study. There is no electrophysiologic evidence of a myopathy.     In addition, there is no electrophysiologic evidence of large fiber peripheral neuropathy.     Compared to the studies conducted on 3/22/2024 and 9/9/2024, there are no significant changes in the results.    BMBx 11/2023:  Microscopic Description    PERIPHERAL SMEAR: Submitted              Red cells: Normal.        White cells: Normal.          Platelets: Thrombocytopenia.         ASPIRATE SMEAR: Submitted                          Specimen: Pauci cellular.        Erythropoiesis: Normal maturation.       Granulopoiesis: Normal maturation.       Megakaryocytes: Present. Morphology: Normal.     TOUCH PREP: Submitted       Specimen: Pauci cellular.        Comments: Similar to aspirate smear.     ASPIRATE CLOT: Submitted                           Specimen: Aspicular.      CORE BIOPSY: Submitted                            Specimen: Small biopsy with aspiration artifact.        Cellularity: Slightly hypocellular for age (30-40%).        Blasts: Not overtly increased in number.        Estimated M:E ratio: Consistent with aspirate smear.       Bony trabeculae: Normal.       Granulopoiesis: Show progressive maturation.       Erythropoiesis: Show progressive maturation.        Megakaryocytes: Few seen.        Granulomas: Absent.       Lymphoid aggregates: Absent.     SPECIAL STAINS:  Performed on clot section, A1.        Iron: Aspicular clot section precludes the assessment of storage iron and ring sideroblasts.       IMMUNOHISTOCHEMISTRY: Performed on core biopsy, B1.        CD34:  Highlights scattered blasts (<1% of overall cellularity).      FLOW CYTOMETRY:   No increased or abnormal myeloblasts  detected. Granulocytes with phenotypic atypia. No clonal B cell or T cell population identified.  See separate report for details.      Immunostains were performed in addition to flow cytometry to fully characterize the phenotype, architecture, and extent of the marrow population(s).  This was medically necessary for the best possible diagnosis.           Assessment/Plan    This is a patient with a complex history including PsO managed with topicals, lichen sclerosus, ITP and UC and demyelinating brain lesions. She has been on mirikizumab for UC since December 2024 and presents today for evaluation of polyarthralgias.     The joint symptoms are largely mechanical in nature, though she describes mild inflammatory features such as MST. Of note, she was previously treated with Humira for 5-6 months for UC but discontinued the medication due to large demyelinating appearing lesions in the periventricular, subcortical and juxtacortical WM. She did not have any polyarthralgias then. Since February 2024, she has reported neurological symptoms including extremity parasthesias and weakness, foot drop. EMGs of upper and lower extremities on multiple occasions hve not demonstrated neuropathy. She recently developed shouler weakness, EMG, CPK, aldolase were all normal and not consistent with autoimmune inflammatory myositis.     On today's MSK exam, there is no evidence of synovitis. She does exhibit myofascial tenderness, suggesting component of central sensitization and fibromyalgia.     While IBD-associated arthritis is possible, such symptoms typically parallel IBD activity. Patient reports significant improvement in GI sx since starting mirikizumab but has not noticed any improvement in her joint symptoms nor any correlation with medication schedule.   CPK and aldolase normal   ESR and CRP normal on many occasions   PsA is also possible but there is no evidence of synovitis on exam. She is HLA B27 negative. No axial  manifestations of SpA, no sacroilitis. MRI of cervical, thoracic and lumbar spine without findings suggestive of SpA either or demyelinating disease. A t2 stir hyperintese focus at C6-C7 was noted likely representing edema or hardware artifact.     Additional history includes oral ulcers of tongue and gum line, with none reported in recent months. 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 No ocular manifestations, or vascular manifestations. No genital ulcers. She has been tested for HLA B51 and that was negative. HLA B51 is more commonly associated with certain ethnic populations.     ALEXEY is positive, KG is negative (drawn prior to Humira initiation), but she has no clinical or lab findings suggestive of SLE or IIM.   Her current neurological symptoms are not explained by an autoimmune rheumatic disease. No SFN testing have been pursued.   MOG and NMO neg     ESR and CRP have remained normal on multiple occasions.   PLT counts have been stable over the past 3 months.     10/2023:  B2GP, CL, LA neg   Complements normal   RF neg     08/2023:  ALEXEY + 1:160, KG neg     CT chest normal     - will obtain MSK US synovial screen  - will  on fibromyalgia at next visit.     Today she also had questions regarding EDS, no overt hypermobility on exam. She counseled that not all forms of joint hypermobility are related to EDS or require genetic testing. In the case of hypermobile EDS, there's no specific genetic test available. Management focuses on symptom relief such as PT, rather than any specific treatment or intervention. Vascular EDS is generally the more serious type and hypermobile joints is not usually a prominent feature of vEDS. Instead, it affects the body's conenctive tissues.      RTC in 1 month for discussion of MSK US     Hali Wells MD  Division of Rheumatology   [place]          [1]   Patient Active Problem List  Diagnosis    Iron deficiency  anemia due to chronic blood loss    Mixed hyperlipidemia    IFG (impaired fasting glucose)    Hypothyroidism    Gastroesophageal reflux disease without esophagitis    Psoriasis (a type of skin inflammation)    Ulcerative colitis    Chronic ITP (idiopathic thrombocytopenic purpura) (Multi)    Menorrhagia with regular cycle    Left hemiparesis (Multi)    Restless leg syndrome    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    Radiculopathy, lumbar region    Chronic pain syndrome    Weakness of both lower extremities    Depression, major, recurrent, moderate    Menorrhagia with irregular cycle    Optic neuritis    Insomnia    CNS demyelination (Multi)    Fibromyalgia    Vasomotor rhinitis    Lichen sclerosus of female genitalia    Deviated nasal septum    Hypertrophy of nasal turbinates    Weakness of both shoulders    Weakness    CNS demyelinating disorder (Multi)   [2]   Past Medical History:  Diagnosis Date    ALEXEY positive     Cardiology follow-up encounter 09/17/2024    evaluation of bradycardia and fatigue Stress test recommended; however, patient is unable to perform s/t neurological dysfunction    Chronic pain syndrome     Depression     PIERRE (dyspnea on exertion)     Encounter for hematology follow-up 12/16/2024    Ursula Salinas PA-C    Fibromyalgia     GERD (gastroesophageal reflux disease)     H/O echocardiogram 09/26/2023    CONCLUSIONS:   1. Left ventricular systolic function is normal with a 65% estimated ejection fraction.    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     s/p C6-C7 Cervical Spine Arthroplasty ~ Anterior Approach 7/19/24    History of ITP     s/p D & C Hysteroscopy 5/22/24    Hx of idiopathic thrombocytopenic purpura     follows with heme, 6.13.24: plt 111    Hypothyroidism     CAMDEN (iron deficiency anemia)     12/12/24 H&H WNL- IV iron   Started on oral iron but can't tolerate due to GI upset   Oncology Ursula Salinas PA-C 12/16/2024     "Inflammatory bowel disease     Insomnia     Menorrhagia with regular cycle     Plan: Robot Assisted Laparoscopic Total Hysterectomy; Possible Removal of Tubes & or  Ovaries; Cystoscopy 25    Neurology follow-up encounter 10/15/2024    Paul Holt MD \"It remains unclear whether you have MS or not. I will repeat your brain MRI again in 2025, which together with the eye test can help in determining whether you have MS or not. If the picture remains unclear, we may need to schedule you for a spinal tap.\"    Neuropathy     Palpitation     Psoriasis     Radiculopathy, cervical     Radiculopathy, lumbar region     RLS (restless legs syndrome)     Sleep apnea     Ulcerative colitis    [3]   Past Surgical History:  Procedure Laterality Date    CERVICAL SPINE SURGERY  2024    C6-C7 Cervical Spine Arthroplasty ~ Anterior Approach     SECTION, LOW TRANSVERSE      x 2, .     COLONOSCOPY      DILATION AND CURETTAGE OF UTERUS      HYSTEROSCOPY  2024    D & C Hysteroscopy    UPPER GASTROINTESTINAL ENDOSCOPY     [4]   Allergies  Allergen Reactions    Adhesive Tape-Silicones Rash    Balsalazide Other     Mouth ulcers    Iron GI Upset    Mesalamine Rash   [5]   Current Outpatient Medications:     ARIPiprazole (Abilify) 5 mg tablet, Take 1 tablet (5 mg) by mouth once daily. For the first 2 weeks take 2.5 mg daily, Disp: 30 tablet, Rfl: 2    clobetasol (Temovate) 0.05 % ointment, Apply topically 2 times a day., Disp: , Rfl:     cyanocobalamin (Vitamin B-12) 1,000 mcg/mL injection, Inject 1 mL (1,000 mcg) into the muscle see administration instructions. Please inject daily x 7, followed by Weekly x 4 and then Monthly, Disp: 30 mL, Rfl: 3    ergocalciferol (Vitamin D-2) 1.25 MG (52607 UT) capsule, Take 1 capsule (1,250 mcg) by mouth 1 (one) time per week., Disp: 4 capsule, Rfl: 11    escitalopram (Lexapro) 20 mg tablet, Take 1 tablet (20 mg) by mouth once daily., Disp: 90 tablet, Rfl: 1    " "hydrOXYzine HCL (Atarax) 10 mg tablet, Take 1-2 tablets (10-20 mg) by mouth as needed at bedtime (insomnia)., Disp: 180 tablet, Rfl: 1    levothyroxine (Synthroid, Levoxyl) 25 mcg tablet, Take 0.5 tablets (12.5 mcg) by mouth once daily., Disp: 45 tablet, Rfl: 1    Mag-G 27 mg magnesium (500 mg) tablet, Take 1 tablet (27 mg) by mouth every 12 hours., Disp: , Rfl:     mirikizumab-mrkz (Omvoh Pen) 100 mg/mL pen injector injection, Inject 2 mL (200 mg) under the skin every 28 (twenty-eight) days., Disp: 2 mL, Rfl: 11    miscellaneous medical supply misc, Bilateral AFO. Apply to bilateral lower legs daily., Disp: 1 each, Rfl: 0    multivitamin tablet, Take 1 tablet by mouth once daily., Disp: , Rfl:     pantoprazole (ProtoNix) 40 mg EC tablet, Take 1 tablet (40 mg) by mouth once daily. Do not crush, chew, or split., Disp: 90 tablet, Rfl: 1    pregabalin (Lyrica) 200 mg capsule, Take 1 capsule (200 mg) by mouth 2 times a day., Disp: 60 capsule, Rfl: 2    PreviDent 5000 Sensitive 1.1-5 % paste, , Disp: , Rfl:     tacrolimus (Protopic) 0.1 % ointment, if needed., Disp: , Rfl:     tiZANidine (Zanaflex) 4 mg tablet, TAKE ONE TABLET BY MOUTH DAILY, Disp: 90 tablet, Rfl: 0    UltiCare Safety Syringe 3 mL 22 gauge x 1\" syringe, , Disp: , Rfl:     Vtama 1 % cream, if needed., Disp: , Rfl:     oxymetazoline 0.05 % nasal spray, Administer 2 sprays into each nostril every 12 hours if needed (nose bleeds) for up to 3 days. Do not use for more than 3 days., Disp: 15 mL, Rfl: 0    "

## 2025-05-09 ENCOUNTER — CLINICAL SUPPORT (OUTPATIENT)
Dept: AUDIOLOGY | Facility: CLINIC | Age: 45
End: 2025-05-09
Payer: COMMERCIAL

## 2025-05-09 DIAGNOSIS — H90.3 SENSORINEURAL HEARING LOSS (SNHL) OF BOTH EARS: Primary | ICD-10-CM

## 2025-05-09 PROCEDURE — 92557 COMPREHENSIVE HEARING TEST: CPT | Performed by: AUDIOLOGIST

## 2025-05-09 PROCEDURE — 92550 TYMPANOMETRY & REFLEX THRESH: CPT | Mod: 52 | Performed by: AUDIOLOGIST

## 2025-05-09 ASSESSMENT — PAIN SCALES - GENERAL: PAINLEVEL_OUTOF10: 0 - NO PAIN

## 2025-05-09 ASSESSMENT — PAIN - FUNCTIONAL ASSESSMENT: PAIN_FUNCTIONAL_ASSESSMENT: 0-10

## 2025-05-09 NOTE — PROGRESS NOTES
HISTORY:  She feels that she is not hearing well especially out of the right ear.  This began in her 20s.    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    Tinnitus that began about a year ago.  She is unsure which ear it is in.    No vertigo  No pain  No aural fullness  Has fallen over this past year.  Uses a walker.      RESULTS:  Otoscopic inspection showed clear ear canals bilaterally.    Immittance testing showed normal tympanograms bilaterally.   Ipsilateral acoustic reflexes were tested at 500-4000Hz in both ears.  They were present at 500-4000Hz in both ears.    Distortion Product Otoacoustic Emission (DPOAE) testing was completed on both ears for the frequencies   2000-8000Hz.  The patient passed at 000-8000Hz in the right ear and at 2000-6000Hz in the left ear indicating normal cochlear outer hair cell function at those frequencies.  Pure tone air and bone conduction testing showed an essentially mild sensorineural hearing loss at 125-8000Hz in both ears.    Word discrimination scores were excellent bilaterally.    IMPRESSIONS:  Ally has a mild sensorineural hearing loss in both ears.  She states that this mostly bothers her when she is in a noisy situation.  She could consider hearing aids if she feels that she is struggling enough to hear in noise.  She will call her insurance to see if she has any hearing aid benefits.     Treatment Plan:   Annual hearing evaluations.    Hearing aid consultation if interested in hearing aids.      TIME:    2294-0866

## 2025-05-12 ENCOUNTER — TREATMENT (OUTPATIENT)
Dept: PHYSICAL THERAPY | Facility: HOSPITAL | Age: 45
End: 2025-05-12
Payer: COMMERCIAL

## 2025-05-12 DIAGNOSIS — R29.898 WEAKNESS OF BOTH SHOULDERS: ICD-10-CM

## 2025-05-12 PROCEDURE — 97113 AQUATIC THERAPY/EXERCISES: CPT | Mod: GP,CQ | Performed by: PHYSICAL THERAPY ASSISTANT

## 2025-05-12 ASSESSMENT — PAIN - FUNCTIONAL ASSESSMENT: PAIN_FUNCTIONAL_ASSESSMENT: 0-10

## 2025-05-12 ASSESSMENT — PAIN SCALES - GENERAL: PAINLEVEL_OUTOF10: 4

## 2025-05-12 NOTE — PROGRESS NOTES
Physical Therapy    Physical Therapy Treatment    Patient Name: Ally Carpio  MRN: [id number]  Today's Date: 5/12/2025  Time Calculation  Start Time: 1344  Stop Time: 1430  Time Calculation (min): 46 min  VISIT 9  PT Therapeutic Procedures Time Entry  Aquatic Therapy Time Entry: 46    Assessment:  PT Assessment  PT Assessment Results: Decreased strength, Decreased range of motion, Pain  Rehab Prognosis: Good  Assessment Comment: Ally was fatigue at end of session. No pain at end of session. pt with foot drop on B LEs. Education was provided on technique and posture. pt encouraged to get water shoes so she doesn't;t scrape her feet on the bottom of the pool.     Plan:  OP PT Plan  Treatment/Interventions: Education/ Instruction, Manual therapy, Therapeutic exercises  PT Plan: Skilled PT  PT Frequency: 2 times per week  Duration: 4-6 wks  Certification Period Start Date: 02/26/25  Certification Period End Date: 02/26/26  Number of Treatments Authorized: 60    Current Problem  1. Weakness of both shoulders  Follow Up In Physical Therapy    Possibly related to underlying neurologic disease.  Also possible frozen shoulder.  Recommend trial of physical therapy.  Consider orthopedic consult.          Subjective  Ally is reporting over all weakness and pain. She is hoping aquatic therapy will decrease her pain and increase her strength.        Precautions  Precautions  Precautions Comment: Fall risk     Pain  Pain Assessment: 0-10  0-10 (Numeric) Pain Score: 4  Pain Location: Shoulder  Pain Orientation: Right, Left    Objective imitated aquatic therapy        Treatments:     Aquatic Exercise  Aquatic Exercise Performed: Yes  Aquatic Exercise Activity 1: ambulation for, retro 2 laps lateral x 1 lap  Aquatic Exercise Activity 2: Marching x 1 laps  Aquatic Exercise Activity 3: shoulder shrugs.x10 ea  Aquatic Exercise Activity 4: Squats x 10 ea  Aquatic Exercise Activity 5: SLR forw, retro, and lateral x10  ea  Aquatic Exercise Activity 6: stretches Hamstring and gastroc 3x20  Aquatic Exercise Activity 7: shoulder rolls forward, retro x10 ea  Aquatic Exercise Activity 8: shoulder roll forward retro x10  Aquatic Exercise Activity 9: shoulder abduction and horizontal shoulder abduction x 10 ea (hand up/hands down)      Goals:  Active       Chuck shoulder weakness       Improve chuck shoulder/scapula stabilizer strength >=1/2 muscle grade for improved lifting.         Start:  03/20/25    Expected End:  06/18/25            <=1/10 right shoulder pain for improved house/yard work.        Start:  03/20/25    Expected End:  06/18/25            Improve QuickDash by >=10 points for improved mobility.         Start:  03/20/25    Expected End:  06/18/25            Chuck shoulder AROM WFL for reaching into cupboards.        Start:  03/20/25    Expected End:  06/18/25            Independent HEP for continued gains after PT is complete.        Start:  03/20/25    Expected End:  06/18/25                Improve Merino Balance Assessment score >=6 points to avoid falls.        Start:  05/06/25    Expected End:  08/04/25            Pt will tolerate >=40 minutes of aquatic exercise for improved endurance.        Start:  05/06/25    Expected End:  08/04/25                Improve chuck LE strength >=1/2 muscle grade for improved balance and mobility.        Start:  05/06/25    Expected End:  08/04/25

## 2025-05-14 ENCOUNTER — APPOINTMENT (OUTPATIENT)
Dept: PRIMARY CARE | Facility: CLINIC | Age: 45
End: 2025-05-14
Payer: COMMERCIAL

## 2025-05-14 VITALS
DIASTOLIC BLOOD PRESSURE: 60 MMHG | TEMPERATURE: 97.1 F | HEART RATE: 58 BPM | OXYGEN SATURATION: 99 % | BODY MASS INDEX: 24.92 KG/M2 | SYSTOLIC BLOOD PRESSURE: 98 MMHG | WEIGHT: 154.4 LBS

## 2025-05-14 DIAGNOSIS — Z12.31 ENCOUNTER FOR SCREENING MAMMOGRAM FOR MALIGNANT NEOPLASM OF BREAST: ICD-10-CM

## 2025-05-14 DIAGNOSIS — E78.2 MIXED HYPERLIPIDEMIA: ICD-10-CM

## 2025-05-14 DIAGNOSIS — K21.9 GASTROESOPHAGEAL REFLUX DISEASE, UNSPECIFIED WHETHER ESOPHAGITIS PRESENT: ICD-10-CM

## 2025-05-14 DIAGNOSIS — G47.00 INSOMNIA, UNSPECIFIED TYPE: ICD-10-CM

## 2025-05-14 DIAGNOSIS — R26.89 DECREASED MOBILITY: ICD-10-CM

## 2025-05-14 DIAGNOSIS — R73.01 IFG (IMPAIRED FASTING GLUCOSE): ICD-10-CM

## 2025-05-14 DIAGNOSIS — E03.9 HYPOTHYROIDISM, UNSPECIFIED TYPE: ICD-10-CM

## 2025-05-14 DIAGNOSIS — F33.1 DEPRESSION, MAJOR, RECURRENT, MODERATE: ICD-10-CM

## 2025-05-14 DIAGNOSIS — M79.7 FIBROMYALGIA: ICD-10-CM

## 2025-05-14 DIAGNOSIS — Z00.00 WELLNESS EXAMINATION: Primary | ICD-10-CM

## 2025-05-14 DIAGNOSIS — K12.30 MUCOSITIS ORAL: ICD-10-CM

## 2025-05-14 PROCEDURE — 99214 OFFICE O/P EST MOD 30 MIN: CPT | Performed by: FAMILY MEDICINE

## 2025-05-14 PROCEDURE — 99396 PREV VISIT EST AGE 40-64: CPT | Performed by: FAMILY MEDICINE

## 2025-05-14 PROCEDURE — 1036F TOBACCO NON-USER: CPT | Performed by: FAMILY MEDICINE

## 2025-05-14 RX ORDER — PREGABALIN 225 MG/1
225 CAPSULE ORAL 2 TIMES DAILY
Qty: 60 CAPSULE | Refills: 5 | Status: SHIPPED | OUTPATIENT
Start: 2025-05-14 | End: 2025-06-13

## 2025-05-14 RX ORDER — HYDROXYZINE HYDROCHLORIDE 10 MG/1
10-20 TABLET, FILM COATED ORAL NIGHTLY PRN
Qty: 180 TABLET | Refills: 1 | Status: SHIPPED | OUTPATIENT
Start: 2025-05-14

## 2025-05-14 RX ORDER — PREGABALIN 200 MG/1
200 CAPSULE ORAL 2 TIMES DAILY
Qty: 180 CAPSULE | Refills: 1 | Status: CANCELLED | OUTPATIENT
Start: 2025-05-14

## 2025-05-14 RX ORDER — LEVOTHYROXINE SODIUM 25 UG/1
12.5 TABLET ORAL DAILY
Qty: 45 TABLET | Refills: 1 | Status: SHIPPED | OUTPATIENT
Start: 2025-05-14 | End: 2025-11-10

## 2025-05-14 RX ORDER — ESCITALOPRAM OXALATE 20 MG/1
20 TABLET ORAL DAILY
Qty: 90 TABLET | Refills: 1 | Status: SHIPPED | OUTPATIENT
Start: 2025-05-14 | End: 2025-11-10

## 2025-05-14 RX ORDER — PANTOPRAZOLE SODIUM 40 MG/1
40 TABLET, DELAYED RELEASE ORAL DAILY
Qty: 90 TABLET | Refills: 1 | Status: SHIPPED | OUTPATIENT
Start: 2025-05-14 | End: 2025-11-10

## 2025-05-14 ASSESSMENT — ENCOUNTER SYMPTOMS
FEVER: 0
DIARRHEA: 0
ARTHRALGIAS: 1
APPETITE CHANGE: 0
NAUSEA: 0
RHINORRHEA: 0
ABDOMINAL PAIN: 0
PALPITATIONS: 0
CONSTIPATION: 0
FATIGUE: 0
WEAKNESS: 1
HEADACHES: 0
DIZZINESS: 0
SHORTNESS OF BREATH: 0
DYSURIA: 0
CHILLS: 0
COUGH: 0
SORE THROAT: 0
VOMITING: 0

## 2025-05-14 NOTE — PROGRESS NOTES
Subjective   Patient ID: Ally Carpio is a 44 y.o. female who presents for Hypothyroidism (recheck), Anxiety, and Mouth Lesions (Sores in mouth X 1 week).    Ally has been noticing sores in her mouth.  They appeared approximately 1 to 2 weeks ago.  Sores are located on the roof of the mouth and are very painful when she is eating or drinking.  The sores are gradually healing.    She also continues to experience severe fibromyalgia pain.  Feels that the Lyrica has improved her symptoms, but is wondering if she could try a higher dose of the medication.    She continues to experience weakness in both her shoulders and lower extremities.  Currently is using a wheeled walker.  Is wondering if she would be able to get a motorized scooter.  She is active in PT, but they have also been working with her shoulders.    Her mood has been stable with escitalopram.    Her acid reflux has been worse than usual recently.  She is scheduled for follow-up with her gastroenterologist next month.         Review of Systems   Constitutional:  Negative for appetite change, chills, fatigue and fever.   HENT:  Positive for mouth sores. Negative for congestion, rhinorrhea and sore throat.    Eyes:  Negative for visual disturbance.   Respiratory:  Negative for cough and shortness of breath.    Cardiovascular:  Negative for chest pain and palpitations.   Gastrointestinal:  Negative for abdominal pain, constipation, diarrhea, nausea and vomiting.   Genitourinary:  Negative for dysuria.   Musculoskeletal:  Positive for arthralgias.   Skin:  Negative for rash.   Neurological:  Positive for weakness. Negative for dizziness, syncope and headaches.       Objective   BP 98/60   Pulse 58   Temp 36.2 °C (97.1 °F)   Wt 70 kg (154 lb 6.4 oz)   LMP 12/25/2024   SpO2 99%   BMI 24.92 kg/m²     Physical Exam  Constitutional:       General: She is not in acute distress.  HENT:      Head: Normocephalic and atraumatic.      Right Ear: Tympanic  membrane normal.      Left Ear: Tympanic membrane normal.      Nose: No congestion or rhinorrhea.      Mouth/Throat:      Mouth: Mucous membranes are moist.      Pharynx: No oropharyngeal exudate or posterior oropharyngeal erythema.   Eyes:      Extraocular Movements: Extraocular movements intact.   Cardiovascular:      Rate and Rhythm: Normal rate and regular rhythm.      Heart sounds: No murmur heard.  Pulmonary:      Effort: Pulmonary effort is normal.      Breath sounds: No wheezing or rhonchi.   Abdominal:      General: There is no distension.      Palpations: Abdomen is soft.      Tenderness: There is no abdominal tenderness. There is no guarding or rebound.   Musculoskeletal:         General: No swelling or tenderness.      Cervical back: Neck supple.      Right lower leg: No edema.      Left lower leg: No edema.   Lymphadenopathy:      Cervical: No cervical adenopathy.   Skin:     General: Skin is warm and dry.   Neurological:      Mental Status: She is alert.      Cranial Nerves: No cranial nerve deficit.      Comments: +5/5 strength of bilateral upper extremities. Lower leg weakness of +2/4 with hip flexion, knee extension, ankle dorsiflexion   Psychiatric:         Mood and Affect: Mood normal.         Behavior: Behavior normal.         Assessment/Plan   Problem List Items Addressed This Visit           ICD-10-CM    Mixed hyperlipidemia E78.2    Plan lipid panel at follow-up in 6 months.         Relevant Orders    Lipid Panel    IFG (impaired fasting glucose) R73.01    Check hemoglobin A1c.         Relevant Orders    Comprehensive Metabolic Panel    Hemoglobin A1C    Hemoglobin A1C    Comprehensive Metabolic Panel    Hypothyroidism E03.9    Check TSH.  Continue levothyroxine.         Relevant Medications    levothyroxine (Synthroid, Levoxyl) 25 mcg tablet    Other Relevant Orders    CBC and Auto Differential    Thyroid Stimulating Hormone    Thyroid Stimulating Hormone    Gastroesophageal reflux disease  K21.9    Continue pantoprazole.  Recommend that she discuss her worsening symptoms with GI doctor as she is scheduled with them next month.         Relevant Medications    pantoprazole (ProtoNix) 40 mg EC tablet    Depression, major, recurrent, moderate F33.1    Stable.  Continue escitalopram.         Relevant Medications    escitalopram (Lexapro) 20 mg tablet    Insomnia G47.00    Relevant Medications    hydrOXYzine HCL (Atarax) 10 mg tablet    Fibromyalgia M79.7    Increase Lyrica to 225 mg twice per day.  Counseled on possible adverse effects of increased dose.         Relevant Medications    pregabalin (Lyrica) 225 mg capsule    Decreased mobility R26.89    Patient using wheeled walker.  Discussed that to meet criteria for motorized scooter, she likely will need to do physical therapy geared specifically toward lower leg weakness.  She is going to speak to her physical therapist to see if they can address this issue.         Mucositis oral K12.30    Start Magic mouthwash to help alleviate symptoms.  Recommend that she reach out to her rheumatologist regarding the symptoms.         Relevant Medications    magic mouthwash (lidocaine, diphenhydrAMINE, Maalox 1:1:1)     Other Visit Diagnoses         Codes      Wellness examination    -  Primary Z00.00    CPE performed. Mammogram ordered.       Encounter for screening mammogram for malignant neoplasm of breast     Z12.31    Relevant Orders    BI mammo bilateral screening tomosynthesis

## 2025-05-14 NOTE — ASSESSMENT & PLAN NOTE
Patient using wheeled walker.  Discussed that to meet criteria for motorized scooter, she likely will need to do physical therapy geared specifically toward lower leg weakness.  She is going to speak to her physical therapist to see if they can address this issue.

## 2025-05-14 NOTE — ASSESSMENT & PLAN NOTE
Increase Lyrica to 225 mg twice per day.  Counseled on possible adverse effects of increased dose.

## 2025-05-14 NOTE — ASSESSMENT & PLAN NOTE
Start Magic mouthwash to help alleviate symptoms.  Recommend that she reach out to her rheumatologist regarding the symptoms.

## 2025-05-14 NOTE — ASSESSMENT & PLAN NOTE
Continue pantoprazole.  Recommend that she discuss her worsening symptoms with GI doctor as she is scheduled with them next month.

## 2025-05-15 ENCOUNTER — TREATMENT (OUTPATIENT)
Dept: PHYSICAL THERAPY | Facility: HOSPITAL | Age: 45
End: 2025-05-15
Payer: COMMERCIAL

## 2025-05-15 DIAGNOSIS — R29.898 WEAKNESS OF BOTH SHOULDERS: ICD-10-CM

## 2025-05-15 PROCEDURE — 97110 THERAPEUTIC EXERCISES: CPT | Mod: GP,CQ

## 2025-05-15 ASSESSMENT — PAIN - FUNCTIONAL ASSESSMENT: PAIN_FUNCTIONAL_ASSESSMENT: 0-10

## 2025-05-15 ASSESSMENT — PAIN SCALES - GENERAL
PAINLEVEL_OUTOF10: 4
PAINLEVEL_OUTOF10: 1
PAINLEVEL_OUTOF10: 4

## 2025-05-15 ASSESSMENT — PAIN DESCRIPTION - DESCRIPTORS: DESCRIPTORS: TINGLING

## 2025-05-15 NOTE — PROGRESS NOTES
Physical Therapy    Physical Therapy Treatment    Patient Name: Ally Carpio  MRN: [id number]  : 1980  Today's Date: 5/15/2025  Time Calculation  Start Time: 0900  Stop Time: 0945  Time Calculation (min): 45 min    PT Therapeutic Procedures Time Entry  Therapeutic Exercise Time Entry: 45          VISIT:# 10    Current Problem  Problem List Items Addressed This Visit           ICD-10-CM       Musculoskeletal and Injuries    Weakness of both shoulders R29.898        Subjective    Pt reports that she fell yesterday when she tripped in her house. She has a bruise on her L arm but is not having any pain from the fall. She reports no other injuries from the fall and states that she did not feel the need to seek medical attention. She says that she has not fallen outside of her house. Pt reports that she is having tingling in her upper back and into her neck/Chuck shoulders today that she states is a new sensation for her. She is not having pain in her back. She reports that she did well with aquatic therapy and felt good afterwards. She has continued to be complaint with her HEP.     Pain  Pain Assessment: 0-10  0-10 (Numeric) Pain Score: 4  Pain Location: Arm  Pain Orientation: Right  Multiple Pain Sites: Three  Pain Score 2: 4  Pain Location 2: Leg  Pain Orientation 2: Right, Left    Objective    Pt ambulates w/ 3WW       6 minute walk test: pt able to ambulate for 2 minutes and 55 seconds before needing a break. She walked approx. 280ft.       Precautions  Precautions  Precautions Comment: Fall risk      Treatments:     EXERCISES Date 4/3/2025 Date 2025 Date 4/10/2025 2025  5/15/2025    VISIT# #5 #6 #7 #8 #9    REPS               6-min walk test     2 minutes 55 sec, 280ft       **NEXT    Left upper trap stretch        Levator scap stretch                NuStep  L3 10min L3 5min NEXT L3 10 min   Pulleys:        FF 3 min 3 min 3min NEXT 3 min   SCAP 3 min 3 min 3min NEXT 3 min           Parallel bars:         Lateral walk    NEXT 2 laps Chuck UE   Marches    NEXT 1 laps UE floating   Tap-ups    NEXT 2 in step 1x10 Chuck   Foam marches    NEXT 2x10 UE floating   Foam trunk turns    NEXT 2x10 no UE   Walk with head turns    NEXT 1 lap Chuck UE                   Shuttle        DLP    NEXT Declined   SLP - chuck    NEXT Declined                   T-band: Seated  Seated  All bands HEP     Pull downs Red 2x10 Red 2x10      Mid rows Red 2x10 Red 2x10      ER Red 8x60Pkb  Red 1c92Fmk       IR Red 1k24Sal  Red 2p91Hfg       Chuck ER  Red 2x10 Red 2x10      Horz Abd  Red x10 Red 2x10              Wall push-ups (+)                        Side lying ER 2k32Yok  5t80Esp       Side lying ABD 2x10 2x10 Chuck       Supine 90 deg arm circles, CW/CCW 5a65Nnh alternating  1t34Xrr alt                      Prone:        Y's X10 Chuck (low)       T's X10 Chuck        I's x10Bil                            Reassess and review/progress HEP - 45 min     HEP: Access Code: [email or web address]    Exercises  - Supine Shoulder Flexion Extension AAROM with Dowel  - 1-2 x daily - 1-2 sets - 10 reps  - Supine Shoulder Protraction with Dowel  - 2 x daily - 1-2 sets - 10 reps  - Seated Shoulder Abduction AAROM with Dowel  - 1-2 x daily - 1-2 sets - 10 reps  - Seated Scapular Retraction  - 1-2 x daily - 1-2 sets - 10 reps    NEW 3/31/25:  New 4/10/25   Access Code: [email or web address]                             Assessment:  At the end of the session pt stated that the pain in her legs and R arm had remained around a 4/10. She was still having tingling sensations in her back, neck and shoulders but no pain. She tolerated treatment well today but did require rest breaks in between exercises.    Plan:   OP PT Plan  Treatment/Interventions: Education/ Instruction, Manual therapy, Therapeutic exercises  PT Plan: Skilled PT  PT Frequency: 2 times per week  Duration: 4-6 wks  Certification Period Start Date: 02/26/25  Certification Period End Date: 02/26/26  Number of Treatments Authorized:  60    Goals:  Active       Chuck shoulder weakness       Improve chuck shoulder/scapula stabilizer strength >=1/2 muscle grade for improved lifting.         Start:  03/20/25    Expected End:  06/18/25            <=1/10 right shoulder pain for improved house/yard work.        Start:  03/20/25    Expected End:  06/18/25            Improve QuickDash by >=10 points for improved mobility.         Start:  03/20/25    Expected End:  06/18/25            Chuck shoulder AROM WFL for reaching into cupboards.        Start:  03/20/25    Expected End:  06/18/25            Independent HEP for continued gains after PT is complete.        Start:  03/20/25    Expected End:  06/18/25                Improve Merino Balance Assessment score >=6 points to avoid falls.        Start:  05/06/25    Expected End:  08/04/25            Pt will tolerate >=40 minutes of aquatic exercise for improved endurance.        Start:  05/06/25    Expected End:  08/04/25                Improve chuck LE strength >=1/2 muscle grade for improved balance and mobility.        Start:  05/06/25    Expected End:  08/04/25

## 2025-05-16 ENCOUNTER — HOSPITAL ENCOUNTER (OUTPATIENT)
Dept: RADIOLOGY | Facility: HOSPITAL | Age: 45
Discharge: HOME | End: 2025-05-16
Payer: COMMERCIAL

## 2025-05-16 DIAGNOSIS — M25.50 POLYARTHRALGIA: ICD-10-CM

## 2025-05-16 PROCEDURE — 76881 US COMPL JOINT R-T W/IMG: CPT

## 2025-05-20 ENCOUNTER — APPOINTMENT (OUTPATIENT)
Dept: PHYSICAL THERAPY | Facility: HOSPITAL | Age: 45
End: 2025-05-20
Payer: COMMERCIAL

## 2025-05-20 ENCOUNTER — DOCUMENTATION (OUTPATIENT)
Dept: PHYSICAL THERAPY | Facility: HOSPITAL | Age: 45
End: 2025-05-20
Payer: COMMERCIAL

## 2025-05-20 NOTE — PROGRESS NOTES
Physical Therapy                 Therapy Communication Note    Patient Name: Ally Carpio  MRN: [id number]  Department:   Room: Room/bed info not found  Today's Date: 5/20/2025     Discipline: Physical Therapy    Missed Visit:       Missed Visit Reason:      Missed Time: Cancel    Comment: rescheduled

## 2025-05-21 ENCOUNTER — TREATMENT (OUTPATIENT)
Dept: PHYSICAL THERAPY | Facility: HOSPITAL | Age: 45
End: 2025-05-21
Payer: COMMERCIAL

## 2025-05-21 DIAGNOSIS — G37.9 CNS DEMYELINATION (MULTI): ICD-10-CM

## 2025-05-21 DIAGNOSIS — R53.1 WEAKNESS: Primary | ICD-10-CM

## 2025-05-21 DIAGNOSIS — G37.9 CNS DEMYELINATING DISORDER (MULTI): ICD-10-CM

## 2025-05-21 DIAGNOSIS — G89.4 CHRONIC PAIN SYNDROME: ICD-10-CM

## 2025-05-21 DIAGNOSIS — R29.898 WEAKNESS OF BOTH SHOULDERS: ICD-10-CM

## 2025-05-21 PROCEDURE — 97113 AQUATIC THERAPY/EXERCISES: CPT | Mod: GP,CQ

## 2025-05-21 ASSESSMENT — PAIN DESCRIPTION - DESCRIPTORS
DESCRIPTORS: RADIATING
DESCRIPTORS: ACHING

## 2025-05-21 ASSESSMENT — PAIN SCALES - GENERAL
PAINLEVEL_OUTOF10: 4
PAINLEVEL_OUTOF10: 4

## 2025-05-21 ASSESSMENT — PAIN - FUNCTIONAL ASSESSMENT: PAIN_FUNCTIONAL_ASSESSMENT: 0-10

## 2025-05-21 NOTE — PROGRESS NOTES
"Physical Therapy    Physical Therapy Treatment    Patient Name: Ally Carpio  MRN: [id number]  Today's Date: 5/21/2025  Time Calculation  Start Time: 1015  Stop Time: 1100  Time Calculation (min): 45 min  VISIT 11  PT Therapeutic Procedures Time Entry  Aquatic Therapy Time Entry: 45    Assessment:  PT Assessment  Assessment Comment: pt reports overall fatigue in Chuck LEs and UEs after treatment, pt with 3 rest breaks during amb, pain 3-4/10 after all increases    Plan:  OP PT Plan  Treatment/Interventions: Education/ Instruction, Manual therapy, Therapeutic exercises  PT Plan: Skilled PT (monitor fatigue and soreness)  PT Frequency: 2 times per week  Duration: 4-6 wks  Certification Period Start Date: 02/26/25  Certification Period End Date: 02/26/26  Number of Treatments Authorized: 60    Current Problem  1. Weakness        2. Weakness of both shoulders  Follow Up In Physical Therapy    Possibly related to underlying neurologic disease.  Also possible frozen shoulder.  Recommend trial of physical therapy.  Consider orthopedic consult.      3. CNS demyelinating disorder (Multi)        4. CNS demyelination (Multi)        5. Chronic pain syndrome            Subjective  pt reports was very fatigued after 1st aqautic visit, pt was able to wear water shoes today       Precautions  Precautions  Precautions Comment: fall risk     Pain  Pain Assessment: 0-10  0-10 (Numeric) Pain Score: 4  Pain Location: Arm  Pain Orientation: Right  Pain Radiating Towards: R Hand  Pain Descriptors: Radiating    Objective         Treatments:     Aquatic Exercise  Aquatic Exercise Performed: Yes  Aquatic Exercise Activity 1: ambulation forw/Retro/Lat x 2 EA  Aquatic Exercise Activity 2: Marching x 1 laps  Aquatic Exercise Activity 3: shoulder shrugs.x10 ea 3\" Hold  Aquatic Exercise Activity 4: Squats x 15 ea  Aquatic Exercise Activity 5: SLR forw, retro, and lateral x15 ea  Aquatic Exercise Activity 6: streches Hamstring and gastroc " "3x20  Aquatic Exercise Activity 7: shoulder rolls CW and CCW  x10 ea  Aquatic Exercise Activity 8: Scap Squeeze 3\" H x 15  Aquatic Exercise Activity 9: shoulder abduction and horizintal shoulder abduction x 10 ea (hand up/hands down)      Goals:  Active       Chuck shoulder weakness       Improve chuck shoulder/scapula stabilizer strength >=1/2 muscle grade for improved lifting.         Start:  03/20/25    Expected End:  06/18/25            <=1/10 right shoulder pain for improved house/yard work.        Start:  03/20/25    Expected End:  06/18/25            Improve QuickDash by >=10 points for improved mobility.         Start:  03/20/25    Expected End:  06/18/25            Chuck shoulder AROM WFL for reaching into cupboards.        Start:  03/20/25    Expected End:  06/18/25            Independent HEP for continued gains after PT is complete.        Start:  03/20/25    Expected End:  06/18/25                Improve Merino Balance Assessment score >=6 points to avoid falls.        Start:  05/06/25    Expected End:  08/04/25            Pt will tolerate >=40 minutes of aquatic exercise for improved endurance.        Start:  05/06/25    Expected End:  08/04/25                Improve chuck LE strength >=1/2 muscle grade for improved balance and mobility.        Start:  05/06/25    Expected End:  08/04/25                   "

## 2025-05-22 ENCOUNTER — APPOINTMENT (OUTPATIENT)
Dept: PHYSICAL THERAPY | Facility: HOSPITAL | Age: 45
End: 2025-05-22
Payer: COMMERCIAL

## 2025-05-22 ENCOUNTER — DOCUMENTATION (OUTPATIENT)
Dept: PHYSICAL THERAPY | Facility: HOSPITAL | Age: 45
End: 2025-05-22
Payer: COMMERCIAL

## 2025-05-22 RX ORDER — TIZANIDINE 4 MG/1
4 TABLET ORAL DAILY
Qty: 90 TABLET | Refills: 1 | Status: SHIPPED | OUTPATIENT
Start: 2025-05-22

## 2025-05-22 NOTE — PROGRESS NOTES
Physical Therapy                 Therapy Communication Note    Patient Name: Ally Carpio  MRN: [id number]  Department: [address]   Today's Date: 5/22/2025     Discipline: Physical Therapy    Missed Visit:       Missed Visit Reason:      Missed Time: Cancel    Comment: sore

## 2025-05-23 ENCOUNTER — PATIENT MESSAGE (OUTPATIENT)
Dept: PRIMARY CARE | Facility: CLINIC | Age: 45
End: 2025-05-23
Payer: COMMERCIAL

## 2025-05-23 DIAGNOSIS — K21.9 GASTROESOPHAGEAL REFLUX DISEASE, UNSPECIFIED WHETHER ESOPHAGITIS PRESENT: ICD-10-CM

## 2025-05-23 DIAGNOSIS — E03.9 HYPOTHYROIDISM, UNSPECIFIED TYPE: ICD-10-CM

## 2025-05-23 DIAGNOSIS — M79.7 FIBROMYALGIA: ICD-10-CM

## 2025-05-23 DIAGNOSIS — G47.00 INSOMNIA, UNSPECIFIED TYPE: ICD-10-CM

## 2025-05-23 DIAGNOSIS — F33.1 DEPRESSION, MAJOR, RECURRENT, MODERATE: ICD-10-CM

## 2025-05-25 ENCOUNTER — ANCILLARY PROCEDURE (OUTPATIENT)
Dept: URGENT CARE | Age: 45
End: 2025-05-25
Payer: COMMERCIAL

## 2025-05-25 ENCOUNTER — OFFICE VISIT (OUTPATIENT)
Dept: URGENT CARE | Age: 45
End: 2025-05-25
Payer: COMMERCIAL

## 2025-05-25 VITALS
TEMPERATURE: 98.2 F | SYSTOLIC BLOOD PRESSURE: 117 MMHG | OXYGEN SATURATION: 98 % | HEART RATE: 61 BPM | DIASTOLIC BLOOD PRESSURE: 75 MMHG

## 2025-05-25 DIAGNOSIS — M25.552 PAIN IN LEFT HIP: ICD-10-CM

## 2025-05-25 DIAGNOSIS — S79.912A HIP INJURY, LEFT, INITIAL ENCOUNTER: Primary | ICD-10-CM

## 2025-05-25 PROCEDURE — 1036F TOBACCO NON-USER: CPT

## 2025-05-25 PROCEDURE — 73502 X-RAY EXAM HIP UNI 2-3 VIEWS: CPT | Mod: LEFT SIDE

## 2025-05-25 PROCEDURE — 99213 OFFICE O/P EST LOW 20 MIN: CPT

## 2025-05-25 ASSESSMENT — PAIN SCALES - GENERAL: PAINLEVEL_OUTOF10: 8

## 2025-05-25 ASSESSMENT — ENCOUNTER SYMPTOMS
CONSTITUTIONAL NEGATIVE: 1
ARTHRALGIAS: 1
HIP PAIN: 1

## 2025-05-25 NOTE — PROGRESS NOTES
Subjective   Patient ID: Ally Carpio is a 44 y.o. female. They present today with a chief complaint of Hip Pain (Fall 1 hour ago. Pain radiating down thigh. Pt has foot drop in both feet. Lost balance. No LOC.).    History of Present Illness  44-year-old female presents clinic today with complaints of a left hip injury.  Patient states an hour ago she was trying to move her 1 leg.  She normally wears leg braces and has bilateral foot drop.  She states she fell landing directly on the left hip.  She states she is feeling the pain radiate down the left leg.  It goes down the lateral portion to the knee.  Denies any change in numbness and tingling from what she normally has.      Hip Pain      Past Medical History  Allergies as of 05/25/2025 - Reviewed 05/25/2025   Allergen Reaction Noted    Adhesive tape-silicones Rash 04/21/2025    Balsalazide Other 04/24/2024    Iron GI Upset 12/18/2024    Mesalamine Rash 04/24/2024       Prescriptions Prior to Admission[1]     Medical History[2]    Surgical History[3]     reports that she quit smoking about 8 years ago. Her smoking use included cigarettes. She started smoking about 20 years ago. She has a 12.5 pack-year smoking history. She has never used smokeless tobacco. She reports that she does not currently use alcohol. She reports that she does not use drugs.    Review of Systems  Review of Systems   Constitutional: Negative.    Musculoskeletal:  Positive for arthralgias.                                  Objective    Vitals:    05/25/25 1600   BP: 117/75   Pulse: 61   Temp: 36.8 °C (98.2 °F)   SpO2: 98%     Patient's last menstrual period was 12/25/2024.    Physical Exam  Constitutional:       Appearance: Normal appearance.   Musculoskeletal:      Left hip: Bony tenderness present.      Comments: Tenderness over greater trochanter, no tenderness over ischial spine, SI joint or tuberosity   Neurological:      Mental Status: She is alert.         Procedures    Point  of Care Test & Imaging Results from this visit  No results found for this visit on 05/25/25.   Imaging  XR hip left with pelvis when performed 2 or 3 views  Result Date: 5/25/2025  No acute osseous abnormality of the pelvis or proximal right or left femur.   Mild right hip dysplasia.   MACRO: None.   Signed by: Adalid Wetzel 5/25/2025 4:43 PM Dictation workstation:   [id number]      Cardiology, Vascular, and Other Imaging  No other imaging results found for the past 2 days      Diagnostic study results (if any) were reviewed by [facility].    Assessment/Plan   Allergies, medications, history, and pertinent labs/EKGs/Imaging reviewed by Gilmer Casillas PA-C.     Medical Decision Making  Patient pleasant cooperative on examination.    On examination there is noted tenderness over the greater trochanter.  There is no tenderness over the femur.    X-rays were obtained and did not reveal any acute fracture or dislocation.    I discussed patient this is a soft tissue injury.    Advised on ice, elevation.  May take Tylenol for discomfort.    Follow-up with orthopedics or primary care if symptoms persist.    Patient agreement with plan.    Orders and Diagnoses  There are no diagnoses linked to this encounter.    Medical Admin Record      Patient disposition: Home    Electronically signed by [facility]  5:05 PM           [1] (Not in a hospital admission)   [2]   Past Medical History:  Diagnosis Date    ALEXEY positive     Cardiology follow-up encounter 09/17/2024    evaluation of bradycardia and fatigue Stress test recommended; however, patient is unable to perform s/t neurological dysfunction    Chronic pain syndrome     Depression     PIERRE (dyspnea on exertion)     Encounter for hematology follow-up 12/16/2024    Ursula Salinas PA-C    Fibromyalgia     GERD (gastroesophageal reflux disease)     H/O echocardiogram 09/26/2023    CONCLUSIONS:   1. Left ventricular systolic function is normal with a  "65% estimated ejection fraction.    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     s/p C6-C7 Cervical Spine Arthroplasty ~ Anterior Approach 24    History of ITP     s/p D & C Hysteroscopy 24    Hx of idiopathic thrombocytopenic purpura     follows with heme, 624: plt 111    Hypothyroidism     CAMDEN (iron deficiency anemia)     24 H&H WNL- IV iron   Started on oral iron but can't tolerate due to GI upset   Oncology Ursula Salinas PA-C 2024    Inflammatory bowel disease     Insomnia     Menorrhagia with regular cycle     Plan: Robot Assisted Laparoscopic Total Hysterectomy; Possible Removal of Tubes & or  Ovaries; Cystoscopy 25    Neurology follow-up encounter 10/15/2024    Paul Holt MD \"It remains unclear whether you have MS or not. I will repeat your brain MRI again in 2025, which together with the eye test can help in determining whether you have MS or not. If the picture remains unclear, we may need to schedule you for a spinal tap.\"    Neuropathy     Palpitation     Psoriasis     Radiculopathy, cervical     Radiculopathy, lumbar region     RLS (restless legs syndrome)     Sleep apnea     Ulcerative colitis    [3]   Past Surgical History:  Procedure Laterality Date    CERVICAL SPINE SURGERY  2024    C6-C7 Cervical Spine Arthroplasty ~ Anterior Approach     SECTION, LOW TRANSVERSE      x 2, .     COLONOSCOPY      DILATION AND CURETTAGE OF UTERUS      HYSTEROSCOPY  2024    D & C Hysteroscopy    UPPER GASTROINTESTINAL ENDOSCOPY       "

## 2025-05-27 ENCOUNTER — APPOINTMENT (OUTPATIENT)
Dept: ORTHOPEDIC SURGERY | Facility: CLINIC | Age: 45
End: 2025-05-27
Payer: COMMERCIAL

## 2025-05-27 DIAGNOSIS — M54.12 CERVICAL RADICULOPATHY: Primary | ICD-10-CM

## 2025-05-27 DIAGNOSIS — M47.812 CERVICAL SPONDYLOSIS: ICD-10-CM

## 2025-05-27 DIAGNOSIS — R29.898 WEAKNESS OF BOTH SHOULDERS: ICD-10-CM

## 2025-05-27 PROCEDURE — 99204 OFFICE O/P NEW MOD 45 MIN: CPT | Performed by: PHYSICAL MEDICINE & REHABILITATION

## 2025-05-27 RX ORDER — PANTOPRAZOLE SODIUM 40 MG/1
40 TABLET, DELAYED RELEASE ORAL DAILY
Qty: 90 TABLET | Refills: 1 | Status: SHIPPED | OUTPATIENT
Start: 2025-05-27 | End: 2025-11-23

## 2025-05-27 RX ORDER — PREGABALIN 225 MG/1
225 CAPSULE ORAL 2 TIMES DAILY
Qty: 180 CAPSULE | Refills: 1 | Status: SHIPPED | OUTPATIENT
Start: 2025-05-27 | End: 2025-11-23

## 2025-05-27 RX ORDER — LEVOTHYROXINE SODIUM 25 UG/1
12.5 TABLET ORAL DAILY
Qty: 45 TABLET | Refills: 1 | Status: SHIPPED | OUTPATIENT
Start: 2025-05-27 | End: 2025-11-23

## 2025-05-27 RX ORDER — HYDROXYZINE HYDROCHLORIDE 10 MG/1
10-20 TABLET, FILM COATED ORAL NIGHTLY PRN
Qty: 180 TABLET | Refills: 1 | Status: SHIPPED | OUTPATIENT
Start: 2025-05-27

## 2025-05-27 RX ORDER — ESCITALOPRAM OXALATE 20 MG/1
20 TABLET ORAL DAILY
Qty: 90 TABLET | Refills: 1 | Status: SHIPPED | OUTPATIENT
Start: 2025-05-27 | End: 2025-11-23

## 2025-05-27 SDOH — SOCIAL STABILITY: SOCIAL NETWORK: SOCIAL ACTIVITY:: 5

## 2025-05-27 NOTE — PROGRESS NOTES
New Consult/New Patient Note    5/27/2025   Kwaku Mederos MD    Assessment:  This is a pleasant 44-year-old female who presents for evaluation of right shoulder pain and weakness. Signs, symptoms, and clinical exam does suggest some degree of myofascial pain and contribution from possible demyelinating disease contributing to her pain. Symptoms are severe in nature and affect her activities of daily living.   - History of C6-C7 total disc replacement   - Myofascial pain syndrome   - Fibromyalgia   - Concern for demyelinating disease (follows with neurology)   - A long discussion with the patient that it somewhat difficult to explain all of her upper extremity symptoms based on her most recent cervical MRI and EMG findings.  There does not appear to be any severe central canal or foraminal narrowing.  She understands that further injections in this area would likely not help with weakness which seems to be her main concern.    Treatment to date:   - T10-11 transforaminal epidural steroid injection with Dr. Rao (pain medicine) on 3/14/25    PLAN:  1)  Imaging/Diagnostic Studies: Extensive review and interpretation of records today. EMG of the right upper extremity shows no evidence of neuropathy. Cervical MRI shows multilevel disc bulging without evidence of significant central or foraminal stenosis that would explain her symptoms.  At least 10 minutes was spent prior records and imaging/studies.  2)  Therapy/Rehabilitation: No new referrals provided today.   3)  Pharmacological Management: Most recent labs reviewed showing normal renal function.  4)  Spine/Surgical Interventions: None planned at this time.   5)  Alternative Treatments: May consider alternative treatment options in the future including manipulation (chiropractor versus osteopathic) and/or acupuncture if patient does not obtain optimal relief with initial treatment plan.  6)  Consultations: She plans on following up with neurology  7)   Follow -up: 4-6 weeks or PRN if symptoms worsen/do not improve.   8)  Future treatment considerations: Currently working with [facility] and neurology.  I recommend she follow-up with neurology as her next step for further evaluation given her limited benefit from cervical spine treatment    Patient advised of the difference between hurt and harm and advised to continue with all normal activities and exercises. Patient verbalized understanding of the above plan and was happy with the care provided.      The above clinical summary has been dictated with voice recognition software. It has not been proofread for grammatical errors, typographical mistakes, or other semantic inconsistencies.    Thank you for visiting our office today. It was our pleasure to take part in your healthcare.     Do not hesitate to call with any questions regarding your plan of care after leaving at [phone]    To clinicians, thank you very much for this kind referral. It is a privilege to partner with you in the care of your patients. My office would be delighted to assist you with any further consultations or with questions regarding the plan of care outlined. Do not hesitate to call the office or contact me directly.     Sincerely,    Patient seen and discussed with attending. Note is not finalized until signed by attending physician.     Jorje Sauceda, DO PGY3  Physical Medicine & Rehabilitation     Seen with resident Dr. Sauceda, note above and below is a joint effort in all areas.    I saw and evaluated the patient. I personally obtained the key and critical portions of the history and physical exam. I reviewed the resident's documentation and discussed the patient with the resident. I agree with the resident's medical decision making as documented in the resident's note, with my additions.      CINDY Khan MD  , Physical Medicine and Rehabilitation, Orthopedic Spine  [facility]  [employer]  [facility] [place]       Ally Carpio  is a 44 y.o. female with past medical history of fibromyalgia who presents with bilateral shoulder weakness. She has a history of neck surgery (C6-C7 disc hernation s/p C6-C7 TDR) July 2024 after developing weakness in her left leg. Symptoms persisted and she is most concerned today about the pain in her right arm. She also complains of weakness in her shoulders bilaterally. Most of her pain is in her mid back, anterior shoulder and radiates down into her 3rd and 4th digits and thumb of her right hand. Has some weakness in her hand when holding her arm up. Most of her pain started in December; no accidents/traumas/injuries. Has slowly been getting worse. Still waiting to hear from neurologist regarding workup for demyelinating disease.     Location: right shoulder and right mid back   Radiation: down right arm   Quality:  all (sharp, stabby, achey, burny dull) current 5/10, at its worst  9/10  Exacerbated by activity   Relieved by rest  Onset, traumatic event: denies    Valsalva sign is negative  Grocery cart sign is not applicable   Smoker:  no  Does wake them at night occasionally  Litigation: no    Patient denies bowel/bladder incontinence, denies fever, denies unintentional weight loss, endorses clumsiness of hands bilaterally and feet (drop foot bilaterally), or dropping things (towards end of day).     PREVIOUS TREATMENTS  IN THE LAST SIX MONTHS     Active conservative therapy  in the last six months (see below)              1. Physical therapy: ongoing since December once per week                                                                                       2. Home exercise program after PT: yes                                                 3. A physician supervised home exercise program (HEP):                 4. Chiropractic Care: yes                           5. Massage therapy: yes                                                         Passive conservative therapy  in the last six months (see below)              1. NSAIDS: cannot tolerate                                                                                                         2. Prescription pain medication: Lyrica to 225 mg twice daily, tizanidine 4 mg daily                                                             3. Acupuncture: yes                                                                                        4. Tens unit: yes, somewhat helpful      Assistive Devices: long distances walker     Work status: no      ROS: Other than listed in HPI, PMHX below, and intake paperwork including a 30 point patient-recorded review of symptoms which was personally reviewed and inclusive of no history of unintentional weight loss, change in appetite, significant malaise, fevers, chills, or change in bowel/bladder, shortness of breath, or chest pain.    I have confirmed and edited as necessary Past Medical, Past Surgical, Family, Social History and ROS as obtained by others. These were also obtained on new patient forms.      PHYSICAL EXAM:   GENERAL APPEARANCE:  Well nourished, well developed, and no apparent distress.  NEURO PSYCH: Patient oriented to person, place, Mood pleasant. Benign affect.  MUSCULOSKELETAL and NEUROLOGICAL       VISUAL INSPECTION          THORACIC: WNL           LUMBAR: WNL  SPINE ROM:   LUMBAR ROM: Full lumbar flexion and extension.       PALPATION:           SPINOUS PROCESS: No tenderness to palpation.            PARASPINALS: No tenderness to palpation.  FACET LOADING: Negative bilaterally  MUSCLE BULK: Normal and symmetrical in the upper & lower extremities.  MUSCLE TONE: Normal  MOTOR: 5/5 in all muscle groups tested in bilateral upper extremities.   SENSORY: Normal sensory exam to light touch in bilateral upper extremities.   GAIT: Normal.  Unable to go up and heels and toes; wearing bilateral AFO's today in office. Slow,  limping gait.   REFLEXES: +3 to bilateral upper and lower extremities.   LONG TRACT SIGNS: Incidentally noted Reyes's positive bilaterally.   PERIPHERAL JOINT ROM:   SHOULDER ROM: Active shoulder flexion/abduction limited to 90 degrees; passive full to 170 abduction 180 flexion.   SPURLING'S TEST: Negative on the right.   BAKODY'S SIGN:  Unable to assess.     DATA REVIEW:   The below imaging studies were personally reviewed and discussed with the patient.    Medical Decision Making:  The above note constitutes a Moderate to High level of medical decision making based on past data and imaging review, new and chronic symptoms with exacerbation, change in weakness or sensation, new imaging and diagnostic studies ordered, discussion of potential interventional or surgical treatment options, acute or chronic pain that may pose a threat to bodily function.    Medical History[1]    Medication Documentation Review Audit       Reviewed by Gilmer Casillas PA-C (Physician Assistant) on 05/25/25 at 1707      Medication Order Taking? Sig Documenting Provider Last Dose Status   ARIPiprazole (Abilify) 5 mg tablet [id number]  Take 1 tablet (5 mg) by mouth once daily. For the first 2 weeks take 2.5 mg daily Marisel Zhang, APRN-CNP  Active   clobetasol (Temovate) 0.05 % ointment [id number]  Apply topically 2 times a day. Historical Provider, MD  Active   cyanocobalamin (Vitamin B-12) 1,000 mcg/mL injection [id number]  Inject 1 mL (1,000 mcg) into the muscle see administration instructions. Please inject daily x 7, followed by Weekly x 4 and then Monthly Ursula Salinas PA-C  Active   ergocalciferol (Vitamin D-2) 1.25 MG (80826 UT) capsule [id number]  Take 1 capsule (1,250 mcg) by mouth 1 (one) time per week. Paul Holt MD  Active   escitalopram (Lexapro) 20 mg tablet [id number]  Take 1 tablet (20 mg) by mouth once daily. Deena Thibodeaux DO  Active   hydrOXYzine HCL (Atarax) 10 mg tablet [id number]  Take 1-2 tablets (10-20 mg) by  "mouth as needed at bedtime (insomnia). Deena Thibodeaux DO  Active   levothyroxine (Synthroid, Levoxyl) 25 mcg tablet [id number]  Take 0.5 tablets (12.5 mcg) by mouth once daily. Deena Thibodeaux DO  Active   lidocaine-diphenhydrAMINE-Maalox 1:1:1 Magic Mouthwash [id number] Yes swish and spit with 5 ml by mouth every 6 hours if needed for mucositis Historical Provider, MD  Active   Mag-G 27 mg magnesium (500 mg) tablet [id number]  Take 1 tablet (27 mg) by mouth every 12 hours. Historical Provider, MD  Active   magic mouthwash (lidocaine, diphenhydrAMINE, Maalox 1:1:1) [id number]  Swish and spit 5 mL every 6 hours if needed for mucositis. Deena Thibodeaux DO  Active   mirikizumab-mrkz ([place]oh Pen) 100 mg/mL pen injector injection [id number]  Inject 2 mL (200 mg) under the skin every 28 (twenty-eight) days. Artem Welch MD  Active   miscellaneous medical supply misc [id number]  Bilateral AFO. Apply to bilateral lower legs daily. Deena Thibodeaux DO  Active   multivitamin tablet [id number]  Take 1 tablet by mouth once daily. Historical Provider, MD  Active   pantoprazole (ProtoNix) 40 mg EC tablet [id number]  Take 1 tablet (40 mg) by mouth once daily. Do not crush, chew, or split. Deena Thibodeaux DO  Active   pregabalin (Lyrica) 225 mg capsule [id number]  Take 1 capsule (225 mg) by mouth 2 times a day. Deena Thibodeaux DO  Active   PreviDent 5000 Sensitive 1.1-5 % paste [id number]   Historical Provider, MD  Active   tacrolimus (Protopic) 0.1 % ointment [id number]  if needed. Historical Provider, MD  Active     Discontinued 05/22/25 1349   tiZANidine (Zanaflex) 4 mg tablet [id number]  TAKE ONE TABLET BY MOUTH DAILY Sparkle Villa, APRN-CNP  Active   UltiCare Safety Syringe 3 mL 22 gauge x 1\" syringe [id number]   Historical Provider, MD  Active   Vtama 1 % cream [id number]  if needed. Historical Provider, MD  Active                    RX Allergies[2]    Social History     Socioeconomic History    " Marital status:      Spouse name: Not on file    Number of children: Not on file    Years of education: Not on file    Highest education level: Not on file   Occupational History    Not on file   Tobacco Use    Smoking status: Former     Current packs/day: 0.00     Average packs/day: 1 pack/day for 12.5 years (12.5 ttl pk-yrs)     Types: Cigarettes     Start date: 2004     Quit date: 2017     Years since quittin.4    Smokeless tobacco: Never   Vaping Use    Vaping status: Never Used   Substance and Sexual Activity    Alcohol use: Not Currently     Comment: on rare occasions    Drug use: Never    Sexual activity: Yes     Partners: Male     Birth control/protection: Male Sterilization     Comment: Pt stopped her Norethindrone about one week ago- does not desire to be on birth control pills any longer   Other Topics Concern    Not on file   Social History Narrative    Not on file     Social Drivers of Health     Financial Resource Strain: Not on file   Food Insecurity: No Food Insecurity (2024)    Hunger Vital Sign     Worried About Running Out of Food in the Last Year: Never true     Ran Out of Food in the Last Year: Never true   Transportation Needs: Not on file   Physical Activity: Not on file   Stress: No Stress Concern Present (2025)    [employer] [place] [employer] - Occupational Stress Questionnaire     Feeling of Stress : Not at all   Social Connections: Not on file   Intimate Partner Violence: Not At Risk (2025)    Humiliation, Afraid, Rape, and Kick questionnaire     Fear of Current or Ex-Partner: No     Emotionally Abused: No     Physically Abused: No     Sexually Abused: No   Housing Stability: Not on file       Surgical History[3]         [1]   Past Medical History:  Diagnosis Date    ALEXEY positive     Cardiology follow-up encounter 2024    evaluation of bradycardia and fatigue Stress test recommended; however, patient is unable to perform s/t neurological  "dysfunction    Chronic pain syndrome     Depression     PIERRE (dyspnea on exertion)     Encounter for hematology follow-up 2024    Ursula Salinas PA-C    Fibromyalgia     GERD (gastroesophageal reflux disease)     H/O echocardiogram 2023    CONCLUSIONS:   1. Left ventricular systolic function is normal with a 65% estimated ejection fraction.    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     s/p C6-C7 Cervical Spine Arthroplasty ~ Anterior Approach 24    History of ITP     s/p D & C Hysteroscopy 24    Hx of idiopathic thrombocytopenic purpura     follows with heme, 6.13.24: plt 111    Hypothyroidism     CAMDEN (iron deficiency anemia)     24 H&H WNL- IV iron   Started on oral iron but can't tolerate due to GI upset   Oncology Ursula Salinas PA-C 2024    Inflammatory bowel disease     Insomnia     Menorrhagia with regular cycle     Plan: Robot Assisted Laparoscopic Total Hysterectomy; Possible Removal of Tubes & or  Ovaries; Cystoscopy 25    Neurology follow-up encounter 10/15/2024    Paul Holt MD \"It remains unclear whether you have MS or not. I will repeat your brain MRI again in 2025, which together with the eye test can help in determining whether you have MS or not. If the picture remains unclear, we may need to schedule you for a spinal tap.\"    Neuropathy     Palpitation     Psoriasis     Radiculopathy, cervical     Radiculopathy, lumbar region     RLS (restless legs syndrome)     Sleep apnea     Ulcerative colitis    [2]   Allergies  Allergen Reactions    Adhesive Tape-Silicones Rash    Balsalazide Other     Mouth ulcers    Iron GI Upset    Mesalamine Rash   [3]   Past Surgical History:  Procedure Laterality Date    CERVICAL SPINE SURGERY  2024    C6-C7 Cervical Spine Arthroplasty ~ Anterior Approach     SECTION, LOW TRANSVERSE      x 2, .     COLONOSCOPY      DILATION AND CURETTAGE OF UTERUS      HYSTEROSCOPY  " 05/22/2024    D & C Hysteroscopy    UPPER GASTROINTESTINAL ENDOSCOPY

## 2025-05-28 ENCOUNTER — APPOINTMENT (OUTPATIENT)
Dept: PRIMARY CARE | Facility: CLINIC | Age: 45
End: 2025-05-28
Payer: COMMERCIAL

## 2025-05-29 ENCOUNTER — TREATMENT (OUTPATIENT)
Dept: PHYSICAL THERAPY | Facility: HOSPITAL | Age: 45
End: 2025-05-29
Payer: COMMERCIAL

## 2025-05-29 DIAGNOSIS — R29.898 WEAKNESS OF BOTH SHOULDERS: ICD-10-CM

## 2025-05-29 PROCEDURE — 97110 THERAPEUTIC EXERCISES: CPT | Mod: GP,CQ

## 2025-05-29 ASSESSMENT — PAIN SCALES - GENERAL
PAINLEVEL_OUTOF10: 5 - MODERATE PAIN
PAINLEVEL_OUTOF10: 4

## 2025-05-29 ASSESSMENT — PAIN DESCRIPTION - DESCRIPTORS: DESCRIPTORS: ACHING

## 2025-05-29 ASSESSMENT — PAIN - FUNCTIONAL ASSESSMENT: PAIN_FUNCTIONAL_ASSESSMENT: 0-10

## 2025-05-29 NOTE — PROGRESS NOTES
Physical Therapy    Physical Therapy Treatment    Patient Name: Ally Carpio  MRN: [id number]  : 1980   Today's Date: 2025  Time Calculation  Start Time: 1730  Stop Time:   Time Calculation (min): 47 min     PT Therapeutic Procedures Time Entry  Therapeutic Exercise Time Entry: 47                 Visit Number:     Current Problem  Problem List Items Addressed This Visit           ICD-10-CM    Weakness of both shoulders R29.898        Subjective   Pt states she fell over the weekend because she attempted to walk a few steps without her AFOs or an assistive device. Pt states her ankle rolled and she fell directly on her R hip causing pain and bruising states she did go to   Precautions  Precautions  Precautions Comment: fall risk    Pain  Pain Assessment: 0-10  0-10 (Numeric) Pain Score: 4  Pain Location: Arm  Pain Orientation: Right      Objective   Pt presents with Chuck AFOs amb with rollator demonstrating decreased Chuck heel strike and forwarded flexed posture with rounded shoulders forward head seated and standing posture.   Treatments:  EXERCISES Date 4/10/2025 2025  5/15/2025  2025   VISIT# #7 #8 #9 #10                 6-min walk test   2 minutes 55 sec, 280ft      **NEXT     Left upper trap stretch       Levator scap stretch              NuStep L3 5min NEXT L3 10 min L3 10min   Pulleys:       FF 3min NEXT 3 min 3 min   SCAP 3min NEXT 3 min 3 min          Parallel bars:       Lateral walk  NEXT 2 laps Chuck UE 2 laps Chuck UE    Marches  NEXT 1 laps UE floating 2 laps Chuck UE    Tap-ups  NEXT 2 in step 1x10 Chuck 4inch [id number] Chuck UE   Foam marches  NEXT 2x10 UE floating    Foam trunk turns  NEXT 2x10 no UE    Walk with head turns  NEXT 1 lap Chuck UE 1 laps horz Chuck UE                  Shuttle       DLP  NEXT Declined    SLP - chuck  NEXT Declined           T-band: All bands HEP      Pull downs       Mid rows       ER       IR       Chuck ER        Horz Abd               Wall push-ups  (+)              Side lying ER       Side lying ABD       Supine 90 deg arm circles, CW/CCW              Prone:       Y's       T's       I's                Reassess and review/progress HEP - 45 min      HEP: Access Code: [email or web address]    Exercises  - Supine Shoulder Flexion Extension AAROM with Dowel  - 1-2 x daily - 1-2 sets - 10 reps  - Supine Shoulder Protraction with Dowel  - 2 x daily - 1-2 sets - 10 reps  - Seated Shoulder Abduction AAROM with Dowel  - 1-2 x daily - 1-2 sets - 10 reps  - Seated Scapular Retraction  - 1-2 x daily - 1-2 sets - 10 reps    NEW 3/31/25:  New 4/10/25 Access Code: [email or web address]          Assessment:  Pt tolerated exercises well requiring increased rest breaks this date. Pt states she is more fatigued later in the day.     Plan:  Continue to improve over all strength and activity tolerance for improved functional abilities.   OP PT Plan  Treatment/Interventions: Education/ Instruction, Manual therapy, Therapeutic exercises  PT Plan: Skilled PT (monitor fatigue and soreness)  PT Frequency: 2 times per week  Duration: 4-6 wks  Certification Period Start Date: 02/26/25  Certification Period End Date: 02/26/26  Number of Treatments Authorized: 60    Goals:  Active       Chuck shoulder weakness       Improve chuck shoulder/scapula stabilizer strength >=1/2 muscle grade for improved lifting.         Start:  03/20/25    Expected End:  06/18/25            <=1/10 right shoulder pain for improved house/yard work.        Start:  03/20/25    Expected End:  06/18/25            Improve QuickDash by >=10 points for improved mobility.         Start:  03/20/25    Expected End:  06/18/25            Chuck shoulder AROM WFL for reaching into cupboards.        Start:  03/20/25    Expected End:  06/18/25            Independent HEP for continued gains after PT is complete.        Start:  03/20/25    Expected End:  06/18/25                Improve Merino Balance Assessment score >=6 points to avoid falls.        Start:   05/06/25    Expected End:  08/04/25            Pt will tolerate >=40 minutes of aquatic exercise for improved endurance.        Start:  05/06/25    Expected End:  08/04/25                Improve karen LE strength >=1/2 muscle grade for improved balance and mobility.        Start:  05/06/25    Expected End:  08/04/25

## 2025-05-30 ENCOUNTER — OFFICE VISIT (OUTPATIENT)
Dept: OBSTETRICS AND GYNECOLOGY | Facility: CLINIC | Age: 45
End: 2025-05-30
Payer: COMMERCIAL

## 2025-05-30 VITALS
BODY MASS INDEX: 24.75 KG/M2 | HEIGHT: 66 IN | WEIGHT: 154 LBS | DIASTOLIC BLOOD PRESSURE: 48 MMHG | SYSTOLIC BLOOD PRESSURE: 104 MMHG

## 2025-05-30 DIAGNOSIS — Z09 POSTOPERATIVE EXAMINATION: ICD-10-CM

## 2025-05-30 PROCEDURE — 99213 OFFICE O/P EST LOW 20 MIN: CPT | Performed by: OBSTETRICS & GYNECOLOGY

## 2025-05-30 PROCEDURE — 1036F TOBACCO NON-USER: CPT | Performed by: OBSTETRICS & GYNECOLOGY

## 2025-05-30 PROCEDURE — 3008F BODY MASS INDEX DOCD: CPT | Performed by: OBSTETRICS & GYNECOLOGY

## 2025-05-30 ASSESSMENT — ENCOUNTER SYMPTOMS
LOSS OF SENSATION IN FEET: 0
DEPRESSION: 0
OCCASIONAL FEELINGS OF UNSTEADINESS: 0

## 2025-05-30 ASSESSMENT — PAIN SCALES - GENERAL: PAINLEVEL_OUTOF10: 0-NO PAIN

## 2025-05-30 NOTE — PROGRESS NOTES
Subjective   Patient ID: Ally Carpio is a 44 y.o. female who presents for Post-op (Pt is here foe her Post op visit. Pt has about hot flashes and facial hair. Pt has no concerns or issue at this time./No pain /No fall/Pt declined chaperone/[facility]).  HPI  Here for follow-up from laparoscopic total hysterectomy with bilateral salpingectomy.  Robot assist.  Patient has been doing very well.  Notes that she is not having any pain.  No issues with her bladder or with bowel movements.  Eating and ambulating without difficulty.  Patient currently is sexually active with steady partner.  No issues.    Review of Systems  Negative    Objective   Physical Exam  Vitals reviewed.   Constitutional:       Appearance: Normal appearance. She is normal weight.   Cardiovascular:      Rate and Rhythm: Normal rate and regular rhythm.   Pulmonary:      Effort: Pulmonary effort is normal.      Breath sounds: Normal breath sounds.   Abdominal:      General: Bowel sounds are normal. There is no distension.      Palpations: Abdomen is soft. There is no mass.      Tenderness: There is no abdominal tenderness.      Comments: Well-healed   Neurological:      General: No focal deficit present.      Mental Status: She is alert.   Psychiatric:         Mood and Affect: Mood normal.         Behavior: Behavior normal.         Thought Content: Thought content normal.         Judgment: Judgment normal.     Assessment/Plan      Patient doing well follow-up from robotically assisted laparoscopic hysterectomy bilateral salpingectomy.  Has healed well.  No current complaints.  Diet and ambulation without difficulty no pain.  Denies any vaginal discharge leakage.  Sexually active without difficulty.  Plan return to office in 6 months for annual exam or as needed    Kwaku Schafer MD 05/30/25 1:21 PM

## 2025-06-02 ENCOUNTER — TELEPHONE (OUTPATIENT)
Dept: HEMATOLOGY/ONCOLOGY | Facility: CLINIC | Age: 45
End: 2025-06-02

## 2025-06-02 ENCOUNTER — TREATMENT (OUTPATIENT)
Dept: PHYSICAL THERAPY | Facility: HOSPITAL | Age: 45
End: 2025-06-02
Payer: COMMERCIAL

## 2025-06-02 DIAGNOSIS — R53.1 WEAKNESS: Primary | ICD-10-CM

## 2025-06-02 DIAGNOSIS — G89.4 CHRONIC PAIN SYNDROME: ICD-10-CM

## 2025-06-02 DIAGNOSIS — E61.1 IRON DEFICIENCY: ICD-10-CM

## 2025-06-02 DIAGNOSIS — R29.898 WEAKNESS OF BOTH SHOULDERS: ICD-10-CM

## 2025-06-02 DIAGNOSIS — D50.0 IRON DEFICIENCY ANEMIA DUE TO CHRONIC BLOOD LOSS: ICD-10-CM

## 2025-06-02 DIAGNOSIS — G37.9 CNS DEMYELINATING DISORDER (MULTI): ICD-10-CM

## 2025-06-02 DIAGNOSIS — M54.16 RADICULOPATHY, LUMBAR REGION: ICD-10-CM

## 2025-06-02 PROCEDURE — 97113 AQUATIC THERAPY/EXERCISES: CPT | Mod: GP,CQ

## 2025-06-02 ASSESSMENT — PAIN - FUNCTIONAL ASSESSMENT: PAIN_FUNCTIONAL_ASSESSMENT: 0-10

## 2025-06-02 ASSESSMENT — PAIN SCALES - GENERAL
PAINLEVEL_OUTOF10: 4
PAINLEVEL_OUTOF10: 4

## 2025-06-02 ASSESSMENT — PAIN DESCRIPTION - DESCRIPTORS: DESCRIPTORS: TIGHTNESS

## 2025-06-02 NOTE — PROGRESS NOTES
"Physical Therapy    Physical Therapy Treatment    Patient Name: Ally Carpio  MRN: [id number]  Today's Date: 6/2/2025  Time Calculation  Start Time: 1430  Stop Time: 1515  Time Calculation (min): 45 min  VISIT 13  PT Therapeutic Procedures Time Entry  Aquatic Therapy Time Entry: 45    Assessment:  PT Assessment  Assessment Comment: good tia of treatment pt reports not as fatigued in UE, C/O fatigue in Chuck LE with increased reps and pace amb, pain overall 3/10    Plan:  OP PT Plan  Treatment/Interventions: Education/ Instruction, Manual therapy, Therapeutic exercises  PT Plan: Skilled PT (monitor fatigue and soreness)  PT Frequency: 2 times per week  Duration: 4-6 wks  Certification Period Start Date: 02/26/25  Certification Period End Date: 02/26/26  Number of Treatments Authorized: 60    Current Problem  1. Weakness        2. Weakness of both shoulders  Follow Up In Physical Therapy    Possibly related to underlying neurologic disease.  Also possible frozen shoulder.  Recommend trial of physical therapy.  Consider orthopedic consult.      3. CNS demyelinating disorder (Multi)        4. Chronic pain syndrome        5. Radiculopathy, lumbar region            Subjective         Precautions  Precautions  Precautions Comment: fall risk     Pain  Pain Assessment: 0-10  0-10 (Numeric) Pain Score: 4  Pain Location: Arm  Pain Orientation: Right  Pain Radiating Towards: R Hand    Objective         Treatments:     Aquatic Exercise  Aquatic Exercise Performed: Yes  Aquatic Exercise Activity 1: ambulation forw/Retro/Lat x 2 EA  Aquatic Exercise Activity 2: Marching x 1 laps  Aquatic Exercise Activity 3: shoulder shrugs.x10 ea 3\" Hold  Aquatic Exercise Activity 4: Squats x 15 ea  Aquatic Exercise Activity 5: SLR forw, retro, and lateral x15 ea  Aquatic Exercise Activity 6: streches Hamstring and gastroc 3x20  Aquatic Exercise Activity 7: shoulder rolls CW and CCW  x10 ea  Aquatic Exercise Activity 8: Scap Squeeze 3\" H x " 15  Aquatic Exercise Activity 9: Wand exs x 15 ea Shoulder Flex/Ext AB/AD, Horizontal AB/AD Hand for resistance      Goals:  Active       Chuck shoulder weakness       Improve chuck shoulder/scapula stabilizer strength >=1/2 muscle grade for improved lifting.         Start:  03/20/25    Expected End:  06/18/25            <=1/10 right shoulder pain for improved house/yard work.        Start:  03/20/25    Expected End:  06/18/25            Improve QuickDash by >=10 points for improved mobility.         Start:  03/20/25    Expected End:  06/18/25            Chuck shoulder AROM WFL for reaching into cupboards.        Start:  03/20/25    Expected End:  06/18/25            Independent HEP for continued gains after PT is complete.        Start:  03/20/25    Expected End:  06/18/25                Improve Merino Balance Assessment score >=6 points to avoid falls.        Start:  05/06/25    Expected End:  08/04/25            Pt will tolerate >=40 minutes of aquatic exercise for improved endurance.        Start:  05/06/25    Expected End:  08/04/25                Improve chuck LE strength >=1/2 muscle grade for improved balance and mobility.        Start:  05/06/25    Expected End:  08/04/25

## 2025-06-02 NOTE — TELEPHONE ENCOUNTER
Reason for Conversation  Lab Order Correction     Background   Dates need adjusted so patient can get labs drawn prior to MD on 6/12.    Disposition   No disposition on file.

## 2025-06-03 LAB
ALBUMIN SERPL-MCNC: 4.4 G/DL (ref 3.6–5.1)
ALP SERPL-CCNC: 35 U/L (ref 31–125)
ALT SERPL-CCNC: 18 U/L (ref 6–29)
ANION GAP SERPL CALCULATED.4IONS-SCNC: 5 MMOL/L (CALC) (ref 7–17)
AST SERPL-CCNC: 17 U/L (ref 10–30)
BILIRUB SERPL-MCNC: 0.4 MG/DL (ref 0.2–1.2)
BUN SERPL-MCNC: 13 MG/DL (ref 7–25)
CALCIUM SERPL-MCNC: 9 MG/DL (ref 8.6–10.2)
CHLORIDE SERPL-SCNC: 104 MMOL/L (ref 98–110)
CO2 SERPL-SCNC: 30 MMOL/L (ref 20–32)
CREAT SERPL-MCNC: 0.65 MG/DL (ref 0.5–0.99)
EGFRCR SERPLBLD CKD-EPI 2021: 113 ML/MIN/1.73M2
EST. AVERAGE GLUCOSE BLD GHB EST-MCNC: 97 MG/DL
EST. AVERAGE GLUCOSE BLD GHB EST-SCNC: 5.4 MMOL/L
GLUCOSE SERPL-MCNC: 83 MG/DL (ref 65–99)
HBA1C MFR BLD: 5 %
POTASSIUM SERPL-SCNC: 3.7 MMOL/L (ref 3.5–5.3)
PROT SERPL-MCNC: 6.5 G/DL (ref 6.1–8.1)
SODIUM SERPL-SCNC: 139 MMOL/L (ref 135–146)
TSH SERPL-ACNC: 1.26 MIU/L

## 2025-06-04 ENCOUNTER — APPOINTMENT (OUTPATIENT)
Dept: INTEGRATIVE MEDICINE | Facility: CLINIC | Age: 45
End: 2025-06-04
Payer: COMMERCIAL

## 2025-06-05 ENCOUNTER — TREATMENT (OUTPATIENT)
Dept: PHYSICAL THERAPY | Facility: HOSPITAL | Age: 45
End: 2025-06-05
Payer: COMMERCIAL

## 2025-06-05 DIAGNOSIS — R29.898 WEAKNESS OF BOTH SHOULDERS: ICD-10-CM

## 2025-06-05 DIAGNOSIS — R53.1 WEAKNESS: Primary | ICD-10-CM

## 2025-06-05 DIAGNOSIS — G37.9 CNS DEMYELINATING DISORDER (MULTI): ICD-10-CM

## 2025-06-05 PROCEDURE — 97110 THERAPEUTIC EXERCISES: CPT | Mod: GP | Performed by: PHYSICAL THERAPIST

## 2025-06-05 ASSESSMENT — BALANCE ASSESSMENTS
TURN 360 DEGREES: NEEDS CLOSE SUPERVISION OR VERBAL CUEING
STANDING TO SITTING: SITS SAFELY WITH MINIMAL USE OF HANDS
STANDING ON ONE LEG: UNABLE TO TRY NEEDS ASSIST TO PREVENT FALL
PLACE ALTERNATE FOOT ON STEP OR STOOL WHILE STANDING UNSUPPORTED: LOOKS BEHIND ONE SIDE ONLY OTHER SIDE SHOWS LESS WEIGHT SHIFT
LONG VERSION TOTAL SCORE (MAX 56): 28
PICK UP OBJECT FROM THE FLOOR FROM A STANDING POSITION: ABLE TO PICK UP SLIPPER BUT NEEDS SUPERVISION
TRANSFERS: ABLE TO TRANSFER SAFELY WITH MINOR USE OF HANDS
STANDING UNSUPPORTED WITH EYES CLOSED: NEEDS HELP TO KEEP FROM FALLING
SITTING WITH BACK UNSUPPORTED BUT FEET SUPPORTED ON FLOOR OR ON A STOOL: ABLE TO SIT SAFELY AND SECURELY FOR 2 MINUTES
PLACE ALTERNATE FOOT ON STEP OR STOOL WHILE STANDING UNSUPPORTED: NEEDS ASSISTANCE TO KEEP FROM FALLING/UNABLE TO TRY
REACHING FORWARD WITH OUTSTRETCHED ARM WHILE STANDING: CAN REACH FORWARD 5 CM (2 INCHES)
STANDING UNSUPPORTED WITH FEET TOGETHER: NEEDS HELP TO ATTAIN POSITION AND UNABLE TO HOLD FOR 15 SECONDS
STANDING UNSUPPORTED: ABLE TO STAND SAFELY FOR 2 MINUTES
STANDING TO SITTING: ABLE TO STAND INDEPENDENTLY USING HANDS
STANDING UNSUPPORTED ONE FOOT IN FRONT: LOSES BALANCE WHILE STEPPING OR STANDING

## 2025-06-05 ASSESSMENT — PAIN SCALES - GENERAL
PAINLEVEL_OUTOF10: 4
PAINLEVEL_OUTOF10: 4

## 2025-06-05 ASSESSMENT — PAIN - FUNCTIONAL ASSESSMENT: PAIN_FUNCTIONAL_ASSESSMENT: 0-10

## 2025-06-05 ASSESSMENT — PAIN DESCRIPTION - DESCRIPTORS: DESCRIPTORS: TIGHTNESS

## 2025-06-05 NOTE — PROGRESS NOTES
Physical Therapy    Physical Therapy Treatment / Discharge    Patient Name: Ally Carpio  MRN: [id number]  : 1980   Today's Date: 2025  Time Calculation  Start Time: 1430  Stop Time: 1515  Time Calculation (min): 45 min       PT Therapeutic Procedures Time Entry  Therapeutic Exercise Time Entry: 45         Visit #14    Assessment:  Pt with similar shoulder ROM measurements but some gains in strength which she states will vary day to day. Decreased hip flexor strength but otherwise LE about the same. Improved Merino score though not significant. She would like to continue PT on her own at this point with a focus on pool exercises. She was given pictures to continue what she learned in PT.    Pt reports 4/10 pain after treatment.    Plan: Discharge to independent [facility].  OP PT Plan  Treatment/Interventions: Education/ Instruction, Manual therapy, Therapeutic exercises  PT Plan: Skilled PT (monitor fatigue and soreness)  PT Frequency: 2 times per week  Duration: 4-6 wks  Certification Period Start Date: 25  Certification Period End Date: 26  Number of Treatments Authorized: 60    Current Problem  1. Weakness        2. Weakness of both shoulders  Follow Up In Physical Therapy    Possibly related to underlying neurologic disease.  Also possible frozen shoulder.  Recommend trial of physical therapy.  Consider orthopedic consult.      3. CNS demyelinating disorder (Multi)            Subjective   General  Pt feels like she has gotten weaker in her legs making longer distance walking more difficult. Using rollator most of the time. Fell when she had her AFO's off by mistake. Shoulders are getting weaker but not as apparent as LE's. Opened her pool and has been doing water exercises on own.    Precautions  Precautions  Precautions Comment: fall risk    Pain  Pain Assessment: 0-10  0-10 (Numeric) Pain Score: 4  Pain Location: Shoulder  Pain Orientation: Right, Left    Objective   Posture: scap  protraction karen    Shoulder AROM  FLEX: 92 deg karen  ABD: 92 deg L, 110 deg R  IR: WNL    Shoulder PROM:  WNL karen    UE Strength:  Shoulder FLEX: 3-/5 karen  Shoulder ABD: 3-/5 karen  Shoulder ER: 4+/5 karen  Shoulder IR: 4+/5 karen  Elbow FLEX: 4/5 karen  Elbow EXT: 4/5 karen    Scapula Stabilizer Strength:  Lower trapezius: 4/5 karen  Mid trapezius: 4+/5 karen  Serratus anterior: 4-/5 karen    LE strength:  3-/5 hip FLEX karen  4/5 hip ADD  4+/5 hip ABD  4-/5 knee EXT  4-/5 knee FLEX    Outcome Measures:  Other Measures  Disability of Arm Shoulder Hand (DASH): 28   Merino Balance Scale  1. Sitting to Standing: Able to stand independently using hands  2. Standing Unsupported: Able to stand safely for 2 minutes  3. Sitting with Back Unsupported but Feet Supported on Floor or on a Stool: Able to sit safely and securely for 2 minutes  4. Standing to Sitting: Sits safely with minimal use of hands  5.  Transfers: Able to transfer safely with minor use of hands  6. Standing Unsupported with Eyes Closed: Needs help to keep from falling  7. Standing Unsupported with Feet Together: Needs help to attain position and unable to hold for 15 seconds  8. Reach Forward with Outstretched Arm While Standing: Can reach forward 5 cm (2 inches)  9.  Object from Floor from a Standing Position: Able to  slipper but needs supervision  10. Turning to Look Behind Over Left and Right Shoulders While Standing: Looks behind one side only other side shows less weight shift  11. Turn 360 Degrees: Needs close supervision or verbal cueing  12. Place Alternate Foot on Step or Stool While Standing Unsupported: Needs assistance to keep from falling/unable to try  13. Standing Unsupported One Foot in Front: Loses balance while stepping or standing  14. Standing on One Leg: Unable to try needs assist to prevent fall  Merino Balance Score: 28      Treatments:  EXERCISES 5/6/2025  5/15/2025  5/29/2025 6/5/2025    VISIT# #8 #9 #10 #11                 6-min walk test   2 minutes 55 sec, 280ft      **NEXT      Left upper trap stretch       Levator scap stretch              NuStep NEXT L3 10 min L3 10min L3 5'   Pulleys:       FF NEXT 3 min 3 min    SCAP NEXT 3 min 3 min           Parallel bars:       Lateral walk NEXT 2 laps Chuck UE 2 laps Chuck UE     Marches NEXT 1 laps UE floating 2 laps Chuck UE     Tap-ups NEXT 2 in step 1x10 Chuck 4inch [id number] Chuck UE    Foam marches NEXT 2x10 UE floating     Foam trunk turns NEXT 2x10 no UE     Walk with head turns NEXT 1 lap Chuck UE 1 laps horz Chuck UE                   Shuttle       DLP NEXT Declined     SLP - chuck NEXT Declined            T-band:       Pull downs       Mid rows       ER       IR       Chuck ER        Horz Abd               Wall push-ups (+)              Side lying ER       Side lying ABD       Supine 90 deg arm circles, CW/CCW              Prone:       Y's       T's       I's               Reassess and review/progress HEP - 45 min    Reassess and review/progress HEP - 35 min    HEP: Access Code: [email or web address]    Exercises  - Supine Shoulder Flexion Extension AAROM with Dowel  - 1-2 x daily - 1-2 sets - 10 reps  - Supine Shoulder Protraction with Dowel  - 2 x daily - 1-2 sets - 10 reps  - Seated Shoulder Abduction AAROM with Dowel  - 1-2 x daily - 1-2 sets - 10 reps  - Seated Scapular Retraction  - 1-2 x daily - 1-2 sets - 10 reps    NEW 3/31/25:  New 4/10/25         Goals:  Resolved       Chuck shoulder weakness       Improve chuck shoulder/scapula stabilizer strength >=1/2 muscle grade for improved lifting.   (Progressing)       Start:  03/20/25    Expected End:  06/18/25    Resolved:  06/05/25         <=1/10 right shoulder pain for improved house/yard work.  (Not Progressing)       Start:  03/20/25    Expected End:  06/18/25    Resolved:  06/05/25         Improve QuickDash by >=10 points for improved mobility.   (Progressing)       Start:  03/20/25    Expected End:  06/18/25    Resolved:  06/05/25         Chuck shoulder AROM WFL for reaching  into cupboards.  (Not Progressing)       Start:  03/20/25    Expected End:  06/18/25    Resolved:  06/05/25         Independent HEP for continued gains after PT is complete.  (Met)       Start:  03/20/25    Expected End:  06/18/25    Resolved:  06/05/25             Improve Merino Balance Assessment score >=6 points to avoid falls.  (Progressing)       Start:  05/06/25    Expected End:  08/04/25    Resolved:  06/05/25         Pt will tolerate >=40 minutes of aquatic exercise for improved endurance.  (Met)       Start:  05/06/25    Expected End:  08/04/25    Resolved:  06/05/25             Improve karen LE strength >=1/2 muscle grade for improved balance and mobility.  (Not Progressing)       Start:  05/06/25    Expected End:  08/04/25    Resolved:  06/05/25

## 2025-06-09 ENCOUNTER — APPOINTMENT (OUTPATIENT)
Dept: INTEGRATIVE MEDICINE | Facility: CLINIC | Age: 45
End: 2025-06-09
Payer: COMMERCIAL

## 2025-06-09 ENCOUNTER — APPOINTMENT (OUTPATIENT)
Dept: PHYSICAL THERAPY | Facility: HOSPITAL | Age: 45
End: 2025-06-09
Payer: COMMERCIAL

## 2025-06-09 ENCOUNTER — LAB (OUTPATIENT)
Dept: LAB | Facility: HOSPITAL | Age: 45
End: 2025-06-09
Payer: COMMERCIAL

## 2025-06-09 DIAGNOSIS — M54.2 CHRONIC NECK PAIN: ICD-10-CM

## 2025-06-09 DIAGNOSIS — E61.1 IRON DEFICIENCY: ICD-10-CM

## 2025-06-09 DIAGNOSIS — M54.59 OTHER LOW BACK PAIN: Primary | ICD-10-CM

## 2025-06-09 DIAGNOSIS — D50.0 IRON DEFICIENCY ANEMIA DUE TO CHRONIC BLOOD LOSS: ICD-10-CM

## 2025-06-09 DIAGNOSIS — D50.0 IRON DEFICIENCY ANEMIA SECONDARY TO BLOOD LOSS (CHRONIC): Primary | ICD-10-CM

## 2025-06-09 DIAGNOSIS — G89.29 CHRONIC NECK PAIN: ICD-10-CM

## 2025-06-09 LAB
ERYTHROCYTE [DISTWIDTH] IN BLOOD BY AUTOMATED COUNT: 12.4 % (ref 11.5–14.5)
FERRITIN SERPL-MCNC: 67 NG/ML (ref 8–150)
HCT VFR BLD AUTO: 36.3 % (ref 36–46)
HGB BLD-MCNC: 11.9 G/DL (ref 12–16)
IRON SATN MFR SERPL: 18 % (ref 25–45)
IRON SERPL-MCNC: 45 UG/DL (ref 35–150)
MCH RBC QN AUTO: 32.1 PG (ref 26–34)
MCHC RBC AUTO-ENTMCNC: 32.8 G/DL (ref 32–36)
MCV RBC AUTO: 98 FL (ref 80–100)
NRBC BLD-RTO: 0 /100 WBCS (ref 0–0)
PLATELET # BLD AUTO: 124 X10*3/UL (ref 150–450)
RBC # BLD AUTO: 3.71 X10*6/UL (ref 4–5.2)
TIBC SERPL-MCNC: 257 UG/DL (ref 240–445)
UIBC SERPL-MCNC: 212 UG/DL (ref 110–370)
WBC # BLD AUTO: 4.5 X10*3/UL (ref 4.4–11.3)

## 2025-06-09 PROCEDURE — 83550 IRON BINDING TEST: CPT

## 2025-06-09 PROCEDURE — 83540 ASSAY OF IRON: CPT

## 2025-06-09 PROCEDURE — 82728 ASSAY OF FERRITIN: CPT

## 2025-06-09 PROCEDURE — 97810 ACUP 1/> WO ESTIM 1ST 15 MIN: CPT | Performed by: ACUPUNCTURIST

## 2025-06-09 PROCEDURE — 36415 COLL VENOUS BLD VENIPUNCTURE: CPT

## 2025-06-09 PROCEDURE — 97811 ACUP 1/> W/O ESTIM EA ADD 15: CPT | Performed by: ACUPUNCTURIST

## 2025-06-09 PROCEDURE — 82607 VITAMIN B-12: CPT

## 2025-06-09 PROCEDURE — 85027 COMPLETE CBC AUTOMATED: CPT

## 2025-06-09 NOTE — PROGRESS NOTES
Acupuncture Visit:     Subjective   Patient ID: Ally Carpio is a 44 y.o. female who presents for No chief complaint on file.  2024 Acupuncture Benefits                                    12/27/2024 RA   [place]  Covered Diagnosis: Medical Necessity   9/30 VPCY    States RIGHT low back has had more pain  No radiation   Upper back and neck tightness  Sleep fair, takes Rx meds to help otherwise states she would be up all night  R hand and R foot with spasms    prev  States pain relief for 4 days p tx  Recent sinus surgery, 3 day recovery, no complications    prev  R sided neck, shoulder and arm pain   Pain relief for 1-2 days p tx, then pain comes back up   Foot drop in both feet   Energy level picks up p tx as well  Massage 1/week     prev  Less pain in shoulder after tx  Low back has been OK,   Neck tight on right side  Some radiation from shoulder   No radiating into hand,   Left side is better  2-3/10  A little less fatigue as well.        States less intense pain  She got new braces to help her walk which is helping    prev  Notes less pain x 3 days  Increase energy  States R sides pain less intense    prev  R sided neck pain  Low back pain  BL foot drop  *Pt using a cane *notes using a rollator at home     States a little relief after the first tx  Notes unable to flu a week after 1st tx due to needing to stay at home  No menses, hysterectomy, ovaries intact    Initial  R sided neck pain  Dxd with CND demyelination, notes multiple Drs have ruled out MS  States she does have lesions on her brain but not spine.  Notes seeing multiple neurologists  States failed neck surgery summer on 2024  RIGHT sided neck pain more behind ear, not at base of skull  Neck pain constant, always there, dull pain 4/10  Worse at end of the day when she has done a lot  BL arms feel weak, RIGHT more than left arm  Radiating to top of shoulder and to front of the shoulder joint  Both arms feel week but RIGHT arm mostly  involved    LBP  Feels achy   RIGHT side pain 80% of the time, 20% Left sided   Radiating to piriformis and to the RIGHT calf mm.  BL foot drop    Other med hx  Ulcerative colitis, partial hysterectomy (hx heavy bleeding- Fe infusions in the past)            Session Information  Is this acupuncture treatment being billed to the patient's insurance company: Yes  This is visit number: 9  The patient has a total number of visits of: 30  Initial Acupuncture Treatment date: 01/28/25  Name of Insurance Company: 2024:  [place] Limitations (Visits, etc.): 30 VPCY   Covered Diagnosis: Medical Necessity         Review of Systems         Provider reviewed plan for the acupuncture session, precautions and contraindications. Patient/guardian/hospital staff has given consent to treat with full understanding of what to expect during the session. Before acupuncture began, provider explained to the patient to communicate at any time if the procedure was causing discomfort past their tolerance level. Patient agreed to advise acupuncturist. The acupuncturist counseled the patient on the risks of acupuncture treatment including pain, infection, bleeding, and no relief of pain. The patient was positioned comfortably. There was no evidence of infection at the site of needle insertions.    Objective   Physical Exam              Acupuncture Treatment  Body Points - Left: P 6  Body Points - Bilateral: DU20 GB20 GB21 40,  BL13, 14, 17, 22, 23, 25, 62, SI 3   Kd 6  Body Points - Right: SI 3   YY   Kd 2  Other Techniques Utilized: TDP Lamp  TDP Lamp Descripton: low back  Needle Count In: 28  Needle Count Out: 28  Total Face to Face Time (min): 25 minutes              Assessment/Plan     Diagnoses and all orders for this visit:  Other low back pain (Primary)  Chronic neck pain

## 2025-06-10 ENCOUNTER — OFFICE VISIT (OUTPATIENT)
Dept: GASTROENTEROLOGY | Facility: HOSPITAL | Age: 45
End: 2025-06-10
Payer: COMMERCIAL

## 2025-06-10 ENCOUNTER — APPOINTMENT (OUTPATIENT)
Dept: PRIMARY CARE | Facility: CLINIC | Age: 45
End: 2025-06-10
Payer: COMMERCIAL

## 2025-06-10 VITALS
WEIGHT: 152.7 LBS | OXYGEN SATURATION: 100 % | HEART RATE: 66 BPM | SYSTOLIC BLOOD PRESSURE: 112 MMHG | BODY MASS INDEX: 24.65 KG/M2 | DIASTOLIC BLOOD PRESSURE: 73 MMHG | TEMPERATURE: 98.2 F

## 2025-06-10 DIAGNOSIS — K21.9 GASTROESOPHAGEAL REFLUX DISEASE, UNSPECIFIED WHETHER ESOPHAGITIS PRESENT: ICD-10-CM

## 2025-06-10 DIAGNOSIS — K51.211 ULCERATIVE PROCTITIS WITH RECTAL BLEEDING (MULTI): Primary | ICD-10-CM

## 2025-06-10 LAB — VIT B12 SERPL-MCNC: 344 PG/ML (ref 211–911)

## 2025-06-10 PROCEDURE — 1036F TOBACCO NON-USER: CPT | Performed by: STUDENT IN AN ORGANIZED HEALTH CARE EDUCATION/TRAINING PROGRAM

## 2025-06-10 PROCEDURE — 99212 OFFICE O/P EST SF 10 MIN: CPT | Performed by: STUDENT IN AN ORGANIZED HEALTH CARE EDUCATION/TRAINING PROGRAM

## 2025-06-10 PROCEDURE — 99215 OFFICE O/P EST HI 40 MIN: CPT | Performed by: STUDENT IN AN ORGANIZED HEALTH CARE EDUCATION/TRAINING PROGRAM

## 2025-06-10 RX ORDER — PANTOPRAZOLE SODIUM 40 MG/1
40 TABLET, DELAYED RELEASE ORAL 2 TIMES DAILY
Qty: 120 TABLET | Refills: 1 | Status: SHIPPED | OUTPATIENT
Start: 2025-06-10 | End: 2025-10-08

## 2025-06-10 RX ORDER — PANTOPRAZOLE SODIUM 40 MG/1
40 TABLET, DELAYED RELEASE ORAL 2 TIMES DAILY
Qty: 120 TABLET | Refills: 1 | Status: SHIPPED | OUTPATIENT
Start: 2025-06-10 | End: 2025-06-10

## 2025-06-10 SDOH — ECONOMIC STABILITY: FOOD INSECURITY: WITHIN THE PAST 12 MONTHS, YOU WORRIED THAT YOUR FOOD WOULD RUN OUT BEFORE YOU GOT MONEY TO BUY MORE.: NEVER TRUE

## 2025-06-10 SDOH — ECONOMIC STABILITY: FOOD INSECURITY: WITHIN THE PAST 12 MONTHS, THE FOOD YOU BOUGHT JUST DIDN'T LAST AND YOU DIDN'T HAVE MONEY TO GET MORE.: NEVER TRUE

## 2025-06-10 ASSESSMENT — LIFESTYLE VARIABLES
HOW MANY STANDARD DRINKS CONTAINING ALCOHOL DO YOU HAVE ON A TYPICAL DAY: 1 OR 2
SKIP TO QUESTIONS 9-10: 1
AUDIT-C TOTAL SCORE: 1
HOW OFTEN DO YOU HAVE SIX OR MORE DRINKS ON ONE OCCASION: NEVER
HOW OFTEN DO YOU HAVE A DRINK CONTAINING ALCOHOL: MONTHLY OR LESS

## 2025-06-10 ASSESSMENT — PATIENT HEALTH QUESTIONNAIRE - PHQ9
1. LITTLE INTEREST OR PLEASURE IN DOING THINGS: NOT AT ALL
SUM OF ALL RESPONSES TO PHQ9 QUESTIONS 1 & 2: 0
2. FEELING DOWN, DEPRESSED OR HOPELESS: NOT AT ALL

## 2025-06-10 ASSESSMENT — PAIN SCALES - GENERAL: PAINLEVEL_OUTOF10: 4

## 2025-06-10 NOTE — PATIENT INSTRUCTIONS
Thank you for coming to GI clinic today, we will:    Plan:  -Continue with subcutaneous omvoh 200mg subcutaneous every 4 weeks.  -may increase pantoprazole to 40mg twice daily for heartburn  -labs at next visit     We will see you back in 4-6 months

## 2025-06-10 NOTE — PROGRESS NOTES
HPI:    This is a 45yo F w/ PMH of psoriasis since 2023 (previously on topical treatment only), C6-7 cervical spine arthroplasty, lTP since November of 2023 (never required treatment), chronic menorrhagia, iron deficiency anemia, RLS since 2014, prior smoking (quit in 2017 smoked 1 ppd for 12 years before that), depression, hypothyroidism, insomnia, ?concern for MS (seeing neuro) who comes in for follow up of UC on subcutaneous omvoh currently.     Interval hx:     She is doing very well on subcutaneous omvoh 200mg q4 weeks- no ISRs.     Prior to omvoh she had >20 Bms a day and now has 2-3 loose to formed brown Bms a day, no BRBPR. Denies urgency, accidents, abd pain.    Heartburn is well controlled but does endorse increaed sx with spicy foods, and PPI bid helps on those days, no night time sx or regurgitation.    EIM: denies new mucositis, skin rash, joint pains, eye redness or pain.    Review of Systems:  Denies dysphagia, odynophagia, melena, f/c, n/v, wt loss, heartburn, 12 point ROS done and neg unless otherwise stated.    Labs 6/9/25:  Hb 11.9, plt 124, Cr 0.65, LFTs WNL, Fecal calpro 21 in in March 2025     Labs Feb 2025:  LFTs wnl CRP <0.10 , Hgb 11.8 Platelets 134  Labs 2024: fecal calpro 2880, TB spot neg, Hep B and C neg     Past hx:   Per pt, over Sep to Oct, she has had worsening of sx- after stopping adalimumab biosimilar in Sept 2024, 7-10 loose brown Bms with intermittent scant BRBPR. She has been off all meds since Sept 2024. Endorses intermittent lower abd pain- dull, achy, 3-5/10.  Endorses chronic fatigue.      She stopped adalimumab biosimilar in Sept 2024 due to c/f MS (was on it from April to Sept 2024, prior to that was on balsalazide- had mucositis, mesalamine- stopped due to skin rash, dxed Jan 2024). Denies abd sugery, no other immunomodulators, small molecules, biologics. She has been on prednisone in the past with mild mood changes.     She previously saw Estevan Hanley over at [facility] and  while neuro had rec zeposia (given c/f MS), her HR was noted to low in the 40s which made zeposia a less than ideal choice.     C-scope Jan 2024 for hematochezia and diarrhea:     The terminal ileum appeared normal. Performed random biopsy using biopsy forceps.  The cecum, ascending colon and hepatic flexure appeared normal. Performed random biopsy using biopsy forceps, labeled right colon.  Subcentimeter polyp in the transverse colon was removed with cold snare (bx: inflammatory polyp).  The descending colon appeared normal. Performed random biopsy using biopsy forceps, labeled left colon.  Moderate abnormal mucosa in the rectosigmoid, with edema, erythema and aphthous ulcerations in contiguous distribution.  This is most suggestive of ulcerative proctitis; performed cold forceps biopsy (bx: chronic active colitis).     PMH/PSH: As above, C-sections, Hysterectomy (Jan 2025)     SH: denies smoking, alcohol, IVDU; she is a former massage therapist     FH: Mother had CRC at age 60s, denies FH of IBD     Physical Exam  Constitutional: Well-developed female in no acute distress.  HEENT: Normocephalic, atraumatic. PERRL. EOMI. No cervical lymphadenopathy.  Respiratory: Normal respiratory effort.  Cardiovascular: RRR.   Abdominal: NTND, tympanitic, no rebound  MSK: No LE edema bilaterally.  Skin: Warm, dry. No rashes or wounds.  Psych: Appropriate mood and affect.      Assessment/Plan  This is a 43yo F w/ PMH of psoriasis since 2023 (previously on topical treatment only), C6-7 cervical spine arthroplasty, lTP since November of 2023 (never required treatment), chronic menorrhagia, iron deficiency anemia, RLS since 2014, prior smoking (quit in 2017 smoked 1 ppd for 12 years before that), depression, hypothyroidism, insomnia, ?concern for MS (seeing neuro) who comes in for follow up of UC on subcutaneous omvoh currently.    She is doing very well on subcutaneous omvoh 200mg q4 weeks- no ISRs.     Prior to omvoh she had >20  Bms a day and now has 2-3 loose to formed brown Bms a day, no BRBPR. Denies urgency, accidents, abd pain.    She is in clinical remission with excellent biochemical response, fecal calpro 2880>21.    Plan:  -Continue with subcutaneous omvoh 200mg subcutaneous every 4 weeks.  -may increase pantoprazole to 40mg twice daily for heartburn  -labs at next visit     We will see you back in 4-6 months     Health maintenance in IBD: reference for PCP     -Vaccinations: pt was counseled that it is recommended to get inactivated flu, pneumococcal, COVID-19 vaccine. No LIVE vaccines while on immunosuppressant therapy---->defer to PCP      -All women w/ IBD on systemic immunosuppression defined as [(pred >20 mg/d for >14 days), AZA, 6-MP, MTX, cyclosporine, tacrolimus, anti-TNFs, USK, TOFA] should undergo cervical cancer screening by cytology annually (if cytology alone) or every 2 years (if HPV negative)      -annual latest TB exposure risk assessment and latent TB screening at Baseline---> (TB spot was negative in 2024)     -Osteoporosis screening by DEXA scan in patients with IBD if any risk factors for osteoporosis (low BMI, >3 mths cumulative steroid exposure, post-menopausal,hypogonadism). Repeat in 5 yrs if inital screen is normal--->>normal in 2024     -screen all patients with IBD for depression (PHQ9) and anxiety (GAD7) at baseline and annually---->>pt denies s/s of depression     -screen all patients for smoking status and refer current smokers to smoking cessation therapy---->>does not apply to this patient currently      ----I will follow this pt for chronic mgmt of ulcerative colitis-----

## 2025-06-11 ENCOUNTER — APPOINTMENT (OUTPATIENT)
Dept: PHYSICAL THERAPY | Facility: HOSPITAL | Age: 45
End: 2025-06-11
Payer: COMMERCIAL

## 2025-06-12 ENCOUNTER — INFUSION (OUTPATIENT)
Dept: HEMATOLOGY/ONCOLOGY | Facility: CLINIC | Age: 45
End: 2025-06-12
Payer: COMMERCIAL

## 2025-06-12 ENCOUNTER — OFFICE VISIT (OUTPATIENT)
Dept: HEMATOLOGY/ONCOLOGY | Facility: CLINIC | Age: 45
End: 2025-06-12
Payer: COMMERCIAL

## 2025-06-12 VITALS
TEMPERATURE: 97.3 F | OXYGEN SATURATION: 100 % | WEIGHT: 154.76 LBS | RESPIRATION RATE: 16 BRPM | SYSTOLIC BLOOD PRESSURE: 109 MMHG | DIASTOLIC BLOOD PRESSURE: 66 MMHG | BODY MASS INDEX: 24.98 KG/M2 | HEART RATE: 57 BPM

## 2025-06-12 VITALS — DIASTOLIC BLOOD PRESSURE: 60 MMHG | SYSTOLIC BLOOD PRESSURE: 94 MMHG

## 2025-06-12 DIAGNOSIS — D50.0 IRON DEFICIENCY ANEMIA DUE TO CHRONIC BLOOD LOSS: Primary | ICD-10-CM

## 2025-06-12 DIAGNOSIS — D50.0 IRON DEFICIENCY ANEMIA DUE TO CHRONIC BLOOD LOSS: ICD-10-CM

## 2025-06-12 PROCEDURE — 99214 OFFICE O/P EST MOD 30 MIN: CPT | Performed by: PHYSICIAN ASSISTANT

## 2025-06-12 PROCEDURE — 2500000004 HC RX 250 GENERAL PHARMACY W/ HCPCS (ALT 636 FOR OP/ED): Performed by: PHYSICIAN ASSISTANT

## 2025-06-12 PROCEDURE — 82180 ASSAY OF ASCORBIC ACID: CPT

## 2025-06-12 PROCEDURE — 96365 THER/PROPH/DIAG IV INF INIT: CPT | Mod: INF

## 2025-06-12 RX ORDER — FAMOTIDINE 10 MG/ML
20 INJECTION, SOLUTION INTRAVENOUS ONCE AS NEEDED
OUTPATIENT
Start: 2025-06-15

## 2025-06-12 RX ORDER — HEPARIN 100 UNIT/ML
500 SYRINGE INTRAVENOUS AS NEEDED
OUTPATIENT
Start: 2025-06-12

## 2025-06-12 RX ORDER — DIPHENHYDRAMINE HYDROCHLORIDE 50 MG/ML
50 INJECTION, SOLUTION INTRAMUSCULAR; INTRAVENOUS AS NEEDED
Status: DISCONTINUED | OUTPATIENT
Start: 2025-06-12 | End: 2025-06-12 | Stop reason: HOSPADM

## 2025-06-12 RX ORDER — HEPARIN SODIUM,PORCINE/PF 10 UNIT/ML
50 SYRINGE (ML) INTRAVENOUS AS NEEDED
OUTPATIENT
Start: 2025-06-12

## 2025-06-12 RX ORDER — EPINEPHRINE 0.3 MG/.3ML
0.3 INJECTION SUBCUTANEOUS EVERY 5 MIN PRN
Status: DISCONTINUED | OUTPATIENT
Start: 2025-06-12 | End: 2025-06-12 | Stop reason: HOSPADM

## 2025-06-12 RX ORDER — ALBUTEROL SULFATE 0.83 MG/ML
3 SOLUTION RESPIRATORY (INHALATION) AS NEEDED
Status: DISCONTINUED | OUTPATIENT
Start: 2025-06-12 | End: 2025-06-12 | Stop reason: HOSPADM

## 2025-06-12 RX ORDER — DIPHENHYDRAMINE HYDROCHLORIDE 50 MG/ML
50 INJECTION, SOLUTION INTRAMUSCULAR; INTRAVENOUS AS NEEDED
OUTPATIENT
Start: 2025-06-15

## 2025-06-12 RX ORDER — FAMOTIDINE 10 MG/ML
20 INJECTION, SOLUTION INTRAVENOUS ONCE AS NEEDED
Status: DISCONTINUED | OUTPATIENT
Start: 2025-06-12 | End: 2025-06-12 | Stop reason: HOSPADM

## 2025-06-12 RX ORDER — EPINEPHRINE 0.3 MG/.3ML
0.3 INJECTION SUBCUTANEOUS EVERY 5 MIN PRN
OUTPATIENT
Start: 2025-06-15

## 2025-06-12 RX ORDER — ALBUTEROL SULFATE 0.83 MG/ML
3 SOLUTION RESPIRATORY (INHALATION) AS NEEDED
OUTPATIENT
Start: 2025-06-15

## 2025-06-12 RX ADMIN — IRON SUCROSE 300.01 MG: 20 INJECTION, SOLUTION INTRAVENOUS at 13:21

## 2025-06-12 ASSESSMENT — PAIN SCALES - GENERAL: PAINLEVEL_OUTOF10: 4

## 2025-06-12 NOTE — PROGRESS NOTES
Patient Visit Information:   Visit Type: Benign Heme Follow-up     Cancer History:   Treatment Synopsis:    42 female referred by Dr. Cooney for CAMDEN and IV iron.    Upon review of labs noted to be anemic with iron deficiency ~1 year ago. Started on oral iron but can't tolerate due to GI upset so stopped taking it. Also of note is thrombocytopenia, also new, last cbc with normal plt count was in  and no labs done between  till now.    On assessment, reports heavy menses and has been experiencing PIERRE and heart palpitations. Notes that she was recently diagnosed with psoriasis but might also have psoriatic arthritis, work up being done now. Otherwise doing well. Denies F/C/night sweats, unintentional weight loss, anorexia, fatigue, HA, change in vision/hearing, palpitations, CP, SOB, n/v/d/c/abd pain, bleeding, melena, LAD, peripheral neuropathy, rash.     S/P 3 doses of Venofer, last given 2023. Had EGD and VCE on 2023 c/w gastritis. No c-scope performed.    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    History of Present Illness:   Patient presents for follow up. In 2024, patient had high dose steroids for b/l foot drop, which didn't improve her symptoms but did improve her platelet count. She has planned hysterectomy in January. Starting medication for her UC next week. Patient reports ongoing fatigue otherwise doing well.     Review of Systems:    12 pt ROS was performed, pertinent positive and negative findings as per HPI above, remainder of systems reviewed and are negative.    Allergies and Intolerances:   Allergies   Allergen Reactions    Adhesive Tape-Silicones Rash    Balsalazide Other     Mouth ulcers    Iron GI Upset    Mesalamine Rash     Outpatient Medication Profile:   Current Outpatient Medications on File  Prior to Visit   Medication Sig Dispense Refill    ARIPiprazole (Abilify) 5 mg tablet Take 1 tablet (5 mg) by mouth once daily. For the first 2 weeks take 2.5 mg daily 30 tablet 2    clobetasol (Temovate) 0.05 % ointment Apply topically 2 times a day.      cyanocobalamin (Vitamin B-12) 1,000 mcg/mL injection Inject 1 mL (1,000 mcg) into the muscle see administration instructions. Please inject daily x 7, followed by Weekly x 4 and then Monthly 30 mL 3    ergocalciferol (Vitamin D-2) 1.25 MG (34794 UT) capsule Take 1 capsule (1,250 mcg) by mouth 1 (one) time per week. 4 capsule 11    escitalopram (Lexapro) 20 mg tablet Take 1 tablet (20 mg) by mouth once daily. 90 tablet 1    hydrOXYzine HCL (Atarax) 10 mg tablet Take 1-2 tablets (10-20 mg) by mouth as needed at bedtime (insomnia). 180 tablet 1    levothyroxine (Synthroid, Levoxyl) 25 mcg tablet Take 0.5 tablets (12.5 mcg) by mouth once daily. 45 tablet 1    lidocaine-diphenhydrAMINE-Maalox 1:1:1 Magic Mouthwash swish and spit with 5 ml by mouth every 6 hours if needed for mucositis      Mag-G 27 mg magnesium (500 mg) tablet Take 1 tablet by mouth every 12 hours.      magic mouthwash (lidocaine, diphenhydrAMINE, Maalox 1:1:1) Swish and spit 5 mL every 6 hours if needed for mucositis. 100 mL 0    mirikizumab-mrkz (Omvoh Pen) 100 mg/mL pen injector injection Inject 2 mL (200 mg) under the skin every 28 (twenty-eight) days. 2 mL 11    miscellaneous medical supply misc Bilateral AFO. Apply to bilateral lower legs daily. 1 each 0    multivitamin tablet Take 1 tablet by mouth once daily.      pantoprazole (ProtoNix) 40 mg EC tablet Take 1 tablet (40 mg) by mouth 2 times a day. Do not crush, chew, or split. 120 tablet 1    pregabalin (Lyrica) 225 mg capsule Take 1 capsule (225 mg) by mouth 2 times a day. 180 capsule 1    PreviDent 5000 Sensitive 1.1-5 % paste       tacrolimus (Protopic) 0.1 % ointment if needed.      tiZANidine (Zanaflex) 4 mg tablet TAKE ONE TABLET BY  "MOUTH DAILY 90 tablet 1    UltiCare Safety Syringe 3 mL 22 gauge x 1\" syringe       Vtama 1 % cream if needed.      [DISCONTINUED] pantoprazole (ProtoNix) 40 mg EC tablet Take 1 tablet (40 mg) by mouth once daily. Do not crush, chew, or split. 90 tablet 1    [DISCONTINUED] pantoprazole (ProtoNix) 40 mg EC tablet Take 1 tablet (40 mg) by mouth 2 times a day. Do not crush, chew, or split. 120 tablet 1     No current facility-administered medications on file prior to visit.     Vitals:      4/21/2025    10:26 AM 5/7/2025     9:50 AM 5/14/2025    10:52 AM 5/25/2025     4:00 PM 5/30/2025     1:12 PM 6/10/2025     1:57 PM 6/12/2025    11:57 AM   Vitals   Systolic 117 96 98 117 104 112 109   Diastolic 76 62 60 75 48 73 66   BP Location  Left arm     Left arm   Heart Rate 79 67 58 61  66 57   Temp   36.2 °C (97.1 °F) 36.8 °C (98.2 °F)  36.8 °C (98.2 °F) 36.3 °C (97.3 °F)   Resp       16   Height     1.676 m (5' 6\")     Weight (lb)  155 154.4  154 152.7 154.76   BMI  25.02 kg/m2 24.92 kg/m2  24.86 kg/m2 24.65 kg/m2 24.98 kg/m2   BSA (m2)  1.81 m2 1.81 m2  1.8 m2 1.8 m2 1.81 m2   Visit Report Report Report Report Report Report Report Report       Physical Exam:   Constitutional: alert, awake and oriented, not in acute distress   HEENT: moist mucous membranes, normal nose   Neck: supple, no lymphadenopathy   EYES: PERRL, EOM intact, conjunctiva normal  Skin: no jaundice, rash or erythema  Neurological: AAOx3, no gross focal deficit   Psychiatric: normal mood and behavior     Labs:  Lab Results   Component Value Date    WBC 4.5 06/09/2025    NEUTROABS 1.92 02/04/2025    IGABSOL 0.01 02/04/2025    LYMPHSABS 0.86 (L) 02/04/2025    MONOSABS 0.35 02/04/2025    EOSABS 0.27 02/04/2025    BASOSABS 0.02 02/04/2025    RBC 3.71 (L) 06/09/2025    MCV 98 06/09/2025    MCHC 32.8 06/09/2025    HGB 11.9 (L) 06/09/2025    HCT 36.3 06/09/2025     (L) 06/09/2025     Lab Results   Component Value Date    RETICCTPCT 2.0 08/04/2023      Lab " Results   Component Value Date    CREATININE 0.65 06/02/2025    BUN 13 06/02/2025    EGFR 113 06/02/2025     06/02/2025    K 3.7 06/02/2025     06/02/2025    CO2 30 06/02/2025      Lab Results   Component Value Date    ALT 18 06/02/2025    AST 17 06/02/2025    ALKPHOS 35 06/02/2025    BILITOT 0.4 06/02/2025      Lab Results   Component Value Date    TSH 1.26 06/02/2025     Lab Results   Component Value Date    TSH 1.26 06/02/2025     Lab Results   Component Value Date    IRON 45 06/09/2025    TIBC 257 06/09/2025    FERRITIN 67 06/09/2025      Lab Results   Component Value Date    MNQILHEU98 344 06/09/2025      Lab Results   Component Value Date    FOLATE 11.6 09/30/2023     Lab Results   Component Value Date    ALEXEY POSITIVE (A) 08/01/2023    RF <10 10/25/2023    SEDRATE 3 01/20/2025      Lab Results   Component Value Date    CRP <0.10 01/20/2025      Lab Results   Component Value Date    HAPTOGLOBIN 190 08/04/2023     Lab Results   Component Value Date    SPEP NORMAL 08/01/2023     Lab Results   Component Value Date     09/24/2024     09/24/2024     09/24/2024        Assessment:    42 female referred for CAMDEN and IV iron due to heavy menses. Can't tolerate oral iron.     S/P 3 doses of Venofer, last given 6/19/2023. Had EGD and VCE on 6/30/2023 c/w gastritis. No c-scope performed.    Thrombocytopenia w/u including abd US (r/o liver etiologies) unremarkble. Likely ITP    Plan:    Reviewed and discussed lab, imaging, and pathology results with patient in detail as well as diagnosis, prognosis, and treatment options.    Continue Venofer 200 mg x 2 doses today and continue to monitor every 6 months.    Platelet improve    Continue B12 injections    BMBx w low level of  but otherwise no evidence of malignancy.    F/U w/GYN, GI, neuro    F/U w/PCP    RTC in 6 month    Patient verbalized understanding, and all her questions were answered to her satisfaction.     30 min spent with  patient greater than 50 % of which was spent in consultation, counselling and coordination of care.

## 2025-06-12 NOTE — PROGRESS NOTES
Pt tolerated treatment without incident. Denies questions, concerns, or comments at this time. Discharged home with walker

## 2025-06-13 ENCOUNTER — APPOINTMENT (OUTPATIENT)
Facility: CLINIC | Age: 45
End: 2025-06-13
Payer: COMMERCIAL

## 2025-06-13 DIAGNOSIS — M25.50 POLYARTHRALGIA: Primary | ICD-10-CM

## 2025-06-13 DIAGNOSIS — M79.7 FIBROMYALGIA: ICD-10-CM

## 2025-06-13 PROCEDURE — 99215 OFFICE O/P EST HI 40 MIN: CPT | Performed by: INTERNAL MEDICINE

## 2025-06-13 NOTE — PROGRESS NOTES
Subjective   Patient ID: [id number]   Ally Carpio is a 44 y.o. female who presents for follow up    Virtual or Telephone Consent    An interactive audio and video telecommunication system which permits real time communications between the patient (at the originating site) and provider (at the distant site) was utilized to provide this telehealth service.   Verbal consent was requested and obtained from Ally Carpio on this date, 06/13/25 for a telehealth visit and the patient's location was confirmed at the time of the visit.    HPI  PMH/PSH: Hypothyroid, PsO sees [facility] diagnosed in 2023, lichen sclerosis, UC, anxiety, depression, PTSD, cervical discectomy, RLS since 2014,   Social: Exsmoker quit 2017, alcohl once in a blue moon, no drug use. 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 Has 2 biological children.   FHx: Aunt with RA and hashimoto's, daughter with hashimoto's     Recall:   First visit with me 5/7/2025:   She saw Dr. Paniagua for LBP 10/2023    She has a lot of joint pain since November 2024, feet (on tops of feet), hands (dorsum of hand, wrists, PIPs) and knees, pain is worse with overuse. Reports that she feels stiff in AM, half an hour. Gets better initially the more she does then gets worse with overuse.    Ankles not as bad as other joints but also can get a little painful with overuse.   Hips pop out of place but no pain.   She has chronic LBP for years, constant, worse with overuse and she feels that her L shoulder is higher than the R and reports weakness in her shoulder. No pain in her shoulder.     She was diagnosed with PsO April 2023 uses topical clobetasol and tacrolimus, then was diagnosed with ITP November 2023 and then UC Jan 2024    She is on mirikizumab for UC since December 2024.  She was on humira prior to the mirikizumab April-August/September 2024, it worked well for the PsO and UC but mirikizumab worked better for UC. She was taken off humira  for hypothetical MS, she reports she has white spots on her brain, she reports that she would get numbness/tingling a few days after injecting the humira.   prior to that was on balsalazide- had mucositis, mesalamine- stopped due to skin rash, dxed Jan 2024).     She takes abilify, lexapro, lyrica 200mg BID    Per Dr. Holt's note:  several sequential neurological symptoms since February of 2024 including paraesthesia of all limbs, trunk, and face, LLE then RLE weakness, transient bladder dysfunction, painful isometric tonic spasms in all limbs and truncal (below the right costal margin), wooziness, nausea, brain fogginess, headaches, and most recently bilateral visual blurring with right eye pain. Symptoms started as she was weaning down prednisone for UC. Symptoms improved only partially after cervical decompression for disc prolapse but then continued to worsen especially after she started adalimumab for UC.  The Neurological Exam showed unequal L>R pupils, mild BLE weakness distal > proximal, right hemisensory level T8, and high-steppage wide-based gait.   The MRI brain showed a few non-enhancing large demyelinating-like lesions in periventricular, subcortical, and juxtacortical locations stable in September compared to March 2024. MRI cervical spine without cord lesions in April and September 2024 but did show central disc prolapse with canal stenosis that was released. MRI thoracic spine in April did not show cord lesions either. EMG normal. Cerebrospinal Fluid not done  Neuromyelitis Optica: negative 9/2024  Myelin Oligodendrocyte Glycoprotein: negative 9/2024  The overall picture is suspicious for a systemic autoimmune disease mimicking ITP, UC, psoriasis, and MS since all four conditions occurred within nearly a one-year-period. She admits to painful mouth ulcers so Behchet's is on the top of the differential. Alternatively, she may have multiple coexisting autoimmune disorders. Her neurological  presentation is highly suspicious for myelitis (acute onset while tapering down prednisone, MS juanito, tonic spasms, brain demyelinating-like lesion, worsening with humira, etc.) but the scans have been negative. Therefore, causes of MRI negative myelitis should be considered especially MOGAD but she tested negative for the antibody. She may also simply have coexisting MS or double seronegative NMOSD. An FND with incidental RIS is also possible. MRI orbit showed questionable small area of enhancement in the retrobulbar left optic nerve that was seen on coronal but not axial post-CHRISTINA images. I arranged for outpatient solumedrol for her to see if it will help. Her vision and headache improved only for one days after solumedrol and then she became even worse after completing the three days with more headache and more fatigue. Reports asymmetric smile and facial weakness but no evidence of facial weakness on exam. The diagnosis remains unclear. Her spine MRIs repeatedly failed to show a lesion that would be symptomatic and explain her symptoms. Her brain lesions are all in locations expected to be clinically silent. Her orbital findings are questionable and could be artifactual especially with the unusual steroid response. 4/21/2025: Brain MRI in March stable without new or active lesions. She messaged me about new bilateral shoulder weakness and right shoulder pain. I ordered muscle enzymes and inflammatory markers that all came back negative. She saw orthopedics and is being referred to a shoulder specialist. Exam with some new mild ratchet-like weakness in the shoulders and more prominent diffuse L>R hyperreflexia. She denies any recent stressors and doesn't think that her shoulder symptoms could be stress or mood-related.    She sees Dr. Welch from GI   -scope Jan 2024 for hematochezia and diarrhea:     The terminal ileum appeared normal. Performed random biopsy using biopsy forceps.  The cecum, ascending colon and  hepatic flexure appeared normal. Performed random biopsy using biopsy forceps, labeled right colon.  Subcentimeter polyp in the transverse colon was removed with cold snare (bx: inflammatory polyp).  The descending colon appeared normal. Performed random biopsy using biopsy forceps, labeled left colon.  Moderate abnormal mucosa in the rectosigmoid, with edema, erythema and aphthous ulcerations in contiguous distribution.  This is most suggestive of ulcerative proctitis; performed cold forceps biopsy (bx: chronic active colitis).    New medication with no effect on joint pains, helps UC a lot, PsO has gotten worse, gluteal cleft, hard to sit.    Shoulder weakness hasn't gotten worse but she cannot lift her arms above 90 degrees, R with more ROM than L, also has pain with abd above 60 degrees in deltoids   Also feels weak in her feet.     Interval Hx:  She feels tired, she had her first iron infusion yesterday   Joint pains are unchanged and subjective weakness is pretty much the same. Reports that its hard for her to distinguish nerve pain from joint pain   Gets an oral ulcer once every couple of months    ROS  Denies fever, chills, weight loss. + Fatigue, unrefreshed in AM. + dry eyes, no ocular inflx. + dry mouth, denies dental loss, loss of taste, Hx of oral ulcers, around tongue and gum line that are painful. No difficulty swallowing. Denies chest pain. Denies shortness of breath, cough, asthma, or recurrent respiratory infections. + nausea occ. No vomiting, heartburn, abdominal pain, constipation, diarrhea, melena or hematochezia. No recurrent urinary infections or STDs, no genital or anal ulcers. Denies Raynaud's phenomenon. + numbness/tingling thoracic area, lyrica helps. Denies history of clots (arterial or venous), or miscarriages           Problem List[1]     Medical History[2]     Surgical History[3]     Social History     Socioeconomic History    Marital status:      Spouse name: Not on file     Number of children: Not on file    Years of education: Not on file    Highest education level: Not on file   Occupational History    Not on file   Tobacco Use    Smoking status: Former     Current packs/day: 0.00     Average packs/day: 1 pack/day for 12.5 years (12.5 ttl pk-yrs)     Types: Cigarettes     Start date: 2004     Quit date: 2017     Years since quittin.4    Smokeless tobacco: Never   Vaping Use    Vaping status: Never Used   Substance and Sexual Activity    Alcohol use: Yes     Comment: on rare occasions    Drug use: Never    Sexual activity: Yes     Partners: Male     Birth control/protection: Male Sterilization     Comment: Pt stopped her Norethindrone about one week ago- does not desire to be on birth control pills any longer   Other Topics Concern    Not on file   Social History Narrative    Not on file     Social Drivers of Health     Financial Resource Strain: Not on file   Food Insecurity: No Food Insecurity (6/10/2025)    Hunger Vital Sign     Worried About Running Out of Food in the Last Year: Never true     Ran Out of Food in the Last Year: Never true   Transportation Needs: Not on file   Physical Activity: Not on file   Stress: No Stress Concern Present (2025)    [employer] - Occupational Stress Questionnaire     Feeling of Stress : Not at all   Social Connections: Not on file   Intimate Partner Violence: Not At Risk (2025)    Humiliation, Afraid, Rape, and Kick questionnaire     Fear of Current or Ex-Partner: No     Emotionally Abused: No     Physically Abused: No     Sexually Abused: No   Housing Stability: Not on file        RX Allergies[4]     Current Medications[5]       Objective     There were no vitals taken for this visit.       Physical Exam  Virtual visit      Lab Results   Component Value Date    WBC 4.5 2025    HGB 11.9 (L) 2025    HCT 36.3 2025    MCV 98 2025     (L) 2025        Chemistry    Lab  "Results   Component Value Date/Time     06/02/2025 1600    K 3.7 06/02/2025 1600     06/02/2025 1600    CO2 30 06/02/2025 1600    BUN 13 06/02/2025 1600    CREATININE 0.65 06/02/2025 1600    Lab Results   Component Value Date/Time    CALCIUM 9.0 06/02/2025 1600    ALKPHOS 35 06/02/2025 1600    AST 17 06/02/2025 1600    ALT 18 06/02/2025 1600    BILITOT 0.4 06/02/2025 1600           Lab Results   Component Value Date    CRP <0.10 01/20/2025      Lab Results   Component Value Date    ALEXEY POSITIVE (A) 08/01/2023    RF <10 10/25/2023    SEDRATE 3 01/20/2025      Lab Results   Component Value Date    CKTOTAL 30 01/20/2025     Lab Results   Component Value Date    NEUTROABS 1.92 02/04/2025      Lab Results   Component Value Date    FERRITIN 67 06/09/2025      Lab Results   Component Value Date    HEPAIGM NONREACTIVE 08/01/2023    HEPBCIGM NONREACTIVE 08/01/2023    HEPBCAB Nonreactive 09/24/2024    HEPCAB Nonreactive 09/24/2024      Lab Results   Component Value Date    ALT 18 06/02/2025    AST 17 06/02/2025    ALKPHOS 35 06/02/2025    BILITOT 0.4 06/02/2025      No results found for: \"PPD\"   No results found for: \"URICACID\"   Lab Results   Component Value Date    PTH 37.9 09/30/2023    CALCIUM 9.0 06/02/2025    PHOS 3.2 11/19/2024      Lab Results   Component Value Date    SPEP NORMAL 08/01/2023      No results found for: \"ALBUR\", \"[id number]\"   === 04/15/25 ===    XR TRANSFER OF OUTSIDE FILMS   === 03/03/25 ===    MR BRAIN W AND WO CONTRAST    - Impression -  *There is no measurable change compared to the previous exam.  *Findings suggestive of a primary demyelinating process such as  multiple sclerosis. No evidence of intracranial mass, extra-axial  collection, diffusion restriction or abnormal enhancement.      MACRO:  none    Signed by: Marcus Sylvester 3/3/2025 1:13 PM  Dictation workstation:   [id number]      05/2025  IMPRESSION:  Impression:     This is a normal study. There is no electrophysiologic " evidence of a myopathy.     In addition, there is no electrophysiologic evidence of large fiber peripheral neuropathy.     Compared to the studies conducted on 3/22/2024 and 9/9/2024, there are no significant changes in the results.    BMBx 11/2023:  Microscopic Description    PERIPHERAL SMEAR: Submitted              Red cells: Normal.        White cells: Normal.          Platelets: Thrombocytopenia.         ASPIRATE SMEAR: Submitted                          Specimen: Pauci cellular.        Erythropoiesis: Normal maturation.       Granulopoiesis: Normal maturation.       Megakaryocytes: Present. Morphology: Normal.     TOUCH PREP: Submitted       Specimen: Pauci cellular.        Comments: Similar to aspirate smear.     ASPIRATE CLOT: Submitted                           Specimen: Aspicular.      CORE BIOPSY: Submitted                            Specimen: Small biopsy with aspiration artifact.        Cellularity: Slightly hypocellular for age (30-40%).        Blasts: Not overtly increased in number.        Estimated M:E ratio: Consistent with aspirate smear.       Bony trabeculae: Normal.       Granulopoiesis: Show progressive maturation.       Erythropoiesis: Show progressive maturation.        Megakaryocytes: Few seen.        Granulomas: Absent.       Lymphoid aggregates: Absent.     SPECIAL STAINS:  Performed on clot section, A1.        Iron: Aspicular clot section precludes the assessment of storage iron and ring sideroblasts.       IMMUNOHISTOCHEMISTRY: Performed on core biopsy, B1.        CD34:  Highlights scattered blasts (<1% of overall cellularity).      FLOW CYTOMETRY:   No increased or abnormal myeloblasts detected. Granulocytes with phenotypic atypia. No clonal B cell or T cell population identified.  See separate report for details.      Immunostains were performed in addition to flow cytometry to fully characterize the phenotype, architecture, and extent of the marrow population(s).  This was medically  necessary for the best possible diagnosis.           Assessment/Plan    This is a patient with a complex history including PsO managed with topicals, lichen sclerosus, ITP and UC and demyelinating brain lesions. She has been on mirikizumab for UC since December 2024 and presents to rheumatology for evaluation of polyarthralgias.     The joint symptoms are largely mechanical in nature, though she describes mild inflammatory features such as MST. Of note, she was previously treated with Humira for 5-6 months for UC but discontinued the medication due to large demyelinating appearing lesions in the periventricular, subcortical and juxtacortical WM. She did not have any polyarthralgias then. Since February 2024, she has reported neurological symptoms including extremity parasthesias and weakness, foot drop. EMGs of upper and lower extremities on multiple occasions hve not demonstrated neuropathy. She recently developed shouler weakness, EMG, CPK, aldolase were all normal and not consistent with autoimmune inflammatory myositis.     On in person MSK exam, there is no evidence of synovitis. She does exhibit myofascial tenderness, suggesting component of central sensitization and fibromyalgia.     While IBD-associated arthritis is possible, such symptoms typically parallel IBD activity and susceptibility to another autoimmune rheumatic disease is also possible. Patient reports significant improvement in GI sx since starting mirikizumab but has not noticed any improvement in her joint symptoms nor any correlation with medication schedule.   CPK and aldolase normal   ESR and CRP normal on many occasions   PsA is also possible but there is no evidence of synovitis on exam. She is HLA B27 negative. No axial manifestations of SpA, no sacroilitis. MRI of cervical, thoracic and lumbar spine without findings suggestive of SpA either or demyelinating disease. A t2 stir hyperintese focus at C6-C7 was noted likely representing edema  or hardware artifact.     Additional history includes oral ulcers of tongue and gum line, with none reported in recent months. 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 No ocular manifestations, or vascular manifestations. No genital ulcers. She has been tested for HLA B51 and that was negative. HLA B51 is more commonly associated with certain ethnic populations.     ALEXEY is positive, KG is negative (drawn prior to Humira initiation), but she has no clinical or lab findings suggestive of SLE or IIM.   Her current neurological symptoms are not explained by an autoimmune rheumatic disease. No SFN testing have been pursued.   MOG and NMO neg     ESR and CRP have remained normal on multiple occasions.   PLT counts have been stable over the past 3 months.     10/2023:  B2GP, CL, LA neg   Complements normal   RF neg     08/2023:  ALEXEY + 1:160, KG neg     CT chest normal     MSK US 5/16/2025:  Neg synovial screen     MSK US was consistent with in person exam, no evidence of inflammatory arthritis.     Today the patient was counseled about fibromyalgia, including concepts of pathophysiology and the available treatment options. She was advised that fibromyalgia is now understood to be a disorder of the brain and not a disorder of the muscles, ligaments, tendons, joints, bone, or immune system. Fibromyalgia is common in patients with high levels of stress, depression, anxiety or PTSD especially if uncontrolled and in patients with other autoimmune diseases. To treat the fibromyalgia I recommended treating the underlying psychosocial risk factors and to continue to work with mental health specialist and [facility], I will also refer her to pain management. We discussed available treatment options for fibromyalgia but will defer to PCP/pain management expertise. I explained that I do not treat fibromyalgia since it is not an autoimmune rheumatic condition.     Previously she  had questions regarding EDS, no overt hypermobility on exam. She counseled that not all forms of joint hypermobility are related to EDS or require genetic testing. In the case of hypermobile EDS, there's no specific genetic test available. Management focuses on symptom relief such as PT, rather than any specific treatment or intervention. Vascular EDS is generally the more serious type and hypermobile joints is not usually a prominent feature of vEDS. Instead, it affects the body's conenctive tissues.      RTC as needed    Hali Wells MD  Division of Rheumatology   [facility]          [1]   Patient Active Problem List  Diagnosis    Iron deficiency anemia due to chronic blood loss    Mixed hyperlipidemia    IFG (impaired fasting glucose)    Hypothyroidism    Gastroesophageal reflux disease    Psoriasis (a type of skin inflammation)    Ulcerative colitis    Chronic ITP (idiopathic thrombocytopenic purpura) (Multi)    Menorrhagia with regular cycle    Left hemiparesis (Multi)    Restless leg syndrome    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    Radiculopathy, lumbar region    Chronic pain syndrome    Weakness of both lower extremities    Depression, major, recurrent, moderate    Menorrhagia with irregular cycle    Optic neuritis    Insomnia    CNS demyelination (Multi)    Fibromyalgia    Vasomotor rhinitis    Lichen sclerosus of female genitalia    Deviated nasal septum    Hypertrophy of nasal turbinates    Weakness of both shoulders    Weakness    CNS demyelinating disorder (Multi)    Decreased mobility    Mucositis oral   [2]   Past Medical History:  Diagnosis Date    ALEXEY positive     Cardiology follow-up encounter 09/17/2024    evaluation of bradycardia and fatigue Stress test recommended; however, patient is unable to perform s/t neurological dysfunction    Chronic pain syndrome     Depression     PIERRE (dyspnea on exertion)     Encounter for hematology follow-up 12/16/2024  "   Ursula Salinas PA-C    Fibromyalgia     GERD (gastroesophageal reflux disease)     H/O echocardiogram 2023    CONCLUSIONS:   1. Left ventricular systolic function is normal with a 65% estimated ejection fraction.    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     s/p C6-C7 Cervical Spine Arthroplasty ~ Anterior Approach 24    History of ITP     s/p D & C Hysteroscopy 24    Hx of idiopathic thrombocytopenic purpura     follows with heme, 6.13.24: plt 111    Hypothyroidism     [place] (iron deficiency anemia)     24 H&H WNL- IV iron   Started on oral iron but can't tolerate due to GI upset   Oncology Ursula Salinas PA-C 2024    Inflammatory bowel disease     Insomnia     Menorrhagia with regular cycle     Plan: Robot Assisted Laparoscopic Total Hysterectomy; Possible Removal of Tubes & or  Ovaries; Cystoscopy 25    Neurology follow-up encounter 10/15/2024    Paul Holt MD \"It remains unclear whether you have MS or not. I will repeat your brain MRI again in 2025, which together with the eye test can help in determining whether you have MS or not. If the picture remains unclear, we may need to schedule you for a spinal tap.\"    Neuropathy     Palpitation     Psoriasis     Radiculopathy, cervical     Radiculopathy, lumbar region     RLS (restless legs syndrome)     Sleep apnea     Ulcerative colitis    [3]   Past Surgical History:  Procedure Laterality Date    CERVICAL SPINE SURGERY  2024    C6-C7 Cervical Spine Arthroplasty ~ Anterior Approach     SECTION, LOW TRANSVERSE      x 2, .     COLONOSCOPY      DILATION AND CURETTAGE OF UTERUS      HYSTEROSCOPY  2024    D & C Hysteroscopy    UPPER GASTROINTESTINAL ENDOSCOPY     [4]   Allergies  Allergen Reactions    Adhesive Tape-Silicones Rash    Balsalazide Other     Mouth ulcers    Iron GI Upset    Mesalamine Rash   [5]   Current Outpatient Medications:     ARIPiprazole (Abilify) 5 " mg tablet, Take 1 tablet (5 mg) by mouth once daily. For the first 2 weeks take 2.5 mg daily, Disp: 30 tablet, Rfl: 2    clobetasol (Temovate) 0.05 % ointment, Apply topically 2 times a day., Disp: , Rfl:     cyanocobalamin (Vitamin B-12) 1,000 mcg/mL injection, Inject 1 mL (1,000 mcg) into the muscle see administration instructions. Please inject daily x 7, followed by Weekly x 4 and then Monthly, Disp: 30 mL, Rfl: 3    ergocalciferol (Vitamin D-2) 1.25 MG (56904 UT) capsule, Take 1 capsule (1,250 mcg) by mouth 1 (one) time per week., Disp: 4 capsule, Rfl: 11    escitalopram (Lexapro) 20 mg tablet, Take 1 tablet (20 mg) by mouth once daily., Disp: 90 tablet, Rfl: 1    hydrOXYzine HCL (Atarax) 10 mg tablet, Take 1-2 tablets (10-20 mg) by mouth as needed at bedtime (insomnia)., Disp: 180 tablet, Rfl: 1    levothyroxine (Synthroid, Levoxyl) 25 mcg tablet, Take 0.5 tablets (12.5 mcg) by mouth once daily., Disp: 45 tablet, Rfl: 1    lidocaine-diphenhydrAMINE-Maalox 1:1:1 Magic Mouthwash, swish and spit with 5 ml by mouth every 6 hours if needed for mucositis, Disp: , Rfl:     Mag-G 27 mg magnesium (500 mg) tablet, Take 1 tablet by mouth every 12 hours., Disp: , Rfl:     magic mouthwash (lidocaine, diphenhydrAMINE, Maalox 1:1:1), Swish and spit 5 mL every 6 hours if needed for mucositis., Disp: 100 mL, Rfl: 0    mirikizumab-mrkz (Omvoh Pen) 100 mg/mL pen injector injection, Inject 2 mL (200 mg) under the skin every 28 (twenty-eight) days., Disp: 2 mL, Rfl: 11    miscellaneous medical supply misc, Bilateral AFO. Apply to bilateral lower legs daily., Disp: 1 each, Rfl: 0    multivitamin tablet, Take 1 tablet by mouth once daily., Disp: , Rfl:     pantoprazole (ProtoNix) 40 mg EC tablet, Take 1 tablet (40 mg) by mouth 2 times a day. Do not crush, chew, or split., Disp: 120 tablet, Rfl: 1    pregabalin (Lyrica) 225 mg capsule, Take 1 capsule (225 mg) by mouth 2 times a day., Disp: 180 capsule, Rfl: 1    PreviDent 5000  "Sensitive 1.1-5 % paste, , Disp: , Rfl:     tacrolimus (Protopic) 0.1 % ointment, if needed., Disp: , Rfl:     tiZANidine (Zanaflex) 4 mg tablet, TAKE ONE TABLET BY MOUTH DAILY, Disp: 90 tablet, Rfl: 1    UltiCare Safety Syringe 3 mL 22 gauge x 1\" syringe, , Disp: , Rfl:     Vtama 1 % cream, if needed., Disp: , Rfl:   No current facility-administered medications for this visit.    "

## 2025-06-13 NOTE — PATIENT INSTRUCTIONS
Fibromyalgia  Fibromyalgia is a chronic condition that causes widespread pain. It affects 2-4% of people, usually women. People who have other rheumatic diseases are at higher risk of having fibromyalgia. Fibromyalgia is not an inflammatory or autoimmune disease. Research suggests that the nervous system is involved. Brain chemicals, like serotonin and norepinephrine, may be off balance, changing reactions to painful stimuli. Fibromyalgia may cause fatigue, poor sleep, and mood problems, like anxiety or stress. It does not cause any signs on x-rays or blood tests. There is currently no cure for fibromyalgia. Medications, exercise, and therapy help.    What Are the Signs/Symptoms?  Fibromyalgia symptoms are different for each person. The most common symptom is widespread pain. Severe fatigue and sleep problems are also common. Someone with fibromyalgia may not feel refreshed after sleeping all night. Other fibromyalgia signs and symptoms include:    Problems with memory or clear thinking, known as “fibro fog”  Depression or anxiety  Migraines or tension headaches  Digestive problems like IBS or heartburn  Irritable or overactive bladder  Pelvic pain  Jaw pain  Blood tests and x-rays may be used to rule out other causes, like thyroid problems or polymyalgia rheumatica.    What Are Common Treatments?  Exercise is the most effective treatment, including yoga, zena chi, or other low-impact aerobic activity. Acupuncture, chiropractic, and massage may help ease symptoms. Psychotherapy may help patients manage stress and anxiety. A sleep specialist may help patients address sleep disorders. Three drugs are FDA-approved for fibromyalgia: duloxetine (Cymbalta) and milnacipran (Savella) adjust brain chemicals to ease widespread pain, and pregabalin (Lyrica), which blocks overactive nerve cells involved in pain. Older drugs, such as amitryptiline (Elavil), cyclobenzaprine (Flexeril) and other antidepressants may be used too.  Opioids and sleep medicines like zolpidem (Ambien) are not recommended for use in treating fibromyalgia symptoms.    Living with Fibromyalgia  Self-care is important to manage fibromyalgia symptoms and have a good quality of life. A healthy lifestyle, along with medications, can help reduce pain, improve sleep, and ease fatigue. Exercise often but be gentle with yourself as you are starting. Walking, swimming, stretching, and yoga are good for people with fibromyalgia. Add more movement to daily routines, like taking a flight of stairs instead of the elevator. Rest and relaxation are helpful too. Make time to relax each day. Deep breathing or medication can ease stress. Set regular sleep habits, like going to bed at the same time each night.    Updated February 2023 by Lukas Celestin MD, and reviewed by the American College of Rheumat[employer] Committee on Communications and Marketing.    This information is provided for general education only. Individuals should consult a qualified health care provider for professional medical advice, diagnosis and treatment of a medical or health condition.

## 2025-06-16 ENCOUNTER — PATIENT MESSAGE (OUTPATIENT)
Dept: PRIMARY CARE | Facility: CLINIC | Age: 45
End: 2025-06-16
Payer: COMMERCIAL

## 2025-06-17 ENCOUNTER — TELEPHONE (OUTPATIENT)
Dept: PRIMARY CARE | Facility: CLINIC | Age: 45
End: 2025-06-17
Payer: COMMERCIAL

## 2025-06-17 DIAGNOSIS — J30.0 VASOMOTOR RHINITIS: Primary | ICD-10-CM

## 2025-06-17 LAB — VIT C SERPL-MCNC: 45 UMOL/L (ref 23–114)

## 2025-06-17 RX ORDER — IPRATROPIUM BROMIDE 21 UG/1
2 SPRAY, METERED NASAL EVERY 12 HOURS
Qty: 30 ML | Refills: 12 | Status: SHIPPED | OUTPATIENT
Start: 2025-06-17 | End: 2026-06-17

## 2025-06-18 ENCOUNTER — ALLIED HEALTH (OUTPATIENT)
Dept: INTEGRATIVE MEDICINE | Facility: CLINIC | Age: 45
End: 2025-06-18
Payer: COMMERCIAL

## 2025-06-18 DIAGNOSIS — G89.29 CHRONIC NECK PAIN: ICD-10-CM

## 2025-06-18 DIAGNOSIS — M54.2 CHRONIC NECK PAIN: ICD-10-CM

## 2025-06-18 DIAGNOSIS — M54.59 OTHER LOW BACK PAIN: Primary | ICD-10-CM

## 2025-06-18 PROCEDURE — 97810 ACUP 1/> WO ESTIM 1ST 15 MIN: CPT | Performed by: ACUPUNCTURIST

## 2025-06-18 PROCEDURE — 97811 ACUP 1/> W/O ESTIM EA ADD 15: CPT | Performed by: ACUPUNCTURIST

## 2025-06-18 NOTE — PROGRESS NOTES
Acupuncture Visit:     Subjective   Patient ID: Ally Carpio is a 44 y.o. female who presents for No chief complaint on file.  2024 Acupuncture Benefits                                    12/27/2024 RA   [place]  Covered Diagnosis: Medical Necessity   10/30 VPCY    States RIGHT low back has had more pain  No radiation   Upper back and neck tightness  Sleep fair, takes Rx meds to help otherwise states she would be up all night  R hand and R foot with spasms    prev  States pain relief for 4 days p tx  Recent sinus surgery, 3 day recovery, no complications    prev  R sided neck, shoulder and arm pain   Pain relief for 1-2 days p tx, then pain comes back up   Foot drop in both feet   Energy level picks up p tx as well  Massage 1/week     prev  Less pain in shoulder after tx  Low back has been OK,   Neck tight on right side  Some radiation from shoulder   No radiating into hand,   Left side is better  2-3/10  A little less fatigue as well.        States less intense pain  She got new braces to help her walk which is helping    prev  Notes less pain x 3 days  Increase energy  States R sides pain less intense    prev  R sided neck pain  Low back pain  BL foot drop  *Pt using a cane *notes using a rollator at home     States a little relief after the first tx  Notes unable to flu a week after 1st tx due to needing to stay at home  No menses, hysterectomy, ovaries intact    Initial  R sided neck pain  Dxd with CND demyelination, notes multiple Drs have ruled out MS  States she does have lesions on her brain but not spine.  Notes seeing multiple neurologists  States failed neck surgery summer on 2024  RIGHT sided neck pain more behind ear, not at base of skull  Neck pain constant, always there, dull pain 4/10  Worse at end of the day when she has done a lot  BL arms feel weak, RIGHT more than left arm  Radiating to top of shoulder and to front of the shoulder joint  Both arms feel week but RIGHT arm mostly  involved    LBP  Feels achy   RIGHT side pain 80% of the time, 20% Left sided   Radiating to piriformis and to the RIGHT calf mm.  BL foot drop    Other med hx  Ulcerative colitis, partial hysterectomy (hx heavy bleeding- Fe infusions in the past)            Session Information  Is this acupuncture treatment being billed to the patient's insurance company: Yes  This is visit number: 10  The patient has a total number of visits of: 30  Initial Acupuncture Treatment date: 01/28/25  Name of Insurance Company: [employer] Limitations : 30 VPCY: Medical Necessity         Review of Systems         Provider reviewed plan for the acupuncture session, precautions and contraindications. Patient/guardian/hospital staff has given consent to treat with full understanding of what to expect during the session. Before acupuncture began, provider explained to the patient to communicate at any time if the procedure was causing discomfort past their tolerance level. Patient agreed to advise acupuncturist. The acupuncturist counseled the patient on the risks of acupuncture treatment including pain, infection, bleeding, and no relief of pain. The patient was positioned comfortably. There was no evidence of infection at the site of needle insertions.    Objective   Physical Exam              Acupuncture Treatment  Body Points - Bilateral: Du 20, GB 21, 40, Kd 3, UB 11, 12, 18-23, 62, SI 3  Other Techniques Utilized: TDP Lamp  TDP Lamp Descripton: mid-low back  Needle Count In: 27  Needle Count Out: 27  Total Face to Face Time (min): 25 minutes              Assessment/Plan     Diagnoses and all orders for this visit:  Other low back pain (Primary)  Chronic neck pain

## 2025-06-20 ENCOUNTER — INFUSION (OUTPATIENT)
Dept: HEMATOLOGY/ONCOLOGY | Facility: CLINIC | Age: 45
End: 2025-06-20
Payer: COMMERCIAL

## 2025-06-20 VITALS
DIASTOLIC BLOOD PRESSURE: 57 MMHG | SYSTOLIC BLOOD PRESSURE: 95 MMHG | TEMPERATURE: 97.5 F | RESPIRATION RATE: 16 BRPM | BODY MASS INDEX: 24.94 KG/M2 | OXYGEN SATURATION: 96 % | WEIGHT: 154.54 LBS | HEART RATE: 96 BPM

## 2025-06-20 DIAGNOSIS — D50.0 IRON DEFICIENCY ANEMIA DUE TO CHRONIC BLOOD LOSS: ICD-10-CM

## 2025-06-20 PROCEDURE — 2500000004 HC RX 250 GENERAL PHARMACY W/ HCPCS (ALT 636 FOR OP/ED): Performed by: PHYSICIAN ASSISTANT

## 2025-06-20 PROCEDURE — 96365 THER/PROPH/DIAG IV INF INIT: CPT | Mod: INF

## 2025-06-20 RX ORDER — EPINEPHRINE 0.3 MG/.3ML
0.3 INJECTION SUBCUTANEOUS EVERY 5 MIN PRN
Status: DISCONTINUED | OUTPATIENT
Start: 2025-06-20 | End: 2025-06-20 | Stop reason: HOSPADM

## 2025-06-20 RX ORDER — EPINEPHRINE 0.3 MG/.3ML
0.3 INJECTION SUBCUTANEOUS EVERY 5 MIN PRN
OUTPATIENT
Start: 2025-06-26

## 2025-06-20 RX ORDER — FAMOTIDINE 10 MG/ML
20 INJECTION, SOLUTION INTRAVENOUS ONCE AS NEEDED
OUTPATIENT
Start: 2025-06-26

## 2025-06-20 RX ORDER — ALBUTEROL SULFATE 0.83 MG/ML
3 SOLUTION RESPIRATORY (INHALATION) AS NEEDED
OUTPATIENT
Start: 2025-06-26

## 2025-06-20 RX ORDER — HEPARIN 100 UNIT/ML
500 SYRINGE INTRAVENOUS AS NEEDED
OUTPATIENT
Start: 2025-06-20

## 2025-06-20 RX ORDER — DIPHENHYDRAMINE HYDROCHLORIDE 50 MG/ML
50 INJECTION, SOLUTION INTRAMUSCULAR; INTRAVENOUS AS NEEDED
Status: DISCONTINUED | OUTPATIENT
Start: 2025-06-20 | End: 2025-06-20 | Stop reason: HOSPADM

## 2025-06-20 RX ORDER — ALBUTEROL SULFATE 0.83 MG/ML
3 SOLUTION RESPIRATORY (INHALATION) AS NEEDED
Status: DISCONTINUED | OUTPATIENT
Start: 2025-06-20 | End: 2025-06-20 | Stop reason: HOSPADM

## 2025-06-20 RX ORDER — HEPARIN SODIUM,PORCINE/PF 10 UNIT/ML
50 SYRINGE (ML) INTRAVENOUS AS NEEDED
OUTPATIENT
Start: 2025-06-20

## 2025-06-20 RX ORDER — DIPHENHYDRAMINE HYDROCHLORIDE 50 MG/ML
50 INJECTION, SOLUTION INTRAMUSCULAR; INTRAVENOUS AS NEEDED
OUTPATIENT
Start: 2025-06-26

## 2025-06-20 RX ORDER — FAMOTIDINE 10 MG/ML
20 INJECTION, SOLUTION INTRAVENOUS ONCE AS NEEDED
Status: DISCONTINUED | OUTPATIENT
Start: 2025-06-20 | End: 2025-06-20 | Stop reason: HOSPADM

## 2025-06-20 RX ADMIN — IRON SUCROSE 300 MG: 20 INJECTION, SOLUTION INTRAVENOUS at 12:13

## 2025-06-20 ASSESSMENT — PAIN SCALES - GENERAL: PAINLEVEL_OUTOF10: 4

## 2025-06-20 NOTE — PROGRESS NOTES
Pt arrived for venofer infusion. Pt tolerated infusion well. Pt verbalized understanding of infusion schedule. VSS. Pt discharged condition stable.

## 2025-06-23 ENCOUNTER — APPOINTMENT (OUTPATIENT)
Dept: BEHAVIORAL HEALTH | Facility: CLINIC | Age: 45
End: 2025-06-23
Payer: COMMERCIAL

## 2025-06-24 ENCOUNTER — TELEMEDICINE (OUTPATIENT)
Dept: BEHAVIORAL HEALTH | Facility: CLINIC | Age: 45
End: 2025-06-24
Payer: COMMERCIAL

## 2025-06-24 DIAGNOSIS — F33.1 MODERATE EPISODE OF RECURRENT MAJOR DEPRESSIVE DISORDER: ICD-10-CM

## 2025-06-24 PROCEDURE — 99214 OFFICE O/P EST MOD 30 MIN: CPT

## 2025-06-24 PROCEDURE — 1036F TOBACCO NON-USER: CPT

## 2025-06-24 RX ORDER — ARIPIPRAZOLE 10 MG/1
10 TABLET ORAL DAILY
Qty: 30 TABLET | Refills: 2 | Status: SHIPPED | OUTPATIENT
Start: 2025-06-24 | End: 2025-09-22

## 2025-06-24 ASSESSMENT — ENCOUNTER SYMPTOMS
CONSTITUTIONAL NEGATIVE: 1
PSYCHIATRIC NEGATIVE: 1

## 2025-06-24 NOTE — PROGRESS NOTES
"Impression : Patients reports some improvement in depressive symptoms. Explains that she is only 30 percent there. Reports Abilify worked well at first, but it feels like the effect has decreased. CANDIDA increased Abilify to 7.5 mg daily for 2 weeks, then instructed patient to increase the dose to 10 mg daily afterward. CANDIDA advised patient to continue talk therapy.  I performed this visit using real-time telehealth tools, including an AUDIO/VIDEO connection between Ally Hitesh Carpio who is at Home and DEANA Jacinto who is at [facility] office.  Virtual or Telephone Consent    An interactive audio and video telecommunication system which permits real time communications between the patient (at the originating site) and provider (at the distant site) was utilized to provide this telehealth service.   Verbal consent was requested and obtained from Ally Carpio on this date, 06/24/25 for a telehealth visit and the patient's location was confirmed at the time of the visit.      Assessment/Plan     Impression:  Ally Carpio is a 44 y.o. female who presents via telehealth visit for a follow up visit with CC of  \" The Abilify worked at first, but then my body got used to it. \"    Patient consents to treatment.    Problem List Items Addressed This Visit    None  Visit Diagnoses         Codes      Moderate episode of recurrent major depressive disorder     F33.1    Relevant Medications    ARIPiprazole (Abilify) 10 mg tablet    Other Relevant Orders    Follow Up In Psychiatry            Patient is reminded that if there is SI to call [phone] and get themselves to the closest ED for evaluation, otherwise contact me for other questions/concerns.    Patient presenting to outpatient treatment for a scheduled psych follow up visit.      HPI   Background:   Patient presenting to outpatient treatment for a scheduled psych follow up visit.  Patient is at home for the appointment.  Patient was seen a couple " of weeks ago for management of depression.  Patient was started on Abilify 5 mg. Patient reports the medication has partially helped with managing symptoms.  Patient hopes to feel less depressed.    Characteristics/Recent psychiatric symptoms (pertinent positives and negatives):    Patient reports still having symptoms of low mood, crying and low motivation. Denies anxiety and panic attacks. Reports getting 6-7 hours of sleep at night. Appetite is normal  Denies wishes for death or consideration for suicide.  CSSRS negative. Denies flores of psychosis. Denies hx hospitalization.   Patient does explain that current dose of Abilify, Lexapro  have partially been successful in the management of depressive symptoms.     -SI/HI : Denies        Psychiatric Review Of Systems:  Depressive Symptoms: energy, sadness  Manic Symptoms: negative    Anxiety Symptoms: Negative  Psychotic Symptoms: negative      REVIEW OF SYSTEMS  Review of Systems   Constitutional: Negative.    Psychiatric/Behavioral: Negative.       All other ROS negative    Current Medications:  Current Medications[1]     Medical History:  Medical History[2]  Surgical History:  Surgical History[3]  Family History:  Family History[4]  Social History:  Social History     Socioeconomic History    Marital status:      Spouse name: Not on file    Number of children: Not on file    Years of education: Not on file    Highest education level: Not on file   Occupational History    Not on file   Tobacco Use    Smoking status: Former     Current packs/day: 0.00     Average packs/day: 1 pack/day for 12.5 years (12.5 ttl pk-yrs)     Types: Cigarettes     Start date: 2004     Quit date: 2017     Years since quittin.4    Smokeless tobacco: Never   Vaping Use    Vaping status: Never Used   Substance and Sexual Activity    Alcohol use: Yes     Comment: on rare occasions    Drug use: Never    Sexual activity: Yes     Partners: Male     Birth control/protection: Male  "Sterilization     Comment: Pt stopped her Norethindrone about one week ago- does not desire to be on birth control pills any longer   Other Topics Concern    Not on file   Social History Narrative    Not on file     Social Drivers of Health     Financial Resource Strain: Not on file   Food Insecurity: No Food Insecurity (6/10/2025)    Hunger Vital Sign     Worried About Running Out of Food in the Last Year: Never true     Ran Out of Food in the Last Year: Never true   Transportation Needs: Not on file   Physical Activity: Not on file   Stress: No Stress Concern Present (2/21/2025)    [place] [employer] - Occupational Stress Questionnaire     Feeling of Stress : Not at all   Social Connections: Not on file   Intimate Partner Violence: Not At Risk (2/21/2025)    Humiliation, Afraid, Rape, and Kick questionnaire     Fear of Current or Ex-Partner: No     Emotionally Abused: No     Physically Abused: No     Sexually Abused: No   Housing Stability: Not on file     Vitals:  There were no vitals filed for this visit.  Allergies:  RX Allergies[5]    -PCP: Deena Thibodeaux DO  -Pt reports currently is not pregnant  -TBI/head trauma/LOC/seizure hx: Denies    Objective   Appearance: Well groomed    Attitude: Cooperative, and engaged in conversation.    Behavior: Appropriate eye contact.     Motor Activity: No psychomotor agitation or retardation. No abnormal movements, tremors or tics. No evidence of extrapyramidal symptoms or tardive dyskinesia.    Speech: Regular rate, rhythm, volume. Spontaneous, no pressured speech.    Mood:  \" I am tired, but that's my mood . \"    Affect: Full range, mood congruent.    Thought Process: Linear, logical, and goal-directed. No loose associations or gross thought disorganization.    Thought Content:  Denied current suicidal ideation or thoughts of harm to self, denied homicidal ideation or thoughts of harm to others. No delusional thinking elicited. No perseverations " or obsessions identified.     Perception: Did not endorse auditory or visual hallucinations, did not appear to be responding to hallucinatory stimuli.     Cognition: Alert, oriented x3. Preserved attention span and concentration, recent and remote memory. Adequate fund of knowledge. No deficits in language.     Insight: Fair, in regards to understanding mental health condition    Judgement: Fair        Physical Exam      -Ally was seen today for depression, follow-up and med management.  Diagnoses and all orders for this visit:  Moderate episode of recurrent major depressive disorder  -     ARIPiprazole (Abilify) 10 mg tablet; Take 1 tablet (10 mg) by mouth once daily.  -     Follow Up In Psychiatry; Future         MEDICAL-DECISION MAKING    -Patient educated and verbalized understanding on benefits, risks, and side effects of Abilify.     Psych med regimen as follows:  -INCREASE Abilify to 7.5 mg daily for MDD for 2 weeks, then 10 mg daily afterwards  -CONTINUE current psychiatric medications as prescribed ; lexapro 20 mg daily for MDD, CHRISTINA  -CONTINUE Psychotherapy  -CONTINUE exercising and eating heathy diet  -Safety plan reviewed  -READ attached information about the prescribed new medication   -CALL OFFICE IN CASE OF SIDE EFFECT TO SCHEDULE A VISIT FOR ASSESSMENT -[phone]    SI/HI ASSESSMENT  -Patient denied current suicidal ideation or thoughts of harm to self, denied homicidal ideation or thoughts of harm to others. No delusional thinking elicited. No perseverations or obsessions identified.     PLAN  -Continue Medications as directed.  -Follow-up with this provider in 8 weeks.  -Risks/benefits/assessment of medication interventions discussed with pt; pt agreeable to plan. Will continue to monitor for symptoms mgmt and SEs and adjust plan as needed.  -MI to increase coping skills/behavior regulation.  -Safety plan reviewed.  -Call  Psychiatry at [phone] with issues.  -For [facility]  residents, [facility] is a 24/7 hotline you can call for assistance at [phone]. Please call 911 or go to your closest Emergency Room if you feel worse. This includes thoughts of hurting yourself or anyone else, or having other troubles such as hearing voices, seeing visions, or having new and scary thoughts about the people around you.    OARRS:  DEANA Jacinto on 6/24/2025  8:09 AM  I have personally reviewed the OARRS report for Allymarianne Carpio. I have considered the risks of abuse, dependence, addiction and diversion  Medication is felt to be clinically appropriate based on documented diagnosis.      DEANA Jacinto  Record Review: extensive  CALL OFFICE IN CASE OF SIDE EFFECT TO SCHEDULE A VISIT FOR ASSESSMENT -[phone]  Follow up:   August 26th at 0830 virtually  Time Spent:  Prep:   Direct time: 24  minutes  Documentation: 4  minutes  Total: 28  minutes       [1]   Current Outpatient Medications:     clobetasol (Temovate) 0.05 % ointment, Apply topically 2 times a day., Disp: , Rfl:     cyanocobalamin (Vitamin B-12) 1,000 mcg/mL injection, Inject 1 mL (1,000 mcg) into the muscle see administration instructions. Please inject daily x 7, followed by Weekly x 4 and then Monthly, Disp: 30 mL, Rfl: 3    ergocalciferol (Vitamin D-2) 1.25 MG (06208 UT) capsule, Take 1 capsule (1,250 mcg) by mouth 1 (one) time per week., Disp: 4 capsule, Rfl: 11    escitalopram (Lexapro) 20 mg tablet, Take 1 tablet (20 mg) by mouth once daily., Disp: 90 tablet, Rfl: 1    hydrOXYzine HCL (Atarax) 10 mg tablet, Take 1-2 tablets (10-20 mg) by mouth as needed at bedtime (insomnia)., Disp: 180 tablet, Rfl: 1    ipratropium (Atrovent) 21 mcg (0.03 %) nasal spray, Administer 2 sprays into each nostril every 12 hours., Disp: 30 mL, Rfl: 12    levothyroxine (Synthroid, Levoxyl) 25 mcg tablet, Take 0.5 tablets (12.5 mcg) by mouth once daily., Disp: 45 tablet, Rfl: 1    lidocaine-diphenhydrAMINE-Maalox  "1:1:1 Magic Mouthwash, swish and spit with 5 ml by mouth every 6 hours if needed for mucositis, Disp: , Rfl:     Mag-G 27 mg magnesium (500 mg) tablet, Take 1 tablet by mouth every 12 hours., Disp: , Rfl:     magic mouthwash (lidocaine, diphenhydrAMINE, Maalox 1:1:1), Swish and spit 5 mL every 6 hours if needed for mucositis., Disp: 100 mL, Rfl: 0    mirikizumab-mrkz (Omvoh Pen) 100 mg/mL pen injector injection, Inject 2 mL (200 mg) under the skin every 28 (twenty-eight) days., Disp: 2 mL, Rfl: 11    multivitamin tablet, Take 1 tablet by mouth once daily., Disp: , Rfl:     pantoprazole (ProtoNix) 40 mg EC tablet, Take 1 tablet (40 mg) by mouth 2 times a day. Do not crush, chew, or split., Disp: 120 tablet, Rfl: 1    pregabalin (Lyrica) 225 mg capsule, Take 1 capsule (225 mg) by mouth 2 times a day., Disp: 180 capsule, Rfl: 1    PreviDent 5000 Sensitive 1.1-5 % paste, , Disp: , Rfl:     tacrolimus (Protopic) 0.1 % ointment, if needed., Disp: , Rfl:     tiZANidine (Zanaflex) 4 mg tablet, TAKE ONE TABLET BY MOUTH DAILY, Disp: 90 tablet, Rfl: 1    UltiCare Safety Syringe 3 mL 22 gauge x 1\" syringe, , Disp: , Rfl:     Vtama 1 % cream, if needed., Disp: , Rfl:     ARIPiprazole (Abilify) 10 mg tablet, Take 1 tablet (10 mg) by mouth once daily., Disp: 30 tablet, Rfl: 2    miscellaneous medical supply misc, Bilateral AFO. Apply to bilateral lower legs daily., Disp: 1 each, Rfl: 0  [2]   Past Medical History:  Diagnosis Date    ALEXEY positive     Cardiology follow-up encounter 09/17/2024    evaluation of bradycardia and fatigue Stress test recommended; however, patient is unable to perform s/t neurological dysfunction    Chronic pain syndrome     Depression     PIERRE (dyspnea on exertion)     Encounter for hematology follow-up 12/16/2024    Ursula Bardi, PA-C    Fibromyalgia     GERD (gastroesophageal reflux disease)     H/O echocardiogram 09/26/2023    CONCLUSIONS:   1. Left ventricular systolic function is normal with a 65% " "estimated ejection fraction.    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     s/p C6-C7 Cervical Spine Arthroplasty ~ Anterior Approach 24    History of ITP     s/p D & C Hysteroscopy 24    Hx of idiopathic thrombocytopenic purpura     follows with heme, 624: plt 111    Hypothyroidism     CAM[place] (iron deficiency anemia)     24 H&H WNL- IV iron   Started on oral iron but can't tolerate due to GI upset   Oncology Ursula Salinas PA-C 2024    Inflammatory bowel disease     Insomnia     Menorrhagia with regular cycle     Plan: Robot Assisted Laparoscopic Total Hysterectomy; Possible Removal of Tubes & or  Ovaries; Cystoscopy 25    Neurology follow-up encounter 10/15/2024    Paul Holt MD \"It remains unclear whether you have MS or not. I will repeat your brain MRI again in 2025, which together with the eye test can help in determining whether you have MS or not. If the picture remains unclear, we may need to schedule you for a spinal tap.\"    Neuropathy     Palpitation     Psoriasis     Radiculopathy, cervical     Radiculopathy, lumbar region     RLS (restless legs syndrome)     Sleep apnea     Ulcerative colitis    [3]   Past Surgical History:  Procedure Laterality Date    CERVICAL SPINE SURGERY  2024    C6-C7 Cervical Spine Arthroplasty ~ Anterior Approach     SECTION, LOW TRANSVERSE      x , .     COLONOSCOPY      DILATION AND CURETTAGE OF UTERUS      HYSTEROSCOPY  2024    D & C Hysteroscopy    UPPER GASTROINTESTINAL ENDOSCOPY     [4]   Family History  Problem Relation Name Age of Onset    Colon cancer Mother Shari     COPD Mother Shari     Hyperlipidemia Father Archuleta     Prostate cancer Father Archuleta     Hyperlipidemia Sister      Hypothyroidism Sister      Diverticulitis Sister      No Known Problems Sister      No Known Problems Brother      Colon cancer Mother's Brother      Colon cancer Mother's Brother Gary     Pancreatic " cancer Mother's Brother Gary     Breast cancer Father's Sister Na    [5]   Allergies  Allergen Reactions    Adhesive Tape-Silicones Rash    Balsalazide Other     Mouth ulcers    Iron GI Upset    Mesalamine Rash

## 2025-06-25 ENCOUNTER — APPOINTMENT (OUTPATIENT)
Dept: INTEGRATIVE MEDICINE | Facility: CLINIC | Age: 45
End: 2025-06-25
Payer: COMMERCIAL

## 2025-06-25 DIAGNOSIS — M54.59 OTHER LOW BACK PAIN: Primary | ICD-10-CM

## 2025-06-25 DIAGNOSIS — M54.2 CHRONIC NECK PAIN: ICD-10-CM

## 2025-06-25 DIAGNOSIS — G89.29 CHRONIC NECK PAIN: ICD-10-CM

## 2025-06-25 PROCEDURE — 97811 ACUP 1/> W/O ESTIM EA ADD 15: CPT | Performed by: ACUPUNCTURIST

## 2025-06-25 PROCEDURE — 97810 ACUP 1/> WO ESTIM 1ST 15 MIN: CPT | Performed by: ACUPUNCTURIST

## 2025-06-25 NOTE — PROGRESS NOTES
Acupuncture Visit:     Subjective   Patient ID: Ally Carpio is a 44 y.o. female who presents for No chief complaint on file.  2024 Acupuncture Benefits                                    12/27/2024 RA   [facility]  Covered Diagnosis: Medical Necessity   10/30 VPCY    R upper arm and shoulder/neck and jaw pain  Some LBP  Upper back and neck tightness  R hand and R foot with spasms    prev  States pain relief for 4 days p tx  Recent sinus surgery, 3 day recovery, no complications    prev  R sided neck, shoulder and arm pain   Pain relief for 1-2 days p tx, then pain comes back up   Foot drop in both feet   Energy level picks up p tx as well  Massage 1/week     prev  Less pain in shoulder after tx  Low back has been OK,   Neck tight on right side  Some radiation from shoulder   No radiating into hand,   Left side is better  2-3/10  A little less fatigue as well.        States less intense pain  She got new braces to help her walk which is helping    prev  Notes less pain x 3 days  Increase energy  States R sides pain less intense    prev  R sided neck pain  Low back pain  BL foot drop  *Pt using a cane *notes using a rollator at home     States a little relief after the first tx  Notes unable to flu a week after 1st tx due to needing to stay at home  No menses, hysterectomy, ovaries intact    Initial  R sided neck pain  Dxd with CND demyelination, notes multiple Drs have ruled out MS  States she does have lesions on her brain but not spine.  Notes seeing multiple neurologists  States failed neck surgery summer on 2024  RIGHT sided neck pain more behind ear, not at base of skull  Neck pain constant, always there, dull pain 4/10  Worse at end of the day when she has done a lot  BL arms feel weak, RIGHT more than left arm  Radiating to top of shoulder and to front of the shoulder joint  Both arms feel week but RIGHT arm mostly involved    LBP  Feels achy   RIGHT side pain 80% of the time, 20% Left sided   Radiating  to piriformis and to the RIGHT calf mm.  BL foot drop    Other med hx  Ulcerative colitis, partial hysterectomy (hx heavy bleeding- Fe infusions in the past)            Session Information  Is this acupuncture treatment being billed to the patient's insurance company: Yes  This is visit number: 11  The patient has a total number of visits of: 30  Initial Acupuncture Treatment date: 01/28/25  Name of Insurance Company: [employer] Limitations : 30 VPCY: Medical Necessity  Visit Type: Follow-up visit         Review of Systems         Provider reviewed plan for the acupuncture session, precautions and contraindications. Patient/guardian/hospital staff has given consent to treat with full understanding of what to expect during the session. Before acupuncture began, provider explained to the patient to communicate at any time if the procedure was causing discomfort past their tolerance level. Patient agreed to advise acupuncturist. The acupuncturist counseled the patient on the risks of acupuncture treatment including pain, infection, bleeding, and no relief of pain. The patient was positioned comfortably. There was no evidence of infection at the site of needle insertions.    Objective   Physical Exam              Acupuncture Treatment  Patient Position: Prone on a table  Acupuncture Needling: Yes  Needle Guage: 36 guage /.20/ Blue seirin  Body Points: With retention  Body Points - Left: BL62  Body Points - Bilateral: DU20 GB20 GB21 BL11 BL12 BL13 BL14 BL28 BL23 - PC6 - SP3 KI3  Body Points - Right: SI3  Other Techniques Utilized: TDP Lamp  TDP Lamp Descripton: neck/upper back + moxa  Needle Count In: 25  Needle Count Out: 25  Total Face to Face Time (min): 25 minutes              Assessment/Plan     Diagnoses and all orders for this visit:  Other low back pain (Primary)  Chronic neck pain

## 2025-06-27 ENCOUNTER — INFUSION (OUTPATIENT)
Dept: HEMATOLOGY/ONCOLOGY | Facility: CLINIC | Age: 45
End: 2025-06-27
Payer: COMMERCIAL

## 2025-06-27 VITALS
DIASTOLIC BLOOD PRESSURE: 51 MMHG | WEIGHT: 154.21 LBS | TEMPERATURE: 98.2 F | BODY MASS INDEX: 24.89 KG/M2 | OXYGEN SATURATION: 98 % | HEART RATE: 52 BPM | RESPIRATION RATE: 18 BRPM | SYSTOLIC BLOOD PRESSURE: 90 MMHG

## 2025-06-27 DIAGNOSIS — D50.0 IRON DEFICIENCY ANEMIA DUE TO CHRONIC BLOOD LOSS: ICD-10-CM

## 2025-06-27 PROCEDURE — 2500000004 HC RX 250 GENERAL PHARMACY W/ HCPCS (ALT 636 FOR OP/ED): Performed by: PHYSICIAN ASSISTANT

## 2025-06-27 PROCEDURE — 96365 THER/PROPH/DIAG IV INF INIT: CPT | Mod: INF

## 2025-06-27 RX ORDER — DIPHENHYDRAMINE HYDROCHLORIDE 50 MG/ML
50 INJECTION, SOLUTION INTRAMUSCULAR; INTRAVENOUS AS NEEDED
Status: DISCONTINUED | OUTPATIENT
Start: 2025-06-27 | End: 2025-06-27 | Stop reason: HOSPADM

## 2025-06-27 RX ORDER — EPINEPHRINE 0.3 MG/.3ML
0.3 INJECTION SUBCUTANEOUS EVERY 5 MIN PRN
Status: DISCONTINUED | OUTPATIENT
Start: 2025-06-27 | End: 2025-06-27 | Stop reason: HOSPADM

## 2025-06-27 RX ORDER — EPINEPHRINE 0.3 MG/.3ML
0.3 INJECTION SUBCUTANEOUS EVERY 5 MIN PRN
OUTPATIENT
Start: 2025-07-03

## 2025-06-27 RX ORDER — DIPHENHYDRAMINE HYDROCHLORIDE 50 MG/ML
50 INJECTION, SOLUTION INTRAMUSCULAR; INTRAVENOUS AS NEEDED
OUTPATIENT
Start: 2025-07-03

## 2025-06-27 RX ORDER — FAMOTIDINE 10 MG/ML
20 INJECTION, SOLUTION INTRAVENOUS ONCE AS NEEDED
OUTPATIENT
Start: 2025-07-03

## 2025-06-27 RX ORDER — ALBUTEROL SULFATE 0.83 MG/ML
3 SOLUTION RESPIRATORY (INHALATION) AS NEEDED
OUTPATIENT
Start: 2025-07-03

## 2025-06-27 RX ORDER — FAMOTIDINE 10 MG/ML
20 INJECTION, SOLUTION INTRAVENOUS ONCE AS NEEDED
Status: DISCONTINUED | OUTPATIENT
Start: 2025-06-27 | End: 2025-06-27 | Stop reason: HOSPADM

## 2025-06-27 RX ORDER — HEPARIN 100 UNIT/ML
500 SYRINGE INTRAVENOUS AS NEEDED
OUTPATIENT
Start: 2025-06-27

## 2025-06-27 RX ORDER — HEPARIN SODIUM,PORCINE/PF 10 UNIT/ML
50 SYRINGE (ML) INTRAVENOUS AS NEEDED
OUTPATIENT
Start: 2025-06-27

## 2025-06-27 RX ORDER — ALBUTEROL SULFATE 0.83 MG/ML
3 SOLUTION RESPIRATORY (INHALATION) AS NEEDED
Status: DISCONTINUED | OUTPATIENT
Start: 2025-06-27 | End: 2025-06-27 | Stop reason: HOSPADM

## 2025-06-27 RX ADMIN — IRON SUCROSE 300 MG: 20 INJECTION, SOLUTION INTRAVENOUS at 13:19

## 2025-06-27 ASSESSMENT — PAIN SCALES - GENERAL: PAINLEVEL_OUTOF10: 4

## 2025-06-27 NOTE — PROGRESS NOTES
Ally perdomo for venofer infusion. No labs needed at this time. Pt tolerated treatment well. Pt declined AVS and verbalized understanding of appointments. VSS. Pt discharged condition stable.

## 2025-07-02 ENCOUNTER — APPOINTMENT (OUTPATIENT)
Dept: HEMATOLOGY/ONCOLOGY | Facility: CLINIC | Age: 45
End: 2025-07-02
Payer: COMMERCIAL

## 2025-07-03 ENCOUNTER — INFUSION (OUTPATIENT)
Dept: HEMATOLOGY/ONCOLOGY | Facility: CLINIC | Age: 45
End: 2025-07-03
Payer: COMMERCIAL

## 2025-07-03 VITALS
SYSTOLIC BLOOD PRESSURE: 103 MMHG | DIASTOLIC BLOOD PRESSURE: 67 MMHG | HEART RATE: 53 BPM | OXYGEN SATURATION: 99 % | BODY MASS INDEX: 25.28 KG/M2 | WEIGHT: 156.6 LBS | TEMPERATURE: 97 F | RESPIRATION RATE: 16 BRPM

## 2025-07-03 DIAGNOSIS — D50.0 IRON DEFICIENCY ANEMIA DUE TO CHRONIC BLOOD LOSS: ICD-10-CM

## 2025-07-03 PROCEDURE — 2500000004 HC RX 250 GENERAL PHARMACY W/ HCPCS (ALT 636 FOR OP/ED): Performed by: PHYSICIAN ASSISTANT

## 2025-07-03 PROCEDURE — 96365 THER/PROPH/DIAG IV INF INIT: CPT | Mod: INF

## 2025-07-03 RX ORDER — ALBUTEROL SULFATE 0.83 MG/ML
3 SOLUTION RESPIRATORY (INHALATION) AS NEEDED
OUTPATIENT
Start: 2025-12-09

## 2025-07-03 RX ORDER — FAMOTIDINE 10 MG/ML
20 INJECTION, SOLUTION INTRAVENOUS ONCE AS NEEDED
OUTPATIENT
Start: 2025-12-09

## 2025-07-03 RX ORDER — FAMOTIDINE 10 MG/ML
20 INJECTION, SOLUTION INTRAVENOUS ONCE AS NEEDED
Status: DISCONTINUED | OUTPATIENT
Start: 2025-07-03 | End: 2025-07-03 | Stop reason: HOSPADM

## 2025-07-03 RX ORDER — DIPHENHYDRAMINE HYDROCHLORIDE 50 MG/ML
50 INJECTION, SOLUTION INTRAMUSCULAR; INTRAVENOUS AS NEEDED
OUTPATIENT
Start: 2025-12-09

## 2025-07-03 RX ORDER — DIPHENHYDRAMINE HYDROCHLORIDE 50 MG/ML
50 INJECTION, SOLUTION INTRAMUSCULAR; INTRAVENOUS AS NEEDED
Status: DISCONTINUED | OUTPATIENT
Start: 2025-07-03 | End: 2025-07-03 | Stop reason: HOSPADM

## 2025-07-03 RX ORDER — HEPARIN SODIUM,PORCINE/PF 10 UNIT/ML
50 SYRINGE (ML) INTRAVENOUS AS NEEDED
OUTPATIENT
Start: 2025-07-03

## 2025-07-03 RX ORDER — HEPARIN 100 UNIT/ML
500 SYRINGE INTRAVENOUS AS NEEDED
OUTPATIENT
Start: 2025-07-03

## 2025-07-03 RX ORDER — EPINEPHRINE 0.3 MG/.3ML
0.3 INJECTION SUBCUTANEOUS EVERY 5 MIN PRN
Status: DISCONTINUED | OUTPATIENT
Start: 2025-07-03 | End: 2025-07-03 | Stop reason: HOSPADM

## 2025-07-03 RX ORDER — EPINEPHRINE 0.3 MG/.3ML
0.3 INJECTION SUBCUTANEOUS EVERY 5 MIN PRN
OUTPATIENT
Start: 2025-12-09

## 2025-07-03 RX ORDER — ALBUTEROL SULFATE 0.83 MG/ML
3 SOLUTION RESPIRATORY (INHALATION) AS NEEDED
Status: DISCONTINUED | OUTPATIENT
Start: 2025-07-03 | End: 2025-07-03 | Stop reason: HOSPADM

## 2025-07-03 RX ADMIN — IRON SUCROSE 300 MG: 20 INJECTION, SOLUTION INTRAVENOUS at 13:49

## 2025-07-03 ASSESSMENT — PAIN SCALES - GENERAL: PAINLEVEL_OUTOF10: 4

## 2025-07-03 NOTE — PROGRESS NOTES
Ally arrived for Venofer tx. No labs needed at this time. Pt tolerated tx well. VSS. AVS declined. Pt discharged home condition stable.

## 2025-07-07 ENCOUNTER — APPOINTMENT (OUTPATIENT)
Dept: INTEGRATIVE MEDICINE | Facility: CLINIC | Age: 45
End: 2025-07-07
Payer: COMMERCIAL

## 2025-07-08 ENCOUNTER — OFFICE VISIT (OUTPATIENT)
Dept: PAIN MEDICINE | Facility: HOSPITAL | Age: 45
End: 2025-07-08
Payer: COMMERCIAL

## 2025-07-08 DIAGNOSIS — M54.14 THORACIC RADICULOPATHY: ICD-10-CM

## 2025-07-08 PROCEDURE — 99214 OFFICE O/P EST MOD 30 MIN: CPT | Performed by: ANESTHESIOLOGY

## 2025-07-08 PROCEDURE — 1036F TOBACCO NON-USER: CPT | Performed by: ANESTHESIOLOGY

## 2025-07-08 ASSESSMENT — PAIN SCALES - GENERAL: PAINLEVEL_OUTOF10: 7

## 2025-07-08 NOTE — PROGRESS NOTES
Subjective   Patient ID: Ally Carpio is a 44 y.o. female with a past medical history of  ITP, fibromyalgia, ulcerative colitis.      HPI:   Patient coming in with midthoracic pain radiating to the right flank and into her ribs.  Patient states that she has had this pain for many months, received T10-T11 interlaminar epidural steroid injection in March with 2 days of partial relief, 50-60%.  Patient states she has a loss of sensation in the area. Paraesthesia is persistent. Patient states pain is worse with sitting, walking, rising from chair. Denies radiation to the UE. Patient has tried multiple over-the-counter medications, she is currently on Lyrica 225 twice daily and tizanidine 4mg daily.  Patient has been worked up extensively by rheumatology and neurology due to several neurological symptoms including polyarthralgias and paresthesias of all limbs, trunk, right lower extremity left lower extremity weakness including bilateral foot drop, spasticity, brain fog.  Patient had MRI of the brain showing a few nonenhancing large demyelinating like lesions, though per neurology note from April these lesions are located in areas of the brain that should be clinically silent. EMGs of upper and lower limbs have not demonstrated neuropathy. Patient states although she is dealing with the shoulder weakness, foot drop, and polyathralgias, her most pressing concern today is the thoracic back pain.     Physical Therapy: The patient completed more than six weeks of formal physical therapy more than six months ago, but has done physician-directed exercises at home every day for greater than six weeks with minimal improvement  Other Conservative Measures she has tried: Heating Pad, Ice, Massage/myofascial release, and Injections  Classes of medications tried in the past: Acetaminophen, NSAIDs, Gabapentenoids, and Topical agents    Review of Systems   13-point ROS done and negative except for HPI.     Current Outpatient  "Medications   Medication Instructions    ARIPiprazole (ABILIFY) 10 mg, oral, Daily    clobetasol (Temovate) 0.05 % ointment 2 times daily    cyanocobalamin (VITAMIN B-12) 1,000 mcg, intramuscular, See admin instructions, Please inject daily x 7, followed by Weekly x 4 and then Monthly    ergocalciferol (VITAMIN D-2) 1,250 mcg, oral, Once Weekly    escitalopram (LEXAPRO) 20 mg, oral, Daily    hydrOXYzine HCL (ATARAX) 10-20 mg, oral, Nightly PRN    ipratropium (Atrovent) 21 mcg (0.03 %) nasal spray 2 sprays, Each Nostril, Every 12 hours    levothyroxine (SYNTHROID, LEVOXYL) 12.5 mcg, oral, Daily    lidocaine-diphenhydrAMINE-Maalox 1:1:1 Magic Mouthwash swish and spit with 5 ml by mouth every 6 hours if needed for mucositis    Mag-G 27 mg magnesium (500 mg) tablet 1 tablet, Every 12 hours scheduled (0630,1830)    magic mouthwash (lidocaine, diphenhydrAMINE, Maalox 1:1:1) 5 mL, Swish & Spit, Every 6 hours PRN    miscellaneous medical supply misc Bilateral AFO. Apply to bilateral lower legs daily.    multivitamin tablet 1 tablet, Daily    [place] Pen 200 mg, subcutaneous, Every 28 days    pantoprazole (PROTONIX) 40 mg, oral, 2 times daily, Do not crush, chew, or split.    pregabalin (LYRICA) 225 mg, oral, 2 times daily    PreviDent 5000 Sensitive 1.1-5 % paste     tacrolimus (Protopic) 0.1 % ointment if needed.    tiZANidine (ZANAFLEX) 4 mg, oral, Daily    UltiCare Safety Syringe 3 mL 22 gauge x 1\" syringe     Vtama 1 % cream if needed.     Medical History[1]     Surgical History[2]     Family History[3]     RX Allergies[4]     Objective     There were no vitals filed for this visit.     Physical Exam  General: NAD, well groomed, well nourished  Eyes: Non-icteric sclera, EOMI  Ears, Nose, Mouth, and Throat: External ears and nose appear to be without deformity or rash. No lesions or masses noted. Hearing is grossly intact.   Neck: Trachea midline  Respiratory: Nonlabored breathing   Cardiovascular: no peripheral edema "   Skin: No rashes or open lesions/ulcers identified on skin.    Back: Paraesthesia along right thoracic/flank    Palpation: No tenderness to palpation over lumbar paraspinous muscles.   Straight leg raise: does not reproduce their pain, bilaterally   DEWEY Maneuver does not reproduce pain bilaterally    Neurologic:   Cranial nerves grossly intact.   Strength 3/5 and symmetric plantar/dorsiflexion   Sensation: Parasthesia right thoracic/flank region  Reyes: absent  Clonus: absent    Psychiatric: Alert, orientation to person, place, and time. Cooperative.    Imaging personally reviewed and independently interpreted:   MRI thoracic 9/24/2024  Narrative & Impression   Interpreted By:  Rustam Hughes,  and Dre Coelho   STUDY:  MR THORACIC SPINE W AND WO IV CONTRAST;  9/25/2024 4:47 pm      INDICATION:  Signs/Symptoms:rule out myelitis.      ,G37.9 Demyelinating disease of central nervous system, unspecified  (Multi)      COMPARISON:  MRI lumbar spine 05/10/2024.      ACCESSION NUMBER(S):  [id number]      ORDERING CLINICIAN:  COLLEEN BOGGS      TECHNIQUE:  Sagittal T1, T2, STIR and axial T2 and T1 weighted MR images of the  thoracic spine were obtained.      FINDINGS:  Cord: Normal in signal. No abnormal spinal cord enhancement. No cord  atrophy.      Alignment: Mild focal kyphosis centered at T10-T11. Thoracic  alignment is otherwise maintained.      Vertebrae/Intervertebral Discs: The vertebral body heights are  intact.  Marrow signal is within normal limits.  There is no marrow  replacing lesion or abnormal osseous enhancement. Mild-to-moderate  degenerative disc height loss at T10-T11. Type 2 Modic endplate  signal change involving the T9-T11 regions. Trace type 1 Modic  endplate signal change involving the anterior superior endplate of  T10.      T1-10: There is no spinal canal stenosis.      T10-T11: Disc osteophyte complex effaces the ventral CSF space and  flattens the ventral aspect of the spinal cord. There  is mild spinal  canal stenosis. No significant neural foraminal stenosis.:      T11 - L2: No significant spinal canal or neural foraminal stenosis.      Paraspinous Soft Tissues: Within normal limits.      IMPRESSION:  1. No abnormal spinal cord signal or enhancement to suggest  demyelinating disease or active demyelination.  2. At the T10-T11 level, there is a disc osteophyte complex which  effaces the ventral CSF space and flattens the ventral aspect of the  spinal cord with mild spinal canal stenosis.      I personally reviewed the images/study and I agree with the findings  as stated by Resident Meliton Erickson. This study was interpreted at  [place], [place].           MRI c spine 9/10/2025  Narrative & Impression   Interpreted By:  Katya Ortiz,   STUDY:  MR CERVICAL SPINE W AND WO IV CONTRAST      INDICATION:  Signs/Symptoms:eval for multiple sclerosis, new weakness      COMPARISON:  None.      ACCESSION NUMBER(S):  [id number]      ORDERING CLINICIAN:  JASMIN BHAKTA      TECHNIQUE:  Multiplanar multisequence MRI of the cervical spine was performed  before and after the intravenous administration of contrast,  according to standard protocol. 13 ml of Dotarem was administered  (the balance of single use vial(s) has/have been discarded).      FINDINGS:  ALIGNMENT: Slight reversal of usual cervical lordosis.      VERTEBRAE: No acute fracture or aggressive osseous lesion. Apparent  STIR hyperintensity within the C6-7 vertebral bodies, which may be  secondary to edema versus artifact from disc space implant.      DISCS: Disc space implants at C6-7. Remaining discs are unremarkable.      CORD: There is no intrinsic spinal cord signal abnormality.      ENHANCEMENT: There is no evidence of abnormal enhancement.      PARAVERTEBRAL SOFT TISSUES: The visualized paravertebral soft tissues  appear within normal limits.      EVALUATION OF INDIVIDUAL LEVELS:  C2-3: No disc  herniation  spinal canal or neuroforaminal stenosis.      C3-4: Uncovertebral and facet hypertrophy mildly narrow the left  neural foramen. Spinal canal right neural foramina are patent.      C4-5: Shallow central disc protrusion with minimal narrowing of the  spinal canal. Bilateral foramina are patent.      C5-6: No disc herniation  spinal canal or neuroforaminal stenosis.      C6-7: Uncovertebral and facet hypertrophy mildly narrow the left  neural foramen. Spinal canal and right neural foramina are patent.      C7-T1: No disc herniation  spinal canal or neuroforaminal stenosis.      IMPRESSION:  Status post C6-7 disc implant. T2 stir hyperintense signal within the  C6-7 vertebral bodies may represent edema and/or artifact secondary  to hardware.      No evidence of demyelinating process within the cervical cord.      Signed by: Katya Ortiz 9/11/2024 2:04 PM  Dictation workstation:   [id number]       Assessment/Plan   44-year-old female with history of ITP, fibromyalgia, ulcerative colitis presenting with persistent midthoracic pain for over 1 year. Patient had interlaminar injection in March 2025 with 2 days of partial relief. Presents today with similar pain/symptomatolgy she had prior to the injection. Patients symptoms likely multifactorial involving some component of underlying systemic inflammatory/neurological disorder along with degenerative disc and osteophyte progression.   Plan:  -T10-11 right transforaminal epidural steroid injection. Risk, benefits, alternatives reviewed with patient.  Discussed that this area is at the level of the cord and also lung.  Risks include infection, bleeding, nerve damage, spinal cord damage, possible pneumothorax.  Patient understood the risk and would like to move forward.      The patient was invited to contact us back anytime with any questions or concerns and follow-up with us in the office as needed.     There are no diagnoses linked to this encounter.    This  "note was generated with the aid of dictation software, there may be typos despite my attempts at proofreading.      Elvira Wilks MD  Anesthesiology PGY-1         [1]   Past Medical History:  Diagnosis Date    ALEXEY positive     Anemia     Cardiology follow-up encounter 09/17/2024    evaluation of bradycardia and fatigue Stress test recommended; however, patient is unable to perform s/t neurological dysfunction    Chronic pain syndrome     Depression     PIERRE (dyspnea on exertion)     Encounter for hematology follow-up 12/16/2024    Ursula Salinas PA-C    Fibromyalgia     GERD (gastroesophageal reflux disease)     H/O echocardiogram 09/26/2023    CONCLUSIONS:   1. Left ventricular systolic function is normal with a 65% estimated ejection fraction.    Heart murmur     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     s/p C6-C7 Cervical Spine Arthroplasty ~ Anterior Approach 7/19/24    History of ITP     s/p D & C Hysteroscopy 5/22/24    Hx of idiopathic thrombocytopenic purpura     follows with heme, 6.13.24: plt 111    Hypothyroidism     CAMDEN (iron deficiency anemia)     12/12/24 H&H WNL- IV iron   Started on oral iron but can't tolerate due to GI upset   Oncology Ursula Salinas PA-C 12/16/2024    Inflammatory bowel disease     Insomnia     Low back pain     Menorrhagia with regular cycle     Plan: Robot Assisted Laparoscopic Total Hysterectomy; Possible Removal of Tubes & or  Ovaries; Cystoscopy 1/8/25    Neurology follow-up encounter 10/15/2024    Paul Holt MD \"It remains unclear whether you have MS or not. I will repeat your brain MRI again in March of 2025, which together with the eye test can help in determining whether you have MS or not. If the picture remains unclear, we may need to schedule you for a spinal tap.\"    Neuropathy     Palpitation     Psoriasis     Radiculopathy, cervical     Radiculopathy, lumbar region     RLS (restless legs syndrome)     Sleep apnea     TMJ dysfunction     " Ulcerative colitis    [2]   Past Surgical History:  Procedure Laterality Date    CERVICAL SPINE SURGERY  2024    C6-C7 Cervical Spine Arthroplasty ~ Anterior Approach     SECTION, LOW TRANSVERSE  ,2009    x 2, .     COLONOSCOPY      DILATION AND CURETTAGE OF UTERUS      HYSTEROSCOPY  2024    D & C Hysteroscopy    UPPER GASTROINTESTINAL ENDOSCOPY     [3]   Family History  Problem Relation Name Age of Onset    Colon cancer Mother Shari     COPD Mother Shari     Hyperlipidemia Father Archuleta     Prostate cancer Father Archuleta     Hyperlipidemia Sister      Hypothyroidism Sister      Diverticulitis Sister      No Known Problems Sister      Motion Sickness Brother Gary brandon     Colon cancer Mother's Brother      Colon cancer Mother's Brother Gary     Pancreatic cancer Mother's Brother Gary     Breast cancer Father's Sister Na     Heart attack Sister Kaitlynn comer     Thyroid disease Mother's Sister Letty blanco     Dementia Paternal Grandmother Rosaline Landin     Rheumatologic disease Mother's Sister Letty Blanco     Dementia Paternal Grandmother Rosaline Landin     Thyroid disease Mother's Sister Magdalene     Dementia Paternal Grandmother Rosaline Landin     Rheumatologic disease Mother's Sister Letty Bella     Dementia Paternal Grandmother Rosaline Landin     No Known Problems Brother Gary brandon    [4]   Allergies  Allergen Reactions    Adhesive Tape-Silicones Rash    Balsalazide Other     Mouth ulcers    Iron GI Upset    Mesalamine Rash

## 2025-07-09 ENCOUNTER — OFFICE VISIT (OUTPATIENT)
Dept: CARDIOLOGY | Facility: HOSPITAL | Age: 45
End: 2025-07-09
Payer: COMMERCIAL

## 2025-07-09 VITALS
DIASTOLIC BLOOD PRESSURE: 63 MMHG | OXYGEN SATURATION: 99 % | HEART RATE: 64 BPM | BODY MASS INDEX: 25.26 KG/M2 | WEIGHT: 156.53 LBS | SYSTOLIC BLOOD PRESSURE: 113 MMHG

## 2025-07-09 DIAGNOSIS — I95.1 ORTHOSTATIC HYPOTENSION: Primary | ICD-10-CM

## 2025-07-09 PROCEDURE — 99214 OFFICE O/P EST MOD 30 MIN: CPT | Performed by: INTERNAL MEDICINE

## 2025-07-09 PROCEDURE — 99202 OFFICE O/P NEW SF 15 MIN: CPT

## 2025-07-09 NOTE — PROGRESS NOTES
Referred by Dr. Thibodeaux for bradycardia, lightheadedness, syncope      History Of Present Illness:    Ally Carpio is a 44 y.o. female with h/o recently identified demyelinating disorder, ulcerative colitis, psoriatic arthritis, fibromyalgia, ITP, PHAN, CAMDEN, chronic menorrhagia s/p hysterectomy, RLS, former smoker, hypothyroidism, stomach ulcer presenting today for second opinion regarding symptoms of lightheadedness that occurs with changes in position and prolonged standing as well as reported bradycardia. States symptoms of lightheadedness associated with change in position or prolonged standing started about 1 year ago. She did have a syncopal event about 1 month ago-- was standing up and felt lightheaded with loss of consciousness briefly.  Denies chest pain or pressure, SOB.  She does wear a smartwatch and reports her baseline resting HR is typically in the 40's with rare times it decreases to 38-39bpm.  She did see cardiology at [facility] with barrono in January that showed sinus rhythm/sinus bradycardia with HR , Avg 54bpm.  She was advised that lightheadedness may be related to tizanidine, lyrica, or atarax, however, she states she cannot stop these medications as they help her ability to ambulate.       Past Medical History:  She has a past medical history of ALEXEY positive, Anemia, Cardiology follow-up encounter (09/17/2024), Chronic pain syndrome, Depression, PIERRE (dyspnea on exertion), Encounter for hematology follow-up (12/16/2024), Fibromyalgia, GERD (gastroesophageal reflux disease), H/O echocardiogram (09/26/2023), Heart murmur, 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, History of ITP, idiopathic thrombocytopenic purpura, Hypothyroidism, CAMDEN (iron deficiency anemia), Inflammatory bowel disease, Insomnia, Low back pain, Menorrhagia with regular cycle, Neurology follow-up encounter (10/15/2024), Neuropathy, Palpitation, Psoriasis, Radiculopathy, cervical,  Radiculopathy, lumbar region, RLS (restless legs syndrome), Sleep apnea, TMJ dysfunction, and Ulcerative colitis.    Past Surgical History:  She has a past surgical history that includes  section, low transverse (,); Colonoscopy; Upper gastrointestinal endoscopy; Dilation and curettage of uterus; Cervical spine surgery (2024); and Hysteroscopy (2024).      Social History:  She reports that she quit smoking about 8 years ago. Her smoking use included cigarettes. She started smoking about 21 years ago. She has a 12.5 pack-year smoking history. She has never used smokeless tobacco. She reports that she does not currently use alcohol. She reports that she does not use drugs.    Family History:  Family History[1]     Allergies:  Adhesive tape-silicones, Balsalazide, Iron, and Mesalamine    Outpatient Medications:  Current Outpatient Medications   Medication Instructions    ARIPiprazole (ABILIFY) 10 mg, oral, Daily    clobetasol (Temovate) 0.05 % ointment 2 times daily    cyanocobalamin (VITAMIN B-12) 1,000 mcg, intramuscular, See admin instructions, Please inject daily x 7, followed by Weekly x 4 and then Monthly    ergocalciferol (VITAMIN D-2) 1,250 mcg, oral, Once Weekly    escitalopram (LEXAPRO) 20 mg, oral, Daily    hydrOXYzine HCL (ATARAX) 10-20 mg, oral, Nightly PRN    ipratropium (Atrovent) 21 mcg (0.03 %) nasal spray 2 sprays, Each Nostril, Every 12 hours    levothyroxine (SYNTHROID, LEVOXYL) 12.5 mcg, oral, Daily    lidocaine-diphenhydrAMINE-Maalox 1:1:1 Magic Mouthwash swish and spit with 5 ml by mouth every 6 hours if needed for mucositis    Mag-G 27 mg magnesium (500 mg) tablet 1 tablet, Every 12 hours scheduled (0630,1830)    magic mouthwash (lidocaine, diphenhydrAMINE, Maalox 1:1:1) 5 mL, Swish & Spit, Every 6 hours PRN    miscellaneous medical supply misc Bilateral AFO. Apply to bilateral lower legs daily.    multivitamin tablet 1 tablet, Daily    [place] Pen 200 mg, subcutaneous,  "Every 28 days    pantoprazole (PROTONIX) 40 mg, oral, 2 times daily, Do not crush, chew, or split.    pregabalin (LYRICA) 225 mg, oral, 2 times daily    PreviDent 5000 Sensitive 1.1-5 % paste     tacrolimus (Protopic) 0.1 % ointment if needed.    tiZANidine (ZANAFLEX) 4 mg, oral, Daily    UltiCare Safety Syringe 3 mL 22 gauge x 1\" syringe     Vtama 1 % cream if needed.        Last Recorded Vitals:  Vitals:    07/09/25 1047   BP: 113/63   Pulse: 64   SpO2: 99%   Weight: 71 kg (156 lb 8.4 oz)       Physical Exam:  Constitutional: Pleasant, Awake/Alert/Oriented to person place and time.   Head: Atraumatic, Normocephalic  Eyes: EOMI. KRYSTAL  Neck: No JVD  Cardiovascular: Regular rate and rhythm, S1, S2. No extra heart sounds or murmurs  Respiratory: Clear to auscultation bilaterally. No wheezing, rales or rhonchi.   Abdomen: Soft, Nontender. Bowel sounds appreciated  Musculoskeletal: ROM intact. Muscle strength grossly intact upper and lower extremities 5/5.   Neurological: CNII-XII intact. Sensation grossly intact  Extremities: Warm and dry. Bilateral leg braces. No LE edema   Psychiatric: Appropriate mood and affect       Last Labs:  CBC -  Lab Results   Component Value Date    WBC 4.5 06/09/2025    WBC 5.4 03/31/2025    HGB 11.9 (L) 06/09/2025    HGB 12.4 03/31/2025    HCT 36.3 06/09/2025    HCT 37.3 03/31/2025    MCV 98 06/09/2025    MCV 93.7 03/31/2025     (L) 06/09/2025     (L) 03/31/2025       CMP -  Lab Results   Component Value Date    CALCIUM 9.0 06/02/2025    PHOS 3.2 11/19/2024    PROT 6.5 06/02/2025    ALBUMIN 4.4 06/02/2025    AST 17 06/02/2025    ALT 18 06/02/2025    ALKPHOS 35 06/02/2025    BILITOT 0.4 06/02/2025       LIPID PANEL -   Lab Results   Component Value Date    CHOL 196 12/05/2024    TRIG 147 12/05/2024    HDL 45.2 12/05/2024    CHHDL 4.3 12/05/2024    LDLF 126 (H) 04/04/2023    VLDL 29 12/05/2024    NHDL 151 (H) 12/05/2024       RENAL FUNCTION PANEL -   Lab Results   Component " Value Date    GLUCOSE 83 06/02/2025     06/02/2025    K 3.7 06/02/2025     06/02/2025    CO2 30 06/02/2025    ANIONGAP 5 (L) 06/02/2025    BUN 13 06/02/2025    CREATININE 0.65 06/02/2025    CALCIUM 9.0 06/02/2025    PHOS 3.2 11/19/2024    ALBUMIN 4.4 06/02/2025        Lab Results   Component Value Date    HGBA1C 5.0 06/02/2025       Last Cardiology Tests:  ECG:  ECG 12 lead 02/04/2025  Sinus bradycardia, HR 56bpm     Echo:  9/26/2023 Echo:  CONCLUSIONS:  1. Left ventricular systolic function is normal with a 65% estimated ejection fraction.    Cardiac Imaging:     Media Information                        Load More  Document Information    Procedure: Holter and Cardiac Event Monitor      01/29/2025 00:00   Attached To:   Holter Or Event Cardiac Monitor [[id number]]   Ancillary Procedure on 1/29/25 with [id number] CARD1 HOLTER RES   Source Information    [facility]ONY [place]  Por Pb100 Card1   Document History        Lab review: I have personally reviewed the laboratory result(s)   Diagnostic review: I have personally reviewed the result(s) of the Echocardiogram, zio     Assessment/Plan   Very pleasant 44 y.o. female with h/o recently identified demyelinating disorder, ulcerative colitis, psoriatic arthritis, fibromyalgia, ITP, PHAN, CAMDEN, chronic menorrhagia s/p hysterectomy, RLS, former smoker, hypothyroidism, stomach ulcer presenting today for second opinion regarding symptoms of lightheadedness that occurs with changes in position and prolonged standing as well as reported bradycardia.     Orthostatic vitals today in office: Sitting HR 58bpm 115/67, Standing HR 72bpm 119/73    Plan:  Reviewed recent zio monitor and ECG from January and February.  Patient does have intermittent sinus bradycardia, however, triggered events do not correlate with bradycardia.  There is no indication for PPM at this time.      We suspect lightheadedness may be related to autonomic dysfunction-- ?intermittent orthostatic  hypotension vs. Neurological in etiology.  Recommend obtaining a tilt table test to evaluate for POTS vs. Vasovagal syncope.  Results will be discussed via telephone with further follow up pending results.       Hortencia Lund, APRN-CNP       [1]   Family History  Problem Relation Name Age of Onset    Colon cancer Mother Shari     COPD Mother Shari     Hyperlipidemia Father Archuleta     Prostate cancer Father Archuleta     Hyperlipidemia Sister      Hypothyroidism Sister      Diverticulitis Sister      No Known Problems Sister      Motion Sickness Brother Gary taylor     Colon cancer Mother's Brother      Colon cancer Mother's Brother Gary     Pancreatic cancer Mother's Brother Gary     Breast cancer Father's Sister Na     Heart attack Sister Kaitlynn comer     Thyroid disease Mother's Sister Letty bella     Dementia Paternal Grandmother Rosaline Mushtaq     Rheumatologic disease Mother's Sister Letty Bella     Dementia Paternal Grandmother Rosaline Mushtaq     Thyroid disease Mother's Sister Magdalene     Dementia Paternal Grandmother Rosaline Taylor     Rheumatologic disease Mother's Sister Letty Bella     Dementia Paternal Grandmother Rosaline Mushtaq     No Known Problems Brother Gary taylor

## 2025-07-10 ENCOUNTER — TELEPHONE (OUTPATIENT)
Dept: CARDIOLOGY | Facility: HOSPITAL | Age: 45
End: 2025-07-10
Payer: COMMERCIAL

## 2025-07-10 NOTE — TELEPHONE ENCOUNTER
RN called pt at this time regarding tilt table instructions, no answer at this time. RN unable to leave message.

## 2025-07-10 NOTE — TELEPHONE ENCOUNTER
----- Message from Heidy GILLIS sent at 7/10/2025 10:11 AM EDT -----  Regarding: MM Tilt 7/24/2025  Patient is scheduled for tilt on: 7/24/2025  Arrival: 8:45 am  Please call pt with instructions. Thank you.

## 2025-07-11 ENCOUNTER — APPOINTMENT (OUTPATIENT)
Dept: HEMATOLOGY/ONCOLOGY | Facility: CLINIC | Age: 45
End: 2025-07-11
Payer: COMMERCIAL

## 2025-07-11 ENCOUNTER — TELEPHONE (OUTPATIENT)
Dept: CARDIOLOGY | Facility: HOSPITAL | Age: 45
End: 2025-07-11
Payer: COMMERCIAL

## 2025-07-15 ENCOUNTER — APPOINTMENT (OUTPATIENT)
Dept: INTEGRATIVE MEDICINE | Facility: CLINIC | Age: 45
End: 2025-07-15
Payer: COMMERCIAL

## 2025-07-15 DIAGNOSIS — M54.2 CHRONIC NECK PAIN: ICD-10-CM

## 2025-07-15 DIAGNOSIS — M54.59 OTHER LOW BACK PAIN: Primary | ICD-10-CM

## 2025-07-15 DIAGNOSIS — G89.29 CHRONIC NECK PAIN: ICD-10-CM

## 2025-07-15 PROCEDURE — 97810 ACUP 1/> WO ESTIM 1ST 15 MIN: CPT | Performed by: ACUPUNCTURIST

## 2025-07-15 PROCEDURE — 97811 ACUP 1/> W/O ESTIM EA ADD 15: CPT | Performed by: ACUPUNCTURIST

## 2025-07-15 NOTE — PROGRESS NOTES
Acupuncture Visit:     Subjective   Patient ID: Ally Carpio is a 44 y.o. female who presents for No chief complaint on file.  2024 Acupuncture Benefits                                    12/27/2024 RA   [place]  Covered Diagnosis: Medical Necessity   12/30 VPCY    States she had improvement in LBP p the tx  Notes R upper top of shoulder more sore today  No headaches    prev  R upper arm and shoulder/neck and jaw pain  Some LBP  Upper back and neck tightness  R hand and R foot with spasms    prev  States pain relief for 4 days p tx  Recent sinus surgery, 3 day recovery, no complications    prev  R sided neck, shoulder and arm pain   Pain relief for 1-2 days p tx, then pain comes back up   Foot drop in both feet   Energy level picks up p tx as well  Massage 1/week     prev  Less pain in shoulder after tx  Low back has been OK,   Neck tight on right side  Some radiation from shoulder   No radiating into hand,   Left side is better  2-3/10  A little less fatigue as well.        States less intense pain  She got new braces to help her walk which is helping    prev  Notes less pain x 3 days  Increase energy  States R sides pain less intense    prev  R sided neck pain  Low back pain  BL foot drop  *Pt using a cane *notes using a rollator at home     States a little relief after the first tx  Notes unable to flu a week after 1st tx due to needing to stay at home  No menses, hysterectomy, ovaries intact    Initial  R sided neck pain  Dxd with CND demyelination, notes multiple Drs have ruled out MS  States she does have lesions on her brain but not spine.  Notes seeing multiple neurologists  States failed neck surgery summer on 2024  RIGHT sided neck pain more behind ear, not at base of skull  Neck pain constant, always there, dull pain 4/10  Worse at end of the day when she has done a lot  BL arms feel weak, RIGHT more than left arm  Radiating to top of shoulder and to front of the shoulder joint  Both arms feel  week but RIGHT arm mostly involved    LBP  Feels achy   RIGHT side pain 80% of the time, 20% Left sided   Radiating to piriformis and to the RIGHT calf mm.  BL foot drop    Other med hx  Ulcerative colitis, partial hysterectomy (hx heavy bleeding- Fe infusions in the past)            Session Information  Is this acupuncture treatment being billed to the patient's insurance company: Yes  This is visit number: 12  The patient has a total number of visits of: 30  Initial Acupuncture Treatment date: 01/28/25  Name of Insurance Company: [employer] Limitations : 30 VPCY: Medical Necessity         Review of Systems         Provider reviewed plan for the acupuncture session, precautions and contraindications. Patient/guardian/hospital staff has given consent to treat with full understanding of what to expect during the session. Before acupuncture began, provider explained to the patient to communicate at any time if the procedure was causing discomfort past their tolerance level. Patient agreed to advise acupuncturist. The acupuncturist counseled the patient on the risks of acupuncture treatment including pain, infection, bleeding, and no relief of pain. The patient was positioned comfortably. There was no evidence of infection at the site of needle insertions.    Objective   Physical Exam              Acupuncture Treatment  Body Points - Left: UB 62  Body Points - Bilateral: DU20 GB20 GB21 40, BL11 BL12 BL13 BL14 BL28 BL23 - PC6 - SP3 KI3  Body Points - Right: SI 3  Other Techniques Utilized: TDP Lamp  TDP Lamp Descripton: low back  Needle Count In: 27  Needle Count Out: 27  Total Face to Face Time (min): 25 minutes              Assessment/Plan     Diagnoses and all orders for this visit:  Other low back pain (Primary)  Chronic neck pain

## 2025-07-18 ENCOUNTER — APPOINTMENT (OUTPATIENT)
Dept: PRIMARY CARE | Facility: CLINIC | Age: 45
End: 2025-07-18
Payer: COMMERCIAL

## 2025-07-18 VITALS
HEART RATE: 58 BPM | SYSTOLIC BLOOD PRESSURE: 110 MMHG | DIASTOLIC BLOOD PRESSURE: 58 MMHG | OXYGEN SATURATION: 97 % | BODY MASS INDEX: 25.34 KG/M2 | TEMPERATURE: 97.7 F | WEIGHT: 157 LBS

## 2025-07-18 DIAGNOSIS — M25.561 ACUTE PAIN OF RIGHT KNEE: Primary | ICD-10-CM

## 2025-07-18 PROBLEM — L73.2 HIDRADENITIS SUPPURATIVA: Status: ACTIVE | Noted: 2025-05-01

## 2025-07-18 PROCEDURE — 99214 OFFICE O/P EST MOD 30 MIN: CPT | Performed by: FAMILY MEDICINE

## 2025-07-18 RX ORDER — DICLOFENAC SODIUM 10 MG/G
4 GEL TOPICAL 4 TIMES DAILY
Qty: 100 G | Refills: 1 | Status: SHIPPED | OUTPATIENT
Start: 2025-07-18

## 2025-07-18 ASSESSMENT — ENCOUNTER SYMPTOMS
COUGH: 0
FEVER: 0
ARTHRALGIAS: 1
CHILLS: 0

## 2025-07-18 ASSESSMENT — PATIENT HEALTH QUESTIONNAIRE - PHQ9
SUM OF ALL RESPONSES TO PHQ9 QUESTIONS 1 AND 2: 0
2. FEELING DOWN, DEPRESSED OR HOPELESS: NOT AT ALL
1. LITTLE INTEREST OR PLEASURE IN DOING THINGS: NOT AT ALL

## 2025-07-18 NOTE — PROGRESS NOTES
"Subjective   Patient ID: Ally Carpio is a 44 y.o. female who presents for Knee Pain (Feels like R knee is \"ripping\" X 3 weeks, NKI).    Ally is having pain in her right knee.  Symptoms started approximately 3 weeks ago.  Most of the pain is located on the outer aspect of the right knee.  She has not had any recent injuries or falls.  Has experienced changes in her gait due to her leg weakness.  Has not had any swelling of the knee.           Review of Systems   Constitutional:  Negative for chills and fever.   HENT:  Negative for congestion.    Respiratory:  Negative for cough.    Musculoskeletal:  Positive for arthralgias.       Objective   /58   Pulse 58   Temp 36.5 °C (97.7 °F)   Wt 71.2 kg (157 lb)   LMP 12/25/2024   SpO2 97%   BMI 25.34 kg/m²     Physical Exam  Constitutional:       General: She is not in acute distress.     Appearance: Normal appearance.   HENT:      Head: Normocephalic.   Pulmonary:      Effort: Pulmonary effort is normal.     Musculoskeletal:      Right knee: No swelling, deformity or crepitus. Normal range of motion. No tenderness. No LCL laxity, MCL laxity, ACL laxity or PCL laxity. Abnormal meniscus (pain with lateral meniscus strain).     Neurological:      General: No focal deficit present.      Mental Status: She is alert.     Psychiatric:         Mood and Affect: Mood normal.         Assessment/Plan   Diagnoses and all orders for this visit:  Acute pain of right knee  Comments:  Suspect related to altered gait. Possible small meniscal tear. Refer to PT. Start topical diclofenac. Plan MRI if pain worsens or does not improve.  Orders:  -     Referral to Physical Therapy; Future  -     diclofenac sodium (Voltaren) 1 % gel; Apply 4.5 inches (4 g) topically 4 times a day.         "

## 2025-07-24 ENCOUNTER — HOSPITAL ENCOUNTER (OUTPATIENT)
Dept: CARDIOLOGY | Facility: HOSPITAL | Age: 45
Discharge: HOME | End: 2025-07-24
Payer: COMMERCIAL

## 2025-07-24 VITALS — HEART RATE: 51 BPM | DIASTOLIC BLOOD PRESSURE: 56 MMHG | SYSTOLIC BLOOD PRESSURE: 86 MMHG

## 2025-07-24 DIAGNOSIS — I95.1 ORTHOSTATIC HYPOTENSION: ICD-10-CM

## 2025-07-24 PROCEDURE — 93660 TILT TABLE EVALUATION: CPT

## 2025-07-28 ENCOUNTER — APPOINTMENT (OUTPATIENT)
Dept: PHYSICAL THERAPY | Facility: CLINIC | Age: 45
End: 2025-07-28
Payer: COMMERCIAL

## 2025-07-29 ENCOUNTER — APPOINTMENT (OUTPATIENT)
Dept: INTEGRATIVE MEDICINE | Facility: CLINIC | Age: 45
End: 2025-07-29
Payer: COMMERCIAL

## 2025-07-29 DIAGNOSIS — M54.59 OTHER LOW BACK PAIN: Primary | ICD-10-CM

## 2025-07-29 PROCEDURE — 97811 ACUP 1/> W/O ESTIM EA ADD 15: CPT | Performed by: ACUPUNCTURIST

## 2025-07-29 PROCEDURE — 97810 ACUP 1/> WO ESTIM 1ST 15 MIN: CPT | Performed by: ACUPUNCTURIST

## 2025-07-29 NOTE — PROGRESS NOTES
Acupuncture Visit:     Subjective   Patient ID: Ally Carpio is a 44 y.o. female who presents for No chief complaint on file.  2024 Acupuncture Benefits                                    12/27/2024 RA   [place]  Covered Diagnosis: Medical Necessity   13/30 VPCY    States she felt good after the tx  BL hips/buttocks with tightness  Notes neck and shoulder pain  States she sleeps too much    prev  States she had improvement in LBP p the tx  Notes R upper top of shoulder more sore today  No headaches    prev  R upper arm and shoulder/neck and jaw pain  Some LBP  Upper back and neck tightness  R hand and R foot with spasms    prev  States pain relief for 4 days p tx  Recent sinus surgery, 3 day recovery, no complications    prev  R sided neck, shoulder and arm pain   Pain relief for 1-2 days p tx, then pain comes back up   Foot drop in both feet   Energy level picks up p tx as well  Massage 1/week       Initial  R sided neck pain  Dxd with CND demyelination, notes multiple Drs have ruled out MS  States she does have lesions on her brain but not spine.  Notes seeing multiple neurologists  States failed neck surgery summer on 2024  RIGHT sided neck pain more behind ear, not at base of skull  Neck pain constant, always there, dull pain 4/10  Worse at end of the day when she has done a lot  BL arms feel weak, RIGHT more than left arm  Radiating to top of shoulder and to front of the shoulder joint  Both arms feel week but RIGHT arm mostly involved    LBP  Feels achy   RIGHT side pain 80% of the time, 20% Left sided   Radiating to piriformis and to the RIGHT calf mm.  BL foot drop    Other med hx  Ulcerative colitis, partial hysterectomy (hx heavy bleeding- Fe infusions in the past)            Session Information  Is this acupuncture treatment being billed to the patient's insurance company: Yes  This is visit number: 13  The patient has a total number of visits of: 30  Initial Acupuncture Treatment date:  01/28/25  Name of Insurance Company: [employer] Limitations : 30 VPCY: Medical Necessity         Review of Systems         Provider reviewed plan for the acupuncture session, precautions and contraindications. Patient/guardian/hospital staff has given consent to treat with full understanding of what to expect during the session. Before acupuncture began, provider explained to the patient to communicate at any time if the procedure was causing discomfort past their tolerance level. Patient agreed to advise acupuncturist. The acupuncturist counseled the patient on the risks of acupuncture treatment including pain, infection, bleeding, and no relief of pain. The patient was positioned comfortably. There was no evidence of infection at the site of needle insertions.    Objective   Physical Exam              Acupuncture Treatment  Body Points - Bilateral: DU20 GB20 GB21 40, BL11 BL12 BL14 BL28 BL23 - PC6 - SP3 KI3  UB 62  GB 30  Other Techniques Utilized: TDP Lamp  TDP Lamp Descripton: upper back  Needle Count In: 27  Needle Count Out: 27  Total Face to Face Time (min): 25 minutes              Assessment/Plan     Diagnoses and all orders for this visit:  Other low back pain (Primary)

## 2025-07-30 ENCOUNTER — EVALUATION (OUTPATIENT)
Dept: PHYSICAL THERAPY | Facility: HOSPITAL | Age: 45
End: 2025-07-30
Payer: COMMERCIAL

## 2025-07-30 DIAGNOSIS — R26.89 DECREASED MOBILITY: ICD-10-CM

## 2025-07-30 DIAGNOSIS — M25.561 ACUTE PAIN OF RIGHT KNEE: Primary | ICD-10-CM

## 2025-07-30 DIAGNOSIS — R29.898 WEAKNESS OF BOTH LOWER EXTREMITIES: ICD-10-CM

## 2025-07-30 PROCEDURE — 97162 PT EVAL MOD COMPLEX 30 MIN: CPT | Mod: GP | Performed by: PHYSICAL THERAPIST

## 2025-07-30 PROCEDURE — 97110 THERAPEUTIC EXERCISES: CPT | Mod: GP | Performed by: PHYSICAL THERAPIST

## 2025-07-30 ASSESSMENT — ENCOUNTER SYMPTOMS
DEPRESSION: 0
LOSS OF SENSATION IN FEET: 1
OCCASIONAL FEELINGS OF UNSTEADINESS: 1

## 2025-07-30 ASSESSMENT — PAIN SCALES - GENERAL: PAINLEVEL_OUTOF10: 3

## 2025-07-30 ASSESSMENT — PAIN - FUNCTIONAL ASSESSMENT: PAIN_FUNCTIONAL_ASSESSMENT: 0-10

## 2025-07-30 NOTE — PROGRESS NOTES
"Physical Therapy    Physical Therapy Evaluation and Treatment      Patient Name: Ally Carpio  MRN: [id number]  Today's Date: 2025  Visit #1   60 v 14 used = 46 remain  Time Entry:   Time Calculation  Start Time: 0900  Stop Time: 0945  Time Calculation (min): 45 min  PT Evaluation Time Entry  PT Evaluation (Moderate) Time Entry: 30  PT Therapeutic Procedures Time Entry  Therapeutic Exercise Time Entry: 15                   Assessment:  Patient ID: Ally Carpio is a 44 y.o. female who presents for Knee Pain (Feels like R knee is \"ripping\" X 3 weeks, NKI).  Symptoms started approximately 3 weeks ago.  Most of the pain is located on the outer aspect of the right knee.  She has not had any recent injuries or falls.   Hx of chuck AFO since 2025   and amb with rollator since 2025 , pt states central nervous system / follows with neuro.    PT Assessment  PT Assessment Results: Pain, Decreased strength, Decreased range of motion, Impaired balance, Decreased mobility  Rehab Prognosis: Good     Plan:  OP PT Plan  Treatment/Interventions: Education/ Instruction, Manual therapy, Neuromuscular re-education, Therapeutic exercises, Laser  PT Plan: Skilled PT  PT Frequency: 2 times per week  Duration: 6 week  Certification Period Start Date: 25  Certification Period End Date: 10/30/25  Number of Treatments Authorized: 2025  Rehab Potential: Good  Plan of Care Agreement: Patient    Current Problem:   1. Acute pain of right knee  Follow Up In Physical Therapy      2. Weakness of both lower extremities  Follow Up In Physical Therapy      3. Decreased mobility  Follow Up In Physical Therapy          Subjective    Name and  confirmed  Rt knee lateral pain 3/10  ( ranges from 2-6/10) ache to burn  States knee  hyperextends with walking  Wears Chuck AFO for foot drop since 2025. Amb with rollator  Pt states she is not sure if she has progressive leg weakness, Demylenation of disease of " "CNS  Pt has had low BP lately as well. Hx of falls ( 3 mo ago )     General:  General  Reason for Referral: acute knee pain Rt  Referred By: Morelia  Past Medical History Relevant to Rehab: falls, CNS, see EMR, hx Cervical Spine Arthroplasty Anterior Approach on 7/19/24    Precautions:  Precautions  STEADI Fall Risk Score (The score of 4 or more indicates an increased risk of falling): 9 ( falls )  Precautions Comment: fall risk       Pain:  Pain Assessment  Pain Assessment: 0-10  0-10 (Numeric) Pain Score: 3 (2-6/10)  Pain Type: Acute pain  Pain Location: Knee  Pain Orientation: Right (left knee , Rt shoulder)       Prior Level of Function:   Amb with rollator since jan 2025  Chuck AFO since March 2025  Independent at home , drives  Unable to lift or do heavy housework      Objective        General Assessments:    Patient ID: Ally aCrpio is a 44 y.o. female who presents for Knee Pain (Feels like R knee is \"ripping\" X 3 weeks, NKI).     Ally is having pain in her right knee.  Symptoms started approximately 3 weeks ago.  Most of the pain is located on the outer aspect of the right knee.  She has not had any recent injuries or falls.  Has experienced changes in her gait due to her leg weakness.  Has not had any swelling of the knee.      Hx of BP low / fatigue  ,  anemic    - History of C6-C7 total disc replacement  a year ago July 2024  - Concern for demyelinating disease (follows with neurology)     Past Medical History Relevant to Rehab: MS, c  Pt is a 42 yo female with insidious onset of extremity weakness and fatigue. Weakness started on the left and as progressed to increased weakness in bilateral extremities, states that has been working on getting diagnosis since march.  Pt underwent C6-C7 Cervical Spine Arthroplasty Anterior Approach on 7/19/24    7-15-25  EMR notes with Ysabel Guerrier pt has pilonidal disease .  She Recommended Possible excision of pilonidal disease and was referred  to  " Gemma, general surgery, for possible excision.           Acute pain of right knee [M25.561]  - Primary   Suspect related to altered gait. Possible small meniscal tear. Refer to PT. Start topical diclofenac. Plan MRI if pain worsens or does not improve.     Functional Assessments:  Amb with rollator and chuck AFOs  Hx of 4-5  falls in last 12 months , most recently 3 mo ago  Upstairs bedroom : Stairs rail and non-reciprocal , ascend with Rt leg as the left is weaker.  Shower walk in and has chair and bars  Lives with spouse and family    Extremity/Trunk Assessments:     AROM knees 0-130 chuck  AAROM hip  WFL to 90 ,   limited AROM ABD due to weakness  Drop foot Chuck    MMT challenge lifting against gravity/ limited range while sitting   Hip flex 2/5 chuck  Hip abd 3-/5 chuck  Knee ext 2/5 chuck  Knee flex 3-/5 chuck  Ankles  in AFO chuck for drop foot    Right knee: No swelling, deformity or crepitus. Normal range of motion.     Chuck AFO [employer] since March 2025      Outcome Measures:  Lefs 35     Treatments:  There ex instructions printed ( pt forgot them - please give print out next on Michel desk)   Access Code: [email or web address]  URL: [email or web address]    Exercises  - Supine Quadricep Sets  - 1 x daily - 7 x weekly - 10 reps  - Supine Heel Slide  - 1 x daily - 7 x weekly - 10 reps  - Supine Bridge  - 1 x daily - 7 x weekly - 10 reps  - Supine Hip Abduction on Slider  - 1 x daily - 7 x weekly - 10 reps  - Clam  - 1 x daily - 7 x weekly - 10 reps  - Seated Ankle Pumps  - 1 x daily - 7 x weekly - 10 reps  - Seated Heel Raise  - 1 x daily - 7 x weekly - 10 reps  - Seated March  - 1 x daily - 7 x weekly - 10 reps    EDUCATION:  Outpatient Education  Individual(s) Educated: Patient  Education Provided: Fall Risk, POC, Home Exercise Program, Home Safety  Risk and Benefits Discussed with Patient/Caregiver/Other: yes  Patient/Caregiver Demonstrated Understanding: yes  Patient Response to Education:  Patient/Caregiver Verbalized Understanding of Information    Goals:  Active       PT Problem       PT Goal       Start:  07/30/25    Expected End:  08/20/25       Patient to demonstrate independent with home exercises as tolerated without pushing through pain to self manage and to continue to build strength and mobility outside of therapy visits.      Pt to increase comfort with amb through bracing adjust as needed/ consult as needed with Antwon if there is anything to reduce pain with mechanics if appropriate.         PT Goal        Start:  07/30/25    Expected End:  09/10/25       Pt pain to reduce 50%  Rt knee    Patient to increase LEFS function by 3 points or more for increased function and ability to ambulate and transfer     Patient will increase strength by 1/3 MMT to improve functional mobility with transfers and stair negotiation    Patient to demonstrate independent with home exercises as tolerated without pushing through pain to self manage and to continue to build strength and mobility outside of therapy visits.

## 2025-08-01 ENCOUNTER — HOSPITAL ENCOUNTER (OUTPATIENT)
Facility: HOSPITAL | Age: 45
Discharge: HOME | End: 2025-08-01
Payer: COMMERCIAL

## 2025-08-01 VITALS
RESPIRATION RATE: 16 BRPM | TEMPERATURE: 98.2 F | WEIGHT: 155 LBS | OXYGEN SATURATION: 99 % | DIASTOLIC BLOOD PRESSURE: 52 MMHG | SYSTOLIC BLOOD PRESSURE: 94 MMHG | BODY MASS INDEX: 24.91 KG/M2 | HEART RATE: 43 BPM | HEIGHT: 66 IN

## 2025-08-01 DIAGNOSIS — M54.14 THORACIC RADICULOPATHY: ICD-10-CM

## 2025-08-01 PROCEDURE — 7100000010 HC PHASE TWO TIME - EACH INCREMENTAL 1 MINUTE

## 2025-08-01 PROCEDURE — 64479 NJX AA&/STRD TFRM EPI C/T 1: CPT | Performed by: ANESTHESIOLOGY

## 2025-08-01 PROCEDURE — 2500000004 HC RX 250 GENERAL PHARMACY W/ HCPCS (ALT 636 FOR OP/ED): Performed by: ANESTHESIOLOGY

## 2025-08-01 PROCEDURE — 7100000009 HC PHASE TWO TIME - INITIAL BASE CHARGE

## 2025-08-01 PROCEDURE — 2550000001 HC RX 255 CONTRASTS: Mod: JW | Performed by: ANESTHESIOLOGY

## 2025-08-01 PROCEDURE — 64479 NJX AA&/STRD TFRM EPI C/T 1: CPT | Mod: RT | Performed by: ANESTHESIOLOGY

## 2025-08-01 RX ORDER — LIDOCAINE HYDROCHLORIDE 5 MG/ML
INJECTION, SOLUTION INFILTRATION; INTRAVENOUS
Status: COMPLETED | OUTPATIENT
Start: 2025-08-01 | End: 2025-08-01

## 2025-08-01 RX ORDER — DEXAMETHASONE SODIUM PHOSPHATE 10 MG/ML
INJECTION INTRAMUSCULAR; INTRAVENOUS
Status: COMPLETED | OUTPATIENT
Start: 2025-08-01 | End: 2025-08-01

## 2025-08-01 RX ORDER — MIDAZOLAM HYDROCHLORIDE 1 MG/ML
INJECTION, SOLUTION INTRAMUSCULAR; INTRAVENOUS
Status: COMPLETED | OUTPATIENT
Start: 2025-08-01 | End: 2025-08-01

## 2025-08-01 RX ADMIN — DEXAMETHASONE SODIUM PHOSPHATE 10 MG: 10 INJECTION, SOLUTION INTRAMUSCULAR; INTRAVENOUS at 08:53

## 2025-08-01 RX ADMIN — IOHEXOL 1 ML: 240 INJECTION, SOLUTION INTRATHECAL; INTRAVASCULAR; INTRAVENOUS; ORAL at 08:53

## 2025-08-01 RX ADMIN — MIDAZOLAM 2 MG: 1 INJECTION INTRAMUSCULAR; INTRAVENOUS at 08:46

## 2025-08-01 RX ADMIN — LIDOCAINE HYDROCHLORIDE 7 ML: 5 INJECTION, SOLUTION INFILTRATION at 08:53

## 2025-08-01 ASSESSMENT — PAIN SCALES - GENERAL
PAINLEVEL_OUTOF10: 0 - NO PAIN
PAINLEVEL_OUTOF10: 4
PAINLEVEL_OUTOF10: 0 - NO PAIN
PAINLEVEL_OUTOF10: 4

## 2025-08-01 ASSESSMENT — PAIN - FUNCTIONAL ASSESSMENT
PAIN_FUNCTIONAL_ASSESSMENT: 0-10
PAIN_FUNCTIONAL_ASSESSMENT: WONG-BAKER FACES

## 2025-08-01 NOTE — H&P
Pain Management H&P    History Of Present Illness  Ally Carpio is a 44 y.o. female presents for procedure stated below. Endorses no changes in past medical history or medical health since last seen in clinic.     Past Medical History  She has a past medical history of Acute pain of right knee (2025), ALEXEY positive, Anemia, Cardiology follow-up encounter (2024), Chronic pain syndrome, Depression, PIERRE (dyspnea on exertion), Encounter for hematology follow-up (2024), Fibromyalgia, GERD (gastroesophageal reflux disease), H/O echocardiogram (2023), Heart murmur, 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, History of ITP, idiopathic thrombocytopenic purpura, Hypothyroidism, CAMDEN (iron deficiency anemia), Inflammatory bowel disease, Insomnia, Low back pain, Menorrhagia with regular cycle, Neurology follow-up encounter (10/15/2024), Neuropathy, Palpitation, Psoriasis, Radiculopathy, cervical, Radiculopathy, lumbar region, RLS (restless legs syndrome), Sleep apnea, TMJ dysfunction, and Ulcerative colitis.    Surgical History  She has a past surgical history that includes  section, low transverse (,); Colonoscopy; Upper gastrointestinal endoscopy; Dilation and curettage of uterus; Cervical spine surgery (2024); and Hysteroscopy (2024).     Social History  She reports that she quit smoking about 8 years ago. Her smoking use included cigarettes. She started smoking about 21 years ago. She has a 12.5 pack-year smoking history. She has never used smokeless tobacco. She reports that she does not currently use alcohol. She reports that she does not use drugs.    Family History  Family History[1]     Allergies  Adhesive tape-silicones, Balsalazide, Iron, and Mesalamine    Review of Symptoms:   Constitutional: Negative for chills, diaphoresis or fever  HENT: Negative for neck swelling  Eyes:.  Negative for eye pain  Respiratory:.  Negative for  cough, shortness of breath or wheezing    Cardiovascular:.  Negative for chest pain or palpitations  Gastrointestinal:.  Negative for abdominal pain, nausea and vomiting  Genitourinary:.  Negative for urgency  Musculoskeletal:  Positive for back pain. Positive for joint pain. Denies falls within the past 3 months.  Skin: Negative for wounds or itching   Neurological: Negative for dizziness, seizures, loss of consciousness and weakness  Endo/Heme/Allergies: Does not bruise/bleed easily  Psychiatric/Behavioral: Negative for depression. The patient does not appear anxious.      Pre-sedation Evaluation  ASA class 3  Mallampati score 2     PHYSICAL EXAM  Vitals signs reviewed  Constitutional:       General: Not in acute distress     Appearance: Normal appearance. Not ill-appearing.  HENT:     Head: Normocephalic and atraumatic  Eyes:     Conjunctiva/sclera: Conjunctivae normal  Cardiovascular:     Rate and Rhythm: Normal rate and regular rhythm  Pulmonary:     Effort: No respiratory distress  Abdominal:     Palpations: Abdomen is soft  Musculoskeletal: STATON  Skin:     General: Skin is warm and dry  Neurological:     General: No focal deficit present  Psychiatric:         Mood and Affect: Mood normal         Behavior: Behavior normal     Last Recorded Vitals  BP 92/56   Pulse (!) 44   Temp 36.8 °C (98.2 °F)   Resp 16   Wt 70.3 kg (155 lb)   SpO2 99%     Relevant Results  Current Outpatient Medications   Medication Instructions    ARIPiprazole (ABILIFY) 10 mg, oral, Daily    clobetasol (Temovate) 0.05 % ointment 2 times daily    cyanocobalamin (VITAMIN B-12) 1,000 mcg, intramuscular, See admin instructions, Please inject daily x 7, followed by Weekly x 4 and then Monthly    diclofenac sodium (VOLTAREN) 4 g, Topical, 4 times daily    ergocalciferol (VITAMIN D-2) 1,250 mcg, oral, Once Weekly    escitalopram (LEXAPRO) 20 mg, oral, Daily    hydrOXYzine HCL (ATARAX) 10-20 mg, oral, Nightly PRN    ipratropium (Atrovent) 21  "mcg (0.03 %) nasal spray 2 sprays, Each Nostril, Every 12 hours    levothyroxine (SYNTHROID, LEVOXYL) 12.5 mcg, oral, Daily    lidocaine-diphenhydrAMINE-Maalox 1:1:1 Magic Mouthwash swish and spit with 5 ml by mouth every 6 hours if needed for mucositis    Mag-G 27 mg magnesium (500 mg) tablet 1 tablet, Every 12 hours scheduled (0630,1830)    magic mouthwash (lidocaine, diphenhydrAMINE, Maalox 1:1:1) 5 mL, Swish & Spit, Every 6 hours PRN    miscellaneous medical supply misc Bilateral AFO. Apply to bilateral lower legs daily.    multivitamin tablet 1 tablet, Daily    [place] Pen 200 mg, subcutaneous, Every 28 days    pantoprazole (PROTONIX) 40 mg, oral, 2 times daily, Do not crush, chew, or split.    pregabalin (LYRICA) 225 mg, oral, 2 times daily    PreviDent 5000 Sensitive 1.1-5 % paste     tacrolimus (Protopic) 0.1 % ointment if needed.    tiZANidine (ZANAFLEX) 4 mg, oral, Daily    UltiCare Safety Syringe 3 mL 22 gauge x 1\" syringe     Vtama 1 % cream if needed.         XR cervical spine 2-3 views 07/19/2024    Narrative  Interpreted By:  Neha Jones,  STUDY:  XR CERVICAL SPINE 2-3 VIEWS; 7/19/2024 4:15 pm    INDICATION:  Signs/Symptoms:s/p C6-7 TDR.    COMPARISON:  None.    ACCESSION NUMBER(S):  [id number]    ORDERING CLINICIAN:  LAMINE RAY    FINDINGS:  Multiple views of the  cervical spine are obtained. Straightening of  normal cervical lordosis. Disc space implant at C6-C7. Endplate  sclerosis and osteophytes from C5 through C7. No fracture-dislocation.    Impression  Postsurgical changes at C6-C7 suggesting TDR..      Signed by: Neha Jones 7/19/2024 4:21 PM  Dictation workstation:   [id number]      XR cervical spine 2-3 views 06/13/2024    Narrative  Interpreted By:  Cosme Paez,  STUDY:  XR CERVICAL SPINE 2-3 VIEWS    INDICATION:  Signs/Symptoms:CERVICAL SPONDYLOSIS.    COMPARISON:  October 28, 2010    ACCESSION NUMBER(S):  [id number]    ORDERING CLINICIAN:  LAMINE RAY    FINDINGS:  Focal " discogenic degenerative disease of the C5-6 level developed in  the interval, moderate in degree. Alignment normal. Prevertebral soft  tissues normal.    Impression  Moderate cervical degenerative change C5-6. Alignment normal.    Signed by: Cosme Paez 6/14/2024 6:03 PM  Dictation workstation:   [id number]      MR lumbar spine wo IV contrast 05/10/2024    Narrative  Interpreted By:  Kaylee Kaba,  STUDY:  MRI of the lumbar spine without IV contrast;  5/10/2024 12:25 pm    INDICATION:  Signs/Symptoms:Pain.    COMPARISON:  None.    ACCESSION NUMBER(S):  [id number]    ORDERING CLINICIAN:  MOMO MADRIGAL    TECHNIQUE:  Sagittal and axial STIR and T1-weighted MRI images of the lumbar  spine were acquired using a spondylolysis protocol.  No contrast was  administered.    FINDINGS:  For counting purposes the last lumbarized vertebral body is labeled  L5.    Alignment, vertebral body heights and marrow signal pattern are  within normal limits.    There is desiccated disc signal at L3-L4, L4-L5 and L5-S1. Mild  degenerative endplate changes at L5-S1. There is a Schmorl's node  along the inferior endplate of L5.    The conus terminates at L1-L2 and is unremarkable. Paraspinal soft  tissues are unremarkable.    Evaluation by level:    T12-L1: No spinal canal or neural foraminal stenosis.    L1-L2: No spinal canal or neural foraminal stenosis    L2-L3: No spinal canal or neural foraminal stenosis    L3-L4: Disc bulge and facet arthrosis. No spinal canal stenosis. Mild  neural foraminal stenosis, left greater than right.    L4-L5: Right eccentric disc bulge and facet arthrosis. No spinal  canal stenosis. Mild bilateral neural foraminal stenosis. There may  be abutment of the exiting right L4 nerve root. Please correlate  clinically for symptoms of right L4 radiculopathy.    L5-S1: Disc bulge and facet arthrosis. No spinal canal stenosis. Mild  neural foraminal stenosis.    Impression  Degenerative changes in the lower  lumbar spine without spinal canal  stenosis. Mild neural foraminal narrowing at L3-L4, L4-L5 and L5-S1.    I personally reviewed the images/study and I agree with the findings  as stated. This study was interpreted at [facility], [place], [place].    MACRO:  None    Signed by: Kaylee Kaba 5/10/2024 1:40 PM  Dictation workstation:   [id number]       ASSESSMENT/PLAN  Ally Carpio is a 44 y.o. female here for Right T10 and T11 Transforaminal Epidural Steroid Injection    she denies any recent antibiotic use or infections, she denies any blood thinner use , and she denies contrast or local anesthetic allergies     Risks, benefits, alternatives discussed. All questions answered to the best of my ability. Patient agrees to proceed.      Our plan is as follows:  - Proceed with aforementioned procedure     Farrah Razo,   Chronic Pain Management Fellow         [1]   Family History  Problem Relation Name Age of Onset    Colon cancer Mother Shari     COPD Mother Shari     Hyperlipidemia Father Archuleta     Prostate cancer Father Archuleta     Hyperlipidemia Sister      Hypothyroidism Sister      Diverticulitis Sister      No Known Problems Sister      Motion Sickness Brother Gary taylor     Colon cancer Mother's Brother      Colon cancer Mother's Brother Gary     Pancreatic cancer Mother's Brother Gary     Breast cancer Father's Sister Na     Heart attack Sister Kaitlynn comer     Thyroid disease Mother's Sister Letty blanco     Dementia Paternal Grandmother Rosaline Taylor     Rheumatologic disease Mother's Sister Letty Blanco     Dementia Paternal Grandmother Rosaline Taylor     Thyroid disease Mother's Sister Magdalene     Dementia Paternal Grandmother Rosaline Taylor     Rheumatologic disease Mother's Sister Letty Blanco     Dementia Paternal Grandmother Rosaline Taylor     No Known Problems Brother Gary taylor

## 2025-08-01 NOTE — DISCHARGE INSTRUCTIONS
DISCHARGE INSTRUCTIONS FOR INJECTIONS     After most injections, it is recommended that you relax and limit your activity for the remainder of the day unless you have been told otherwise by your pain physician.  You should not drive a car, operate machinery, or make important legal decisions unless otherwise directed by your pain physician.  You may resume your normal activity, including exercise, tomorrow.      Keep a written pain diary of how much pain relief you experienced following the injection procedure and the length of time of pain relief you experienced pain relief. Following diagnostic injections like medial branch nerve blocks, sacroiliac joint blocks, stellate ganglion injections and other blocks, it is very important you record the specific amount of pain relief you experienced immediately after the injectionand how long it lasted. Your doctor will ask you for this information at your follow up visit.     For all injections, please keep the injection site dry and inspect the site for a couple of days. You may remove the Band-Aid the day of the injection at any time.     Some discomfort, bruising or slight swelling may occur at the injection site. This is not abnormal if it occurs.  If needed you may:    -Take over the counter medication such as Tylenol or Motrin.   -Apply an ice pack for 30 minutes, 2 to 3 times a day for the first 24 hours.     You may shower today; no soaking baths, hot tubs, whirlpools or swimming pools for two days.      If you are given steroids in your injection, it may take 3-5 days for the steroid medication to take effect. You may notice a worsening of your symptoms for 1-2 days after the injection. This is not abnormal.  You may use acetaminophen, ibuprofen, or prescription medication that your doctor may have prescribed for you if you need to do so.     A few common side effects of steroids include facial flushing, sweating, restlessness, irritability,difficulty sleeping,  increase in blood sugar, and increased blood pressure. If you have diabetes, please monitor your blood sugar at least once a day for at least 5 days. If you have poorly controlled high blood pressure, monitoryour blood pressure for at least 2 days and contact your primary care physician if these numbers are unusually high for you.      If you take aspirin or non-steroidal anti-inflammatory drugs (examples are Motrin, Advil, ibuprofen, Naprosyn, Voltaren, Relafen, etc.) you may restart these this evening, but stop taking it 3 days before your next appointment, unless instructed otherwiseby your physician.      You do not need to discontinue non-aspirin-containing pain medications prior to an injection (examples: Celebrex, tramadol, hydrocodone and acetaminophen).      If you take a blood thinning medication (Coumadin, Lovenox, Fragmin,Ticlid, Plavix, Pradaxa, etc.), please discuss this with your primary care physician/cardiologist and your pain physician. These medications MUST be discontinued before you can have an injection safely, without the risk of uncontrolled bleeding. If these medications are not discontinued for an appropriate period of time, you will not be able to receivean injection. Please adhere to instructions given to you about when to restart your blood thinning medication. If you have any questions please reach out to our team.    If you are taking Coumadin, please have your INR checked the morning of your procedure and bring the result to your appointment unless otherwise instructed. If your INR is over 1.2, your injection may need to be rescheduled to avoid uncontrolled bleeding from the needle placement.     Call [facility]  and ask for Pain Management at [phone] between 8am-4pm Monday - Friday if you are experiencing the following:    If you received an epidural or spinal injection:    -Headache that doesnot go away with medicine, is worse when sitting or standing up, and is greatly  relieved upon lying down.   -Severe pain worse than or different than your baseline pain.   -Chills or fever (101º F or greater).   -Drainage or signs of infection at the injection site     Go directly to the Emergency Department if you are experiencing the following and received an epidural or spinal injection:   -Abrupt weakness or progressive weakness in your legs that starts after you leave the clinic.   -Abrupt severe or worsening numbness in your legs.   -Inability to urinate after the injection or loss of bowel or bladder control without the urge to defecate or urinate.     If you have a clinical question that cannot wait until your next appointment, please call [phone] between 8am-4pm Monday - Friday or send a [facility] message. We do our best to return all non-emergency messages within 24 hours, Monday - Friday. A nurse or physician will return your message. You may also try calling and they will do their best to answer your question(s):   - Dr. Jalen Henry's nurse ([phone])    If you need to cancel an appointment, please call the scheduling staff at [phone] during normal business hours or leave a message at least 24 hours in advance.     If you are going to be sedated for your next procedure, you MUST have responsible adult who can legally drive accompany you home. You cannot eat or drink for at least eight hours prior to the planned procedure if you are going to receive sedation. You may take your non-blood thinning medications with a small sip of water.

## 2025-08-06 ENCOUNTER — TREATMENT (OUTPATIENT)
Dept: PHYSICAL THERAPY | Facility: HOSPITAL | Age: 45
End: 2025-08-06
Payer: COMMERCIAL

## 2025-08-06 DIAGNOSIS — R29.898 WEAKNESS OF BOTH LOWER EXTREMITIES: ICD-10-CM

## 2025-08-06 DIAGNOSIS — R26.89 DECREASED MOBILITY: ICD-10-CM

## 2025-08-06 DIAGNOSIS — M25.561 ACUTE PAIN OF RIGHT KNEE: Primary | ICD-10-CM

## 2025-08-06 PROCEDURE — 97110 THERAPEUTIC EXERCISES: CPT | Mod: GP,CQ

## 2025-08-06 ASSESSMENT — PAIN - FUNCTIONAL ASSESSMENT: PAIN_FUNCTIONAL_ASSESSMENT: 0-10

## 2025-08-06 ASSESSMENT — PAIN SCALES - GENERAL: PAINLEVEL_OUTOF10: 4

## 2025-08-06 NOTE — PROGRESS NOTES
Physical Therapy    Physical Therapy Treatment    Patient Name: Ally Carpio  MRN: [id number]  : 1980  Today's Date: 2025  Time Calculation  Start Time: 0800  Stop Time: 0843  Time Calculation (min): 43 min  PT Therapeutic Procedures Time Entry  Therapeutic Exercise Time Entry: 43          VISIT:# 2    Current Problem  Problem List Items Addressed This Visit           ICD-10-CM    Weakness of both lower extremities R29.898    Decreased mobility R26.89    Acute pain of right knee - Primary M25.561        Subjective    No falls reported since last visit.      Pain  Pain Assessment: 0-10  0-10 (Numeric) Pain Score: 4  Pain Type: Acute pain  Pain Location: Knee  Pain Orientation: Right          Objective    Started land based therapy    Reviewed HEP           Precautions  Precautions  STEADI Fall Risk Score (The score of 4 or more indicates an increased risk of falling): 9  Prosthesis/Orthosis Used: Ankle/Foot orthosis (Chuck)  Precautions Comment: fall risk, falls, problems with balance      Treatments:  Therapeutic Exercise  Therapeutic Exercise Activity 1: Nustep L3 10'  Therapeutic Exercise Activity 2: Shuttle:  DLP 4B 2 x 10  Therapeutic Exercise Activity 3: Shuttle:  SLP 2B 2 x 10  Therapeutic Exercise Activity 4: Amb w/ RWW indp  Therapeutic Exercise Activity 5: seated HS curls red tband 10x  Therapeutic Exercise Activity 6: LAQ 5x 2  Therapeutic Exercise Activity 7: Seated hip abd rtb 2 x 10  Therapeutic Exercise Activity 8: hip add ball 2 x 10                          Assessment:   Pt tolerated treatment without complaint of increase in symptoms.  Pt fatigues quickly.         Plan: Cont to progress strength while watching to not over do it.  OP PT Plan  Treatment/Interventions: Education/ Instruction, Manual therapy, Neuromuscular re-education, Therapeutic exercises, Laser  PT Plan: Skilled PT  PT Frequency: 2 times per week  Duration: 6 week  Certification Period Start Date:  07/30/25  Certification Period End Date: 10/30/25  Number of Treatments Authorized: 7-1-2025  Rehab Potential: Good  Plan of Care Agreement: Patient    Goals:  Active       PT Problem       PT Goal       Start:  07/30/25    Expected End:  08/20/25       Patient to demonstrate independent with home exercises as tolerated without pushing through pain to self manage and to continue to build strength and mobility outside of therapy visits.      Pt to increase comfort with amb through bracing adjust as needed/ consult as needed with Antwon if there is anything to reduce pain with mechanics if appropriate.         PT Goal        Start:  07/30/25    Expected End:  09/10/25       Pt pain to reduce 50%  Rt knee    Patient to increase LEFS function by 3 points or more for increased function and ability to ambulate and transfer     Patient will increase strength by 1/3 MMT to improve functional mobility with transfers and stair negotiation    Patient to demonstrate independent with home exercises as tolerated without pushing through pain to self manage and to continue to build strength and mobility outside of therapy visits.

## 2025-08-08 DIAGNOSIS — G37.9 CNS DEMYELINATION (MULTI): ICD-10-CM

## 2025-08-08 RX ORDER — ERGOCALCIFEROL 1.25 MG/1
1.25 CAPSULE ORAL
Qty: 12 CAPSULE | Refills: 3 | Status: SHIPPED | OUTPATIENT
Start: 2025-08-08

## 2025-08-11 ENCOUNTER — APPOINTMENT (OUTPATIENT)
Dept: CARDIOLOGY | Facility: HOSPITAL | Age: 45
End: 2025-08-11
Payer: COMMERCIAL

## 2025-08-11 ENCOUNTER — TREATMENT (OUTPATIENT)
Dept: PHYSICAL THERAPY | Facility: HOSPITAL | Age: 45
End: 2025-08-11
Payer: COMMERCIAL

## 2025-08-11 DIAGNOSIS — M25.561 ACUTE PAIN OF RIGHT KNEE: Primary | ICD-10-CM

## 2025-08-11 DIAGNOSIS — R29.898 WEAKNESS OF BOTH LOWER EXTREMITIES: ICD-10-CM

## 2025-08-11 DIAGNOSIS — R26.89 DECREASED MOBILITY: ICD-10-CM

## 2025-08-11 PROCEDURE — 97110 THERAPEUTIC EXERCISES: CPT | Mod: GP,CQ

## 2025-08-12 ENCOUNTER — APPOINTMENT (OUTPATIENT)
Dept: INTEGRATIVE MEDICINE | Facility: CLINIC | Age: 45
End: 2025-08-12
Payer: COMMERCIAL

## 2025-08-15 ENCOUNTER — APPOINTMENT (OUTPATIENT)
Dept: PHYSICAL THERAPY | Facility: HOSPITAL | Age: 45
End: 2025-08-15
Payer: COMMERCIAL

## 2025-08-15 ENCOUNTER — DOCUMENTATION (OUTPATIENT)
Dept: PHYSICAL THERAPY | Facility: HOSPITAL | Age: 45
End: 2025-08-15
Payer: COMMERCIAL

## 2025-08-15 DIAGNOSIS — M25.561 ACUTE PAIN OF RIGHT KNEE: Primary | ICD-10-CM

## 2025-08-22 ENCOUNTER — APPOINTMENT (OUTPATIENT)
Dept: INTEGRATIVE MEDICINE | Facility: CLINIC | Age: 45
End: 2025-08-22
Payer: COMMERCIAL

## 2025-08-22 ENCOUNTER — TREATMENT (OUTPATIENT)
Dept: PHYSICAL THERAPY | Facility: HOSPITAL | Age: 45
End: 2025-08-22
Payer: COMMERCIAL

## 2025-08-22 ENCOUNTER — APPOINTMENT (OUTPATIENT)
Dept: OTOLARYNGOLOGY | Facility: CLINIC | Age: 45
End: 2025-08-22
Payer: COMMERCIAL

## 2025-08-22 DIAGNOSIS — G89.29 CHRONIC NECK PAIN: ICD-10-CM

## 2025-08-22 DIAGNOSIS — R26.89 DECREASED MOBILITY: ICD-10-CM

## 2025-08-22 DIAGNOSIS — R29.898 WEAKNESS OF BOTH LOWER EXTREMITIES: ICD-10-CM

## 2025-08-22 DIAGNOSIS — M54.2 CHRONIC NECK PAIN: ICD-10-CM

## 2025-08-22 DIAGNOSIS — M54.59 OTHER LOW BACK PAIN: Primary | ICD-10-CM

## 2025-08-22 DIAGNOSIS — M25.561 ACUTE PAIN OF RIGHT KNEE: Primary | ICD-10-CM

## 2025-08-22 PROCEDURE — 97810 ACUP 1/> WO ESTIM 1ST 15 MIN: CPT | Performed by: NATUROPATH

## 2025-08-22 PROCEDURE — 97811 ACUP 1/> W/O ESTIM EA ADD 15: CPT | Performed by: NATUROPATH

## 2025-08-22 PROCEDURE — 97110 THERAPEUTIC EXERCISES: CPT | Mod: GP,CQ

## 2025-08-26 ENCOUNTER — APPOINTMENT (OUTPATIENT)
Dept: BEHAVIORAL HEALTH | Facility: CLINIC | Age: 45
End: 2025-08-26
Payer: COMMERCIAL

## 2025-08-27 ENCOUNTER — APPOINTMENT (OUTPATIENT)
Dept: OTOLARYNGOLOGY | Facility: CLINIC | Age: 45
End: 2025-08-27
Payer: COMMERCIAL

## 2025-08-27 ENCOUNTER — TELEPHONE (OUTPATIENT)
Dept: NEUROLOGY | Facility: CLINIC | Age: 45
End: 2025-08-27

## 2025-08-27 DIAGNOSIS — G37.9 CNS DEMYELINATION (MULTI): Primary | ICD-10-CM

## 2025-08-27 RX ORDER — DIAZEPAM 5 MG/1
TABLET ORAL
Qty: 2 TABLET | Refills: 0 | Status: SHIPPED | OUTPATIENT
Start: 2025-08-27

## 2025-08-28 ENCOUNTER — APPOINTMENT (OUTPATIENT)
Dept: PHYSICAL THERAPY | Facility: HOSPITAL | Age: 45
End: 2025-08-28
Payer: COMMERCIAL

## 2025-08-28 ENCOUNTER — DOCUMENTATION (OUTPATIENT)
Dept: PHYSICAL THERAPY | Facility: HOSPITAL | Age: 45
End: 2025-08-28
Payer: COMMERCIAL

## 2025-08-28 DIAGNOSIS — M25.561 ACUTE PAIN OF RIGHT KNEE: Primary | ICD-10-CM

## 2025-09-02 ENCOUNTER — TREATMENT (OUTPATIENT)
Dept: PHYSICAL THERAPY | Facility: HOSPITAL | Age: 45
End: 2025-09-02
Payer: COMMERCIAL

## 2025-09-02 DIAGNOSIS — R29.898 WEAKNESS OF BOTH LOWER EXTREMITIES: ICD-10-CM

## 2025-09-02 DIAGNOSIS — M25.561 ACUTE PAIN OF RIGHT KNEE: Primary | ICD-10-CM

## 2025-09-02 DIAGNOSIS — R26.89 DECREASED MOBILITY: ICD-10-CM

## 2025-09-02 PROCEDURE — 97110 THERAPEUTIC EXERCISES: CPT | Mod: GP | Performed by: PHYSICAL THERAPIST

## 2025-09-04 ENCOUNTER — APPOINTMENT (OUTPATIENT)
Dept: BEHAVIORAL HEALTH | Facility: CLINIC | Age: 45
End: 2025-09-04
Payer: COMMERCIAL

## 2025-09-04 ASSESSMENT — PATIENT HEALTH QUESTIONNAIRE - PHQ9
7. TROUBLE CONCENTRATING ON THINGS, SUCH AS READING THE NEWSPAPER OR WATCHING TELEVISION: NEARLY EVERY DAY
3. TROUBLE FALLING OR STAYING ASLEEP OR SLEEPING TOO MUCH: SEVERAL DAYS
8. MOVING OR SPEAKING SO SLOWLY THAT OTHER PEOPLE COULD HAVE NOTICED. OR THE OPPOSITE, BEING SO FIGETY OR RESTLESS THAT YOU HAVE BEEN MOVING AROUND A LOT MORE THAN USUAL: MORE THAN HALF THE DAYS
10. IF YOU CHECKED OFF ANY PROBLEMS, HOW DIFFICULT HAVE THESE PROBLEMS MADE IT FOR YOU TO DO YOUR WORK, TAKE CARE OF THINGS AT HOME, OR GET ALONG WITH OTHER PEOPLE: VERY DIFFICULT
9. THOUGHTS THAT YOU WOULD BE BETTER OFF DEAD, OR OF HURTING YOURSELF: SEVERAL DAYS
1. LITTLE INTEREST OR PLEASURE IN DOING THINGS: MORE THAN HALF THE DAYS
5. POOR APPETITE OR OVEREATING: NOT AT ALL
2. FEELING DOWN, DEPRESSED OR HOPELESS: NEARLY EVERY DAY
6. FEELING BAD ABOUT YOURSELF - OR THAT YOU ARE A FAILURE OR HAVE LET YOURSELF OR YOUR FAMILY DOWN: SEVERAL DAYS
4. FEELING TIRED OR HAVING LITTLE ENERGY: NEARLY EVERY DAY

## 2025-09-04 ASSESSMENT — ANXIETY QUESTIONNAIRES
6. BECOMING EASILY ANNOYED OR IRRITABLE: NOT AT ALL
4. TROUBLE RELAXING: SEVERAL DAYS
2. NOT BEING ABLE TO STOP OR CONTROL WORRYING: SEVERAL DAYS
1. FEELING NERVOUS, ANXIOUS, OR ON EDGE: SEVERAL DAYS
7. FEELING AFRAID AS IF SOMETHING AWFUL MIGHT HAPPEN: NOT AT ALL
3. WORRYING TOO MUCH ABOUT DIFFERENT THINGS: SEVERAL DAYS
5. BEING SO RESTLESS THAT IT IS HARD TO SIT STILL: NOT AT ALL
IF YOU CHECKED OFF ANY PROBLEMS ON THIS QUESTIONNAIRE, HOW DIFFICULT HAVE THESE PROBLEMS MADE IT FOR YOU TO DO YOUR WORK, TAKE CARE OF THINGS AT HOME, OR GET ALONG WITH OTHER PEOPLE: SOMEWHAT DIFFICULT
GAD7 TOTAL SCORE: 4

## 2025-09-09 ENCOUNTER — APPOINTMENT (OUTPATIENT)
Dept: PRIMARY CARE | Facility: CLINIC | Age: 45
End: 2025-09-09
Payer: COMMERCIAL

## 2025-09-11 ENCOUNTER — APPOINTMENT (OUTPATIENT)
Dept: INTEGRATIVE MEDICINE | Facility: CLINIC | Age: 45
End: 2025-09-11
Payer: COMMERCIAL

## 2025-09-17 ENCOUNTER — APPOINTMENT (OUTPATIENT)
Dept: OTOLARYNGOLOGY | Facility: CLINIC | Age: 45
End: 2025-09-17
Payer: COMMERCIAL

## 2025-09-18 ENCOUNTER — APPOINTMENT (OUTPATIENT)
Dept: INTEGRATIVE MEDICINE | Facility: CLINIC | Age: 45
End: 2025-09-18
Payer: COMMERCIAL

## 2025-09-23 ENCOUNTER — APPOINTMENT (OUTPATIENT)
Dept: INTEGRATIVE MEDICINE | Facility: CLINIC | Age: 45
End: 2025-09-23
Payer: COMMERCIAL

## 2025-10-16 ENCOUNTER — APPOINTMENT (OUTPATIENT)
Dept: BEHAVIORAL HEALTH | Facility: CLINIC | Age: 45
End: 2025-10-16
Payer: COMMERCIAL

## 2025-11-14 ENCOUNTER — APPOINTMENT (OUTPATIENT)
Dept: PRIMARY CARE | Facility: CLINIC | Age: 45
End: 2025-11-14
Payer: COMMERCIAL

## 2025-11-21 ENCOUNTER — APPOINTMENT (OUTPATIENT)
Dept: OBSTETRICS AND GYNECOLOGY | Facility: CLINIC | Age: 45
End: 2025-11-21
Payer: COMMERCIAL

## (undated) DEVICE — GLOVE, SURGICAL, PROTEXIS PI , 7.5, PF, LF

## (undated) DEVICE — TROCAR, OPTICAL BLADELESS 5MM X 100 W/ADVANCED FIXATION

## (undated) DEVICE — Device

## (undated) DEVICE — BRIEF, CURITY, XLARGE, MESH

## (undated) DEVICE — DRAPE, SHEET, THREE QUARTER, FAN FOLD, 57 X 77 IN

## (undated) DEVICE — STAPLER, SKIN PROXIMATE, 35 WIDE

## (undated) DEVICE — COVER, TIP HOT SHEARS ENDOWRIST

## (undated) DEVICE — ELECTRODE, CORKSCREW NEEDLE 1.5M LENGTH

## (undated) DEVICE — COVER, BACK TABLE, 65 X 90, HVY REINFORCED

## (undated) DEVICE — NEEDLE, ELECTRODE, SUBDERMAL, PAIRED, 2.0 LEAD, DISP

## (undated) DEVICE — SUTURE, MONOCRYL, 4-0, 18 IN, PS2, UNDYED

## (undated) DEVICE — BLADE, BEAVER, MINI, 15, STAINLESS STEEL

## (undated) DEVICE — GOWN, ASTOUND, L

## (undated) DEVICE — HOLSTER, JET SAFETY

## (undated) DEVICE — GRASPER, COBRA, DAVINCI XI

## (undated) DEVICE — TUBING, CLEAR N-COND, 5MM X 10, LF

## (undated) DEVICE — SUTURE, PROLENE, 3-0, 30 IN, SH

## (undated) DEVICE — OBTURATOR, BLADELESS , SU

## (undated) DEVICE — PREP TRAY, VAGINAL

## (undated) DEVICE — TUBE SET, PNEUMOLAR HEATED, SMOKE EVACU, HIGH-FLOW

## (undated) DEVICE — SUTURE, VICRYL, 3-0,18 IN, SH, UNDYED

## (undated) DEVICE — FORCEPS, BIPOLAR MARYLAND, DAVINCI XI

## (undated) DEVICE — GLOVE, SURGICAL, PROTEXIS PI MICRO, 7.5, PF, LF

## (undated) DEVICE — HEMOSTAT, SURGICEL POWDER 3.0GRAMS

## (undated) DEVICE — SOLUTION, INJECTION, USP, SODIUM CHLORIDE 0.9%, .9, NACL, 1000 ML, BAG

## (undated) DEVICE — IRRIGATION SET, CYSTOSCOPY, TURP, Y, CONTINUOUS, 81 IN

## (undated) DEVICE — SEALER, VESSEL, EXTENDED

## (undated) DEVICE — PUMP, STRYKERFLOW 2 & HANDPIECE W/10FT. IRRIGATION TUBING

## (undated) DEVICE — DRAPE, INSTRUMENT, W/POUCH, STERI DRAPE, 7 X 11 IN, DISPOSABLE, STERILE

## (undated) DEVICE — SYRINGE, 10 CC, LUER LOCK

## (undated) DEVICE — GLOVE, SURGICAL, PROTEXIS PI ORTHO, 7.5, PF, LF

## (undated) DEVICE — DRAPE PACK, LAVH, W/ATTACHED LEGGINGS, W/POUCH, 100 X 114 IN, LF, STERILE

## (undated) DEVICE — PAD, GROUNDING, ELECTROSURGICAL, W/9 FT CABLE, POLYHESIVE II, ADULT, LF

## (undated) DEVICE — DRAPE, ARM XI

## (undated) DEVICE — ADHESIVE, SKIN, DERMABOND ADVANCED, 15CM, PEN-STYLE

## (undated) DEVICE — ADHESIVE, SKIN, LIQUIBAND EXCEED

## (undated) DEVICE — TOWEL, SURGICAL, NEURO, O/R, 16 X 26, BLUE, STERILE

## (undated) DEVICE — SUTURE, SILK, 2-0, 18 IN, BLACK

## (undated) DEVICE — SYRINGE, LUER LOCK, 12ML

## (undated) DEVICE — TIP, SUCTION, FRAZIER, W/CONTROL VENT, 10 FR

## (undated) DEVICE — COVER HANDLE LIGHT, STERIS, BLUE, STERILE

## (undated) DEVICE — TUBING, SUCTION, NON-CONDUCTIVE, W/CONNECT,.25 IN X 12 FT, STERILE, LF

## (undated) DEVICE — STRIP, SKIN CLOSURE, STERI STRIP, REINFORCED, 0.5 X 4 IN

## (undated) DEVICE — SYRINGE, 60 CC, IRRIGATION, BULB, CONTRO-BULB, PAPER POUCH

## (undated) DEVICE — GLOVE, SURGICAL, PROTEXIS PI BLUE W/NEUTHERA, 7.5, PF, LF

## (undated) DEVICE — SUTURE, PLAIN, 5-0, 18 IN, PC1, YELLOW

## (undated) DEVICE — MARKER, SKIN, REGULAR TIP, W/FLEXI-RULER

## (undated) DEVICE — ELECTRODE, ELECTROSURGICAL, BLADE, INSULATED, ENT/IMA, STERILE

## (undated) DEVICE — CORD, CAUTERY, BIOPOLAR FORCEP, 12FT

## (undated) DEVICE — APPLICATOR, ARISTA, FLEXITIP, XL, LF

## (undated) DEVICE — ACCESSORY, FLUID MANAGEMENT, AVETA

## (undated) DEVICE — SOLUTION, IRRIGATION, SODIUM CHLORIDE 0.9%, 1000 ML, POUR BOTTLE

## (undated) DEVICE — DRAPE, COLUMN, DAVINCI XI

## (undated) DEVICE — DEVICE, FS-30

## (undated) DEVICE — DRIVER, MEGA NEEDLE

## (undated) DEVICE — SOLUTION, SCRUB EXIDINE, 4% CHG, 4 OZ

## (undated) DEVICE — SUTURE, PROLENE, 3-0, 18 IN, PS2, BLUE

## (undated) DEVICE — ELECTRODE, GROUND PLATE

## (undated) DEVICE — STAPLER, SKIN, PLUS, REGULAR, 35

## (undated) DEVICE — DRAPE, MICROSCOPE, FOR ZEISS 65MM, VARI-LENS II, 52 X 150

## (undated) DEVICE — GOWN, SURGICAL, SMARTGOWN, XLARGE, STERILE

## (undated) DEVICE — SHEAR, W/UNIPOLAR CAUTERY, ENDOSHEAR, 5 MM

## (undated) DEVICE — CARE KIT, LAPAROSCOPIC, ADVANCED

## (undated) DEVICE — TRAY, SURESTEP, URINE METER, 16FR, COMPLETE, W/STATLOCK

## (undated) DEVICE — EVACUATOR, WOUND, CLOSED, 3 SPRING, 400 CC, Y CONNECTING TUBE

## (undated) DEVICE — APPLICATOR, COTTON TIP, 6 IN, 2PK, STERILE

## (undated) DEVICE — NEEDLE, HYPODERMIC, 25 G X 1.5 IN, A BEVEL, STERILE

## (undated) DEVICE — TIP, SUCTION, FRAZIER, W/CONTROL VENT, 12 FR

## (undated) DEVICE — PAD, SANITARY, OBSTETRICAL, W/ADHSV STRIP,11 IN,LF

## (undated) DEVICE — TIP, ELECTROSURGICAL, 2.5 BLADE, MODIFIED

## (undated) DEVICE — DEVICE, RESECTING, AVETA FLEX, 2.9MM

## (undated) DEVICE — KIT, MINOR, DOUBLE BASIN

## (undated) DEVICE — SCREW, MAXCESS-C, 14MM DISTRACTION

## (undated) DEVICE — NEEDLE, INSUFFLATION, 13GAX120MM, DISP

## (undated) DEVICE — PENCIL, ELECTROSURG (BOVIE) FOOT SWITCH, LONGER CORD

## (undated) DEVICE — ACCESSORY, AVETA, WASTE MANAGEMENT WITH CAP

## (undated) DEVICE — SOLUTION, ANTI FOG, W/SPONGE, ENDOMATE

## (undated) DEVICE — APPLICATOR, ENDOSCOPIC, F/SURGICEL POWDER

## (undated) DEVICE — SUTURE, VICRYL, 0, 27 IN, UR-6, VIOLET